# Patient Record
Sex: FEMALE | Race: WHITE | NOT HISPANIC OR LATINO | Employment: PART TIME | URBAN - NONMETROPOLITAN AREA
[De-identification: names, ages, dates, MRNs, and addresses within clinical notes are randomized per-mention and may not be internally consistent; named-entity substitution may affect disease eponyms.]

---

## 2019-02-25 ENCOUNTER — HOSPITAL ENCOUNTER (EMERGENCY)
Facility: HOSPITAL | Age: 39
Discharge: HOME | End: 2019-02-25
Attending: EMERGENCY MEDICINE | Admitting: EMERGENCY MEDICINE
Payer: COMMERCIAL

## 2019-02-25 ENCOUNTER — APPOINTMENT (EMERGENCY)
Dept: RADIOLOGY | Facility: HOSPITAL | Age: 39
End: 2019-02-25
Payer: COMMERCIAL

## 2019-02-25 VITALS
BODY MASS INDEX: 29.23 KG/M2 | SYSTOLIC BLOOD PRESSURE: 126 MMHG | HEIGHT: 63 IN | RESPIRATION RATE: 18 BRPM | WEIGHT: 165 LBS | HEART RATE: 96 BPM | TEMPERATURE: 98.1 F | DIASTOLIC BLOOD PRESSURE: 78 MMHG | OXYGEN SATURATION: 98 %

## 2019-02-25 DIAGNOSIS — J02.9 PHARYNGITIS: Primary | ICD-10-CM

## 2019-02-25 PROCEDURE — 99283 EMERGENCY DEPT VISIT LOW MDM: CPT

## 2019-02-25 RX ORDER — AMOXICILLIN 500 MG/1
500 CAPSULE ORAL EVERY 8 HOURS SCHEDULED
Qty: 21 CAPSULE | Refills: 0 | Status: SHIPPED | OUTPATIENT
Start: 2019-02-25 | End: 2019-03-04

## 2019-02-25 NOTE — ED PROVIDER NOTES
History  Chief Complaint   Patient presents with    Sore Throat     Sore throat, congestion, cough since yesterday     Patient presents to the emergency department today offering a 24 hour history of sore throat  Vague history of a nonproductive cough at best   No ear pain  No nasal congestion  No fevers chills sweats  No sick contents  Denies novel body aches  Denies headache  None       Past Medical History:   Diagnosis Date    Bipolar depression [facility])        Past Surgical History:   Procedure Laterality Date     SECTION         History reviewed  No pertinent family history  I have reviewed and agree with the history as documented  Social History     Tobacco Use    Smoking status: Current Every Day Smoker     Types: Cigarettes    Smokeless tobacco: Never Used   Substance Use Topics    Alcohol use: Never     Frequency: Never    Drug use: Not Currently        Review of Systems   Constitutional: Negative  HENT: Positive for sore throat  Negative for congestion, dental problem, drooling, ear discharge, ear pain, facial swelling, hearing loss, mouth sores, nosebleeds, postnasal drip, rhinorrhea, sinus pressure, sinus pain, sneezing, tinnitus, trouble swallowing and voice change  Eyes: Negative  Respiratory: Positive for cough  Negative for choking, chest tightness, shortness of breath, wheezing and stridor  Cardiovascular: Negative  Gastrointestinal: Negative  Endocrine: Negative  Genitourinary: Negative  Musculoskeletal: Negative  Skin: Negative  Allergic/Immunologic: Negative  Neurological: Negative  Hematological: Negative  Psychiatric/Behavioral: Negative  All other systems reviewed and are negative  Physical Exam  Physical Exam   Constitutional: She is oriented to person, place, and time  She appears well-developed and well-nourished  Non-toxic appearance  She does not appear ill  No distress  HENT:   Head: Normocephalic  Mouth/Throat: No oral lesions  No uvula swelling  Posterior oropharyngeal erythema present  No oropharyngeal exudate, posterior oropharyngeal edema or tonsillar abscesses  No tonsillar exudate  Eyes: Pupils are equal, round, and reactive to light  Neck: Normal range of motion  Cardiovascular: Normal rate  Pulmonary/Chest: Effort normal    Abdominal: Soft  Neurological: She is alert and oriented to person, place, and time  Skin: Skin is warm  Capillary refill takes less than 2 seconds  Psychiatric: She has a normal mood and affect  Vitals reviewed  Vital Signs  ED Triage Vitals [02/25/19 1023]   Temperature Pulse Respirations Blood Pressure SpO2   98 1 °F (36 7 °C) 96 18 126/78 98 %      Temp Source Heart Rate Source Patient Position - Orthostatic VS BP Location FiO2 (%)   Temporal Monitor Sitting Right arm --      Pain Score       No Pain           Vitals:    02/25/19 1023   BP: 126/78   Pulse: 96   Patient Position - Orthostatic VS: Sitting       Visual Acuity      ED Medications  Medications - No data to display    Diagnostic Studies  Results Reviewed     None                 XR chest 2 views    (Results Pending)              Procedures  Procedures       Phone Contacts  ED Phone Contact    ED Course  ED Course as of Feb 25 1157   Mon Feb 25, 2019   1152 Blood Pressure: 126/78   1152 Temperature: 98 1 °F (36 7 °C)   1152 Pulse: 96   1152 Respirations: 18   1152 SpO2: 98 %                               MDM    Disposition  Final diagnoses:   Pharyngitis     Time reflects when diagnosis was documented in both MDM as applicable and the Disposition within this note     Time User Action Codes Description Comment    2/25/2019 11:56 AM Cheryle Legions D Add [J02 9] Pharyngitis       ED Disposition     ED Disposition Condition Date/Time Comment    Discharge Good Mon Feb 25, 2019 11:56 AM Laila Jeffery discharge to home/self care              Follow-up Information    None         Patient's Medications Discharge Prescriptions    AMOXICILLIN (AMOXIL) 500 MG CAPSULE    Take 1 capsule (500 mg total) by mouth every 8 (eight) hours for 7 days       Start Date: 2/25/2019 End Date: 3/4/2019       Order Dose: 500 mg       Quantity: 21 capsule    Refills: 0     No discharge procedures on file      ED Provider  Electronically Signed by           Bryan Fung PA-C  02/25/19 1159

## 2019-04-23 ENCOUNTER — HOSPITAL ENCOUNTER (EMERGENCY)
Facility: HOSPITAL | Age: 39
Discharge: HOME | End: 2019-04-23
Attending: EMERGENCY MEDICINE
Payer: COMMERCIAL

## 2019-04-23 VITALS
OXYGEN SATURATION: 95 % | RESPIRATION RATE: 14 BRPM | BODY MASS INDEX: 31.21 KG/M2 | TEMPERATURE: 97.6 F | WEIGHT: 176.2 LBS | SYSTOLIC BLOOD PRESSURE: 139 MMHG | DIASTOLIC BLOOD PRESSURE: 88 MMHG | HEART RATE: 88 BPM

## 2019-04-23 DIAGNOSIS — J01.10 ACUTE FRONTAL SINUSITIS, RECURRENCE NOT SPECIFIED: Primary | ICD-10-CM

## 2019-04-23 PROCEDURE — 99283 EMERGENCY DEPT VISIT LOW MDM: CPT | Performed by: EMERGENCY MEDICINE

## 2019-04-23 PROCEDURE — 99283 EMERGENCY DEPT VISIT LOW MDM: CPT

## 2019-04-23 RX ORDER — DOXYCYCLINE HYCLATE 100 MG/1
100 CAPSULE ORAL ONCE
Status: COMPLETED | OUTPATIENT
Start: 2019-04-23 | End: 2019-04-23

## 2019-04-23 RX ORDER — DOXYCYCLINE HYCLATE 100 MG/1
100 CAPSULE ORAL 2 TIMES DAILY
Qty: 14 CAPSULE | Refills: 0 | Status: SHIPPED | OUTPATIENT
Start: 2019-04-23 | End: 2019-04-30

## 2019-04-23 RX ORDER — FLUTICASONE PROPIONATE 50 MCG
1 SPRAY, SUSPENSION (ML) NASAL DAILY
Qty: 16 G | Refills: 0 | Status: SHIPPED | OUTPATIENT
Start: 2019-04-23 | End: 2021-10-07

## 2019-04-23 RX ADMIN — DOXYCYCLINE HYCLATE 100 MG: 100 CAPSULE ORAL at 09:58

## 2020-12-26 ENCOUNTER — HOSPITAL ENCOUNTER (EMERGENCY)
Facility: HOSPITAL | Age: 40
Discharge: HOME | End: 2020-12-26
Attending: EMERGENCY MEDICINE | Admitting: EMERGENCY MEDICINE
Payer: COMMERCIAL

## 2020-12-26 ENCOUNTER — APPOINTMENT (EMERGENCY)
Dept: ULTRASOUND IMAGING | Facility: HOSPITAL | Age: 40
End: 2020-12-26
Payer: COMMERCIAL

## 2020-12-26 VITALS
OXYGEN SATURATION: 99 % | RESPIRATION RATE: 18 BRPM | WEIGHT: 198 LBS | TEMPERATURE: 97.5 F | HEART RATE: 80 BPM | BODY MASS INDEX: 35.07 KG/M2 | DIASTOLIC BLOOD PRESSURE: 69 MMHG | SYSTOLIC BLOOD PRESSURE: 119 MMHG

## 2020-12-26 DIAGNOSIS — D64.9 ANEMIA: ICD-10-CM

## 2020-12-26 DIAGNOSIS — N93.8 DYSFUNCTIONAL UTERINE BLEEDING: Primary | ICD-10-CM

## 2020-12-26 LAB
ANION GAP SERPL CALCULATED.3IONS-SCNC: 11 MMOL/L (ref 4–13)
BACTERIA UR QL AUTO: ABNORMAL /HPF
BASOPHILS # BLD AUTO: 0.09 THOUSANDS/ΜL (ref 0–0.1)
BASOPHILS NFR BLD AUTO: 1 % (ref 0–1)
BILIRUB UR QL STRIP: NEGATIVE
BUN SERPL-MCNC: 8 MG/DL (ref 5–25)
CALCIUM SERPL-MCNC: 8.4 MG/DL (ref 8.3–10.1)
CHLORIDE SERPL-SCNC: 105 MMOL/L (ref 100–108)
CLARITY UR: ABNORMAL
CO2 SERPL-SCNC: 23 MMOL/L (ref 21–32)
COLOR UR: ABNORMAL
CREAT SERPL-MCNC: 0.79 MG/DL (ref 0.6–1.3)
EOSINOPHIL # BLD AUTO: 0.23 THOUSAND/ΜL (ref 0–0.61)
EOSINOPHIL NFR BLD AUTO: 3 % (ref 0–6)
ERYTHROCYTE [DISTWIDTH] IN BLOOD BY AUTOMATED COUNT: 17.1 % (ref 11.6–15.1)
EXT PREG TEST URINE: NEGATIVE
EXT. CONTROL ED NAV: NORMAL
GFR SERPL CREATININE-BSD FRML MDRD: 94 ML/MIN/1.73SQ M
GLUCOSE SERPL-MCNC: 154 MG/DL (ref 65–140)
GLUCOSE UR STRIP-MCNC: NEGATIVE MG/DL
HCT VFR BLD AUTO: 29.8 % (ref 34.8–46.1)
HGB BLD-MCNC: 8.8 G/DL (ref 11.5–15.4)
HGB UR QL STRIP.AUTO: ABNORMAL
IMM GRANULOCYTES # BLD AUTO: 0.06 THOUSAND/UL (ref 0–0.2)
IMM GRANULOCYTES NFR BLD AUTO: 1 % (ref 0–2)
INR PPP: 1.09 (ref 0.84–1.19)
KETONES UR STRIP-MCNC: NEGATIVE MG/DL
LEUKOCYTE ESTERASE UR QL STRIP: NEGATIVE
LYMPHOCYTES # BLD AUTO: 1.65 THOUSANDS/ΜL (ref 0.6–4.47)
LYMPHOCYTES NFR BLD AUTO: 20 % (ref 14–44)
MCH RBC QN AUTO: 24.4 PG (ref 26.8–34.3)
MCHC RBC AUTO-ENTMCNC: 29.5 G/DL (ref 31.4–37.4)
MCV RBC AUTO: 83 FL (ref 82–98)
MONOCYTES # BLD AUTO: 0.5 THOUSAND/ΜL (ref 0.17–1.22)
MONOCYTES NFR BLD AUTO: 6 % (ref 4–12)
NEUTROPHILS # BLD AUTO: 5.74 THOUSANDS/ΜL (ref 1.85–7.62)
NEUTS SEG NFR BLD AUTO: 69 % (ref 43–75)
NITRITE UR QL STRIP: NEGATIVE
NON-SQ EPI CELLS URNS QL MICRO: ABNORMAL /HPF
NRBC BLD AUTO-RTO: 0 /100 WBCS
PH UR STRIP.AUTO: 6.5 [PH]
PLATELET # BLD AUTO: 251 THOUSANDS/UL (ref 149–390)
PMV BLD AUTO: 9.6 FL (ref 8.9–12.7)
POTASSIUM SERPL-SCNC: 4.1 MMOL/L (ref 3.5–5.3)
PROT UR STRIP-MCNC: ABNORMAL MG/DL
PROTHROMBIN TIME: 13.9 SECONDS (ref 11.6–14.5)
RBC # BLD AUTO: 3.6 MILLION/UL (ref 3.81–5.12)
RBC #/AREA URNS AUTO: ABNORMAL /HPF
SODIUM SERPL-SCNC: 139 MMOL/L (ref 136–145)
SP GR UR STRIP.AUTO: 1.03 (ref 1–1.03)
TSH SERPL DL<=0.05 MIU/L-ACNC: 2.17 UIU/ML (ref 0.36–3.74)
UROBILINOGEN UR QL STRIP.AUTO: 0.2 E.U./DL
WBC # BLD AUTO: 8.27 THOUSAND/UL (ref 4.31–10.16)
WBC #/AREA URNS AUTO: ABNORMAL /HPF

## 2020-12-26 PROCEDURE — 81001 URINALYSIS AUTO W/SCOPE: CPT | Performed by: EMERGENCY MEDICINE

## 2020-12-26 PROCEDURE — 80048 BASIC METABOLIC PNL TOTAL CA: CPT | Performed by: EMERGENCY MEDICINE

## 2020-12-26 PROCEDURE — 99284 EMERGENCY DEPT VISIT MOD MDM: CPT

## 2020-12-26 PROCEDURE — 85025 COMPLETE CBC W/AUTO DIFF WBC: CPT | Performed by: EMERGENCY MEDICINE

## 2020-12-26 PROCEDURE — 84443 ASSAY THYROID STIM HORMONE: CPT | Performed by: EMERGENCY MEDICINE

## 2020-12-26 PROCEDURE — 76856 US EXAM PELVIC COMPLETE: CPT

## 2020-12-26 PROCEDURE — 96365 THER/PROPH/DIAG IV INF INIT: CPT

## 2020-12-26 PROCEDURE — 81025 URINE PREGNANCY TEST: CPT | Performed by: EMERGENCY MEDICINE

## 2020-12-26 PROCEDURE — 36415 COLL VENOUS BLD VENIPUNCTURE: CPT | Performed by: EMERGENCY MEDICINE

## 2020-12-26 PROCEDURE — 99285 EMERGENCY DEPT VISIT HI MDM: CPT | Performed by: EMERGENCY MEDICINE

## 2020-12-26 PROCEDURE — 76830 TRANSVAGINAL US NON-OB: CPT

## 2020-12-26 PROCEDURE — 85610 PROTHROMBIN TIME: CPT | Performed by: EMERGENCY MEDICINE

## 2020-12-26 PROCEDURE — 96375 TX/PRO/DX INJ NEW DRUG ADDON: CPT

## 2020-12-26 RX ORDER — ACETAMINOPHEN 325 MG/1
975 TABLET ORAL ONCE
Status: COMPLETED | OUTPATIENT
Start: 2020-12-26 | End: 2020-12-26

## 2020-12-26 RX ORDER — FERROUS SULFATE 325(65) MG
325 TABLET ORAL DAILY
Qty: 30 TABLET | Refills: 1 | Status: SHIPPED | OUTPATIENT
Start: 2020-12-26 | End: 2021-10-07

## 2020-12-26 RX ORDER — TRANEXAMIC ACID 650 1/1
650 TABLET ORAL 3 TIMES DAILY
Qty: 15 TABLET | Refills: 0 | Status: SHIPPED | OUTPATIENT
Start: 2020-12-26 | End: 2020-12-31

## 2020-12-26 RX ORDER — FERROUS SULFATE 325(65) MG
325 TABLET ORAL ONCE
Status: COMPLETED | OUTPATIENT
Start: 2020-12-26 | End: 2020-12-26

## 2020-12-26 RX ORDER — FENTANYL CITRATE 50 UG/ML
50 INJECTION, SOLUTION INTRAMUSCULAR; INTRAVENOUS ONCE
Status: COMPLETED | OUTPATIENT
Start: 2020-12-26 | End: 2020-12-26

## 2020-12-26 RX ADMIN — TRANEXAMIC ACID 1000 MG: 1 INJECTION, SOLUTION INTRAVENOUS at 12:55

## 2020-12-26 RX ADMIN — FENTANYL CITRATE 50 MCG: 0.05 INJECTION, SOLUTION INTRAMUSCULAR; INTRAVENOUS at 09:53

## 2020-12-26 RX ADMIN — FERROUS SULFATE TAB 325 MG (65 MG ELEMENTAL FE) 325 MG: 325 (65 FE) TAB at 10:12

## 2020-12-26 RX ADMIN — ACETAMINOPHEN 975 MG: 325 TABLET, FILM COATED ORAL at 09:51

## 2020-12-30 ENCOUNTER — TELEPHONE (OUTPATIENT)
Dept: OBGYN CLINIC | Facility: MEDICAL CENTER | Age: 40
End: 2020-12-30

## 2021-04-19 ENCOUNTER — IMMUNIZATIONS (OUTPATIENT)
Dept: FAMILY MEDICINE CLINIC | Facility: HOSPITAL | Age: 41
End: 2021-04-19

## 2021-04-19 DIAGNOSIS — Z23 ENCOUNTER FOR IMMUNIZATION: Primary | ICD-10-CM

## 2021-04-19 PROCEDURE — 91301 SARS-COV-2 / COVID-19 MRNA VACCINE (MODERNA) 100 MCG: CPT

## 2021-04-19 PROCEDURE — 0011A SARS-COV-2 / COVID-19 MRNA VACCINE (MODERNA) 100 MCG: CPT

## 2021-05-18 ENCOUNTER — IMMUNIZATIONS (OUTPATIENT)
Dept: FAMILY MEDICINE CLINIC | Facility: HOSPITAL | Age: 41
End: 2021-05-18

## 2021-05-18 DIAGNOSIS — Z23 ENCOUNTER FOR IMMUNIZATION: Primary | ICD-10-CM

## 2021-05-18 PROCEDURE — 91301 SARS-COV-2 / COVID-19 MRNA VACCINE (MODERNA) 100 MCG: CPT

## 2021-05-18 PROCEDURE — 0012A SARS-COV-2 / COVID-19 MRNA VACCINE (MODERNA) 100 MCG: CPT

## 2021-09-30 ENCOUNTER — OFFICE VISIT (OUTPATIENT)
Dept: URGENT CARE | Facility: CLINIC | Age: 41
End: 2021-09-30
Payer: COMMERCIAL

## 2021-09-30 VITALS
BODY MASS INDEX: 34.2 KG/M2 | TEMPERATURE: 97.8 F | RESPIRATION RATE: 18 BRPM | HEART RATE: 91 BPM | HEIGHT: 63 IN | OXYGEN SATURATION: 99 % | WEIGHT: 193 LBS

## 2021-09-30 DIAGNOSIS — B34.9 VIRAL ILLNESS: Primary | ICD-10-CM

## 2021-09-30 PROCEDURE — U0005 INFEC AGEN DETEC AMPLI PROBE: HCPCS | Performed by: PHYSICIAN ASSISTANT

## 2021-09-30 PROCEDURE — 99213 OFFICE O/P EST LOW 20 MIN: CPT | Performed by: PHYSICIAN ASSISTANT

## 2021-09-30 PROCEDURE — U0003 INFECTIOUS AGENT DETECTION BY NUCLEIC ACID (DNA OR RNA); SEVERE ACUTE RESPIRATORY SYNDROME CORONAVIRUS 2 (SARS-COV-2) (CORONAVIRUS DISEASE [COVID-19]), AMPLIFIED PROBE TECHNIQUE, MAKING USE OF HIGH THROUGHPUT TECHNOLOGIES AS DESCRIBED BY CMS-2020-01-R: HCPCS | Performed by: PHYSICIAN ASSISTANT

## 2021-09-30 NOTE — PATIENT INSTRUCTIONS
You can take vitamin D3 2000 IU daily, vitamin-C 1 g every 12 hours, and a daily multivitamin  Please check your sugars more frequently  Call your primary care provider and schedule a follow-up tele visit within the next 3 days  Follow CDC guidelines for self quarantine as discussed  [address]    Your healthcare provider and/or public health staff have evaluated you and have determined that you do not need to remain in the hospital at this time  At this time you can be isolated at home where you will be monitored by staff from your local or state health department  You should carefully follow the prevention and isolation steps below until a healthcare provider or local or state health department says that you can return to your normal activities  Stay home except to get medical care    People who are mildly ill with COVID-19 are able to isolate at home during their illness  You should restrict activities outside your home, except for getting medical care  Do not go to work, school, or public areas  Avoid using public transportation, ride-sharing, or taxis  Separate yourself from other people and animals in your home    People: As much as possible, you should stay in a specific room and away from other people in your home  Also, you should use a separate bathroom, if available  Animals: You should restrict contact with pets and other animals while you are sick with COVID-19, just like you would around other people  Although there have not been reports of pets or other animals becoming sick with COVID-19, it is still recommended that people sick with COVID-19 limit contact with animals until more information is known about the virus  When possible, have another member of your household care for your animals while you are sick  If you are sick with COVID-19, avoid contact with your pet, including petting, snuggling, being kissed or licked, and sharing food   If you must care for your pet or be around animals while you are sick, wash your hands before and after you interact with pets and wear a facemask  See COVID-19 and Animals for more information  Call ahead before visiting your doctor    If you have a medical appointment, call the healthcare provider and tell them that you have or may have COVID-19  This will help the healthcare providers office take steps to keep other people from getting infected or exposed  Wear a facemask    You should wear a facemask when you are around other people (e g , sharing a room or vehicle) or pets and before you enter a healthcare providers office  If you are not able to wear a facemask (for example, because it causes trouble breathing), then people who live with you should not stay in the same room with you, or they should wear a facemask if they enter your room  Cover your coughs and sneezes    Cover your mouth and nose with a tissue when you cough or sneeze  Throw used tissues in a lined trash can  Immediately wash your hands with soap and water for at least 20 seconds or, if soap and water are not available, clean your hands with an alcohol-based hand  that contains at least 60% alcohol  Clean your hands often    Wash your hands often with soap and water for at least 20 seconds, especially after blowing your nose, coughing, or sneezing; going to the bathroom; and before eating or preparing food  If soap and water are not readily available, use an alcohol-based hand  with at least 60% alcohol, covering all surfaces of your hands and rubbing them together until they feel dry  Soap and water are the best option if hands are visibly dirty  Avoid touching your eyes, nose, and mouth with unwashed hands  Avoid sharing personal household items    You should not share dishes, drinking glasses, cups, eating utensils, towels, or bedding with other people or pets in your home   After using these items, they should be washed thoroughly with soap and water  Clean all high-touch surfaces everyday    High touch surfaces include counters, tabletops, doorknobs, bathroom fixtures, toilets, phones, keyboards, tablets, and bedside tables  Also, clean any surfaces that may have blood, stool, or body fluids on them  Use a household cleaning spray or wipe, according to the label instructions  Labels contain instructions for safe and effective use of the cleaning product including precautions you should take when applying the product, such as wearing gloves and making sure you have good ventilation during use of the product  Monitor your symptoms    Seek prompt medical attention if your illness is worsening (e g , difficulty breathing)  Before seeking care, call your healthcare provider and tell them that you have, or are being evaluated for, COVID-19  Put on a facemask before you enter the facility  These steps will help the healthcare providers office to keep other people in the office or waiting room from getting infected or exposed  Ask your healthcare provider to call the local or [facility] health department  Persons who are placed under active monitoring or facilitated self-monitoring should follow instructions provided by their local health department or occupational health professionals, as appropriate  If you have a medical emergency and need to call 911, notify the dispatch personnel that you have, or are being evaluated for COVID-19  If possible, put on a facemask before emergency medical services arrive      Discontinuing home isolation    Patients with confirmed COVID-19 should remain under home isolation precautions until the following conditions are met:   - They have had no fever for at least 24 hours (that is one full day of no fever without the use medicine that reduces fevers)  AND  - other symptoms have improved (for example, when their cough or shortness of breath have improved)  AND  - If had mild or moderate illness, at least 10 days have passed since their symptoms first appeared or if severe illness (needed oxygen) or immunosuppressed, at least 20 days have passed since symptoms first appeared  Patients with confirmed COVID-19 should also notify close contacts (including their workplace) and ask that they self-quarantine  Currently, close contact is defined as being within 6 feet for 15 minutes or more from the period 24 hours starting 48 hours before symptom onset to the time at which the patient went into isolation  Close contacts of patients diagnosed with COVID-19 should be instructed by the patient to self-quarantine for 14 days from the last time of their last contact with the patient       Source: [employer]

## 2021-09-30 NOTE — PROGRESS NOTES
[facility]          NAME: Barry Ramírez is a 39 y o  female  : 1980    MRN: [id number]  DATE: 2021  TIME: 3:03 PM    Assessment and Plan   Viral illness [B34 9]  1  Viral illness  Novel Coronavirus (Covid-19),PCR [facility] - Office Collection       Patient Instructions   You can take vitamin D3 2000 IU daily, vitamin-C 1 g every 12 hours, and a daily multivitamin  Please check your sugars more frequently  Call your primary care provider and schedule a follow-up tele visit within the next 3 days  Follow CDC guidelines for self quarantine as discussed  [address]    Your healthcare provider and/or public health staff have evaluated you and have determined that you do not need to remain in the hospital at this time  At this time you can be isolated at home where you will be monitored by staff from your local or state health department  You should carefully follow the prevention and isolation steps below until a healthcare provider or local or state health department says that you can return to your normal activities  Stay home except to get medical care    People who are mildly ill with COVID-19 are able to isolate at home during their illness  You should restrict activities outside your home, except for getting medical care  Do not go to work, school, or public areas  Avoid using public transportation, ride-sharing, or taxis  Separate yourself from other people and animals in your home    People: As much as possible, you should stay in a specific room and away from other people in your home  Also, you should use a separate bathroom, if available  Animals: You should restrict contact with pets and other animals while you are sick with COVID-19, just like you would around other people   Although there have not been reports of pets or other animals becoming sick with COVID-19, it is still recommended that people sick with COVID-19 limit contact with animals until more information is known about the virus  When possible, have another member of your household care for your animals while you are sick  If you are sick with COVID-19, avoid contact with your pet, including petting, snuggling, being kissed or licked, and sharing food  If you must care for your pet or be around animals while you are sick, wash your hands before and after you interact with pets and wear a facemask  See COVID-19 and Animals for more information  Call ahead before visiting your doctor    If you have a medical appointment, call the healthcare provider and tell them that you have or may have COVID-19  This will help the healthcare providers office take steps to keep other people from getting infected or exposed  Wear a facemask    You should wear a facemask when you are around other people (e g , sharing a room or vehicle) or pets and before you enter a healthcare providers office  If you are not able to wear a facemask (for example, because it causes trouble breathing), then people who live with you should not stay in the same room with you, or they should wear a facemask if they enter your room  Cover your coughs and sneezes    Cover your mouth and nose with a tissue when you cough or sneeze  Throw used tissues in a lined trash can  Immediately wash your hands with soap and water for at least 20 seconds or, if soap and water are not available, clean your hands with an alcohol-based hand  that contains at least 60% alcohol  Clean your hands often    Wash your hands often with soap and water for at least 20 seconds, especially after blowing your nose, coughing, or sneezing; going to the bathroom; and before eating or preparing food  If soap and water are not readily available, use an alcohol-based hand  with at least 60% alcohol, covering all surfaces of your hands and rubbing them together until they feel dry    Soap and water are the best option if hands are visibly dirty  Avoid touching your eyes, nose, and mouth with unwashed hands  Avoid sharing personal household items    You should not share dishes, drinking glasses, cups, eating utensils, towels, or bedding with other people or pets in your home  After using these items, they should be washed thoroughly with soap and water  Clean all high-touch surfaces everyday    High touch surfaces include counters, tabletops, doorknobs, bathroom fixtures, toilets, phones, keyboards, tablets, and bedside tables  Also, clean any surfaces that may have blood, stool, or body fluids on them  Use a household cleaning spray or wipe, according to the label instructions  Labels contain instructions for safe and effective use of the cleaning product including precautions you should take when applying the product, such as wearing gloves and making sure you have good ventilation during use of the product  Monitor your symptoms    Seek prompt medical attention if your illness is worsening (e g , difficulty breathing)  Before seeking care, call your healthcare provider and tell them that you have, or are being evaluated for, COVID-19  Put on a facemask before you enter the facility  These steps will help the healthcare providers office to keep other people in the office or waiting room from getting infected or exposed  Ask your healthcare provider to call the local or state health department  Persons who are placed under active monitoring or facilitated self-monitoring should follow instructions provided by their local health department or occupational health professionals, as appropriate  If you have a medical emergency and need to call 911, notify the dispatch personnel that you have, or are being evaluated for COVID-19  If possible, put on a facemask before emergency medical services arrive      Discontinuing home isolation    Patients with confirmed COVID-19 should remain under home isolation precautions until the following conditions are met:   - They have had no fever for at least 24 hours (that is one full day of no fever without the use medicine that reduces fevers)  AND  - other symptoms have improved (for example, when their cough or shortness of breath have improved)  AND  - If had mild or moderate illness, at least 10 days have passed since their symptoms first appeared or if severe illness (needed oxygen) or immunosuppressed, at least 20 days have passed since symptoms first appeared  Patients with confirmed COVID-19 should also notify close contacts (including their workplace) and ask that they self-quarantine  Currently, close contact is defined as being within 6 feet for 15 minutes or more from the period 24 hours starting 48 hours before symptom onset to the time at which the patient went into isolation  Close contacts of patients diagnosed with COVID-19 should be instructed by the patient to self-quarantine for 14 days from the last time of their last contact with the patient  Source: [employer]   To present to the ER if symptoms worsen  Chief Complaint     Chief Complaint   Patient presents with    Cold Like Symptoms     headache, sinus congestion, sore throat for 2 days         History of Present Illness   Azam Davila presents to the clinic c/o    + covid exposure 9/24  Fully vaccinated against covid  Sinusitis  This is a new problem  The current episode started yesterday  The problem is unchanged  There has been no fever  The pain is moderate  Associated symptoms include chills, congestion, coughing, headaches, sinus pressure and a sore throat  Pertinent negatives include no diaphoresis, ear pain or shortness of breath  Past treatments include nothing  The treatment provided no relief  Review of Systems   Review of Systems   Constitutional: Positive for chills  Negative for diaphoresis, fatigue and fever     HENT: Positive for congestion, sinus pressure and sore throat  Negative for ear discharge, ear pain and facial swelling  Eyes: Negative for photophobia, pain, discharge, redness, itching and visual disturbance  Respiratory: Positive for cough  Negative for apnea, chest tightness, shortness of breath and wheezing  Cardiovascular: Negative for chest pain and palpitations  Gastrointestinal: Negative for abdominal pain  Skin: Negative for color change, rash and wound  Neurological: Positive for headaches  Negative for dizziness  Hematological: Negative for adenopathy           Current Medications     Long-Term Medications   Medication Sig Dispense Refill    ferrous sulfate 325 (65 Fe) mg tablet Take 1 tablet (325 mg total) by mouth daily (Patient not taking: Reported on 2021) 30 tablet 1    fluticasone (FLONASE) 50 mcg/act nasal spray 1 spray into each nostril daily (Patient not taking: Reported on 2021) 16 g 0       Current Allergies     Allergies as of 2021 - Reviewed 2021   Allergen Reaction Noted    Clonazepam Other (See Comments) 2019    Latex  2019    Methylphenidate Other (See Comments) 2020            The following portions of the patient's history were reviewed and updated as appropriate: allergies, current medications, past family history, past medical history, past social history, past surgical history and problem list   Past Medical History:   Diagnosis Date    [facility]      Past Surgical History:   Procedure Laterality Date     SECTION       Social History     Socioeconomic History    Marital status: /Civil Union     Spouse name: Not on file    Number of children: Not on file    Years of education: Not on file    Highest education level: Not on file   Occupational History    Not on file   Tobacco Use    Smoking status: Current Every Day Smoker     Packs/day:      Types: Cigarettes    Smokeless tobacco: Never Used   Vaping Use    Vaping Use: Never used   Substance and Sexual Activity    Alcohol use: Never    Drug use: Not Currently    Sexual activity: Not on file   Other Topics Concern    Not on file   Social History Narrative    Not on file     Social Determinants of Health     Financial Resource Strain:     Difficulty of Paying Living Expenses:    Food Insecurity:     Worried About Running Out of Food in the Last Year:     [address] in the Last Year:    Transportation Needs:     Lack of Transportation (Medical):  Lack of Transportation (Non-Medical):    Physical Activity:     Days of Exercise per Week:     Minutes of Exercise per Session:    Stress:     Feeling of Stress :    Social Connections:     Frequency of Communication with Friends and Family:     Frequency of Social Gatherings with Friends and Family:     Attends Denominational Services:     Active Member of Clubs or Organizations:     Attends Club or Organization Meetings:     Marital Status:    Intimate Partner Violence:     Fear of Current or Ex-Partner:     Emotionally Abused:     Physically Abused:     Sexually Abused:        Objective   Pulse 91   Temp 97 8 °F (36 6 °C) (Temporal)   Resp 18   Ht 5' 3" (1 6 m)   Wt 87 5 kg (193 lb)   SpO2 99%   BMI 34 19 kg/m²      Physical Exam     Physical Exam  Vitals and nursing note reviewed  Constitutional:       General: She is not in acute distress  Appearance: She is well-developed  She is not diaphoretic  HENT:      Head: Normocephalic and atraumatic  Right Ear: Tympanic membrane and external ear normal       Left Ear: Tympanic membrane and external ear normal       Nose: Nose normal       Mouth/Throat:      Mouth: Mucous membranes are moist       Pharynx: Oropharynx is clear  No oropharyngeal exudate or posterior oropharyngeal erythema  Eyes:      General: No scleral icterus  Right eye: No discharge  Left eye: No discharge        Conjunctiva/sclera: Conjunctivae normal  Cardiovascular:      Rate and Rhythm: Normal rate and regular rhythm  Heart sounds: Normal heart sounds  No murmur heard  No friction rub  No gallop  Pulmonary:      Effort: Pulmonary effort is normal  No respiratory distress  Breath sounds: Normal breath sounds  No decreased breath sounds, wheezing, rhonchi or rales  Skin:     General: Skin is warm and dry  Coloration: Skin is not pale  Findings: No erythema or rash  Neurological:      Mental Status: She is alert and oriented to person, place, and time  Psychiatric:         Behavior: Behavior normal          Thought Content:  Thought content normal          Judgment: Judgment normal          Trevor Amezcua PA-C

## 2021-09-30 NOTE — LETTER
September 30, 2021     Patient: Sloane Santiago   YOB: 1980   Date of Visit: 9/30/2021       To Whom it May Concern:    Sloane Santiago is under my professional care  She was seen in my office on 9/30/2021  Patient cannot return to work until cleared by medical provider  If you have any questions or concerns, please don't hesitate to call           Sincerely,          Margie uBtts PA-C        CC: No Recipients

## 2021-10-01 ENCOUNTER — TELEPHONE (OUTPATIENT)
Dept: INTERNAL MEDICINE CLINIC | Facility: CLINIC | Age: 41
End: 2021-10-01

## 2021-10-01 LAB — SARS-COV-2 RNA RESP QL NAA+PROBE: NEGATIVE

## 2021-10-07 ENCOUNTER — OFFICE VISIT (OUTPATIENT)
Dept: INTERNAL MEDICINE CLINIC | Facility: CLINIC | Age: 41
End: 2021-10-07
Payer: COMMERCIAL

## 2021-10-07 VITALS
HEART RATE: 82 BPM | HEIGHT: 63 IN | DIASTOLIC BLOOD PRESSURE: 70 MMHG | BODY MASS INDEX: 33.54 KG/M2 | WEIGHT: 189.3 LBS | SYSTOLIC BLOOD PRESSURE: 124 MMHG | OXYGEN SATURATION: 98 % | TEMPERATURE: 97.6 F

## 2021-10-07 DIAGNOSIS — Z13.6 ENCOUNTER FOR SCREENING FOR CARDIOVASCULAR DISORDERS: ICD-10-CM

## 2021-10-07 DIAGNOSIS — M54.2 NECK PAIN: ICD-10-CM

## 2021-10-07 DIAGNOSIS — D50.0 IRON DEFICIENCY ANEMIA DUE TO CHRONIC BLOOD LOSS: ICD-10-CM

## 2021-10-07 DIAGNOSIS — K76.0 FATTY LIVER: ICD-10-CM

## 2021-10-07 DIAGNOSIS — F31.81 BIPOLAR 2 DISORDER (HCC): Primary | ICD-10-CM

## 2021-10-07 DIAGNOSIS — M62.830 BACK SPASM: ICD-10-CM

## 2021-10-07 DIAGNOSIS — Z62.810 PERSONAL HISTORY OF SEXUAL ABUSE IN CHILDHOOD: ICD-10-CM

## 2021-10-07 DIAGNOSIS — Z23 NEED FOR INFLUENZA VACCINATION: ICD-10-CM

## 2021-10-07 DIAGNOSIS — Z91.410 HISTORY OF SEXUAL ABUSE IN ADULTHOOD: ICD-10-CM

## 2021-10-07 DIAGNOSIS — Z12.31 VISIT FOR SCREENING MAMMOGRAM: ICD-10-CM

## 2021-10-07 DIAGNOSIS — F43.10 PTSD (POST-TRAUMATIC STRESS DISORDER): ICD-10-CM

## 2021-10-07 PROBLEM — N85.00 ENDOMETRIAL HYPERPLASIA WITHOUT ATYPIA: Status: ACTIVE | Noted: 2021-02-25

## 2021-10-07 PROBLEM — N92.1 MENOMETRORRHAGIA: Status: ACTIVE | Noted: 2021-03-08

## 2021-10-07 PROBLEM — R10.2 CHRONIC PELVIC PAIN IN FEMALE: Status: ACTIVE | Noted: 2021-03-08

## 2021-10-07 PROBLEM — D27.1 CYSTADENOMA OF LEFT OVARY: Status: ACTIVE | Noted: 2021-04-15

## 2021-10-07 PROBLEM — G89.29 CHRONIC PELVIC PAIN IN FEMALE: Status: RESOLVED | Noted: 2021-03-08 | Resolved: 2021-10-07

## 2021-10-07 PROBLEM — R10.2 CHRONIC PELVIC PAIN IN FEMALE: Status: RESOLVED | Noted: 2021-03-08 | Resolved: 2021-10-07

## 2021-10-07 PROBLEM — N92.1 MENOMETRORRHAGIA: Status: RESOLVED | Noted: 2021-03-08 | Resolved: 2021-10-07

## 2021-10-07 PROBLEM — N85.00 ENDOMETRIAL HYPERPLASIA WITHOUT ATYPIA: Status: RESOLVED | Noted: 2021-02-25 | Resolved: 2021-10-07

## 2021-10-07 PROBLEM — G89.29 CHRONIC PELVIC PAIN IN FEMALE: Status: ACTIVE | Noted: 2021-03-08

## 2021-10-07 PROCEDURE — 99214 OFFICE O/P EST MOD 30 MIN: CPT | Performed by: FAMILY MEDICINE

## 2021-10-07 PROCEDURE — 90471 IMMUNIZATION ADMIN: CPT | Performed by: FAMILY MEDICINE

## 2021-10-07 PROCEDURE — 3725F SCREEN DEPRESSION PERFORMED: CPT | Performed by: FAMILY MEDICINE

## 2021-10-07 PROCEDURE — 90686 IIV4 VACC NO PRSV 0.5 ML IM: CPT | Performed by: FAMILY MEDICINE

## 2021-10-07 RX ORDER — TOPIRAMATE 25 MG/1
25 TABLET ORAL 2 TIMES DAILY
Qty: 60 TABLET | Refills: 4 | Status: SHIPPED | OUTPATIENT
Start: 2021-10-07

## 2021-10-07 RX ORDER — METHOCARBAMOL 500 MG/1
500 TABLET, FILM COATED ORAL 4 TIMES DAILY PRN
Qty: 100 TABLET | Refills: 0 | Status: SHIPPED | OUTPATIENT
Start: 2021-10-07 | End: 2022-03-21

## 2021-10-09 ENCOUNTER — OFFICE VISIT (OUTPATIENT)
Dept: URGENT CARE | Facility: CLINIC | Age: 41
End: 2021-10-09
Payer: COMMERCIAL

## 2021-10-09 ENCOUNTER — HOSPITAL ENCOUNTER (EMERGENCY)
Facility: HOSPITAL | Age: 41
Discharge: HOME | End: 2021-10-09
Attending: INTERNAL MEDICINE
Payer: COMMERCIAL

## 2021-10-09 ENCOUNTER — NURSE TRIAGE (OUTPATIENT)
Dept: OTHER | Facility: OTHER | Age: 41
End: 2021-10-09

## 2021-10-09 VITALS
HEART RATE: 95 BPM | RESPIRATION RATE: 17 BRPM | OXYGEN SATURATION: 98 % | TEMPERATURE: 98.4 F | SYSTOLIC BLOOD PRESSURE: 130 MMHG | DIASTOLIC BLOOD PRESSURE: 71 MMHG

## 2021-10-09 VITALS
HEART RATE: 87 BPM | SYSTOLIC BLOOD PRESSURE: 145 MMHG | OXYGEN SATURATION: 96 % | DIASTOLIC BLOOD PRESSURE: 67 MMHG | RESPIRATION RATE: 18 BRPM | TEMPERATURE: 98.1 F

## 2021-10-09 DIAGNOSIS — F31.9 BIPOLAR DISORDER (HCC): ICD-10-CM

## 2021-10-09 DIAGNOSIS — F41.9 ANXIETY: ICD-10-CM

## 2021-10-09 DIAGNOSIS — F32.A DEPRESSION: ICD-10-CM

## 2021-10-09 DIAGNOSIS — R45.851 SUICIDAL IDEATION: Primary | ICD-10-CM

## 2021-10-09 DIAGNOSIS — Z65.8 PSYCHOSOCIAL STRESSORS: ICD-10-CM

## 2021-10-09 LAB
AMPHETAMINES SERPL QL SCN: NEGATIVE
BARBITURATES UR QL: NEGATIVE
BENZODIAZ UR QL: NEGATIVE
COCAINE UR QL: NEGATIVE
ETHANOL EXG-MCNC: NORMAL MG/DL
EXT PREG TEST URINE: NEGATIVE
EXT. CONTROL ED NAV: NORMAL
METHADONE UR QL: NEGATIVE
OPIATES UR QL SCN: NEGATIVE
OXYCODONE+OXYMORPHONE UR QL SCN: NEGATIVE
PCP UR QL: NEGATIVE
SARS-COV-2 RNA RESP QL NAA+PROBE: NEGATIVE
THC UR QL: NEGATIVE

## 2021-10-09 PROCEDURE — 81025 URINE PREGNANCY TEST: CPT | Performed by: INTERNAL MEDICINE

## 2021-10-09 PROCEDURE — U0003 INFECTIOUS AGENT DETECTION BY NUCLEIC ACID (DNA OR RNA); SEVERE ACUTE RESPIRATORY SYNDROME CORONAVIRUS 2 (SARS-COV-2) (CORONAVIRUS DISEASE [COVID-19]), AMPLIFIED PROBE TECHNIQUE, MAKING USE OF HIGH THROUGHPUT TECHNOLOGIES AS DESCRIBED BY CMS-2020-01-R: HCPCS | Performed by: INTERNAL MEDICINE

## 2021-10-09 PROCEDURE — 99215 OFFICE O/P EST HI 40 MIN: CPT | Performed by: NURSE PRACTITIONER

## 2021-10-09 PROCEDURE — 80307 DRUG TEST PRSMV CHEM ANLYZR: CPT | Performed by: INTERNAL MEDICINE

## 2021-10-09 PROCEDURE — U0005 INFEC AGEN DETEC AMPLI PROBE: HCPCS | Performed by: INTERNAL MEDICINE

## 2021-10-09 PROCEDURE — 82075 ASSAY OF BREATH ETHANOL: CPT | Performed by: INTERNAL MEDICINE

## 2021-10-09 PROCEDURE — 99284 EMERGENCY DEPT VISIT MOD MDM: CPT

## 2021-10-09 PROCEDURE — 99284 EMERGENCY DEPT VISIT MOD MDM: CPT | Performed by: INTERNAL MEDICINE

## 2021-10-09 RX ORDER — HYDROXYZINE HYDROCHLORIDE 25 MG/1
25 TABLET, FILM COATED ORAL EVERY 6 HOURS PRN
Qty: 15 TABLET | Refills: 0 | Status: SHIPPED | OUTPATIENT
Start: 2021-10-09

## 2021-10-09 RX ORDER — SERTRALINE HYDROCHLORIDE 25 MG/1
25 TABLET, FILM COATED ORAL DAILY
Qty: 30 TABLET | Refills: 1 | Status: SHIPPED | OUTPATIENT
Start: 2021-10-09

## 2021-10-11 ENCOUNTER — HOSPITAL ENCOUNTER (OUTPATIENT)
Dept: RADIOLOGY | Facility: HOSPITAL | Age: 41
Discharge: HOME | End: 2021-10-11
Payer: COMMERCIAL

## 2021-10-11 ENCOUNTER — OFFICE VISIT (OUTPATIENT)
Dept: INTERNAL MEDICINE CLINIC | Facility: CLINIC | Age: 41
End: 2021-10-11
Payer: COMMERCIAL

## 2021-10-11 ENCOUNTER — APPOINTMENT (OUTPATIENT)
Dept: LAB | Facility: HOSPITAL | Age: 41
End: 2021-10-11
Payer: COMMERCIAL

## 2021-10-11 VITALS
HEART RATE: 69 BPM | BODY MASS INDEX: 33.13 KG/M2 | TEMPERATURE: 97.4 F | HEIGHT: 63 IN | OXYGEN SATURATION: 98 % | DIASTOLIC BLOOD PRESSURE: 72 MMHG | WEIGHT: 187 LBS | SYSTOLIC BLOOD PRESSURE: 124 MMHG

## 2021-10-11 DIAGNOSIS — Z13.6 ENCOUNTER FOR SCREENING FOR CARDIOVASCULAR DISORDERS: ICD-10-CM

## 2021-10-11 DIAGNOSIS — D50.0 IRON DEFICIENCY ANEMIA DUE TO CHRONIC BLOOD LOSS: ICD-10-CM

## 2021-10-11 DIAGNOSIS — K76.0 FATTY LIVER: ICD-10-CM

## 2021-10-11 DIAGNOSIS — F43.10 PTSD (POST-TRAUMATIC STRESS DISORDER): ICD-10-CM

## 2021-10-11 DIAGNOSIS — M54.2 NECK PAIN: ICD-10-CM

## 2021-10-11 DIAGNOSIS — F31.81 BIPOLAR 2 DISORDER (HCC): ICD-10-CM

## 2021-10-11 DIAGNOSIS — F31.81 BIPOLAR 2 DISORDER (HCC): Primary | ICD-10-CM

## 2021-10-11 LAB
ALBUMIN SERPL BCP-MCNC: 3.9 G/DL (ref 3.5–5)
ALP SERPL-CCNC: 125 U/L (ref 46–116)
ALT SERPL W P-5'-P-CCNC: 40 U/L (ref 12–78)
ANION GAP SERPL CALCULATED.3IONS-SCNC: 12 MMOL/L (ref 4–13)
AST SERPL W P-5'-P-CCNC: 24 U/L (ref 5–45)
BASOPHILS # BLD AUTO: 0.1 THOUSANDS/ΜL (ref 0–0.1)
BASOPHILS NFR BLD AUTO: 1 % (ref 0–1)
BILIRUB SERPL-MCNC: 0.39 MG/DL (ref 0.2–1)
BUN SERPL-MCNC: 9 MG/DL (ref 5–25)
CALCIUM SERPL-MCNC: 8.6 MG/DL (ref 8.3–10.1)
CHLORIDE SERPL-SCNC: 104 MMOL/L (ref 100–108)
CHOLEST SERPL-MCNC: 262 MG/DL (ref 50–200)
CO2 SERPL-SCNC: 21 MMOL/L (ref 21–32)
CREAT SERPL-MCNC: 0.72 MG/DL (ref 0.6–1.3)
EOSINOPHIL # BLD AUTO: 0.32 THOUSAND/ΜL (ref 0–0.61)
EOSINOPHIL NFR BLD AUTO: 3 % (ref 0–6)
ERYTHROCYTE [DISTWIDTH] IN BLOOD BY AUTOMATED COUNT: 18.8 % (ref 11.6–15.1)
FERRITIN SERPL-MCNC: 8 NG/ML (ref 8–388)
GFR SERPL CREATININE-BSD FRML MDRD: 104 ML/MIN/1.73SQ M
GLUCOSE P FAST SERPL-MCNC: 83 MG/DL (ref 65–99)
HCT VFR BLD AUTO: 38.2 % (ref 34.8–46.1)
HDLC SERPL-MCNC: 34 MG/DL
HGB BLD-MCNC: 11.9 G/DL (ref 11.5–15.4)
IMM GRANULOCYTES # BLD AUTO: 0.06 THOUSAND/UL (ref 0–0.2)
IMM GRANULOCYTES NFR BLD AUTO: 1 % (ref 0–2)
IRON SATN MFR SERPL: 13 % (ref 15–50)
IRON SERPL-MCNC: 66 UG/DL (ref 50–170)
LDLC SERPL CALC-MCNC: 182 MG/DL (ref 0–100)
LYMPHOCYTES # BLD AUTO: 2.59 THOUSANDS/ΜL (ref 0.6–4.47)
LYMPHOCYTES NFR BLD AUTO: 28 % (ref 14–44)
MCH RBC QN AUTO: 27.6 PG (ref 26.8–34.3)
MCHC RBC AUTO-ENTMCNC: 31.2 G/DL (ref 31.4–37.4)
MCV RBC AUTO: 89 FL (ref 82–98)
MONOCYTES # BLD AUTO: 0.61 THOUSAND/ΜL (ref 0.17–1.22)
MONOCYTES NFR BLD AUTO: 7 % (ref 4–12)
NEUTROPHILS # BLD AUTO: 5.74 THOUSANDS/ΜL (ref 1.85–7.62)
NEUTS SEG NFR BLD AUTO: 60 % (ref 43–75)
NONHDLC SERPL-MCNC: 228 MG/DL
NRBC BLD AUTO-RTO: 0 /100 WBCS
PLATELET # BLD AUTO: 357 THOUSANDS/UL (ref 149–390)
PMV BLD AUTO: 9.6 FL (ref 8.9–12.7)
POTASSIUM SERPL-SCNC: 4.2 MMOL/L (ref 3.5–5.3)
PROT SERPL-MCNC: 7.3 G/DL (ref 6.4–8.2)
RBC # BLD AUTO: 4.31 MILLION/UL (ref 3.81–5.12)
SODIUM SERPL-SCNC: 137 MMOL/L (ref 136–145)
TIBC SERPL-MCNC: 501 UG/DL (ref 250–450)
TRIGL SERPL-MCNC: 230 MG/DL
TSH SERPL DL<=0.05 MIU/L-ACNC: 1.24 UIU/ML (ref 0.36–3.74)
WBC # BLD AUTO: 9.42 THOUSAND/UL (ref 4.31–10.16)

## 2021-10-11 PROCEDURE — 85025 COMPLETE CBC W/AUTO DIFF WBC: CPT

## 2021-10-11 PROCEDURE — 99213 OFFICE O/P EST LOW 20 MIN: CPT | Performed by: NURSE PRACTITIONER

## 2021-10-11 PROCEDURE — 72050 X-RAY EXAM NECK SPINE 4/5VWS: CPT

## 2021-10-11 PROCEDURE — 36415 COLL VENOUS BLD VENIPUNCTURE: CPT

## 2021-10-11 PROCEDURE — 83550 IRON BINDING TEST: CPT

## 2021-10-11 PROCEDURE — 84443 ASSAY THYROID STIM HORMONE: CPT

## 2021-10-11 PROCEDURE — 82728 ASSAY OF FERRITIN: CPT

## 2021-10-11 PROCEDURE — 3008F BODY MASS INDEX DOCD: CPT | Performed by: NURSE PRACTITIONER

## 2021-10-11 PROCEDURE — 83540 ASSAY OF IRON: CPT

## 2021-10-11 PROCEDURE — 80053 COMPREHEN METABOLIC PANEL: CPT

## 2021-10-11 PROCEDURE — 80061 LIPID PANEL: CPT

## 2021-10-18 ENCOUNTER — HOSPITAL ENCOUNTER (OUTPATIENT)
Dept: MAMMOGRAPHY | Facility: HOSPITAL | Age: 41
Discharge: HOME | End: 2021-10-18
Payer: COMMERCIAL

## 2021-10-18 VITALS — WEIGHT: 187 LBS | BODY MASS INDEX: 33.13 KG/M2 | HEIGHT: 63 IN

## 2021-10-18 DIAGNOSIS — Z12.31 VISIT FOR SCREENING MAMMOGRAM: ICD-10-CM

## 2021-10-18 PROCEDURE — 77067 SCR MAMMO BI INCL CAD: CPT

## 2021-10-18 PROCEDURE — 77063 BREAST TOMOSYNTHESIS BI: CPT

## 2021-10-21 ENCOUNTER — TELEPHONE (OUTPATIENT)
Dept: FAMILY MEDICINE CLINIC | Facility: CLINIC | Age: 41
End: 2021-10-21

## 2021-11-08 ENCOUNTER — EVALUATION (OUTPATIENT)
Dept: PHYSICAL THERAPY | Facility: CLINIC | Age: 41
End: 2021-11-08
Payer: COMMERCIAL

## 2021-11-08 DIAGNOSIS — M54.2 NECK PAIN: Primary | ICD-10-CM

## 2021-11-08 DIAGNOSIS — M62.830 BACK SPASM: ICD-10-CM

## 2021-11-08 PROCEDURE — 97535 SELF CARE MNGMENT TRAINING: CPT | Performed by: PHYSICAL THERAPIST

## 2021-11-08 PROCEDURE — 97112 NEUROMUSCULAR REEDUCATION: CPT | Performed by: PHYSICAL THERAPIST

## 2021-11-08 PROCEDURE — 97162 PT EVAL MOD COMPLEX 30 MIN: CPT | Performed by: PHYSICAL THERAPIST

## 2021-11-08 PROCEDURE — 97140 MANUAL THERAPY 1/> REGIONS: CPT | Performed by: PHYSICAL THERAPIST

## 2021-11-15 ENCOUNTER — OFFICE VISIT (OUTPATIENT)
Dept: PHYSICAL THERAPY | Facility: CLINIC | Age: 41
End: 2021-11-15
Payer: COMMERCIAL

## 2021-11-15 DIAGNOSIS — M62.830 BACK SPASM: Primary | ICD-10-CM

## 2021-11-15 PROCEDURE — 97535 SELF CARE MNGMENT TRAINING: CPT | Performed by: PHYSICAL THERAPIST

## 2021-11-15 PROCEDURE — 97110 THERAPEUTIC EXERCISES: CPT | Performed by: PHYSICAL THERAPIST

## 2021-11-15 PROCEDURE — 97140 MANUAL THERAPY 1/> REGIONS: CPT | Performed by: PHYSICAL THERAPIST

## 2021-11-22 ENCOUNTER — OFFICE VISIT (OUTPATIENT)
Dept: PHYSICAL THERAPY | Facility: CLINIC | Age: 41
End: 2021-11-22
Payer: COMMERCIAL

## 2021-11-22 DIAGNOSIS — M62.830 BACK SPASM: ICD-10-CM

## 2021-11-22 DIAGNOSIS — M54.2 NECK PAIN: Primary | ICD-10-CM

## 2021-11-22 PROCEDURE — 97535 SELF CARE MNGMENT TRAINING: CPT | Performed by: PHYSICAL THERAPIST

## 2021-11-22 PROCEDURE — 97140 MANUAL THERAPY 1/> REGIONS: CPT | Performed by: PHYSICAL THERAPIST

## 2021-11-22 PROCEDURE — 97110 THERAPEUTIC EXERCISES: CPT | Performed by: PHYSICAL THERAPIST

## 2021-11-29 ENCOUNTER — OFFICE VISIT (OUTPATIENT)
Dept: PHYSICAL THERAPY | Facility: CLINIC | Age: 41
End: 2021-11-29
Payer: COMMERCIAL

## 2021-11-29 DIAGNOSIS — M54.2 NECK PAIN: Primary | ICD-10-CM

## 2021-11-29 DIAGNOSIS — M62.830 BACK SPASM: ICD-10-CM

## 2021-11-29 PROCEDURE — 97110 THERAPEUTIC EXERCISES: CPT | Performed by: PHYSICAL THERAPIST

## 2021-11-29 PROCEDURE — 97140 MANUAL THERAPY 1/> REGIONS: CPT | Performed by: PHYSICAL THERAPIST

## 2021-12-06 ENCOUNTER — EVALUATION (OUTPATIENT)
Dept: PHYSICAL THERAPY | Facility: CLINIC | Age: 41
End: 2021-12-06
Payer: COMMERCIAL

## 2021-12-06 DIAGNOSIS — M54.2 NECK PAIN: Primary | ICD-10-CM

## 2021-12-06 DIAGNOSIS — M62.830 BACK SPASM: ICD-10-CM

## 2021-12-06 PROCEDURE — 97140 MANUAL THERAPY 1/> REGIONS: CPT | Performed by: PHYSICAL THERAPIST

## 2021-12-06 PROCEDURE — 97110 THERAPEUTIC EXERCISES: CPT | Performed by: PHYSICAL THERAPIST

## 2021-12-13 ENCOUNTER — OFFICE VISIT (OUTPATIENT)
Dept: PHYSICAL THERAPY | Facility: CLINIC | Age: 41
End: 2021-12-13
Payer: COMMERCIAL

## 2021-12-13 DIAGNOSIS — M54.2 NECK PAIN: Primary | ICD-10-CM

## 2021-12-13 DIAGNOSIS — M62.830 BACK SPASM: ICD-10-CM

## 2021-12-13 PROCEDURE — 97140 MANUAL THERAPY 1/> REGIONS: CPT | Performed by: PHYSICAL THERAPIST

## 2021-12-13 PROCEDURE — 97110 THERAPEUTIC EXERCISES: CPT | Performed by: PHYSICAL THERAPIST

## 2021-12-20 ENCOUNTER — OFFICE VISIT (OUTPATIENT)
Dept: PHYSICAL THERAPY | Facility: CLINIC | Age: 41
End: 2021-12-20
Payer: COMMERCIAL

## 2021-12-20 DIAGNOSIS — M54.2 NECK PAIN: Primary | ICD-10-CM

## 2021-12-20 DIAGNOSIS — M62.830 BACK SPASM: ICD-10-CM

## 2021-12-20 PROCEDURE — 97110 THERAPEUTIC EXERCISES: CPT | Performed by: PHYSICAL THERAPIST

## 2021-12-20 PROCEDURE — 97140 MANUAL THERAPY 1/> REGIONS: CPT | Performed by: PHYSICAL THERAPIST

## 2021-12-27 ENCOUNTER — TELEPHONE (OUTPATIENT)
Dept: INTERNAL MEDICINE CLINIC | Facility: CLINIC | Age: 41
End: 2021-12-27

## 2021-12-27 ENCOUNTER — APPOINTMENT (OUTPATIENT)
Dept: PHYSICAL THERAPY | Facility: CLINIC | Age: 41
End: 2021-12-27
Payer: COMMERCIAL

## 2021-12-27 DIAGNOSIS — Z20.822 EXPOSURE TO COVID-19 VIRUS: Primary | ICD-10-CM

## 2021-12-27 PROCEDURE — U0005 INFEC AGEN DETEC AMPLI PROBE: HCPCS | Performed by: NURSE PRACTITIONER

## 2021-12-27 PROCEDURE — U0003 INFECTIOUS AGENT DETECTION BY NUCLEIC ACID (DNA OR RNA); SEVERE ACUTE RESPIRATORY SYNDROME CORONAVIRUS 2 (SARS-COV-2) (CORONAVIRUS DISEASE [COVID-19]), AMPLIFIED PROBE TECHNIQUE, MAKING USE OF HIGH THROUGHPUT TECHNOLOGIES AS DESCRIBED BY CMS-2020-01-R: HCPCS | Performed by: NURSE PRACTITIONER

## 2022-01-03 ENCOUNTER — TELEPHONE (OUTPATIENT)
Dept: INTERNAL MEDICINE CLINIC | Facility: CLINIC | Age: 42
End: 2022-01-03

## 2022-01-03 NOTE — LETTER
January 3, 2022    Patient: Chela Moran  YOB: 1980  Date of Last Encounter: 12/27/2021      To whom it may concern:     Chela Moran has tested positive for COVID-19 (Coronavirus)  She may return to work on 1/4/2022  Any questions or concerns, please feel free to call        Sincerely,              MARTHA Gtz

## 2022-01-03 NOTE — TELEPHONE ENCOUNTER
Pt called asking for a RTW note  Stated no symptoms  Started with symptoms on 12/24/21    I did the note and faxed to [phone] Unable to assess

## 2022-01-05 NOTE — PROGRESS NOTES
PT Re-Evaluation     Today's date: 1/10/2022  Patient name: Remedios Maradiaga  : 1980  MRN: [id number]  Referring provider: Valentino Peri, MD  Dx:   Encounter Diagnosis     ICD-10-CM    1  Neck pain  M54 2    2  Back spasm  M62 830                   Assessment  Assessment details:   CURRENT FUNCTIONAL STATUS    Ability to turn neck: Fair/Good  Ability to look up: Fair/Good   Standing/ADL tolerance 60 minutes  Ability to bend forward: Fair/Good     SHORT TERM GOALS (2 WEEKS)    Increase cervical spine AROM 10 % in all restricted planes  Increase lumbar spine AROM 10 % in all restricted planes  Improve sitting posture  Decrease pain to 2-4/10  Ability to turn neck: Good  Ability to look up: Good  Standing/ADL tolerance 2 hours  Ability to bend forward: Good     LONG TERM GOALS (DISCHARGE)    Cervical Spine AROM: WFL in all planes  -partially met  Lumbar Spine AROM: WFL in all planes  -partially met  Sitting posture: Good-partially met  Decrease pain to 1-4/10-partially met  Ability to turn neck: Good-partially met  Ability to look up: Good-partially met  Standing/ADL tolerance 2 hours  -partially met    Ability to bend forward: Fair/Good-partially met  Understanding of Dx/Px/POC: good   Prognosis: good    Goals  See assessment details above  Plan  Plan details: The patient is resuming PT after having COVID recently  She continues to present with pain, decreased ROM, poor posture, and decreased tolerance to activity  The patient would benefit from continued skilled PT services to address these issues and to maximize function  The patient will also continue performing their HEP        Patient would benefit from: skilled physical therapy  Planned modality interventions: cryotherapy and thermotherapy: hydrocollator packs  Planned therapy interventions: manual therapy, neuromuscular re-education, therapeutic exercise, therapeutic activities, self care and home exercise program  Frequency: 1x week  Duration in weeks: 4        Subjective Evaluation    History of Present Illness  Mechanism of injury: Subjective: The patient is resuming treatment after being on hold from having COVID  She did note improvement from treatment, but states that her pain and stiffness have worsened since not attending PT  She would like to continue attending once a week  Pain  Current pain ratin  At best pain rating: 3  At worst pain ratin  Location: Neck 4-5/10, Low back 3-4/10  Progression: improved    Hand dominance: right    Patient Goals  Patient goals for therapy: decreased pain and increased motion  Patient goal: Improved tolerance for standing at work  Objective     Postural Observations    Additional Postural Observation Details  Moderate forward head sitting posture  General Comments:      Cervical/Thoracic Comments  CURRENT OBJECTIVE MEASUREMENTS    Cervical Spine AROM: F=90 %, EXT=90 %, R ROT=75 %, L ROT=75 %  Upper Extremity Strength: R=4 5/5, L=5/5  Upper Extremity Reflexes: Right elbow absent, remainder of UE reflexes +2  Upper Extremity Sensation: Intact to light touch bilaterally  Lumbar Spine AROM: F=75 %, EXT=100 %, R SB=90 %, L SB=90 %  Lower Extremity Strength: R=4 5/5, L=5/5  Lower Extremity Reflexes: Right knee reflex absent, left knee and bilateral ankle +2  Lower Extremity Sensation: Intact to light touch bilaterally    Sitting Posture: Fair                       Precautions: None        Manuals 12/20 1/10 11/22 11/29  12/6  12/13   STM Neck supine RK RK RK RK  RK  RK   Upper Cervical Traction RK RK RK RK  RK  RK   Left UT stretching supine RK RK-bilat RK RK  RK  RK   C-T P/A mob prone RK  RK       RK   L-S STM prone RK  RK       RK                   Neuro Re-Ed               Quadruped  Arm Raises               Quadruped Leg Raises               Quadruped Arm and Leg Raises               Sitting Posture Correction with Roll                               Ther Ex             Head Retraction Sitting 10x  10x 10x 10x  HEP  10x   C EXT AROM 10x 10x 10x 10x  HEP  10x   LTR B     5x         DKTC     5x         Bilateral piriformis stretching    3x 10" 10x10'  10x10"     Flexion in standing 1/10 1/10 2/10 1/10  1/10  1/10   Lumbar flexion in sitting 10x TID HEP             BACK EXT:  F 4, P 3, C 0  P11 3/10  P11 3/10 P11 3/10 P11 3/10  P11 3/10  P11 3/10   PYR AB:  S 7, P 1  P7 3/10  P7 3/10 P7 2/10 P7 2/10  P7 3/10  P7 3/10   Rotary Torso:  S 1, P 3, C 5  P1 3/10  P1 3/10 P1 2/10 P1 2/10  P1 3/10  P1 3/10   Hoist Row: S5  P2 3/10  P2 3/10 P2 2/10 P2 2/10  P2 3/10  P2 3/10   LAT Pulldown  P2 2/10  P2 3/10      P2 2/10 P2 2/10   SA Pulldown P4 3/10 P4 3/10 P4 2/10 P4 2/10  P4 3/10  P4 3/10   Oblique Press  P2 3/10  P2 3/10 P2 2/10 P2 2/10  P2 3/10  P2 3/10                   Ther Activity                               Modalities

## 2022-01-10 ENCOUNTER — EVALUATION (OUTPATIENT)
Dept: PHYSICAL THERAPY | Facility: CLINIC | Age: 42
End: 2022-01-10
Payer: COMMERCIAL

## 2022-01-10 DIAGNOSIS — M54.2 NECK PAIN: Primary | ICD-10-CM

## 2022-01-10 DIAGNOSIS — M62.830 BACK SPASM: ICD-10-CM

## 2022-01-10 PROCEDURE — 97110 THERAPEUTIC EXERCISES: CPT | Performed by: PHYSICAL THERAPIST

## 2022-01-10 PROCEDURE — 97140 MANUAL THERAPY 1/> REGIONS: CPT | Performed by: PHYSICAL THERAPIST

## 2022-01-17 ENCOUNTER — APPOINTMENT (OUTPATIENT)
Dept: PHYSICAL THERAPY | Facility: CLINIC | Age: 42
End: 2022-01-17
Payer: COMMERCIAL

## 2022-01-24 ENCOUNTER — OFFICE VISIT (OUTPATIENT)
Dept: PHYSICAL THERAPY | Facility: CLINIC | Age: 42
End: 2022-01-24
Payer: COMMERCIAL

## 2022-01-24 DIAGNOSIS — M54.2 NECK PAIN: Primary | ICD-10-CM

## 2022-01-24 DIAGNOSIS — M62.830 BACK SPASM: ICD-10-CM

## 2022-01-24 PROCEDURE — 97140 MANUAL THERAPY 1/> REGIONS: CPT | Performed by: PHYSICAL THERAPIST

## 2022-01-24 PROCEDURE — 97110 THERAPEUTIC EXERCISES: CPT | Performed by: PHYSICAL THERAPIST

## 2022-01-24 NOTE — PROGRESS NOTES
Daily Note     Today's date: 2022  Patient name: Helio Lafleur  : 1980  MRN: [id number]  Referring provider: Dimas Rucker MD  Dx:   Encounter Diagnosis     ICD-10-CM    1  Neck pain  M54 2    2  Back spasm  M62 830                   Subjective: Patient states that she was bent over cleaning at work and had increased neck and back pain until the next day  Motrin helped some  She feels that her neck has improved more than her low back in therapy, but she is doing her neck stretches more than her back stretches  Objective: Cervical extension and lumbar flexion moderately limited by local spinal pain  Moderate cervical and lumbar paraspinal spasm  Assessment: Tolerated treatment well, having decreased pain and spasm afterward  Patient would benefit from continued PT      Plan: Progress treatment as tolerated            Precautions: None        Manuals 12/20 1/10 1/24 11/29  12/6  12/13   STM Neck supine RK RK RK RK  RK  RK   Upper Cervical Traction RK RK RK RK  RK  RK   Left UT stretching supine RK RK-bilat RK RK  RK  RK   C-T P/A mob prone RK  RK  RK     RK   L-S STM prone RK  RK  RK     RK                   Neuro Re-Ed               Quadruped  Arm Raises               Quadruped Leg Raises               Quadruped Arm and Leg Raises               Sitting Posture Correction with Roll                               Ther Ex               Head Retraction Sitting 10x  10x  10x  HEP  10x   C EXT AROM 10x 10x  10x  HEP  10x   LTR B              DKTC              Bilateral piriformis stretching     10x10'  10x10"     Flexion in standing 1/10 1/10  1/10  1/10  1/10   Lumbar flexion in sitting 10x TID HEP             BACK EXT:  F 4, P 3, C 0  P11 3/10  P11 3/10 P11 3/10 P11 3/10  P11 3/10  P11 3/10   PYR AB:  S 7, P 1  P7 3/10  P7 3/10 P7 2/10 P7 2/10  P7 3/10  P7 3/10   Rotary Torso:  S 1, P 3, C 5  P1 3/10  P1 3/10 P1 2/10 P1 2/10  P1 3/10  P1 3/10   Hoist Row: S5  P2 3/10  P2 3/10 P2 2/10 P2 2/10  P2 3/10  P2 3/10   LAT Pulldown  P2 2/10  P2 3/10  P2 3/10    P2 2/10 P2 2/10   SA Pulldown P4 3/10 P4 3/10 P4 2/10 P4 2/10  P4 3/10  P4 3/10   Oblique Press  P2 3/10  P2 3/10 P2 2/10 P2 2/10  P2 3/10  P2 3/10                   Ther Activity                               Modalities

## 2022-01-31 ENCOUNTER — OFFICE VISIT (OUTPATIENT)
Dept: PHYSICAL THERAPY | Facility: CLINIC | Age: 42
End: 2022-01-31
Payer: COMMERCIAL

## 2022-01-31 DIAGNOSIS — M54.2 NECK PAIN: Primary | ICD-10-CM

## 2022-01-31 DIAGNOSIS — M62.830 BACK SPASM: ICD-10-CM

## 2022-01-31 PROCEDURE — 97140 MANUAL THERAPY 1/> REGIONS: CPT | Performed by: PHYSICAL THERAPIST

## 2022-01-31 PROCEDURE — 97110 THERAPEUTIC EXERCISES: CPT | Performed by: PHYSICAL THERAPIST

## 2022-01-31 NOTE — PROGRESS NOTES
PT Discharge    Today's date: 2022  Patient name: Mark Carmen  : 1980  MRN: [id number]  Referring provider: Neva Huddleston MD  Dx:   Encounter Diagnosis     ICD-10-CM    1  Neck pain  M54 2    2  Back spasm  M62 830                   Assessment  Assessment details:   CURRENT FUNCTIONAL STATUS    Ability to turn neck: Fair/Good  Ability to look up: Fair/Good   Standing/ADL tolerance 60 minutes  Ability to bend forward: Fair      LONG TERM GOALS (DISCHARGE)    Cervical Spine AROM: WFL in all planes  -partially met  Lumbar Spine AROM: WFL in all planes  -partially met  Sitting posture: Good-partially met  Decrease pain to 1-4/10-partially met  Ability to turn neck: Good-partially met  Ability to look up: Good-partially met  Standing/ADL tolerance 2 hours  -partially met    Ability to bend forward: Fair/Good-partially met  Understanding of Dx/Px/POC: good   Prognosis: good    Goals  See assessment details above  Plan  Plan details: The patient has shown some improvement in PT demonstrating decreased pain, increased range of motion, improved posture, and increased tolerance to activity  As per her request she has been discharged to a home exercise program           Patient would benefit from: skilled physical therapy  Planned modality interventions: cryotherapy and thermotherapy: hydrocollator packs  Planned therapy interventions: manual therapy, neuromuscular re-education, therapeutic exercise, therapeutic activities, self care and home exercise program        Subjective Evaluation    History of Present Illness  Mechanism of injury: Subjective: The patient states that there has been more improvement in her neck than in her back from therapy  She notes occasional tingling in both hands and feet, and weakness in both ankles  Recently she was bent over cleaning, and had a significant increase in low back pain   She feels that she has achieved maximum benefit from treatment at this time and requests discharge to a [facility]  Pain  Current pain ratin  At best pain rating: 3  At worst pain ratin  Location: Neck 4-10, Low back 3-4/10  Progression: improved    Hand dominance: right    Patient Goals  Patient goals for therapy: decreased pain and increased motion  Patient goal: Improved tolerance for standing at work  Objective     Postural Observations    Additional Postural Observation Details  Moderate forward head sitting posture  General Comments:      Cervical/Thoracic Comments  CURRENT OBJECTIVE MEASUREMENTS    Cervical Spine AROM: F=90 %, EXT=75 %, R ROT=90 %, L ROT=75 %  Upper Extremity Strength: R=4 5/5, L=5/5  Upper Extremity Sensation: Intact to light touch bilaterally  Lumbar Spine AROM: F=75 %, EXT=100 %, R SB=90 %, L SB=90 %  Lower Extremity Strength: R=4 5/5, L=5/5  Lower Extremity Sensation: Intact to light touch bilaterally    Sitting Posture: Fair                       Precautions: None        Manuals 12/20 1/10 1/24 1/31  12/6  12/13   STM Neck supine RK RK RK RK  RK  RK   Upper Cervical Traction RK RK RK RK  RK  RK   Left UT stretching supine RK RK-bilat RK RK  RK  RK   C-T P/A mob prone RK  RK  RK  RK   RK   L-S STM prone RK  RK  RK  RK   RK                   Neuro Re-Ed               Quadruped  Arm Raises               Quadruped Leg Raises               Quadruped Arm and Leg Raises               Sitting Posture Correction with Roll                               Ther Ex               Head Retraction Sitting 10x  10x   10x BID HEP  HEP  10x   C EXT AROM 10x 10x   10x BID HEP  HEP  10x   LTR B               DKTC               Bilateral piriformis stretching       10x10' BID HEP  10x10"     Lumbar Flexion in standing 1/10 1/10   1/10 BID HEP  1/10  1/10   Lumbar flexion in sitting 10x TID HEP             BACK EXT:  F 4, P 3, C 0  P11 3/10  P11 3/10 P11 3/10 P11 3/10  P11 3/10  P11 3/10   PYR AB:  S 7, P 1  P7 3/10  P7 3/10 P7 2/10 P7 2/10  P7 3/10  P7 3/10   Rotary Torso:  S 1, P 3, C 5  P1 3/10  P1 3/10 P1 2/10 P1 2/10  P1 3/10  P1 3/10   Hoist Row: S5  P2 3/10  P2 3/10 P2 2/10 P2 2/10  P2 3/10  P2 3/10   LAT Pulldown  P2 2/10  P2 3/10  P2 3/10  P2 3/10  P2 2/10 P2 2/10   SA Pulldown P4 3/10 P4 3/10 P4 2/10 P4 2/10  P4 3/10  P4 3/10   Oblique Press  P2 3/10  P2 3/10 P2 2/10 P2 2/10  P2 3/10  P2 3/10                   Ther Activity                               Modalities

## 2022-01-31 NOTE — PROGRESS NOTES
Daily Note     Today's date: 2022  Patient name: Mar Goss  : 1980  MRN: [id number]  Referring provider: Rdaha Maurice MD  Dx: No diagnosis found  Subjective: ***      Objective: See treatment diary below      Assessment: Tolerated treatment {Tolerated treatment :3822173451}   Patient {assessment:8058010131}      Plan: {PLAN:2840910349}        Precautions: None        Manuals 12/20 1/10 1/24 1/31  12/6  12/13   STM Neck supine RK RK RK RK  RK  RK   Upper Cervical Traction RK RK RK RK  RK  RK   Left UT stretching supine RK RK-bilat RK RK  RK  RK   C-T P/A mob prone RK  RK  RK  RK   RK   L-S STM prone RK  RK  RK  RK   RK                   Neuro Re-Ed               Quadruped  Arm Raises               Quadruped Leg Raises               Quadruped Arm and Leg Raises               Sitting Posture Correction with Roll                               Ther Ex               Head Retraction Sitting 10x  10x    HEP  10x   C EXT AROM 10x 10x    HEP  10x   LTR B             DKTC             Bilateral piriformis stretching       10x10"     Flexion in standing 1/10 1/10  1/10  1/10  1/10   Lumbar flexion in sitting 10x TID HEP             BACK EXT:  F 4, P 3, C 0  P11 3/10  P11 3/10 P11 3/10 P11 3/10  P11 3/10  P11 3/10   PYR AB:  S 7, P 1  P7 3/10  P7 3/10 P7 2/10 P7 3/10  P7 3/10  P7 3/10   Rotary Torso:  S 1, P 3, C 5  P1 3/10  P1 3/10 P1 2/10 P1 3/10  P1 3/10  P1 3/10   Hoist Row: S5  P2 3/10  P2 3/10 P2 2/10 P2 3/10  P2 3/10  P2 3/10   LAT Pulldown  P2 2/10  P2 3/10  P2 3/10  P2 3/10  P2 2/10 P2 2/10   SA Pulldown P4 3/10 P4 3/10 P4 2/10 P4 3/10  P4 3/10  P4 3/10   Oblique Press  P2 3/10  P2 3/10 P2 2/10 P2 2/10  P2 3/10  P2 3/10                   Ther Activity                               Modalities

## 2022-03-15 ENCOUNTER — TELEPHONE (OUTPATIENT)
Dept: INTERNAL MEDICINE CLINIC | Facility: CLINIC | Age: 42
End: 2022-03-15

## 2022-03-15 NOTE — TELEPHONE ENCOUNTER
Patient called stating that she thinks that she has either diverticulitis or a kidney stone  Since patient woke up this morning she has pain in left side between waist and groin  She does not have a fever  She doesn't know what to do, if she needs an appointment or just get testing done

## 2022-03-16 ENCOUNTER — OFFICE VISIT (OUTPATIENT)
Dept: URGENT CARE | Facility: MEDICAL CENTER | Age: 42
End: 2022-03-16
Payer: COMMERCIAL

## 2022-03-16 VITALS
WEIGHT: 177 LBS | BODY MASS INDEX: 31.35 KG/M2 | RESPIRATION RATE: 18 BRPM | OXYGEN SATURATION: 97 % | HEART RATE: 74 BPM | DIASTOLIC BLOOD PRESSURE: 74 MMHG | TEMPERATURE: 97.4 F | SYSTOLIC BLOOD PRESSURE: 108 MMHG

## 2022-03-16 DIAGNOSIS — M54.50 ACUTE LEFT-SIDED LOW BACK PAIN WITHOUT SCIATICA: Primary | ICD-10-CM

## 2022-03-16 LAB
SL AMB  POCT GLUCOSE, UA: NEGATIVE
SL AMB LEUKOCYTE ESTERASE,UA: NEGATIVE
SL AMB POCT BILIRUBIN,UA: 0.2
SL AMB POCT BLOOD,UA: NEGATIVE
SL AMB POCT CLARITY,UA: NORMAL
SL AMB POCT COLOR,UA: NORMAL
SL AMB POCT KETONES,UA: NEGATIVE
SL AMB POCT NITRITE,UA: NEGATIVE
SL AMB POCT PH,UA: 5
SL AMB POCT SPECIFIC GRAVITY,UA: 1.03
SL AMB POCT URINE PROTEIN: NEGATIVE
SL AMB POCT UROBILINOGEN: 0.2

## 2022-03-16 PROCEDURE — 99212 OFFICE O/P EST SF 10 MIN: CPT | Performed by: PHYSICIAN ASSISTANT

## 2022-03-16 RX ORDER — METHOCARBAMOL 750 MG/1
750 TABLET, FILM COATED ORAL EVERY 6 HOURS PRN
Qty: 20 TABLET | Refills: 0 | Status: SHIPPED | OUTPATIENT
Start: 2022-03-16 | End: 2022-03-21

## 2022-03-16 NOTE — PATIENT INSTRUCTIONS
Back Pain   AMBULATORY CARE:   Back pain  is common  You may have back pain and muscle spasms  You may feel sore or stiff on one or both sides of your back  The pain may spread to your lower body  Conditions that affect the spine, joints, or muscles can cause back pain  These may include arthritis, spinal stenosis (narrowing of the spinal column), muscle tension, or breakdown of the spinal discs  Call your local emergency number (911 in the [address] [facility]) if:   · You have severe back pain with chest pain  · You cannot control your urine or bowel movements  · Your pain becomes so severe that you cannot walk  Seek care immediately if:   · You have pain, numbness, or weakness in one or both legs  · You have severe back pain, nausea, and vomiting  · You have severe back pain that spreads to your side or genital area  Call your doctor if:   · You have back pain that does not get better with rest and pain medicine  · You have a fever  · You have pain that worsens when you are on your back or when you rest     · You have pain that worsens when you cough or sneeze  · You lose weight without trying  · You have questions or concerns about your condition or care  Medicines: You may need any of the following:  · NSAIDs , such as ibuprofen, help decrease swelling, pain, and fever  This medicine is available with or without a doctor's order  NSAIDs can cause stomach bleeding or kidney problems in certain people  If you take blood thinner medicine, always ask your healthcare provider if NSAIDs are safe for you  Always read the medicine label and follow directions  · Acetaminophen  decreases pain and fever  It is available without a doctor's order  Ask how much to take and how often to take it  Follow directions  Read the labels of all other medicines you are using to see if they also contain acetaminophen, or ask your doctor or pharmacist  Acetaminophen can cause liver damage if not taken correctly   Do not use more than 4 grams (4,000 milligrams) total of acetaminophen in one day  · Muscle relaxers  help decrease muscle spasms and back pain  · Take your medicine as directed  Contact your healthcare provider if you think your medicine is not helping or if you have side effects  Tell him or her if you are allergic to any medicine  Keep a list of the medicines, vitamins, and herbs you take  Include the amounts, and when and why you take them  Bring the list or the pill bottles to follow-up visits  Carry your medicine list with you in case of an emergency  Manage your back pain:   · Apply ice  on your back for 15 to 20 minutes every hour or as directed  Use an ice pack, or put crushed ice in a plastic bag  Cover it with a towel before you apply it to your skin  Ice helps prevent tissue damage and decreases pain  · Apply heat  on your back for 20 to 30 minutes every 2 hours for as many days as directed  Heat helps decrease pain and muscle spasms  · Stay active  as much as you can without causing more pain  Bed rest could make your back pain worse  Avoid heavy lifting until your pain is gone  · Go to physical therapy as directed  A physical therapist can teach you exercises to help improve movement and strength, and to decrease pain  Follow up with your doctor in 2 weeks, or as directed: You might need to see a specialist  Write down your questions so you remember to ask them during your visits  © Copyright [employer] 2022 Information is for End User's use only and may not be sold, redistributed or otherwise used for commercial purposes  All illustrations and images included in CareNotes® are the copyrighted property of A D A M , Inc  or [facility] [employer]   The above information is an  only  It is not intended as medical advice for individual conditions or treatments   Talk to your doctor, nurse or pharmacist before following any medical regimen to see if it is safe and effective for you

## 2022-03-16 NOTE — LETTER
March 16, 2022     Patient: Price Kocher   YOB: 1980   Date of Visit: 3/16/2022       To Whom It May Concern: It is my medical opinion that Price Kocher be excused form work 03/16/2022  If you have any questions or concerns, please don't hesitate to call           Sincerely,        Armando Cruz PA-C    CC: No Recipients

## 2022-03-16 NOTE — PROGRESS NOTES
[facility]        NAME: Helio Lafleur is a 39 y o  female  : 1980    MRN: [id number]  DATE: 2022  TIME: 10:45 AM    Assessment and Plan   Acute left-sided low back pain without sciatica [M54 50]  1  Acute left-sided low back pain without sciatica  POCT urine dip auto non-scope    methocarbamol (Robaxin-750) 750 mg tablet         Patient Instructions       Follow up with PCP in 3-5 days  Proceed to  ER if symptoms worsen  Chief Complaint     Chief Complaint   Patient presents with    Back Pain     lower back pain that radiates toleft flank and down to groin  History of Present Illness       Patient presents with a 2 day history of lower abdominal pain and back pain  She has a history of previous of diverticulitis,  UTIs, as well as, kidney stones  No fever or chills  No known injuries and no radiation of pain into lower extremities  No bowel or bladder incontinence, paresthesias lower extremities or lower extremity muscle weakness  Review of Systems   Review of Systems   Constitutional: Negative for chills and fever  Gastrointestinal: Positive for nausea  Negative for diarrhea and vomiting  Genitourinary: Negative for difficulty urinating, dysuria, frequency, hematuria and urgency  Musculoskeletal: Positive for back pain  Skin: Negative for rash           Current Medications       Current Outpatient Medications:     atorvastatin (LIPITOR) 10 mg tablet, Take 1 tablet (10 mg total) by mouth daily, Disp: 90 tablet, Rfl: 3    hydrOXYzine HCL (ATARAX) 25 mg tablet, Take 1 tablet (25 mg total) by mouth every 6 (six) hours as needed for anxiety for up to 15 doses, Disp: 15 tablet, Rfl: 0    methocarbamol (ROBAXIN) 500 mg tablet, Take 1 tablet (500 mg total) by mouth 4 (four) times a day as needed for muscle spasms (Patient not taking: Reported on 3/16/2022 ), Disp: 100 tablet, Rfl: 0    methocarbamol (Robaxin-750) 750 mg tablet, Take 1 tablet (750 mg total) by mouth every 6 (six) hours as needed for muscle spasms, Disp: 20 tablet, Rfl: 0    sertraline (Zoloft) 25 mg tablet, Take 1 tablet (25 mg total) by mouth daily, Disp: 30 tablet, Rfl: 1    topiramate (Topamax) 25 mg tablet, Take 1 tablet (25 mg total) by mouth 2 (two) times a day, Disp: 60 tablet, Rfl: 4    Current Allergies     Allergies as of 2022 - Reviewed 2022   Allergen Reaction Noted    Clonazepam Other (See Comments) 2019    Latex  2019    Methylphenidate Other (See Comments) 2020            The following portions of the patient's history were reviewed and updated as appropriate: allergies, current medications, past family history, past medical history, past social history, past surgical history and problem list      Past Medical History:   Diagnosis Date    Anxiety     Bipolar depression ([facility] [address] )     Chronic pelvic pain in female 3/8/2021    Endometrial hyperplasia without atypia 2021    Hypertension     Menometrorrhagia 3/8/2021       Past Surgical History:   Procedure Laterality Date     SECTION      HYSTERECTOMY  2021       Family History   Problem Relation Age of Onset    Heart disease Father     No Known Problems Sister     No Known Problems Daughter     No Known Problems Maternal Grandmother     No Known Problems Maternal Grandfather     No Known Problems Paternal Grandmother     No Known Problems Paternal Grandfather     No Known Problems Son     No Known Problems Son     No Known Problems Son     No Known Problems Maternal Aunt     No Known Problems Maternal Aunt     No Known Problems Paternal Aunt     No Known Problems Paternal Aunt          Medications have been verified  Objective   /74   Pulse 74   Temp (!) 97 4 °F (36 3 °C) (Temporal)   Resp 18   Wt 80 3 kg (177 lb)   LMP 2020   SpO2 97%   BMI 31 35 kg/m²   Patient's last menstrual period was 2020         Physical Exam     Physical Exam

## 2022-03-16 NOTE — TELEPHONE ENCOUNTER
Patient went to [facility] and they gave her muscle relaxers which she hasn't started taking  I told her that if that medication does not help then let us know

## 2022-03-17 ENCOUNTER — HOSPITAL ENCOUNTER (EMERGENCY)
Facility: HOSPITAL | Age: 42
Discharge: HOME | End: 2022-03-17
Attending: EMERGENCY MEDICINE | Admitting: EMERGENCY MEDICINE
Payer: COMMERCIAL

## 2022-03-17 ENCOUNTER — APPOINTMENT (EMERGENCY)
Dept: CT IMAGING | Facility: HOSPITAL | Age: 42
End: 2022-03-17
Payer: COMMERCIAL

## 2022-03-17 VITALS
HEART RATE: 78 BPM | TEMPERATURE: 97.8 F | SYSTOLIC BLOOD PRESSURE: 111 MMHG | DIASTOLIC BLOOD PRESSURE: 62 MMHG | RESPIRATION RATE: 16 BRPM | OXYGEN SATURATION: 95 %

## 2022-03-17 DIAGNOSIS — M54.50 ACUTE LOW BACK PAIN: Primary | ICD-10-CM

## 2022-03-17 LAB
ALBUMIN SERPL BCP-MCNC: 4.4 G/DL (ref 3.5–5)
ALP SERPL-CCNC: 138 U/L (ref 46–116)
ALT SERPL W P-5'-P-CCNC: 40 U/L (ref 12–78)
ANION GAP SERPL CALCULATED.3IONS-SCNC: 7 MMOL/L (ref 4–13)
AST SERPL W P-5'-P-CCNC: 23 U/L (ref 5–45)
BASOPHILS # BLD AUTO: 0.08 THOUSANDS/ΜL (ref 0–0.1)
BASOPHILS NFR BLD AUTO: 1 % (ref 0–1)
BILIRUB SERPL-MCNC: 0.47 MG/DL (ref 0.2–1)
BUN SERPL-MCNC: 9 MG/DL (ref 5–25)
CALCIUM SERPL-MCNC: 9 MG/DL (ref 8.3–10.1)
CHLORIDE SERPL-SCNC: 101 MMOL/L (ref 100–108)
CO2 SERPL-SCNC: 28 MMOL/L (ref 21–32)
CREAT SERPL-MCNC: 0.82 MG/DL (ref 0.6–1.3)
EOSINOPHIL # BLD AUTO: 0.23 THOUSAND/ΜL (ref 0–0.61)
EOSINOPHIL NFR BLD AUTO: 2 % (ref 0–6)
ERYTHROCYTE [DISTWIDTH] IN BLOOD BY AUTOMATED COUNT: 14.3 % (ref 11.6–15.1)
GFR SERPL CREATININE-BSD FRML MDRD: 89 ML/MIN/1.73SQ M
GLUCOSE SERPL-MCNC: 72 MG/DL (ref 65–140)
HCT VFR BLD AUTO: 44 % (ref 34.8–46.1)
HGB BLD-MCNC: 14.4 G/DL (ref 11.5–15.4)
IMM GRANULOCYTES # BLD AUTO: 0.04 THOUSAND/UL (ref 0–0.2)
IMM GRANULOCYTES NFR BLD AUTO: 0 % (ref 0–2)
LIPASE SERPL-CCNC: 69 U/L (ref 73–393)
LYMPHOCYTES # BLD AUTO: 3.55 THOUSANDS/ΜL (ref 0.6–4.47)
LYMPHOCYTES NFR BLD AUTO: 32 % (ref 14–44)
MCH RBC QN AUTO: 31.6 PG (ref 26.8–34.3)
MCHC RBC AUTO-ENTMCNC: 32.7 G/DL (ref 31.4–37.4)
MCV RBC AUTO: 97 FL (ref 82–98)
MONOCYTES # BLD AUTO: 0.85 THOUSAND/ΜL (ref 0.17–1.22)
MONOCYTES NFR BLD AUTO: 8 % (ref 4–12)
NEUTROPHILS # BLD AUTO: 6.46 THOUSANDS/ΜL (ref 1.85–7.62)
NEUTS SEG NFR BLD AUTO: 57 % (ref 43–75)
NRBC BLD AUTO-RTO: 0 /100 WBCS
PLATELET # BLD AUTO: 331 THOUSANDS/UL (ref 149–390)
PMV BLD AUTO: 10.2 FL (ref 8.9–12.7)
POTASSIUM SERPL-SCNC: 4 MMOL/L (ref 3.5–5.3)
PROT SERPL-MCNC: 8.1 G/DL (ref 6.4–8.2)
RBC # BLD AUTO: 4.55 MILLION/UL (ref 3.81–5.12)
SODIUM SERPL-SCNC: 136 MMOL/L (ref 136–145)
WBC # BLD AUTO: 11.21 THOUSAND/UL (ref 4.31–10.16)

## 2022-03-17 PROCEDURE — 96361 HYDRATE IV INFUSION ADD-ON: CPT

## 2022-03-17 PROCEDURE — 36415 COLL VENOUS BLD VENIPUNCTURE: CPT | Performed by: PHYSICIAN ASSISTANT

## 2022-03-17 PROCEDURE — 80053 COMPREHEN METABOLIC PANEL: CPT | Performed by: PHYSICIAN ASSISTANT

## 2022-03-17 PROCEDURE — 83690 ASSAY OF LIPASE: CPT | Performed by: PHYSICIAN ASSISTANT

## 2022-03-17 PROCEDURE — 85025 COMPLETE CBC W/AUTO DIFF WBC: CPT | Performed by: PHYSICIAN ASSISTANT

## 2022-03-17 PROCEDURE — 99284 EMERGENCY DEPT VISIT MOD MDM: CPT | Performed by: PHYSICIAN ASSISTANT

## 2022-03-17 PROCEDURE — 74177 CT ABD & PELVIS W/CONTRAST: CPT

## 2022-03-17 PROCEDURE — 96374 THER/PROPH/DIAG INJ IV PUSH: CPT

## 2022-03-17 PROCEDURE — 99284 EMERGENCY DEPT VISIT MOD MDM: CPT

## 2022-03-17 RX ORDER — NAPROXEN 500 MG/1
500 TABLET ORAL 2 TIMES DAILY WITH MEALS
Qty: 30 TABLET | Refills: 0 | Status: SHIPPED | OUTPATIENT
Start: 2022-03-17 | End: 2022-08-01

## 2022-03-17 RX ORDER — KETOROLAC TROMETHAMINE 30 MG/ML
15 INJECTION, SOLUTION INTRAMUSCULAR; INTRAVENOUS ONCE
Status: COMPLETED | OUTPATIENT
Start: 2022-03-17 | End: 2022-03-17

## 2022-03-17 RX ADMIN — SODIUM CHLORIDE 1000 ML: 0.9 INJECTION, SOLUTION INTRAVENOUS at 17:43

## 2022-03-17 RX ADMIN — IOHEXOL 100 ML: 350 INJECTION, SOLUTION INTRAVENOUS at 19:02

## 2022-03-17 RX ADMIN — KETOROLAC TROMETHAMINE 15 MG: 30 INJECTION, SOLUTION INTRAMUSCULAR; INTRAVENOUS at 17:43

## 2022-03-17 NOTE — Clinical Note
Sapna Turk was seen and treated in our emergency department on 3/17/2022  No restrictions            Diagnosis: low back strain    Clovis Maradiaga    She may return on this date: 03/20/2022         If you have any questions or concerns, please don't hesitate to call        Ewelina Kelly PA-C    ______________________________           _______________          _______________  Hospital Representative                              Date                                Time

## 2022-03-17 NOTE — ED PROVIDER NOTES
History  Chief Complaint   Patient presents with    Back Pain     Started w/ L groin pain now radiating into b/l lower back  Seen at [facility] yesterday, r/o UTI  Started on muscle relaxers w/ no relief  Patient presents to the emergency department today for evaluation of back pain  She actually started about 2-3 days ago with left lower quadrant abdominal pain the radiating into the low back  She states for the most part the abdominal pain has subsided  She states her main concern is bilateral low back pain  No history of trauma  She denies urinary urgency frequency or burning  Denies constipation or diarrhea  She has an urgent care yesterday had a negative urinalysis was discharged home on muscle relaxers but she states she is scared to them therefore did not take them  She does have a history of diverticulitis  Denies vaginal bleeding or discharge  She has had hysterectomy in the past           Prior to Admission Medications   Prescriptions Last Dose Informant Patient Reported?  Taking?   atorvastatin (LIPITOR) 10 mg tablet   No No   Sig: Take 1 tablet (10 mg total) by mouth daily   hydrOXYzine HCL (ATARAX) 25 mg tablet  Self No No   Sig: Take 1 tablet (25 mg total) by mouth every 6 (six) hours as needed for anxiety for up to 15 doses   methocarbamol (ROBAXIN) 500 mg tablet  Self No No   Sig: Take 1 tablet (500 mg total) by mouth 4 (four) times a day as needed for muscle spasms   Patient not taking: Reported on 3/16/2022    methocarbamol (Robaxin-750) 750 mg tablet   No No   Sig: Take 1 tablet (750 mg total) by mouth every 6 (six) hours as needed for muscle spasms   sertraline (Zoloft) 25 mg tablet  Self No No   Sig: Take 1 tablet (25 mg total) by mouth daily   topiramate (Topamax) 25 mg tablet  Self No No   Sig: Take 1 tablet (25 mg total) by mouth 2 (two) times a day      Facility-Administered Medications: None       Past Medical History:   Diagnosis Date    Anxiety     [facility]     Chronic pelvic pain in female 3/8/2021    Endometrial hyperplasia without atypia 2021    Hypertension     Menometrorrhagia 3/8/2021       Past Surgical History:   Procedure Laterality Date     SECTION      HYSTERECTOMY  2021       Family History   Problem Relation Age of Onset    Heart disease Father     No Known Problems Sister     No Known Problems Daughter     No Known Problems Maternal Grandmother     No Known Problems Maternal Grandfather     No Known Problems Paternal Grandmother     No Known Problems Paternal Grandfather     No Known Problems Son     No Known Problems Son     No Known Problems Son     No Known Problems Maternal Aunt     No Known Problems Maternal Aunt     No Known Problems Paternal Aunt     No Known Problems Paternal Aunt      I have reviewed and agree with the history as documented  E-Cigarette/Vaping    E-Cigarette Use Current Every Day User     Cartridges/Day 0 5 ppd      E-Cigarette/Vaping Substances     Social History     Tobacco Use    Smoking status: Current Every Day Smoker     Packs/day: 0 50     Types: Cigarettes    Smokeless tobacco: Never Used   Vaping Use    Vaping Use: Every day   Substance Use Topics    Alcohol use: Never    Drug use: Never       Review of Systems   Constitutional: Negative for chills and fever  HENT: Negative for ear pain and sore throat  Eyes: Negative for pain and visual disturbance  Respiratory: Negative for cough and shortness of breath  Cardiovascular: Negative for chest pain and palpitations  Gastrointestinal: Positive for abdominal pain and nausea  Negative for vomiting  Genitourinary: Negative for dysuria and hematuria  Musculoskeletal: Positive for back pain  Negative for arthralgias  Skin: Negative for color change and rash  Neurological: Negative for seizures and syncope  All other systems reviewed and are negative        Physical Exam  Physical Exam  Vitals and nursing note reviewed  Constitutional:       General: She is not in acute distress  Appearance: She is well-developed  She is not ill-appearing, toxic-appearing or diaphoretic  HENT:      Head: Normocephalic and atraumatic  Right Ear: External ear normal  No swelling  Tympanic membrane is not bulging  Left Ear: External ear normal  No swelling  Tympanic membrane is not bulging  Nose: Nose normal       Mouth/Throat:      Pharynx: No oropharyngeal exudate  Eyes:      General: Lids are normal       Conjunctiva/sclera: Conjunctivae normal       Pupils: Pupils are equal, round, and reactive to light  Neck:      Thyroid: No thyromegaly  Vascular: No JVD  Trachea: No tracheal deviation  Cardiovascular:      Rate and Rhythm: Normal rate and regular rhythm  Pulses: Normal pulses  Heart sounds: Normal heart sounds  No murmur heard  No friction rub  No gallop  Pulmonary:      Effort: Pulmonary effort is normal  No respiratory distress  Breath sounds: Normal breath sounds  No stridor  No wheezing or rales  Chest:      Chest wall: No tenderness  Abdominal:      General: Bowel sounds are normal  There is no distension  Palpations: Abdomen is soft  There is no mass  Tenderness: There is abdominal tenderness  There is no guarding or rebound  Negative signs include Snow's sign  Hernia: No hernia is present  Comments: LLQ and RLQ tenderness   Musculoskeletal:         General: No tenderness  Normal range of motion  Cervical back: Normal range of motion and neck supple  No edema  Normal range of motion  Lymphadenopathy:      Cervical: No cervical adenopathy  Skin:     General: Skin is warm and dry  Capillary Refill: Capillary refill takes less than 2 seconds  Coloration: Skin is not pale  Findings: No erythema or rash  Neurological:      General: No focal deficit present        Mental Status: She is alert and oriented to person, place, and time       GCS: GCS eye subscore is 4  GCS verbal subscore is 5  GCS motor subscore is 6  Cranial Nerves: No cranial nerve deficit  Sensory: No sensory deficit  Deep Tendon Reflexes: Reflexes are normal and symmetric  Psychiatric:         Mood and Affect: Mood normal          Speech: Speech normal          Behavior: Behavior normal          Thought Content:  Thought content normal          Judgment: Judgment normal          Vital Signs  ED Triage Vitals [03/17/22 1638]   Temperature Pulse Respirations Blood Pressure SpO2   97 8 °F (36 6 °C) 89 15 127/83 96 %      Temp Source Heart Rate Source Patient Position - Orthostatic VS BP Location FiO2 (%)   Temporal Monitor Sitting Right arm --      Pain Score       7           Vitals:    03/17/22 1638 03/17/22 1830   BP: 127/83 111/62   Pulse: 89 78   Patient Position - Orthostatic VS: Sitting Lying         Visual Acuity      ED Medications  Medications   sodium chloride 0 9 % bolus 1,000 mL (0 mL Intravenous Stopped 3/17/22 1844)   ketorolac (TORADOL) injection 15 mg (15 mg Intravenous Given 3/17/22 1743)   iohexol (OMNIPAQUE) 350 MG/ML injection (SINGLE-DOSE) 100 mL (100 mL Intravenous Given 3/17/22 1902)       Diagnostic Studies  Results Reviewed     Procedure Component Value Units Date/Time    Comprehensive metabolic panel [[id number]]  (Abnormal) Collected: 03/17/22 1743    Lab Status: Final result Specimen: Blood from Arm, Left Updated: 03/17/22 1805     Sodium 136 mmol/L      Potassium 4 0 mmol/L      Chloride 101 mmol/L      CO2 28 mmol/L      ANION GAP 7 mmol/L      BUN 9 mg/dL      Creatinine 0 82 mg/dL      Glucose 72 mg/dL      Calcium 9 0 mg/dL      AST 23 U/L      ALT 40 U/L      Alkaline Phosphatase 138 U/L      Total Protein 8 1 g/dL      Albumin 4 4 g/dL      Total Bilirubin 0 47 mg/dL      eGFR 89 ml/min/1 73sq m     Narrative:      [place] guidelines for Chronic Kidney Disease (CKD):     Stage 1 with normal or high GFR (GFR > 90 mL/min/1 73 square meters)    Stage 2 Mild CKD (GFR = 60-89 mL/min/1 73 square meters)    Stage 3A Moderate CKD (GFR = 45-59 mL/min/1 73 square meters)    Stage 3B Moderate CKD (GFR = 30-44 mL/min/1 73 square meters)    Stage 4 Severe CKD (GFR = 15-29 mL/min/1 73 square meters)    Stage 5 End Stage CKD (GFR <15 mL/min/1 73 square meters)  Note: GFR calculation is accurate only with a steady state creatinine    Lipase [[id number]]  (Abnormal) Collected: 03/17/22 1743    Lab Status: Final result Specimen: Blood from Arm, Left Updated: 03/17/22 1805     Lipase 69 u/L     CBC and differential [[id number]]  (Abnormal) Collected: 03/17/22 1743    Lab Status: Final result Specimen: Blood from Arm, Left Updated: 03/17/22 1751     WBC 11 21 Thousand/uL      RBC 4 55 Million/uL      Hemoglobin 14 4 g/dL      Hematocrit 44 0 %      MCV 97 fL      MCH 31 6 pg      MCHC 32 7 g/dL      RDW 14 3 %      MPV 10 2 fL      Platelets 385 Thousands/uL      nRBC 0 /100 WBCs      Neutrophils Relative 57 %      Immat GRANS % 0 %      Lymphocytes Relative 32 %      Monocytes Relative 8 %      Eosinophils Relative 2 %      Basophils Relative 1 %      Neutrophils Absolute 6 46 Thousands/µL      Immature Grans Absolute 0 04 Thousand/uL      Lymphocytes Absolute 3 55 Thousands/µL      Monocytes Absolute 0 85 Thousand/µL      Eosinophils Absolute 0 23 Thousand/µL      Basophils Absolute 0 08 Thousands/µL     UA (URINE) with reflex to Scope [[id number]]     Lab Status: No result Specimen: Urine                  CT abdomen pelvis with contrast   Final Result by Jina Matias MD (03/17 2008)         1  Hepatomegaly  2   Hysterectomy  Suspected 3 1 cm residual right ovary  Clinical correlation is needed  3   Colonic diverticulosis without diverticulitis              Workstation performed: [id number]                    Procedures  Procedures         ED Course  ED Course as of 03/17/22 2048   Thu Mar 17, 2022   1328 Blood Pressure: 127/83   1652 Temperature: 97 8 °F (36 6 °C)   1652 Pulse: 89   1652 Respirations: 15   1652 SpO2: 96 %   1811 WBC(!): 11 21   1811 Hemoglobin: 14 4   1811 Platelet Count: 025   1811 Sodium: 136   1811 CO2: 28   1811 eGFR: 89   1811 Lipase(!): 69   1840 Awaiting urine and CT scan   1854 Pt at [address] interpretation and urine   2040 IMPRESSION:        1  Hepatomegaly  2   Hysterectomy  Suspected 3 1 cm residual right ovary  Clinical correlation is needed  3   Colonic diverticulosis without diverticulitis                                                 MDM    Disposition  Final diagnoses:   Acute low back pain     Time reflects when diagnosis was documented in both MDM as applicable and the Disposition within this note     Time User Action Codes Description Comment    3/17/2022  8:42 PM Albaro Ryan [M54 50] Acute low back pain       ED Disposition     ED Disposition Condition Date/Time Comment    Discharge Stable u Mar 17, 2022  8:42 PM Sapna Turk discharge to home/self care  Follow-up Information     Follow up With Specialties Details Why Contact Info    Reina Hermosillo MD [facility] Medicine Schedule an appointment as soon as possible for a visit  As needed [address] [place] [address]  [phone]            Patient's Medications   Discharge Prescriptions    NAPROXEN (NAPROSYN) 500 MG TABLET    Take 1 tablet (500 mg total) by mouth 2 (two) times a day with meals       Start Date: 3/17/2022 End Date: --       Order Dose: 500 mg       Quantity: 30 tablet    Refills: 0       No discharge procedures on file      PDMP Review     None          ED Provider  Electronically Signed by           Ewelina Kelly PA-C  03/17/22 2048

## 2022-03-18 ENCOUNTER — TELEPHONE (OUTPATIENT)
Dept: INTERNAL MEDICINE CLINIC | Facility: CLINIC | Age: 42
End: 2022-03-18

## 2022-03-18 NOTE — TELEPHONE ENCOUNTER
Pt called stated she needed to schedule an ED f/u appt  Stated she had LBP  She did not get the scripts yet from the ED  Stated still has some pain but waiting for pharmacy to open to get other med    Informed her to take meds and call Monday if f/u appt is needed

## 2022-03-21 ENCOUNTER — OFFICE VISIT (OUTPATIENT)
Dept: INTERNAL MEDICINE CLINIC | Facility: CLINIC | Age: 42
End: 2022-03-21
Payer: COMMERCIAL

## 2022-03-21 ENCOUNTER — HOSPITAL ENCOUNTER (OUTPATIENT)
Dept: RADIOLOGY | Facility: HOSPITAL | Age: 42
Discharge: HOME | End: 2022-03-21
Payer: COMMERCIAL

## 2022-03-21 VITALS — OXYGEN SATURATION: 97 % | SYSTOLIC BLOOD PRESSURE: 114 MMHG | HEART RATE: 87 BPM | DIASTOLIC BLOOD PRESSURE: 70 MMHG

## 2022-03-21 DIAGNOSIS — M54.50 CHRONIC BILATERAL LOW BACK PAIN WITHOUT SCIATICA: Primary | ICD-10-CM

## 2022-03-21 DIAGNOSIS — G89.29 CHRONIC BILATERAL LOW BACK PAIN WITHOUT SCIATICA: Primary | ICD-10-CM

## 2022-03-21 DIAGNOSIS — M54.50 CHRONIC BILATERAL LOW BACK PAIN WITHOUT SCIATICA: ICD-10-CM

## 2022-03-21 DIAGNOSIS — G89.29 CHRONIC BILATERAL LOW BACK PAIN WITHOUT SCIATICA: ICD-10-CM

## 2022-03-21 PROCEDURE — 72110 X-RAY EXAM L-2 SPINE 4/>VWS: CPT

## 2022-03-21 PROCEDURE — 99213 OFFICE O/P EST LOW 20 MIN: CPT | Performed by: FAMILY MEDICINE

## 2022-03-21 RX ORDER — CYCLOBENZAPRINE HCL 5 MG
5 TABLET ORAL 3 TIMES DAILY PRN
Qty: 30 TABLET | Refills: 0 | Status: SHIPPED | OUTPATIENT
Start: 2022-03-21 | End: 2022-08-01
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
## 2022-03-21 NOTE — LETTER
March 21, 2022     Patient: Rachelle Gerardo   YOB: 1980   Date of Visit: 3/21/2022       To Whom it May Concern:    Rachelle Gerardo is under my professional care  She was seen in my office on 3/21/2022  She may return to work on 03/24/22  If you have any questions or concerns, please don't hesitate to call           Sincerely,          Nicole Jolley MD        CC: Rachelle Gerardo

## 2022-04-18 ENCOUNTER — TELEPHONE (OUTPATIENT)
Dept: PAIN MEDICINE | Facility: CLINIC | Age: 42
End: 2022-04-18

## 2022-04-18 ENCOUNTER — TELEPHONE (OUTPATIENT)
Dept: OBGYN CLINIC | Facility: HOSPITAL | Age: 42
End: 2022-04-18

## 2022-05-31 ENCOUNTER — CONSULT (OUTPATIENT)
Dept: PAIN MEDICINE | Facility: CLINIC | Age: 42
End: 2022-05-31
Payer: COMMERCIAL

## 2022-05-31 VITALS
TEMPERATURE: 98.7 F | BODY MASS INDEX: 33.17 KG/M2 | DIASTOLIC BLOOD PRESSURE: 72 MMHG | HEIGHT: 63 IN | HEART RATE: 94 BPM | SYSTOLIC BLOOD PRESSURE: 108 MMHG | WEIGHT: 187.2 LBS

## 2022-05-31 DIAGNOSIS — M54.12 CERVICAL RADICULOPATHY: ICD-10-CM

## 2022-05-31 DIAGNOSIS — G89.29 CHRONIC BILATERAL LOW BACK PAIN WITHOUT SCIATICA: ICD-10-CM

## 2022-05-31 DIAGNOSIS — M54.50 CHRONIC BILATERAL LOW BACK PAIN WITHOUT SCIATICA: ICD-10-CM

## 2022-05-31 DIAGNOSIS — M54.2 NECK PAIN: Primary | ICD-10-CM

## 2022-05-31 PROCEDURE — 99204 OFFICE O/P NEW MOD 45 MIN: CPT | Performed by: ANESTHESIOLOGY

## 2022-05-31 NOTE — PATIENT INSTRUCTIONS
Core Strengthening Exercises   WHAT YOU NEED TO KNOW:   What do I need to know about core strengthening exercises? Your core includes the muscles of your lower back, hip, pelvis, and abdomen  Core strengthening exercises help heal and strengthen these muscles  This helps prevent another injury, and keeps your pelvis, spine, and hips in the correct position  What do I need to know about exercise safety? Talk to your healthcare provider before you start an exercise program  A physical therapist can teach you how to do core strengthening exercises safely  Do the exercises on a mat or firm surface  A firm surface will support your spine and prevent low back pain  Do not do these exercises on a bed  Move slowly and smoothly  Avoid fast or jerky motions  Stop if you feel pain  Core exercises should not be painful  Stop if you feel pain  Breathe normally during core exercises  Do not hold your breath  This may cause an increase in blood pressure and prevent muscle strengthening  Your healthcare provider will tell you when to inhale and exhale during the exercise  Begin all of your exercises with abdominal bracing  Abdominal bracing helps warm up your core muscles  You can also practice abdominal bracing throughout the day  Lie on your back with your knees bent and feet flat on the floor  Place your arms in a relaxed position beside your body  Tighten your abdominal muscles  Pull your belly button in and up toward your spine  Hold for 5 seconds  Relax your muscles  Repeat 10 times  How do I perform core strengthening exercises? Your healthcare provider will tell you how often to do these exercises  The provider will also tell you how many repetitions of each exercise you should do  Hold each exercise for 5 seconds or as directed  As you get stronger, increase your hold to 10 to 15 seconds  You can do some of these exercises on a stability ball, or with a weight   Ask your healthcare provider how to use a stability ball or weight for these exercises:  Bridging:  Lie on your back with your knees bent and feet flat on the floor  Rest your arms at your side  Tighten your buttocks, and then lift your hips 1 inch off the floor  Hold for 5 seconds  When you can do this exercise without pain for 10 seconds, increase the distance you lift your hips  A good goal is to be able to lift your hips so that your shoulders, hips, and knees are in a straight line  Dead bug:  Lie on your back with your knees bent and feet flat on the floor  Place your arms in a relaxed position beside your body  Begin with abdominal bracing  Next, raise one leg, keeping your knee bent  Hold for 5 seconds  Repeat with the other leg  When you can do this exercise without pain for 10 to 15 seconds, you may raise one straight leg and hold  Repeat with the other leg  Quadruped:  Place your hands and knees on the floor  Keep your wrists directly below your shoulders and your knees directly below your hips  Pull your belly button in toward your spine  Do not flatten or arch your back  Tighten your abdominal muscles below your belly button  Hold for 5 seconds  When you can do this exercise without pain for 10 to 15 seconds, you may extend one arm and hold  Repeat on the other side  Side bridge exercises:      Standing side bridge:  Stand next to a wall and extend one arm toward the wall  Place your palm flat on the wall with your fingers pointing upward  Begin with abdominal bracing  Next, without moving your feet, slowly bend your arm to 90 degrees  Hold for 5 seconds  Repeat on the other side  When you can do this exercise without pain for 10 to 15 seconds, you may do the bent leg side bridge on the floor  Bent leg side bridge:  Lie on one side with your legs, hips, and shoulders in a straight line  Prop yourself up onto your forearm so your elbow is directly below your shoulder   Bend your knees back to 90 degrees  Begin with abdominal bracing  Next, lift your hips and balance yourself on your forearm and knees  Hold for 5 seconds  Repeat on the other side  When you can do this exercise without pain for 10 to 15 seconds, you may do the straight leg side bridge on the floor  Straight leg side bridge:  Lie on one side with your legs, hips, and shoulders in a straight line  Prop yourself up onto your forearm so your elbow is directly below your shoulder  Begin with abdominal bracing  Lift your hips off the floor and balance yourself on your forearm and the outside of your flexed foot  Do not let your ankle bend sideways  Hold for 5 seconds  Repeat on the other side  When you can do this exercise without pain for 10 to 15 seconds, ask your healthcare provider for more advanced exercises  Superman:  Lie on your stomach  Extend your arms forward on the floor  Tighten your abdominal muscles and lift your right hand and left leg off the floor  Hold this position  Slowly return to the starting position  Tighten your abdominal muscles and lift your left hand and right leg off the floor  Hold this position  Slowly return to the starting position  Clam:  Lie on your side with your knees bent  Put your bottom arm under your head to keep your neck in line  Put your top hand on your hip to keep your pelvis from moving  Put your heels together, and keep them together during this exercise  Slowly raise your top knee toward the ceiling  Then lower your leg so your knees are together  Repeat this exercise 10 times  Then switch sides and do the exercise 10 times with the other leg  Curl up:  Lie on your back with your knees bent and feet flat on the floor  Place your hands, palms down, underneath your lower back  Next, with your elbows on the floor, lift your shoulders and chest 2 to 3 inches off the floor  Keep your head in line with your shoulders  Hold this position   Slowly return to the starting position  Straight leg raises:  Lie on your back with one leg straight  Bend the other knee and place your foot flat on the floor  Tighten your abdominal muscles  Keep your leg straight and slowly lift it straight up 6 to 12 inches off the floor  Hold this position  Lower your leg slowly  Do as many repetitions as directed on this side  Repeat with the other leg  Plank:  Lie on your stomach  Bend your elbows and place your forearms flat on the floor  Lift your chest, stomach, and knees off the floor  Make sure your elbows are below your shoulders  Your body should be in a straight line  Do not let your hips or lower back sink to the ground  Squeeze your abdominal muscles together and hold for 15 seconds  To make this exercise harder, hold for 30 seconds or lift 1 leg at a time  Bicycles:  Lie on your back  Bend both knees and bring them toward your chest  Your calves should be parallel to the floor  Place the palms of your hands on the back of your head  Straighten your right leg and keep it lifted 2 inches off the floor  Raise your head and shoulders off the floor and twist towards your left  Keep your head and shoulders lifted  Bend your right knee while you straighten your left leg  Keep your left leg 2 inches off the floor  Twist your head and chest towards the left leg  Continue to straighten 1 leg at a time and twist        When should I contact my healthcare provider? You have sharp or worsening pain during exercise or at rest     You have questions or concerns about your condition, care, or exercise program     CARE AGREEMENT:   You have the right to help plan your care  Learn about your health condition and how it may be treated  Discuss treatment options with your healthcare providers to decide what care you want to receive  You always have the right to refuse treatment  The above information is an  only   It is not intended as medical advice for individual conditions or treatments  Talk to your doctor, nurse or pharmacist before following any medical regimen to see if it is safe and effective for you  © Copyright [facility] 2022 Information is for End User's use only and may not be sold, redistributed or otherwise used for commercial purposes   All illustrations and images included in CareNotes® are the copyrighted property of A D A M , Inc  or [address] [place] [address]

## 2022-05-31 NOTE — PROGRESS NOTES
Assessment:  1  Neck pain    2  Chronic bilateral low back pain without sciatica    3  Cervical radiculopathy        Plan:  Patient is a 70-year-old female complaints of neck pain, bilateral shoulder pain, low back pain, bilateral leg pain with chronic pain syndrome secondary to lumbar degenerative disc disease, cervical degenerative disc disease, lumbar radiculopathy presents office for initial consultation  Patient is not having a full set of diagnostic images at this time  This office visit we reviewed the pathology behind core muscles in ways in which we can engage those muscles the lifestyle changes  We also  discussed core strengthening exercises which involved incorporating exercise such as Pilates and calisthenics  Reviewed the fact that patient does not do anything strengthen core but continues to do low bearing activities damage will continue to have into the disc and mild of the lumbar spine joint then continue to worsen patient's prognosis  1  Physical therapy referral provided to patient for her to learn how to do lumbar core strengthening exercises   2  We will order an MRI of the cervical lumbar spine to better assess the discogenic pathology behind patient's cervical lumbar radicular symptoms  Patient has complete physical therapy as of January 2022 and continued to home exercise regimen ever since without any significant improvement  3  Follow-up in 1 month      History of Present Illness: The patient is a 39 y o  female who presents for consultation in regards to Neck Pain, Shoulder Pain, Back Pain, and Knee Pain  Symptoms have been present for several months  Symptoms began without any precipitating injury or trauma  Pain is reported to be 9 on the numeric rating scale  Symptoms are felt nearly constantly and worst in the no typical pattern  Symptoms are characterized as sharp, numbing, tingling and pressure-like  Symptoms are associated with no weakness    Aggravating factors include standing, bending, leaning forward, leaning bckward, sitting and walking  Relieving factors include nothing  No change in symptoms with kneeling, turning the head, relaxation, coughing/sneezing and bowel movements  Treatments that have been helpful include home exercise  Medications to relieve symptoms include Flexeril  Review of Systems:    Review of Systems   Constitutional: Negative for fever and unexpected weight change  HENT: Negative for trouble swallowing  Eyes: Negative for visual disturbance  Respiratory: Negative for shortness of breath and wheezing  Cardiovascular: Positive for leg swelling  Negative for chest pain and palpitations  Gastrointestinal: Negative for constipation, diarrhea, nausea and vomiting  Endocrine: Negative for cold intolerance, heat intolerance and polydipsia  Genitourinary: Negative for difficulty urinating and frequency  Musculoskeletal: Positive for arthralgias, joint swelling and myalgias  Negative for gait problem  Skin: Negative for rash  Neurological: Negative for dizziness, seizures, syncope, weakness and headaches  Hematological: Does not bruise/bleed easily  Psychiatric/Behavioral: Negative for dysphoric mood  The patient is nervous/anxious  Depression     All other systems reviewed and are negative          Past Medical History:   Diagnosis Date    Anxiety     Bipolar depression ([facility] [address] )     Chronic pelvic pain in female 3/8/2021    Endometrial hyperplasia without atypia 2021    Hypertension     Menometrorrhagia 3/8/2021       Past Surgical History:   Procedure Laterality Date     SECTION      HYSTERECTOMY  2021       Family History   Problem Relation Age of Onset    Heart disease Father     No Known Problems Sister     No Known Problems Daughter     No Known Problems Maternal Grandmother     No Known Problems Maternal Grandfather     No Known Problems Paternal Grandmother     No Known Problems Paternal Grandfather     No Known Problems Son     No Known Problems Son     No Known Problems Son     No Known Problems Maternal Aunt     No Known Problems Maternal Aunt     No Known Problems Paternal Aunt     No Known Problems Paternal Aunt        Social History     Occupational History    Not on file   Tobacco Use    Smoking status: Current Every Day Smoker     Packs/day: 0 50     Types: Cigarettes    Smokeless tobacco: Never Used   Vaping Use    Vaping Use: Every day   Substance and Sexual Activity    Alcohol use: Never    Drug use: Never    Sexual activity: Not Currently         Current Outpatient Medications:     atorvastatin (LIPITOR) 10 mg tablet, Take 1 tablet (10 mg total) by mouth daily, Disp: 90 tablet, Rfl: 3    cyclobenzaprine (FLEXERIL) 5 mg tablet, Take 1 tablet (5 mg total) by mouth 3 (three) times a day as needed for muscle spasms, Disp: 30 tablet, Rfl: 0    hydrOXYzine HCL (ATARAX) 25 mg tablet, Take 1 tablet (25 mg total) by mouth every 6 (six) hours as needed for anxiety for up to 15 doses, Disp: 15 tablet, Rfl: 0    naproxen (Naprosyn) 500 mg tablet, Take 1 tablet (500 mg total) by mouth 2 (two) times a day with meals, Disp: 30 tablet, Rfl: 0    sertraline (Zoloft) 25 mg tablet, Take 1 tablet (25 mg total) by mouth daily (Patient not taking: Reported on 3/21/2022 ), Disp: 30 tablet, Rfl: 1    topiramate (Topamax) 25 mg tablet, Take 1 tablet (25 mg total) by mouth 2 (two) times a day, Disp: 60 tablet, Rfl: 4    Allergies   Allergen Reactions    Clonazepam Other (See Comments)    Latex     Methylphenidate Other (See Comments)     Hyperactivity"       Physical Exam:    /72 (BP Location: Left arm, Patient Position: Sitting, Cuff Size: Large)   Pulse 94   Temp 98 7 °F (37 1 °C)   Ht 5' 3" (1 6 m)   Wt 84 9 kg (187 lb 3 2 oz)   LMP 12/25/2020   BMI 33 16 kg/m²     Constitutional: normal, well developed, well nourished, alert, in no distress and non-toxic and no overt pain behavior  and obese  Eyes: anicteric  HEENT: grossly intact  Neck: supple, symmetric, trachea midline and no masses   Pulmonary:even and unlabored  Cardiovascular:No edema or pitting edema present  Skin:Normal without rashes or lesions and well hydrated  Psychiatric:Mood and affect appropriate  Neurologic:Cranial Nerves II-XII grossly intact  Musculoskeletal:antalgic     Cervical Spine examination demonstrates  Decreased ROM secondary to pain with lateral rotation to the left/right and bending to the left/right, in addition to neck flexion  5/5 upper extremity strength in all muscle groups bilaterally  Negative Spurling's maneuver to the b/l Ue, sensitivity to light touch intact b/l Ue  Lumbar/Sacral Spine examination demonstrates  Decreased range of motion lumbar spine with pain upon: flexion, lateral rotation to the left/right, and bending to the left/right  Bilateral lumbar paraspinals tender to palpation  Muscle spasms noted in the lumbar area bilaterally  4/5 lower extremity strength in all muscle groups bilaterally  Positive seated straight leg raise for bilateral lower extremities  Sensitivity to light touch intact bilateral lower extremities  2+ reflexes in the patella and Achilles  No ankle clonus     Imaging  LUMBAR SPINE     INDICATION:   M54 50: Low back pain, unspecified  G89 29: Other chronic pain        COMPARISON:  None     VIEWS:  XR SPINE LUMBAR MINIMUM 4 VIEWS NON INJURY  Images: 5     FINDINGS:     There are 5 non rib bearing lumbar vertebral bodies       There is no evidence of acute fracture or destructive osseous lesion      Alignment is unremarkable       Mild intervertebral disc disc space narrowing at L5-S1      The pedicles appear intact   Facet arthropathy at L5-S1      Soft tissues are unremarkable      IMPRESSION:     Mild degenerative changes of lumbar spine, most significant at L5-S1

## 2022-06-07 ENCOUNTER — TELEPHONE (OUTPATIENT)
Dept: PAIN MEDICINE | Facility: CLINIC | Age: 42
End: 2022-06-07

## 2022-06-07 ENCOUNTER — HOSPITAL ENCOUNTER (OUTPATIENT)
Dept: MRI IMAGING | Facility: HOSPITAL | Age: 42
Discharge: HOME | End: 2022-06-07
Attending: ANESTHESIOLOGY

## 2022-06-07 DIAGNOSIS — M54.50 CHRONIC BILATERAL LOW BACK PAIN WITHOUT SCIATICA: ICD-10-CM

## 2022-06-07 DIAGNOSIS — M54.12 CERVICAL RADICULOPATHY: ICD-10-CM

## 2022-06-07 DIAGNOSIS — M54.2 NECK PAIN: ICD-10-CM

## 2022-06-07 DIAGNOSIS — G89.29 CHRONIC BILATERAL LOW BACK PAIN WITHOUT SCIATICA: ICD-10-CM

## 2022-06-07 NOTE — TELEPHONE ENCOUNTER
Pt called in to get a Open MRI referral   Pt had a MRI appt today and it was canceled  Pt is claustrophobic          Please be advised thank you    Pt can be contacted @ [phone]

## 2022-06-08 DIAGNOSIS — M54.2 NECK PAIN: Primary | ICD-10-CM

## 2022-06-08 DIAGNOSIS — G89.29 CHRONIC BILATERAL LOW BACK PAIN WITH SCIATICA, SCIATICA LATERALITY UNSPECIFIED: ICD-10-CM

## 2022-06-08 DIAGNOSIS — M54.16 LUMBAR RADICULOPATHY: ICD-10-CM

## 2022-06-08 DIAGNOSIS — M54.40 CHRONIC BILATERAL LOW BACK PAIN WITH SCIATICA, SCIATICA LATERALITY UNSPECIFIED: ICD-10-CM

## 2022-06-08 DIAGNOSIS — M62.830 BACK SPASM: ICD-10-CM

## 2022-06-08 DIAGNOSIS — M54.12 CERVICAL RADICULOPATHY: ICD-10-CM

## 2022-06-08 NOTE — TELEPHONE ENCOUNTER
S/w pt and advised of same  Per pt she will decide where she is going to have her open MRI and was advised to let us know so that the order can be faxed  Pt verbalized understanding

## 2022-06-15 NOTE — TELEPHONE ENCOUNTER
Pt would like an auth done for [facility] on [address] [facility]     # for location [phone]    Pt states she cant schedule until Maggi Umaña is completed

## 2022-06-20 ENCOUNTER — EVALUATION (OUTPATIENT)
Dept: PHYSICAL THERAPY | Facility: CLINIC | Age: 42
End: 2022-06-20
Payer: COMMERCIAL

## 2022-06-20 DIAGNOSIS — M54.2 NECK PAIN: ICD-10-CM

## 2022-06-20 DIAGNOSIS — M54.12 CERVICAL RADICULOPATHY: ICD-10-CM

## 2022-06-20 DIAGNOSIS — M54.50 CHRONIC BILATERAL LOW BACK PAIN WITHOUT SCIATICA: ICD-10-CM

## 2022-06-20 DIAGNOSIS — G89.29 CHRONIC BILATERAL LOW BACK PAIN WITHOUT SCIATICA: ICD-10-CM

## 2022-06-20 PROCEDURE — 97535 SELF CARE MNGMENT TRAINING: CPT | Performed by: PHYSICAL THERAPIST

## 2022-06-20 PROCEDURE — 97110 THERAPEUTIC EXERCISES: CPT | Performed by: PHYSICAL THERAPIST

## 2022-06-20 PROCEDURE — 97161 PT EVAL LOW COMPLEX 20 MIN: CPT | Performed by: PHYSICAL THERAPIST

## 2022-06-20 NOTE — PROGRESS NOTES
PT Evaluation     Today's date: 2022  Patient name: Denver Blanco   : 1980  MRN: [id number]  Referring provider: Geeta aWlters MD  Dx:   Encounter Diagnosis     ICD-10-CM    1  Chronic bilateral low back pain without sciatica  M54 50 Ambulatory referral to Physical Therapy    G89 29    2  Neck pain  M54 2 Ambulatory referral to Physical Therapy   3  Cervical radiculopathy  M54 12 Ambulatory referral to Physical Therapy                  Assessment  Assessment details: Pt presents to PT with long term low back pain  Pain most of the time but worse with walking and standing long periods of time  Findings of hypomobile TL spine, hinging at mid lumbar with ext, L lumbar paraspinal tone and slight hip IR restriction  Weakness of trunk noted    Impairments: abnormal or restricted ROM, impaired balance, impaired physical strength and pain with function  Functional limitations: walking long periods of time, sitting at times, laying at times,   Symptom irritability: moderateUnderstanding of Dx/Px/POC: good   Prognosis: fair    Goals  ST-3 weeks  Independent with phase I lumbar stability program  Perform proper prone superman      LT-8 weeks  Hamstring mobility 65 deg  LE strength 5/5 throughout   Review proper lifting and lumbar mechanics for painfree lifting with ADLs and household activity  Reduce pain by 50% with all activity and ambulation  Complete SLS for 10 sec b/l with proper hip and trunk control  Trunk sit test: 10 sec    Plan  Plan details: TE, NMR, TA, MT, self education, and modalities as needed in order to progress through skilled PT focused on  ROM, strength, balance, coordination   Patient would benefit from: skilled physical therapy  Planned modality interventions: cryotherapy and thermotherapy: hydrocollator packs  Planned therapy interventions: manual therapy, neuromuscular re-education, self care, therapeutic activities, therapeutic exercise and home exercise program  Frequency: 2x week  Duration in weeks: 4  Treatment plan discussed with: patient        Subjective Evaluation    History of Present Illness  Mechanism of injury: 39year old female presenting to PT with long term low back pain  Was doing PT end of /early   Was sent to pain management where they plan on doing MRI () and recommended starting PT again  Low back hurts constantly  Standing and walking seem to be the worst  Is compliant with previous HEP but does not feel it is helping at this point  Also does have neck pain  Works at [employer]     Pain  Current pain ratin  At worst pain ratin    Patient Goals  Patient goals for therapy: decreased pain, increased motion, increased strength, independence with ADLs/IADLs and return to sport/leisure activities  Patient goal: walk with less pain        Objective     General Comments:      Lumbar Comments  Observation/Gait  Gait is normal  Slight increase in lordosis      Multi-seg movement patterns  Flex: mid tibia, able to round lumbar, stiffness with lumbar  Ext: scap past heels, hinge mid lumbar immediately  SB: good mobility, no hinging      Lumbar  Good mobility PA  Hypomobile TL     Slight increase in tone L lumbar paraspinals      Hip screen  Prone: left hip IR joseph 10 deg compared to R    Leg length symmetrical    Hamstring mobility:   50 deg b/l    Hip flexor mobility: slight restriction on R (2" from table)    Strength/myotome  4-4+/5 throughout    DTR's  1+ L4 R, 2+ L  1+ b/l S1    Trunk flex sit test: unable to hold  Superman: unable to complete              Precautions: long term low back and neck pain    Manuals 6-20       Manual traction        Joint work TL mobs NV       flexibility        ST        Neuro Re-Ed        [employer]iv        birddog                                        Ther Ex        NuStep/TM        Hamstring stretch 4x30" ea       Prone alt ue/le lift x6 ea       bridge x20       Supine hand to knee x12 5" ea       clamshells        Hip ext        Leg press        Ham curls        Knee ext        Ankle strength - band        Standing HR        Lumbar ext        Lumbar flex                SKTC        LTR        Prone ext                Ther Activity                        Gait Training                        Modalities                          Access Code: [email or web address]  URL: [email or web address]  Date: 06/20/2022  Prepared by: Lai Kinsey    Exercises  · Supine Bridge - 1 x daily - 7 x weekly - 3 sets - 10 reps  · Full Superman on Table - 1 x daily - 7 x weekly - 3 sets - 10 reps  · Supine Alternating Knee Taps with Hands - 1 x daily - 7 x weekly - 3 sets - 10 reps  · Supine Hamstring Stretch with Strap - 1 x daily - 7 x weekly - 3 sets - 10 reps

## 2022-06-27 ENCOUNTER — APPOINTMENT (OUTPATIENT)
Dept: PHYSICAL THERAPY | Facility: CLINIC | Age: 42
End: 2022-06-27
Payer: COMMERCIAL

## 2022-06-29 ENCOUNTER — OFFICE VISIT (OUTPATIENT)
Dept: PHYSICAL THERAPY | Facility: CLINIC | Age: 42
End: 2022-06-29
Payer: COMMERCIAL

## 2022-06-29 DIAGNOSIS — G89.29 CHRONIC BILATERAL LOW BACK PAIN WITHOUT SCIATICA: Primary | ICD-10-CM

## 2022-06-29 DIAGNOSIS — M54.50 CHRONIC BILATERAL LOW BACK PAIN WITHOUT SCIATICA: Primary | ICD-10-CM

## 2022-06-29 DIAGNOSIS — M54.12 CERVICAL RADICULOPATHY: ICD-10-CM

## 2022-06-29 DIAGNOSIS — M54.2 NECK PAIN: ICD-10-CM

## 2022-06-29 PROCEDURE — 97110 THERAPEUTIC EXERCISES: CPT

## 2022-06-29 PROCEDURE — 97112 NEUROMUSCULAR REEDUCATION: CPT

## 2022-06-29 NOTE — PROGRESS NOTES
Daily Note     Today's date: 2022  Patient name: Annelise Harry  : 1980  MRN: [id number]  Referring provider: Naheed Singh MD  Dx:   Encounter Diagnosis     ICD-10-CM    1  Chronic bilateral low back pain without sciatica  M54 50     G89 29    2  Neck pain  M54 2    3  Cervical radiculopathy  M54 12                   Subjective: ***      Objective: See treatment diary below      Assessment: Tolerated treatment well  Patient demonstrated fatigue post treatment      Plan: Continue per plan of care  Precautions: long term low back and neck pain    Manuals 6-20       Manual traction        Joint work TL mobs NV       flexibility        ST        Neuro Re-Ed        [employer]iv        birddog                                        Ther Ex        NuStep/TM        Hamstring stretch 4x30" ea       Prone alt ue/le lift x6 ea       bridge x20       Supine hand to knee x12 5" ea       clamshells        Hip ext        Leg press        Ham curls        Knee ext        Ankle strength - band        Standing HR        Lumbar ext        Lumbar flex                SKTC        LTR        Prone ext                Ther Activity                        Gait Training                        Modalities                          Access Code: [email or web address]  URL: [email or web address]  Date: 2022  Prepared by: Jaja Cox    Exercises  · Supine Bridge - 1 x daily - 7 x weekly - 3 sets - 10 reps  · Full Superman on Table - 1 x daily - 7 x weekly - 3 sets - 10 reps  · Supine Alternating Knee Taps with Hands - 1 x daily - 7 x weekly - 3 sets - 10 reps  · Supine Hamstring Stretch with Strap - 1 x daily - 7 x weekly - 3 sets - 10 reps

## 2022-06-29 NOTE — PROGRESS NOTES
Daily Note     Today's date: 2022   Patient name: Michoacano Lopez  : 1980  MRN: [id number]  Referring provider: Shantel Santos MD  Dx:   Encounter Diagnosis     ICD-10-CM    1  Chronic bilateral low back pain without sciatica  M54 50     G89 29    2  Neck pain  M54 2    3  Cervical radiculopathy  M54 12        Start Time: 1300  Stop Time: 1400  Total time in clinic (min): 60 minutes    Subjective: Patient reports that she does not know how she is feeling  Patient reports low back pain as 5/10  Objective: See treatment diary below      Assessment: Tolerated treatment well  Nina participated in skilled PT session focused on strengthening, stretching, ROM, and core stabilization  Patient demonstrates decreased core stability with exercises, cues to correct and engage  Patient presents with increased tightness in B/L HS  Patient tolerated new exercises added well with no increase in pain  Patient would continue to benefit from skilled PT interventions to address strengthening, stretching, ROM, and core stabilization    Patient demonstrated fatigue post treatment      Plan: Continue per plan of care        Precautions: long term low back and neck pain    Manuals 6-20       Manual traction        Joint work TL mobs NV       flexibility        ST        Neuro Re-Ed        TA  5"2x10      TA+  X 5 ea                                      Ther Ex        NuStep/TM  NS L2 x 8 min      Hamstring stretch 4x30" ea Supine w/strap 30" x 4 ea      Prone alt ue/le lift x6 ea x6 ea      bridge x20 X 20      Supine hand to knee x12 5" ea Held pain      clamshells        Hip ext        Leg press  P2 2 x10      Ham curls        Knee ext        Ankle strength - band        Standing HR        Lumbar ext        Lumbar flex                SKTC        LTR        Prone ext                Ther Activity                        Gait Training                        Modalities Access Code: [email or web address]  URL: [email or web address]  Date: 06/20/2022  Prepared by: Ramana Noyola    Exercises  · Supine Bridge - 1 x daily - 7 x weekly - 3 sets - 10 reps  · Full Superman on Table - 1 x daily - 7 x weekly - 3 sets - 10 reps  · Supine Alternating Knee Taps with Hands - 1 x daily - 7 x weekly - 3 sets - 10 reps  · Supine Hamstring Stretch with Strap - 1 x daily - 7 x weekly - 3 sets - 10 reps

## 2022-07-05 ENCOUNTER — APPOINTMENT (OUTPATIENT)
Dept: PHYSICAL THERAPY | Facility: CLINIC | Age: 42
End: 2022-07-05
Payer: COMMERCIAL

## 2022-07-06 ENCOUNTER — OFFICE VISIT (OUTPATIENT)
Dept: PHYSICAL THERAPY | Facility: CLINIC | Age: 42
End: 2022-07-06
Payer: COMMERCIAL

## 2022-07-06 DIAGNOSIS — G89.29 CHRONIC BILATERAL LOW BACK PAIN WITHOUT SCIATICA: Primary | ICD-10-CM

## 2022-07-06 DIAGNOSIS — M54.12 CERVICAL RADICULOPATHY: ICD-10-CM

## 2022-07-06 DIAGNOSIS — M54.2 NECK PAIN: ICD-10-CM

## 2022-07-06 DIAGNOSIS — M54.50 CHRONIC BILATERAL LOW BACK PAIN WITHOUT SCIATICA: Primary | ICD-10-CM

## 2022-07-06 PROCEDURE — 97110 THERAPEUTIC EXERCISES: CPT

## 2022-07-06 PROCEDURE — 97140 MANUAL THERAPY 1/> REGIONS: CPT

## 2022-07-06 NOTE — PROGRESS NOTES
Daily Note     Today's date: 2022  Patient name: Elise Lees  : 1980  MRN: [id number]  Referring provider: Ailin Moctezuma MD  Dx:   Encounter Diagnosis     ICD-10-CM    1  Chronic bilateral low back pain without sciatica  M54 50     G89 29    2  Neck pain  M54 2    3  Cervical radiculopathy  M54 12        Start Time: 1300  Stop Time: 1400  Total time in clinic (min): 60 minutes    Subjective: Patient reports no new complaints  Patient upset about something that happened at work in the morning  Objective: See treatment diary below      Assessment: Tolerated treatment well  Nina participated in skilled PT session focused on strengthening, core stabilization, stretching, and ROM  Patient able to tolerate new exercises added to session  Patient demonstrates improved strength with exercises with increased weight on some exercises  Patient continues with low back pain and L knee pain  Patient would continue to benefit from skilled PT interventions to address strengthening, core stabilization, stretching, and ROM  Patient demonstrated fatigue post treatment      Plan: Continue per plan of care        Precautions: long term low back and neck pain    Manuals 6-20      Manual traction        Joint work TL mobs NV       flexibility        ST   Left Low back paraspinal/L Piriformis STM     Neuro Re-Ed        TA  5"2x10 5" 2x10     TA+march   3" 2x10     birddog  X 5 ea                                      Ther Ex        NuStep/TM  NS L2 x 8 min NS L2 x 10 min     Hamstring stretch 4x30" ea Supine w/strap 30" x 4 ea Supine w/strap 30" x 4 ea     Prone alt ue/le lift x6 ea x6 ea      bridge x20 X 20      Supine hand to knee x12 5" ea Held pain      clamshells        Hip ext        Leg press  P2 2 x10 P2 2x10     Ham curls   P5 2x10     Knee ext   P2 2x10     Ankle strength - band        Standing HR   3x10     Lumbar ext        Lumbar flex                SKTC   10 x 5"     LTR   5 x 10" Prone ext                Ther Activity                        Gait Training                        Modalities                          Access Code: [email or web address]  URL: [email or web address]  Date: 06/20/2022  Prepared by: Darian Lorenzana    Exercises  · Supine Bridge - 1 x daily - 7 x weekly - 3 sets - 10 reps  · Full Superman on Table - 1 x daily - 7 x weekly - 3 sets - 10 reps  · Supine Alternating Knee Taps with Hands - 1 x daily - 7 x weekly - 3 sets - 10 reps  · Supine Hamstring Stretch with Strap - 1 x daily - 7 x weekly - 3 sets - 10 reps

## 2022-07-11 ENCOUNTER — OFFICE VISIT (OUTPATIENT)
Dept: PHYSICAL THERAPY | Facility: CLINIC | Age: 42
End: 2022-07-11
Payer: COMMERCIAL

## 2022-07-11 DIAGNOSIS — M54.2 NECK PAIN: ICD-10-CM

## 2022-07-11 DIAGNOSIS — M54.12 CERVICAL RADICULOPATHY: ICD-10-CM

## 2022-07-11 DIAGNOSIS — G89.29 CHRONIC BILATERAL LOW BACK PAIN WITHOUT SCIATICA: Primary | ICD-10-CM

## 2022-07-11 DIAGNOSIS — M54.50 CHRONIC BILATERAL LOW BACK PAIN WITHOUT SCIATICA: Primary | ICD-10-CM

## 2022-07-11 PROCEDURE — 97110 THERAPEUTIC EXERCISES: CPT

## 2022-07-11 PROCEDURE — 97140 MANUAL THERAPY 1/> REGIONS: CPT

## 2022-07-11 NOTE — PROGRESS NOTES
Daily Note     Today's date: 2022  Patient name: Lilian Mckeon  : 1980  MRN: [id number]  Referring provider: Braulio Phipps MD  Dx:   Encounter Diagnosis     ICD-10-CM    1  Chronic bilateral low back pain without sciatica  M54 50     G89 29    2  Neck pain  M54 2    3  Cervical radiculopathy  M54 12        Start Time: 1400  Stop Time: 1500  Total time in clinic (min): 60 minutes    Subjective: Patient very quiet upon entering gym  Patient states her L knee was hurting all weekend  Patient reports 0/10 pain in L knee at present  Objective: See treatment diary below       Assessment: Tolerated treatment well  Nina participated in skilled PT session focused on strengthening, stretching, and ROM  Patient continues with B/L paraspinal thoracic tightness limiting trunk mobility  Patient would continue to benefit from skilled PT interventions to address strengthening, stretching, and ROM  Patient demonstrated fatigue post treatment       Plan: Continue per plan of care        Precautions: long term low back and neck pain    Manuals 6- 7    Manual traction        Joint work TL mobs NV       flexibility        ST   Left Low back paraspinal/L Piriformis STM Left/Right low back paraspinal STM    Neuro Re-Ed        TA  5"2x10 5" 2x10 5" 2x10    TA+march   3" 2x10 3" 2x10    birddog  X 5 ea                                      Ther Ex        NuStep/TM  NS L2 x 8 min NS L2 x 10 min L2 x 10 min    Hamstring stretch 4x30" ea Supine w/strap 30" x 4 ea Supine w/strap 30" x 4 ea Supine w/strap 30"x4 ea    Prone alt ue/le lift x6 ea x6 ea  X 8 ea    bridge x20 X 20  2x10    Supine hand to knee x12 5" ea Held pain      clamshells    YTB 5" 2x10    Hip ext        Leg press  P2 2 x10 P2 2x10     Ham curls   P5 2x10     Knee ext   P2 2x10     Ankle strength - band        Standing HR   3x10     Lumbar ext        Lumbar flex                SKTC   10 x 5" 10"x10 ea    LTR   5 x 10" 10" x 5 ea Prone ext                Ther Activity                        Gait Training                        Modalities                          Access Code: [email or web address]  URL: [email or web address]  Date: 06/20/2022  Prepared by: Carla Italia    Exercises  · Supine Bridge - 1 x daily - 7 x weekly - 3 sets - 10 reps  · Full Superman on Table - 1 x daily - 7 x weekly - 3 sets - 10 reps  · Supine Alternating Knee Taps with Hands - 1 x daily - 7 x weekly - 3 sets - 10 reps  · Supine Hamstring Stretch with Strap - 1 x daily - 7 x weekly - 3 sets - 10 reps

## 2022-07-18 ENCOUNTER — APPOINTMENT (OUTPATIENT)
Dept: PHYSICAL THERAPY | Facility: CLINIC | Age: 42
End: 2022-07-18
Payer: COMMERCIAL

## 2022-07-18 NOTE — PROGRESS NOTES
PT Discharge    Today's date: 2022  Patient name: Henna Pruett   : 1980  MRN: [id number]  Referring provider: Mis Francis MD  Dx:   Encounter Diagnosis     ICD-10-CM    1  Chronic bilateral low back pain without sciatica  M54 50     G89 29    2  Neck pain  M54 2    3  Cervical radiculopathy  M54 12        Assessment  Assessment details: Pt self discharged from therapy today over the phone  I did discuss with her that we can re-evaluate her and make sure we adjust treatment plan accordingly to ensure no irritation at the knee  She was not interested in this and said she no longer wanted to continue with therapy  Impairments: abnormal or restricted ROM, impaired balance, impaired physical strength and pain with function  Functional limitations: walking long periods of time, sitting at times, laying at times,   Symptom irritability: moderateUnderstanding of Dx/Px/POC: good   Prognosis: fair    Goals  DISCONTINUED - self discharged   ST-3 weeks  Independent with phase I lumbar stability program  Perform proper prone superman      LT-8 weeks  Hamstring mobility 65 deg  LE strength 5/5 throughout   Review proper lifting and lumbar mechanics for painfree lifting with ADLs and household activity  Reduce pain by 50% with all activity and ambulation  Complete SLS for 10 sec b/l with proper hip and trunk control  Trunk sit test: 10 sec    Plan  Plan details: Self discharged  Planned therapy interventions: home exercise program  Treatment plan discussed with: patient        Subjective Evaluation    History of Present Illness  Mechanism of injury: Pt called to cancel appt for  noting her knee has been hurting with therapy  She does not want to come anymore and self discharged    Pain  Current pain ratin  At worst pain ratin    Patient Goals  Patient goals for therapy: decreased pain, increased motion, increased strength, independence with ADLs/IADLs and return to sport/leisure activities  Patient goal: walk with less pain        Objective  ·

## 2022-08-01 ENCOUNTER — OFFICE VISIT (OUTPATIENT)
Dept: PAIN MEDICINE | Facility: CLINIC | Age: 42
End: 2022-08-01
Payer: COMMERCIAL

## 2022-08-01 VITALS
WEIGHT: 189 LBS | DIASTOLIC BLOOD PRESSURE: 81 MMHG | BODY MASS INDEX: 33.49 KG/M2 | HEIGHT: 63 IN | SYSTOLIC BLOOD PRESSURE: 130 MMHG | HEART RATE: 91 BPM

## 2022-08-01 DIAGNOSIS — M47.812 CERVICAL SPONDYLOSIS: ICD-10-CM

## 2022-08-01 DIAGNOSIS — G89.4 CHRONIC PAIN SYNDROME: Primary | ICD-10-CM

## 2022-08-01 DIAGNOSIS — M47.816 LUMBAR SPONDYLOSIS: ICD-10-CM

## 2022-08-01 DIAGNOSIS — G89.29 CHRONIC BILATERAL LOW BACK PAIN WITHOUT SCIATICA: ICD-10-CM

## 2022-08-01 DIAGNOSIS — M54.50 CHRONIC BILATERAL LOW BACK PAIN WITHOUT SCIATICA: ICD-10-CM

## 2022-08-01 DIAGNOSIS — M54.2 NECK PAIN: ICD-10-CM

## 2022-08-01 DIAGNOSIS — M79.18 MYOFASCIAL PAIN SYNDROME: ICD-10-CM

## 2022-08-01 PROCEDURE — 99214 OFFICE O/P EST MOD 30 MIN: CPT | Performed by: NURSE PRACTITIONER

## 2022-08-01 RX ORDER — TIZANIDINE 2 MG/1
2 TABLET ORAL EVERY 8 HOURS PRN
Qty: 90 TABLET | Refills: 1 | Status: SHIPPED | OUTPATIENT
Start: 2022-08-01 | End: 2022-10-03

## 2022-08-01 NOTE — PATIENT INSTRUCTIONS
Tizanidine (By mouth)   Tizanidine ([id number]-[id number]-d-nayely)  Treats muscle spasticity  Brand Name(s): Zanaflex, Zanaflex Capsule   There may be other brand names for this medicine  When This Medicine Should Not Be Used: This medicine is not right for everyone  Do not use if you had an allergic reaction to tizanidine  How to Use This Medicine:   Capsule, Tablet  Take your medicine as directed  Your dose may need to be changed several times to find what works best for you  You may take this medicine with or without food, but always take it the same way every time  Tizanidine works differently depending on whether you take it on an empty stomach or a full stomach  Talk to your doctor if you have any questions about this  Missed dose: Take a dose as soon as you remember  If it is almost time for your next dose, wait until then and take a regular dose  Do not take extra medicine to make up for a missed dose  Store the medicine in a closed container at room temperature, away from heat, moisture, and direct light  Drugs and Foods to Avoid:   Ask your doctor or pharmacist before using any other medicine, including over-the-counter medicines, vitamins, and herbal products  Do not use this medicine together with ciprofloxacin or fluvoxamine  Some foods and medicines can affect how tizanidine works  Tell your doctor if you are using any of the following:  Acyclovir, baclofen, cimetidine, famotidine, ticlopidine, verapamil, zileuton  Birth control pills, blood pressure medicine, medicine for heart rhythm problems (such as amiodarone, mexiletine, propafenone), or medicine to treat an infection (such as levofloxacin, moxifloxacin)  Do not drink alcohol while you are using this medicine  Tell your doctor if you use anything else that makes you sleepy  Some examples are allergy medicine, narcotic pain medicine, and alcohol    Warnings While Using This Medicine:   Tell your doctor if you are pregnant or breastfeeding, or if you have kidney disease or liver disease  This medicine may cause the following problems:  Low blood pressure  Liver damage  This medicine may make you dizzy or drowsy  Do not drive or do anything else that could be dangerous until you know how this medicine affects you  Stand or sit up slowly if you are dizzy  Do not stop using this medicine suddenly  Your doctor will need to slowly decrease your dose before you stop it completely  Your doctor will do lab tests at regular visits to check on the effects of this medicine  Keep all appointments  Keep all medicine out of the reach of children  Never share your medicine with anyone  Possible Side Effects While Using This Medicine:   Call your doctor right away if you notice any of these side effects: Allergic reaction: Itching or hives, swelling in your face or hands, swelling or tingling in your mouth or throat, chest tightness, trouble breathing  Dark urine or pale stools, nausea, vomiting, loss of appetite, stomach pain, yellow skin or eyes  Lightheadedness, dizziness, or fainting  Seeing or hearing things that are not really there  Slow heartbeat  If you notice these less serious side effects, talk with your doctor:   Dry mouth  Drowsiness or sleepiness  Weakness  If you notice other side effects that you think are caused by this medicine, tell your doctor  Call your doctor for medical advice about side effects  You may report side effects to FDA at 7-316-FDA-8632    © Copyright [employer] 2022 Information is for End User's use only and may not be sold, redistributed or otherwise used for commercial purposes  The above information is an  only  It is not intended as medical advice for individual conditions or treatments  Talk to your doctor, nurse or pharmacist before following any medical regimen to see if it is safe and effective for you

## 2022-08-01 NOTE — PROGRESS NOTES
Assessment:  1  Chronic pain syndrome    2  Chronic bilateral low back pain without sciatica    3  Lumbar spondylosis    4  Neck pain    5  Cervical spondylosis    6  Myofascial pain syndrome        Plan:  While the patient was in the office today, I did have a thorough conversation regarding their chronic pain syndrome, medication management, and treatment plan options  Patient is being seen for follow-up visit  She was initially seen here for consultation on 05/31/2022  She was referred to physical therapy for her cervical area  Patient states that she attended several sessions of physical therapy  She is doing cervical stretching/strengthening exercises at home  She previously participated in physical therapy for her lumbar spine and tells me that she continues to do lumbar exercises at home  MRI of her lumbar spine was done on 06/27/2022  MRI reveals mild facet arthritis at L3-4 and L4-5  There is minimal loss of height and disc desiccation at L4-5  MRI of the cervical spine reveals multilevel degenerative disc disease and foraminal stenosis predominantly at C4-5 and C5-6 levels  MRI results were reviewed with patient during today's visit  We briefly discussed possibility of interventional therapy which would include injections such as trigger point injections and or medial branch blocks  Patient states that she is not at all interested in injections because she is scared of needles  She would like to consider seeing a chiropractor at this time and is planning to self refer herself  Since the patient is having myofascial pain and/or spasms, I advised the patient that starting on a muscle relaxer could help decrease some of the myofascial pain and/or spasms  I advised patient that they should not drive or operate heavy machinery while on this medication as it could cause lethargy  I advised the patient to call our office if they experience any side effects    The patient verbalized understanding  Trial tizanidine 2 mg every 8 hours as needed for spasms  Prescription was sent to her pharmacy  The patient will follow-up in 8 weeks for medication prescription refill and reevaluation  The patient was advised to contact the office should their symptoms worsen in the interim  The patient was agreeable and verbalized an understanding  History of Present Illness: The patient is a 39 y o  female who presents for a follow up office visit in regards to Back Pain  The patients current symptoms include complaints of neck pain that radiates to both shoulders and low back pain, nonradiating  Current pain level is an 8/10  Quality pain is described as tight and stiff  Current pain medications includes:  Over-the-counter Tylenol as needed  She previously tried Flexeril she states that it was not very effective, but made her very drowsy  I have personally reviewed and/or updated the patient's past medical history, past surgical history, family history, social history, current medications, allergies, and vital signs today  Review of Systems  Review of Systems   Constitutional: Negative for fatigue and unexpected weight change  HENT: Negative for dental problem, ear pain, hearing loss and sneezing  Eyes: Negative for visual disturbance  Respiratory: Negative for cough and chest tightness  Cardiovascular: Negative for leg swelling  Gastrointestinal: Positive for nausea  Negative for anal bleeding  Endocrine: Negative for heat intolerance  Genitourinary: Negative for flank pain and genital sores  Musculoskeletal: Positive for gait problem  Decreased range of motion  Joint stiffness   Skin: Negative for wound  Allergic/Immunologic: Negative for immunocompromised state  Neurological: Negative for speech difficulty and light-headedness  Hematological: Negative for adenopathy  Psychiatric/Behavioral: Negative for confusion   The patient is not hyperactive  All other systems reviewed and are negative          Past Medical History:   Diagnosis Date    Anxiety     Bipolar depression ([facility] [address] )     Chronic pelvic pain in female 3/8/2021    Endometrial hyperplasia without atypia 2021    Hypertension     Menometrorrhagia 3/8/2021       Past Surgical History:   Procedure Laterality Date     SECTION      HYSTERECTOMY  2021       Family History   Problem Relation Age of Onset    Heart disease Father     No Known Problems Sister     No Known Problems Daughter     No Known Problems Maternal Grandmother     No Known Problems Maternal Grandfather     No Known Problems Paternal Grandmother     No Known Problems Paternal Grandfather     No Known Problems Son     No Known Problems Son     No Known Problems Son     No Known Problems Maternal Aunt     No Known Problems Maternal Aunt     No Known Problems Paternal Aunt     No Known Problems Paternal Aunt        Social History     Occupational History    Not on file   Tobacco Use    Smoking status: Current Every Day Smoker     Packs/day: 0 50     Types: Cigarettes    Smokeless tobacco: Never Used   Vaping Use    Vaping Use: Every day   Substance and Sexual Activity    Alcohol use: Never    Drug use: Never    Sexual activity: Not Currently         Current Outpatient Medications:     atorvastatin (LIPITOR) 10 mg tablet, Take 1 tablet (10 mg total) by mouth daily, Disp: 90 tablet, Rfl: 3    hydrOXYzine HCL (ATARAX) 25 mg tablet, Take 1 tablet (25 mg total) by mouth every 6 (six) hours as needed for anxiety for up to 15 doses, Disp: 15 tablet, Rfl: 0    tiZANidine (ZANAFLEX) 2 mg tablet, Take 1 tablet (2 mg total) by mouth every 8 (eight) hours as needed for muscle spasms, Disp: 90 tablet, Rfl: 1    topiramate (Topamax) 25 mg tablet, Take 1 tablet (25 mg total) by mouth 2 (two) times a day, Disp: 60 tablet, Rfl: 4    sertraline (Zoloft) 25 mg tablet, Take 1 tablet (25 mg total) by mouth daily (Patient not taking: No sig reported), Disp: 30 tablet, Rfl: 1    Allergies   Allergen Reactions    Clonazepam Other (See Comments)    Latex     Methylphenidate Other (See Comments)     Hyperactivity"       Physical Exam:    /81   Pulse 91   Ht 5' 3" (1 6 m)   Wt 85 7 kg (189 lb)   LMP 12/25/2020   BMI 33 48 kg/m²     Constitutional:normal, well developed, well nourished, alert, in no distress and non-toxic and no overt pain behavior  Eyes:anicteric  HEENT:grossly intact  Neck:supple, symmetric, trachea midline and no masses   Pulmonary:even and unlabored  Cardiovascular:No edema or pitting edema present  Skin:Normal without rashes or lesions and well hydrated  Psychiatric:Mood and affect appropriate  Neurologic:Cranial Nerves II-XII grossly intact  Musculoskeletal:Range of motion of the cervical and lumbar spine are slightly limited in all planes  there is trigger point tenderness over bilateral cervical paraspinal muscles and bilateral trapezius muscles  There is tenderness to palpation across the lower lumbar area  She is able to heel and toe walk without difficulty  Motor strength is intact in both lower extremities at +5 out of +5     Imaging  No orders to display       No orders of the defined types were placed in this encounter

## 2022-10-03 ENCOUNTER — OFFICE VISIT (OUTPATIENT)
Dept: PAIN MEDICINE | Facility: CLINIC | Age: 42
End: 2022-10-03
Payer: COMMERCIAL

## 2022-10-03 VITALS
BODY MASS INDEX: 34.38 KG/M2 | WEIGHT: 194 LBS | DIASTOLIC BLOOD PRESSURE: 76 MMHG | HEIGHT: 63 IN | HEART RATE: 87 BPM | SYSTOLIC BLOOD PRESSURE: 113 MMHG

## 2022-10-03 DIAGNOSIS — G89.4 CHRONIC PAIN SYNDROME: Primary | ICD-10-CM

## 2022-10-03 DIAGNOSIS — M54.50 CHRONIC BILATERAL LOW BACK PAIN WITHOUT SCIATICA: ICD-10-CM

## 2022-10-03 DIAGNOSIS — M54.2 NECK PAIN: ICD-10-CM

## 2022-10-03 DIAGNOSIS — G89.29 CHRONIC BILATERAL LOW BACK PAIN WITHOUT SCIATICA: ICD-10-CM

## 2022-10-03 DIAGNOSIS — M47.816 LUMBAR SPONDYLOSIS: ICD-10-CM

## 2022-10-03 DIAGNOSIS — M79.18 MYOFASCIAL PAIN SYNDROME: ICD-10-CM

## 2022-10-03 DIAGNOSIS — M47.812 CERVICAL SPONDYLOSIS: ICD-10-CM

## 2022-10-03 PROCEDURE — 99214 OFFICE O/P EST MOD 30 MIN: CPT | Performed by: NURSE PRACTITIONER

## 2022-10-03 RX ORDER — MELOXICAM 15 MG/1
15 TABLET ORAL DAILY
Qty: 30 TABLET | Refills: 1 | Status: SHIPPED | OUTPATIENT
Start: 2022-10-03

## 2022-10-03 NOTE — PROGRESS NOTES
Assessment:  1  Chronic pain syndrome    2  Chronic bilateral low back pain without sciatica    3  Lumbar spondylosis    4  Neck pain    5  Cervical spondylosis    6  Myofascial pain syndrome        Plan:  While the patient was in the office today, I did have a thorough conversation regarding their chronic pain syndrome, medication management, and treatment plan options  Patient is being seen for follow-up visit  She was last seen here on 08/01/2022 which time tizanidine was started  Patient tells me that she only took it a couple times at bedtime  She is not really sure that helped  She never took it during the day because it seemed to make her drowsy  At this time, we will discontinue tizanidine due to lack of efficacy  Trial Mobic 15 milligrams daily with food  Prescription was sent to her pharmacy  Patient is not interested in interventional therapy  Patient previously participated in physical therapy for both her cervical spine lumbar spine  She states that she continues to do exercises at home every day  The patient will follow-up in 6 weeks for medication prescription refill and reevaluation  The patient was advised to contact the office should their symptoms worsen in the interim  The patient was agreeable and verbalized an understanding  History of Present Illness: The patient is a 43 y o  female who presents for a follow up office visit in regards to Shoulder Pain and Back Pain  The patients current symptoms include complaints of low back pain, neck pain  Current pain level is an 8/10  Quality pain is described as sharp, numb, pins and needles  Current pain medications includes:  She has prescription for tizanidine 2 milligrams, but states that she has not really taken it  She took a couple times at bedtime, but isn't sure if it even helps  It seemed to make her drowsy during the day        I have personally reviewed and/or updated the patient's past medical history, past surgical history, family history, social history, current medications, allergies, and vital signs today  Review of Systems  Review of Systems   Respiratory: Negative for shortness of breath  Cardiovascular: Negative for chest pain  Gastrointestinal: Negative for constipation, diarrhea, nausea and vomiting  Musculoskeletal: Positive for gait problem  Negative for arthralgias, joint swelling (knees) and myalgias  Decreased range of motion  Joint stiffness   Neurological: Negative for dizziness, seizures and weakness  All other systems reviewed and are negative          Past Medical History:   Diagnosis Date    Anxiety     Bipolar depression ([facility] [address] )     Chronic pelvic pain in female 3/8/2021    Endometrial hyperplasia without atypia 2021    Hypertension     Menometrorrhagia 3/8/2021       Past Surgical History:   Procedure Laterality Date     SECTION      HYSTERECTOMY  2021       Family History   Problem Relation Age of Onset    Heart disease Father     No Known Problems Sister     No Known Problems Daughter     No Known Problems Maternal Grandmother     No Known Problems Maternal Grandfather     No Known Problems Paternal Grandmother     No Known Problems Paternal Grandfather     No Known Problems Son     No Known Problems Son     No Known Problems Son     No Known Problems Maternal Aunt     No Known Problems Maternal Aunt     No Known Problems Paternal Aunt     No Known Problems Paternal Aunt        Social History     Occupational History    Not on file   Tobacco Use    Smoking status: Current Every Day Smoker     Packs/day: 0 50     Types: Cigarettes    Smokeless tobacco: Never Used   Vaping Use    Vaping Use: Every day   Substance and Sexual Activity    Alcohol use: Never    Drug use: Never    Sexual activity: Not Currently         Current Outpatient Medications:     atorvastatin (LIPITOR) 10 mg tablet, Take 1 tablet (10 mg total) by mouth daily, Disp: 90 tablet, Rfl: 3    hydrOXYzine HCL (ATARAX) 25 mg tablet, Take 1 tablet (25 mg total) by mouth every 6 (six) hours as needed for anxiety for up to 15 doses, Disp: 15 tablet, Rfl: 0    meloxicam (MOBIC) 15 mg tablet, Take 1 tablet (15 mg total) by mouth daily With food, Disp: 30 tablet, Rfl: 1    topiramate (Topamax) 25 mg tablet, Take 1 tablet (25 mg total) by mouth 2 (two) times a day, Disp: 60 tablet, Rfl: 4    sertraline (Zoloft) 25 mg tablet, Take 1 tablet (25 mg total) by mouth daily (Patient not taking: No sig reported), Disp: 30 tablet, Rfl: 1    Allergies   Allergen Reactions    Clonazepam Other (See Comments)    Latex     Methylphenidate Other (See Comments)     Hyperactivity"       Physical Exam:    /76 (BP Location: Left arm, Patient Position: Sitting, Cuff Size: Large)   Pulse 87   Ht 5' 3" (1 6 m)   Wt 88 kg (194 lb)   LMP 12/25/2020   BMI 34 37 kg/m²     Constitutional:normal, well developed, well nourished, alert, in no distress and non-toxic and no overt pain behavior  Eyes:anicteric  HEENT:grossly intact  Neck:supple, symmetric, trachea midline and no masses   Pulmonary:even and unlabored  Cardiovascular:No edema or pitting edema present  Skin:Normal without rashes or lesions and well hydrated  Psychiatric:Mood and affect appropriate  Neurologic:Cranial Nerves II-XII grossly intact  Musculoskeletal:normal    Imaging  No orders to display       No orders of the defined types were placed in this encounter

## 2022-10-06 ENCOUNTER — APPOINTMENT (OUTPATIENT)
Dept: LAB | Facility: MEDICAL CENTER | Age: 42
End: 2022-10-06
Payer: COMMERCIAL

## 2022-10-06 DIAGNOSIS — F41.8 CASTRATION COMPLEX: ICD-10-CM

## 2022-10-06 DIAGNOSIS — F43.10 POSTTRAUMATIC STRESS DISORDER: ICD-10-CM

## 2022-10-06 DIAGNOSIS — F31.76 DEPRESSED BIPOLAR I DISORDER IN REMISSION (HCC): ICD-10-CM

## 2022-10-06 LAB
25(OH)D3 SERPL-MCNC: 14.5 NG/ML (ref 30–100)
ANION GAP SERPL CALCULATED.3IONS-SCNC: 6 MMOL/L (ref 4–13)
BASOPHILS # BLD AUTO: 0.08 THOUSANDS/ΜL (ref 0–0.1)
BASOPHILS NFR BLD AUTO: 1 % (ref 0–1)
BUN SERPL-MCNC: 9 MG/DL (ref 5–25)
CALCIUM SERPL-MCNC: 9.4 MG/DL (ref 8.3–10.1)
CHLORIDE SERPL-SCNC: 111 MMOL/L (ref 96–108)
CO2 SERPL-SCNC: 22 MMOL/L (ref 21–32)
CREAT SERPL-MCNC: 0.78 MG/DL (ref 0.6–1.3)
EOSINOPHIL # BLD AUTO: 0.26 THOUSAND/ΜL (ref 0–0.61)
EOSINOPHIL NFR BLD AUTO: 3 % (ref 0–6)
ERYTHROCYTE [DISTWIDTH] IN BLOOD BY AUTOMATED COUNT: 13.2 % (ref 11.6–15.1)
GFR SERPL CREATININE-BSD FRML MDRD: 94 ML/MIN/1.73SQ M
GLUCOSE P FAST SERPL-MCNC: 119 MG/DL (ref 65–99)
HCT VFR BLD AUTO: 44.4 % (ref 34.8–46.1)
HGB BLD-MCNC: 14.6 G/DL (ref 11.5–15.4)
IMM GRANULOCYTES # BLD AUTO: 0.04 THOUSAND/UL (ref 0–0.2)
IMM GRANULOCYTES NFR BLD AUTO: 1 % (ref 0–2)
LYMPHOCYTES # BLD AUTO: 2.6 THOUSANDS/ΜL (ref 0.6–4.47)
LYMPHOCYTES NFR BLD AUTO: 34 % (ref 14–44)
MCH RBC QN AUTO: 33.3 PG (ref 26.8–34.3)
MCHC RBC AUTO-ENTMCNC: 32.9 G/DL (ref 31.4–37.4)
MCV RBC AUTO: 101 FL (ref 82–98)
MONOCYTES # BLD AUTO: 0.54 THOUSAND/ΜL (ref 0.17–1.22)
MONOCYTES NFR BLD AUTO: 7 % (ref 4–12)
NEUTROPHILS # BLD AUTO: 4.04 THOUSANDS/ΜL (ref 1.85–7.62)
NEUTS SEG NFR BLD AUTO: 54 % (ref 43–75)
NRBC BLD AUTO-RTO: 0 /100 WBCS
PLATELET # BLD AUTO: 302 THOUSANDS/UL (ref 149–390)
PMV BLD AUTO: 10.5 FL (ref 8.9–12.7)
POTASSIUM SERPL-SCNC: 4.3 MMOL/L (ref 3.5–5.3)
RBC # BLD AUTO: 4.38 MILLION/UL (ref 3.81–5.12)
SODIUM SERPL-SCNC: 139 MMOL/L (ref 135–147)
T4 FREE SERPL-MCNC: 0.9 NG/DL (ref 0.76–1.46)
TSH SERPL DL<=0.05 MIU/L-ACNC: 2.25 UIU/ML (ref 0.45–4.5)
WBC # BLD AUTO: 7.56 THOUSAND/UL (ref 4.31–10.16)

## 2022-10-06 PROCEDURE — 84443 ASSAY THYROID STIM HORMONE: CPT

## 2022-10-06 PROCEDURE — 82306 VITAMIN D 25 HYDROXY: CPT

## 2022-10-06 PROCEDURE — 80048 BASIC METABOLIC PNL TOTAL CA: CPT

## 2022-10-06 PROCEDURE — 85025 COMPLETE CBC W/AUTO DIFF WBC: CPT

## 2022-10-06 PROCEDURE — 84439 ASSAY OF FREE THYROXINE: CPT

## 2022-10-06 PROCEDURE — 36415 COLL VENOUS BLD VENIPUNCTURE: CPT

## 2022-10-19 ENCOUNTER — HOSPITAL ENCOUNTER (OUTPATIENT)
Dept: MAMMOGRAPHY | Facility: HOSPITAL | Age: 42
Discharge: HOME | End: 2022-10-19
Payer: COMMERCIAL

## 2022-10-19 VITALS — HEIGHT: 63 IN | WEIGHT: 194 LBS | BODY MASS INDEX: 34.38 KG/M2

## 2022-10-19 DIAGNOSIS — Z12.31 ENCOUNTER FOR SCREENING MAMMOGRAM FOR MALIGNANT NEOPLASM OF BREAST: ICD-10-CM

## 2022-10-19 PROCEDURE — 77063 BREAST TOMOSYNTHESIS BI: CPT

## 2022-10-19 PROCEDURE — 77067 SCR MAMMO BI INCL CAD: CPT

## 2022-11-10 ENCOUNTER — OFFICE VISIT (OUTPATIENT)
Dept: INTERNAL MEDICINE CLINIC | Facility: CLINIC | Age: 42
End: 2022-11-10

## 2022-11-10 VITALS
HEART RATE: 87 BPM | SYSTOLIC BLOOD PRESSURE: 124 MMHG | DIASTOLIC BLOOD PRESSURE: 80 MMHG | TEMPERATURE: 97.9 F | BODY MASS INDEX: 33.49 KG/M2 | OXYGEN SATURATION: 96 % | HEIGHT: 63 IN | WEIGHT: 189 LBS

## 2022-11-10 DIAGNOSIS — Z00.00 ANNUAL PHYSICAL EXAM: Primary | ICD-10-CM

## 2022-11-10 DIAGNOSIS — B07.9 WART ON THUMB: ICD-10-CM

## 2022-11-10 DIAGNOSIS — Z23 NEED FOR INFLUENZA VACCINATION: ICD-10-CM

## 2022-11-10 DIAGNOSIS — F31.81 BIPOLAR 2 DISORDER (HCC): ICD-10-CM

## 2022-11-10 DIAGNOSIS — M54.50 CHRONIC BILATERAL LOW BACK PAIN WITHOUT SCIATICA: ICD-10-CM

## 2022-11-10 DIAGNOSIS — E78.2 MIXED HYPERLIPIDEMIA: ICD-10-CM

## 2022-11-10 DIAGNOSIS — E55.9 VITAMIN D DEFICIENCY: ICD-10-CM

## 2022-11-10 DIAGNOSIS — G89.29 CHRONIC BILATERAL LOW BACK PAIN WITHOUT SCIATICA: ICD-10-CM

## 2022-11-10 RX ORDER — ERGOCALCIFEROL 1.25 MG/1
50000 CAPSULE ORAL WEEKLY
Qty: 12 CAPSULE | Refills: 1 | Status: SHIPPED | OUTPATIENT
Start: 2022-11-10

## 2022-11-11 ENCOUNTER — TELEPHONE (OUTPATIENT)
Dept: INTERNAL MEDICINE CLINIC | Facility: CLINIC | Age: 42
End: 2022-11-11

## 2022-11-11 NOTE — PATIENT INSTRUCTIONS

## 2022-11-11 NOTE — PROGRESS NOTES
ADULT ANNUAL [facility]  [facility]    NAME: Jessica Johnson  AGE: 43 y o  SEX: female  : 1980     DATE: 2022     Assessment and Plan:     Problem List Items Addressed This Visit        Musculoskeletal and Integument    Wart on thumb    Relevant Orders    Ambulatory Referral to Dermatology    CBC and differential       Other    Bipolar 2 disorder (HCC)    Relevant Medications    sertraline (ZOLOFT) 50 mg tablet    Other Relevant Orders    CBC and differential    Chronic bilateral low back pain without sciatica    Relevant Orders    CBC and differential    Mixed hyperlipidemia    Relevant Orders    CBC and differential    Comprehensive metabolic panel    Lipid panel    TSH, 3rd generation      Other Visit Diagnoses     Annual physical exam    -  Primary    Vitamin D deficiency        Relevant Medications    ergocalciferol (VITAMIN D2) 50,000 units    Other Relevant Orders    Vitamin D 25 hydroxy    Need for influenza vaccination        Relevant Orders    influenza vaccine, quadrivalent, 0 5 mL, preservative-free, for adult and pediatric patients 6 mos+ (AFLURIA, FLUARIX, FLULAVAL, FLUZONE) (Completed)        Orders recommendations as noted above  Previous laboratory testing reviewed with her  Blood sugar mildly elevated  Watch diet more closely  Carbohydrates reviewed  Increase activity level  Cholesterol much improved  Continue with the atorvastatin as previously  Watch for any worsening joint pains  Visits with pain management reviewed  Continue with the meloxicam   Vitamin-D level significantly low  High-dose vitamin-D prescribed weekly  Continue follow-up with Psychiatry regularly  Mood has been relatively stable  Watch for any worsening  Recommend mammograms yearly  Flu shot given today  Will have her follow up in about 6 months or sooner if needed        Immunizations and preventive care screenings were discussed with patient today  Appropriate education was printed on patient's after visit summary  Counseling:  Alcohol/drug use: discussed moderation in alcohol intake, the recommendations for healthy alcohol use, and avoidance of illicit drug use  Dental Health: discussed importance of regular tooth brushing, flossing, and dental visits  Injury prevention: discussed safety/seat belts, safety helmets, smoke detectors, carbon dioxide detectors, and smoking near bedding or upholstery  Sexual health: discussed sexually transmitted diseases, partner selection, use of condoms, avoidance of unintended pregnancy, and contraceptive alternatives  Exercise: the importance of regular exercise/physical activity was discussed  Recommend exercise 3-5 times per week for at least 30 minutes  BMI Counseling: Body mass index is 33 48 kg/m²  The BMI is above normal  Nutrition recommendations include decreasing portion sizes, encouraging healthy choices of fruits and vegetables, decreasing fast food intake, consuming healthier snacks, limiting drinks that contain sugar, moderation in carbohydrate intake and reducing intake of cholesterol  Exercise recommendations include exercising 3-5 times per week  Rationale for BMI follow-up plan is due to patient being overweight or obese  No follow-ups on file  Chief Complaint:     Chief Complaint   Patient presents with   • Follow-up     "Discuss labs, thinks she is allergic to gold, a thing on her left thumb"      History of Present Illness:     Adult Annual Physical   Patient here for a comprehensive physical exam  The patient reports problems - See below  She presents for annual wellness visit  Has generally been doing well  Has been working at [employer]  Has noted a firm skin lesion left thumb  This seems to peel off a times but always returns  Would like this taking care of definitively    Continues with the back and joint symptoms but these have improved with the meloxicam  Had followed up with pain management  Continues to follow-up with Psychiatry  Tolerating her medications without difficulty  Has not been taking over-the-counter vitamin B12 or D recently  Appetite has been generally stable  Denies any nausea, vomiting, or diarrhea  Diet and Physical Activity  Diet/Nutrition: well balanced diet  Exercise: no formal exercise  Depression Screening  PHQ-2/9 Depression Screening         General Health  Sleep: gets 7-8 hours of sleep on average  Hearing: normal - bilateral   Vision: goes for regular eye exams  Dental: regular dental visits  /GYN Health  Patient is: premenopausal  Last menstrual period:    Contraceptive method:        Review of Systems:     Review of Systems   Constitutional: Positive for activity change  Negative for appetite change, chills and fever  HENT: Negative for congestion and rhinorrhea  Eyes: Negative for visual disturbance  Respiratory: Negative for chest tightness and shortness of breath  Cardiovascular: Negative for chest pain and palpitations  Gastrointestinal: Negative for abdominal pain, blood in stool, diarrhea, nausea and vomiting  Endocrine: Negative for polydipsia, polyphagia and polyuria  Genitourinary: Negative for dysuria, frequency and urgency  Musculoskeletal: Negative for gait problem  Skin: Negative for color change  As per HPI   Neurological: Negative for dizziness and headaches  Hematological: Does not bruise/bleed easily  Psychiatric/Behavioral: Negative for confusion and sleep disturbance  The patient is not nervous/anxious         Past Medical History:     Past Medical History:   Diagnosis Date   • Anxiety    • Bipolar depression ([facility] [address] )    • Chronic pelvic pain in female 3/8/2021   • Endometrial hyperplasia without atypia 2021   • Hypertension    • Menometrorrhagia 3/8/2021      Past Surgical History:     Past Surgical History:   Procedure Laterality Date   •  SECTION • HYSTERECTOMY  04/09/2021      Social History:     Social History     Socioeconomic History   • Marital status: /Civil Union     Spouse name: None   • Number of children: None   • Years of education: None   • Highest education level: None   Occupational History   • None   Tobacco Use   • Smoking status: Current Every Day Smoker     Packs/day: 0 50     Types: Cigarettes   • Smokeless tobacco: Never Used   Vaping Use   • Vaping Use: Every day   Substance and Sexual Activity   • Alcohol use: Never   • Drug use: Never   • Sexual activity: Not Currently   Other Topics Concern   • None   Social History Narrative   • None     Social Determinants of Health     Financial Resource Strain: Not on file   Food Insecurity: Not on file   Transportation Needs: Not on file   Physical Activity: Not on file   Stress: Not on file   Social Connections: Not on file   Intimate Partner Violence: Not on file   Housing Stability: Not on file      Family History:     Family History   Problem Relation Age of Onset   • Heart disease Father    • No Known Problems Sister    • No Known Problems Daughter    • No Known Problems Maternal Grandmother    • No Known Problems Maternal Grandfather    • No Known Problems Paternal Grandmother    • No Known Problems Paternal Grandfather    • No Known Problems Son    • No Known Problems Son    • No Known Problems Son    • No Known Problems Maternal Aunt    • No Known Problems Maternal Aunt    • No Known Problems Paternal Aunt    • No Known Problems Paternal Aunt       Current Medications:     Current Outpatient Medications   Medication Sig Dispense Refill   • atorvastatin (LIPITOR) 10 mg tablet Take 1 tablet (10 mg total) by mouth daily 90 tablet 3   • ergocalciferol (VITAMIN D2) 50,000 units Take 1 capsule (50,000 Units total) by mouth once a week 12 capsule 1   • meloxicam (MOBIC) 15 mg tablet Take 1 tablet (15 mg total) by mouth daily With food 30 tablet 1   • sertraline (ZOLOFT) 50 mg tablet Take 50 mg by mouth daily     • topiramate (Topamax) 25 mg tablet Take 1 tablet (25 mg total) by mouth 2 (two) times a day 60 tablet 4     No current facility-administered medications for this visit  Allergies: Allergies   Allergen Reactions   • Clonazepam Other (See Comments)   • Latex    • Methylphenidate Other (See Comments)     Hyperactivity"      Physical Exam:     /80 (BP Location: Left arm, Patient Position: Sitting, Cuff Size: Large)   Pulse 87   Temp 97 9 °F (36 6 °C)   Ht 5' 3" (1 6 m)   Wt 85 7 kg (189 lb)   LMP 12/25/2020   SpO2 96%   BMI 33 48 kg/m²     Physical Exam  Vitals and nursing note reviewed  Constitutional:       General: She is not in acute distress  Appearance: She is well-developed  HENT:      Head: Normocephalic and atraumatic  Eyes:      Conjunctiva/sclera: Conjunctivae normal    Cardiovascular:      Rate and Rhythm: Normal rate and regular rhythm  Heart sounds: No murmur heard  Pulmonary:      Effort: Pulmonary effort is normal  No respiratory distress  Breath sounds: Normal breath sounds  Abdominal:      Palpations: Abdomen is soft  Tenderness: There is no abdominal tenderness  Musculoskeletal:      Cervical back: Neck supple  Skin:     General: Skin is warm and dry  Comments: Wart like skin lesion left thumb   Neurological:      Mental Status: She is alert     Psychiatric:         Mood and Affect: Mood and affect normal          Speech: Speech normal          Cognition and Memory: Cognition and memory normal           Maxwell Bustos MD  [address] [place] [address],[address]

## 2022-11-13 DIAGNOSIS — J45.20 MILD INTERMITTENT REACTIVE AIRWAY DISEASE WITHOUT COMPLICATION: Primary | ICD-10-CM

## 2022-11-13 RX ORDER — ALBUTEROL SULFATE 90 UG/1
2 AEROSOL, METERED RESPIRATORY (INHALATION) EVERY 6 HOURS PRN
Qty: 18 G | Refills: 1 | Status: SHIPPED | OUTPATIENT
Start: 2022-11-13 | End: 2022-12-13

## 2022-11-14 ENCOUNTER — OFFICE VISIT (OUTPATIENT)
Dept: PAIN MEDICINE | Facility: CLINIC | Age: 42
End: 2022-11-14

## 2022-11-14 VITALS
DIASTOLIC BLOOD PRESSURE: 81 MMHG | BODY MASS INDEX: 32.71 KG/M2 | HEIGHT: 63 IN | HEART RATE: 80 BPM | SYSTOLIC BLOOD PRESSURE: 124 MMHG | WEIGHT: 184.6 LBS

## 2022-11-14 DIAGNOSIS — M79.18 MYOFASCIAL PAIN SYNDROME: ICD-10-CM

## 2022-11-14 DIAGNOSIS — G89.4 CHRONIC PAIN SYNDROME: ICD-10-CM

## 2022-11-14 DIAGNOSIS — G89.29 CHRONIC BILATERAL LOW BACK PAIN WITHOUT SCIATICA: ICD-10-CM

## 2022-11-14 DIAGNOSIS — M47.816 LUMBAR SPONDYLOSIS: ICD-10-CM

## 2022-11-14 DIAGNOSIS — M47.812 CERVICAL SPONDYLOSIS: ICD-10-CM

## 2022-11-14 DIAGNOSIS — M54.2 NECK PAIN: ICD-10-CM

## 2022-11-14 DIAGNOSIS — M54.50 CHRONIC BILATERAL LOW BACK PAIN WITHOUT SCIATICA: ICD-10-CM

## 2022-11-14 RX ORDER — MELOXICAM 15 MG/1
15 TABLET ORAL DAILY
Qty: 30 TABLET | Refills: 2 | Status: SHIPPED | OUTPATIENT
Start: 2022-11-14

## 2022-11-14 NOTE — PROGRESS NOTES
Assessment:  1  Chronic pain syndrome    2  Chronic bilateral low back pain without sciatica    3  Lumbar spondylosis    4  Neck pain    5  Cervical spondylosis    6  Myofascial pain syndrome        Plan:  While the patient was in the office today, I did have a thorough conversation regarding their chronic pain syndrome, medication management, and treatment plan options  Patient is being seen for follow-up visit  She was last seen here on 10/03/2022 at which time she was started on Mobic 15 mg daily  She tells me that there has been some additional improvement in her low back pain since starting this medication  She denies side effects from this medication  Renewed Mobic 15 mg daily  Prescription was sent to her pharmacy with 2 refills  Patient is not interested in interventional therapy  Continue home exercise program for lumbar core strengthening/stretching  We discussed the importance of a lifelong commitment to daily exercises geared toward lumbar core stretching and strengthening  The patient was agreeable and verbalized an understanding  The patient will follow-up in 3 months for medication prescription refill and reevaluation  The patient was advised to contact the office should their symptoms worsen in the interim  The patient was agreeable and verbalized an understanding  History of Present Illness: The patient is a 43 y o  female who presents for a follow up office visit in regards to Back Pain  The patient’s current symptoms include complaints of low back pain, nonradiating  Current pain level is a 5/10  Quality pain is described as pressure-like  Current pain medications includes:  Mobic 15 mg daily   The patient reports that this regimen is providing 25-30 % pain relief  The patient is reporting no side effects from this pain medication regimen      I have personally reviewed and/or updated the patient's past medical history, past surgical history, family history, social history, current medications, allergies, and vital signs today  Review of Systems  Review of Systems   Respiratory: Negative for shortness of breath  Cardiovascular: Negative for chest pain  Gastrointestinal: Negative for constipation, diarrhea, nausea and vomiting  Musculoskeletal: Positive for joint swelling  Negative for arthralgias, gait problem and myalgias  Joint stiffness   Neurological: Negative for dizziness, seizures and weakness  All other systems reviewed and are negative          Past Medical History:   Diagnosis Date   • Anxiety    • Bipolar depression ([facility] [address] )    • Chronic pelvic pain in female 3/8/2021   • Endometrial hyperplasia without atypia 2021   • Hypertension    • Menometrorrhagia 3/8/2021       Past Surgical History:   Procedure Laterality Date   •  SECTION     • HYSTERECTOMY  2021       Family History   Problem Relation Age of Onset   • Heart disease Father    • No Known Problems Sister    • No Known Problems Daughter    • No Known Problems Maternal Grandmother    • No Known Problems Maternal Grandfather    • No Known Problems Paternal Grandmother    • No Known Problems Paternal Grandfather    • No Known Problems Son    • No Known Problems Son    • No Known Problems Son    • No Known Problems Maternal Aunt    • No Known Problems Maternal Aunt    • No Known Problems Paternal Aunt    • No Known Problems Paternal Aunt        Social History     Occupational History   • Not on file   Tobacco Use   • Smoking status: Current Every Day Smoker     Packs/day: 0 50     Types: Cigarettes   • Smokeless tobacco: Never Used   Vaping Use   • Vaping Use: Every day   Substance and Sexual Activity   • Alcohol use: Never   • Drug use: Never   • Sexual activity: Not Currently         Current Outpatient Medications:   •  albuterol (PROVENTIL HFA,VENTOLIN HFA) 90 mcg/act inhaler, Inhale 2 puffs every 6 (six) hours as needed for wheezing, Disp: 18 g, Rfl: 1  • atorvastatin (LIPITOR) 10 mg tablet, Take 1 tablet (10 mg total) by mouth daily, Disp: 90 tablet, Rfl: 3  •  ergocalciferol (VITAMIN D2) 50,000 units, Take 1 capsule (50,000 Units total) by mouth once a week, Disp: 12 capsule, Rfl: 1  •  meloxicam (MOBIC) 15 mg tablet, Take 1 tablet (15 mg total) by mouth daily With food, Disp: 30 tablet, Rfl: 2  •  sertraline (ZOLOFT) 50 mg tablet, Take 50 mg by mouth daily, Disp: , Rfl:   •  topiramate (Topamax) 25 mg tablet, Take 1 tablet (25 mg total) by mouth 2 (two) times a day, Disp: 60 tablet, Rfl: 4    Allergies   Allergen Reactions   • Clonazepam Other (See Comments)   • Latex    • Methylphenidate Other (See Comments)     Hyperactivity"       Physical Exam:    /81 (BP Location: Left arm, Patient Position: Sitting, Cuff Size: Large)   Pulse 80   Ht 5' 3" (1 6 m)   Wt 83 7 kg (184 lb 9 6 oz)   LMP 12/25/2020   BMI 32 70 kg/m²     Constitutional:normal, well developed, well nourished, alert, in no distress and non-toxic and no overt pain behavior  Eyes:anicteric  HEENT:grossly intact  Neck:supple, symmetric, trachea midline and no masses   Pulmonary:even and unlabored  Cardiovascular:No edema or pitting edema present  Skin:Normal without rashes or lesions and well hydrated  Psychiatric:Mood and affect appropriate  Neurologic:Cranial Nerves II-XII grossly intact  Musculoskeletal:normal    Imaging  No orders to display       No orders of the defined types were placed in this encounter

## 2022-11-18 DIAGNOSIS — R40.0 DAYTIME SOMNOLENCE: Primary | ICD-10-CM

## 2022-11-21 ENCOUNTER — OFFICE VISIT (OUTPATIENT)
Dept: URGENT CARE | Facility: MEDICAL CENTER | Age: 42
End: 2022-11-21

## 2022-11-21 VITALS
HEART RATE: 85 BPM | RESPIRATION RATE: 18 BRPM | WEIGHT: 186 LBS | HEIGHT: 63 IN | TEMPERATURE: 98.6 F | BODY MASS INDEX: 32.96 KG/M2 | OXYGEN SATURATION: 98 % | SYSTOLIC BLOOD PRESSURE: 120 MMHG | DIASTOLIC BLOOD PRESSURE: 80 MMHG

## 2022-11-21 DIAGNOSIS — J06.9 VIRAL URI: Primary | ICD-10-CM

## 2022-11-21 RX ORDER — FLUTICASONE PROPIONATE 50 MCG
2 SPRAY, SUSPENSION (ML) NASAL DAILY
Qty: 11.1 ML | Refills: 0 | Status: SHIPPED | OUTPATIENT
Start: 2022-11-21

## 2022-11-21 NOTE — PROGRESS NOTES
[facility]        NAME: Alexandra Guzman is a 43 y o  female  : 1980    MRN: [id number]  DATE: 2022  TIME: 9:11 AM    Assessment and Plan   Viral URI [J06 9]  1  Viral URI  fluticasone (FLONASE) 50 mcg/act nasal spray            Patient Instructions     Use nasal spray as instructed  Tylenol as needed for pain/fever  Drink plenty of fluids and rest    May try over the counter cold/flu meds like robitussin, mucinex, etc   Follow up with PCP in 3-5 days  Proceed to  ER if symptoms worsen  Chief Complaint     Chief Complaint   Patient presents with   • Cold Like Symptoms     Cough, sneezing, runny nose, fatigue , when laying nicole she hears bubbles in the throat area all s/s started Thursday          History of Present Illness       43 yoF here for cough, runny nose, and fatigue that began 4 days ago  Sx have been constant  Pt states coworker was sick recently - pt had negative covid test at home last week  She has been taking dayquil with some relief  Pt has occasional chills at home  She denies any fever, trouble breathing, chest pain, ear pain  Review of Systems   Review of Systems   Constitutional: Positive for chills and fatigue  Negative for fever  HENT: Positive for congestion, postnasal drip and rhinorrhea  Negative for ear pain, sinus pressure, sinus pain and sore throat  Respiratory: Positive for cough  Negative for shortness of breath and wheezing  Cardiovascular: Negative for chest pain and palpitations  Gastrointestinal: Negative for abdominal pain, diarrhea, nausea and vomiting  Musculoskeletal: Negative for arthralgias and myalgias  Skin: Negative  Neurological: Negative            Current Medications       Current Outpatient Medications:   •  albuterol (PROVENTIL HFA,VENTOLIN HFA) 90 mcg/act inhaler, Inhale 2 puffs every 6 (six) hours as needed for wheezing, Disp: 18 g, Rfl: 1  •  atorvastatin (LIPITOR) 10 mg tablet, Take 1 tablet (10 mg total) by mouth daily, Disp: 90 tablet, Rfl: 3  •  ergocalciferol (VITAMIN D2) 50,000 units, Take 1 capsule (50,000 Units total) by mouth once a week, Disp: 12 capsule, Rfl: 1  •  fluticasone (FLONASE) 50 mcg/act nasal spray, 2 sprays into each nostril daily, Disp: 11 1 mL, Rfl: 0  •  meloxicam (MOBIC) 15 mg tablet, Take 1 tablet (15 mg total) by mouth daily With food, Disp: 30 tablet, Rfl: 2  •  sertraline (ZOLOFT) 50 mg tablet, Take 50 mg by mouth daily, Disp: , Rfl:   •  topiramate (Topamax) 25 mg tablet, Take 1 tablet (25 mg total) by mouth 2 (two) times a day, Disp: 60 tablet, Rfl: 4    Current Allergies     Allergies as of 2022 - Reviewed 2022   Allergen Reaction Noted   • Clonazepam Other (See Comments) 2019   • Latex  2019   • Methylphenidate Other (See Comments) 2020            The following portions of the patient's history were reviewed and updated as appropriate: allergies, current medications, past family history, past medical history, past social history, past surgical history and problem list      Past Medical History:   Diagnosis Date   • Anxiety    • Bipolar depression ([facility] [address] )    • Chronic pelvic pain in female 3/8/2021   • Endometrial hyperplasia without atypia 2021   • Hypertension    • Menometrorrhagia 3/8/2021       Past Surgical History:   Procedure Laterality Date   •  SECTION     • HYSTERECTOMY  2021       Family History   Problem Relation Age of Onset   • Heart disease Father    • No Known Problems Sister    • No Known Problems Daughter    • No Known Problems Maternal Grandmother    • No Known Problems Maternal Grandfather    • No Known Problems Paternal Grandmother    • No Known Problems Paternal Grandfather    • No Known Problems Son    • No Known Problems Son    • No Known Problems Son    • No Known Problems Maternal Aunt    • No Known Problems Maternal Aunt    • No Known Problems Paternal Aunt    • No Known Problems Paternal Aunt          Medications have been verified  Objective   /80   Pulse 85   Temp 98 6 °F (37 °C)   Resp 18   Ht 5' 3" (1 6 m)   Wt 84 4 kg (186 lb)   LMP 12/25/2020   SpO2 98%   BMI 32 95 kg/m²        Physical Exam     Physical Exam  Constitutional:       General: She is not in acute distress  Appearance: Normal appearance  HENT:      Right Ear: Tympanic membrane, ear canal and external ear normal  Tympanic membrane is not erythematous or bulging  Left Ear: A middle ear effusion is present  Tympanic membrane is not erythematous  Nose: Mucosal edema, congestion and rhinorrhea present  Rhinorrhea is clear  Mouth/Throat:      Lips: Pink  Mouth: Mucous membranes are moist       Pharynx: Posterior oropharyngeal erythema (mild) present  No oropharyngeal exudate or uvula swelling  Tonsils: No tonsillar exudate or tonsillar abscesses  Eyes:      Extraocular Movements: Extraocular movements intact  Pupils: Pupils are equal, round, and reactive to light  Cardiovascular:      Rate and Rhythm: Normal rate and regular rhythm  Pulses: Normal pulses  Heart sounds: Normal heart sounds  No murmur heard  No friction rub  No gallop  Pulmonary:      Effort: Pulmonary effort is normal  No respiratory distress  Breath sounds: Normal breath sounds  No stridor  No wheezing, rhonchi or rales  Abdominal:      General: Abdomen is flat  Tenderness: There is no abdominal tenderness  There is no guarding or rebound  Skin:     Capillary Refill: Capillary refill takes less than 2 seconds  Neurological:      General: No focal deficit present  Mental Status: She is alert

## 2022-11-21 NOTE — PATIENT INSTRUCTIONS
Use nasal spray as instructed  Tylenol as needed for pain/fever  Drink plenty of fluids and rest    May try over the counter cold/flu meds like robitussin, mucinex, etc   Follow up with PCP in 3-5 days  Upper Respiratory Infection   WHAT YOU NEED TO KNOW:   An upper respiratory infection is also called a cold  It can affect your nose, throat, ears, and sinuses  Cold symptoms are usually worst for the first 3 to 5 days  Most people get better in 7 to 14 days  You may continue to cough for 2 to 3 weeks  Colds are caused by viruses and do not get better with antibiotics  DISCHARGE INSTRUCTIONS:   Call your local emergency number (911 in the [address] [place] [address],[address]) if:   You have chest pain or trouble breathing  Return to the emergency department if:   You have a fever over 102ºF (39ºC)  Call your doctor if:   You have a low fever  Your sore throat gets worse or you see white or yellow spots in your throat  Your symptoms get worse after 3 to 5 days or are not better in 14 days  You have a rash anywhere on your skin  You have large, tender lumps in your neck  You have thick, green, or yellow drainage from your nose  You cough up thick yellow, green, or bloody mucus  You have a bad earache  You have questions or concerns about your condition or care  Medicines: You may need any of the following:  Decongestants  help reduce nasal congestion and help you breathe more easily  If you take decongestant pills, they may make you feel restless or cause problems with your sleep  Do not use decongestant sprays for more than a few days  Cough suppressants  help reduce coughing  Ask your healthcare provider which type of cough medicine is best for you  NSAIDs , such as ibuprofen, help decrease swelling, pain, and fever  NSAIDs can cause stomach bleeding or kidney problems in certain people  If you take blood thinner medicine, always ask your healthcare provider if NSAIDs are safe for you   Always read the medicine label and follow directions  Acetaminophen  decreases pain and fever  It is available without a doctor's order  Ask how much to take and how often to take it  Follow directions  Read the labels of all other medicines you are using to see if they also contain acetaminophen, or ask your doctor or pharmacist  Acetaminophen can cause liver damage if not taken correctly  Do not use more than 4 grams (4,000 milligrams) total of acetaminophen in one day  Take your medicine as directed  Contact your healthcare provider if you think your medicine is not helping or if you have side effects  Tell him or her if you are allergic to any medicine  Keep a list of the medicines, vitamins, and herbs you take  Include the amounts, and when and why you take them  Bring the list or the pill bottles to follow-up visits  Carry your medicine list with you in case of an emergency  Self-care:   Rest as much as possible  Slowly start to do more each day  Drink more liquids as directed  Liquids will help thin and loosen mucus so you can cough it up  Liquids will also help prevent dehydration  Liquids that help prevent dehydration include water, fruit juice, and broth  Do not drink liquids that contain caffeine  Caffeine can increase your risk for dehydration  Ask your healthcare provider how much liquid to drink each day  Soothe a sore throat  Gargle with warm salt water  Make salt water by dissolving ¼ teaspoon salt in 1 cup warm water  You may also suck on hard candy or throat lozenges  You may use a sore throat spray  Use a humidifier or vaporizer  Use a cool mist humidifier or a vaporizer to increase air moisture in your home  This may make it easier for you to breathe and help decrease your cough  Use saline nasal drops as directed  These help relieve congestion  Apply petroleum-based jelly around the outside of your nostrils  This can decrease irritation from blowing your nose  Do not smoke  Nicotine and other chemicals in cigarettes and cigars can make your symptoms worse  They can also cause infections such as bronchitis or pneumonia  Ask your healthcare provider for information if you currently smoke and need help to quit  E-cigarettes or smokeless tobacco still contain nicotine  Talk to your healthcare provider before you use these products  Prevent a cold: Wash your hands often  Use soap and water every time you wash your hands  Rub your soapy hands together, lacing your fingers  Use the fingers of one hand to scrub under the nails of the other hand  Wash for at least 20 seconds  Rinse with warm, running water for several seconds  Then dry your hands  Use germ-killing gel if soap and water are not available  Do not touch your eyes or mouth without washing your hands first          Cover a sneeze or cough  Use a tissue that covers your mouth and nose  Put the used tissue in the trash right away  Use the bend of your arm if a tissue is not available  Wash your hands well with soap and water or use a hand   Do not stand close to anyone who is sneezing or coughing  Try to stay away from others while you are sick  This is especially important during the first 2 to 3 days when the virus is more easily spread  Wait until a fever, cough, or other symptoms are gone before you return to work or other regular activities  Do not share items while you are sick  This includes food, drinks, eating utensils, and dishes  Follow up with your doctor as directed:  Write down your questions so you remember to ask them during your visits  © Copyright [employer] 2022 Information is for End User's use only and may not be sold, redistributed or otherwise used for commercial purposes  All illustrations and images included in CareNotes® are the copyrighted property of [employer]  or [employer]  The above information is an  only   It is not intended as medical advice for individual conditions or treatments  Talk to your doctor, nurse or pharmacist before following any medical regimen to see if it is safe and effective for you

## 2022-11-28 ENCOUNTER — TELEPHONE (OUTPATIENT)
Dept: PAIN MEDICINE | Facility: CLINIC | Age: 42
End: 2022-11-28

## 2022-11-28 NOTE — TELEPHONE ENCOUNTER
Caller: Paula Ohm    Doctor: Jj Christopher     Reason for call: patient called for script for meloxicam (MOBIC) 15 mg tablet     Informed patient that script was send on 11/14- pt is calling pharmacy to check

## 2023-01-26 ENCOUNTER — OFFICE VISIT (OUTPATIENT)
Dept: INTERNAL MEDICINE CLINIC | Facility: CLINIC | Age: 43
End: 2023-01-26

## 2023-01-26 ENCOUNTER — HOSPITAL ENCOUNTER (OUTPATIENT)
Dept: RADIOLOGY | Facility: HOSPITAL | Age: 43
Discharge: HOME | End: 2023-01-26

## 2023-01-26 VITALS
HEART RATE: 65 BPM | OXYGEN SATURATION: 97 % | DIASTOLIC BLOOD PRESSURE: 84 MMHG | SYSTOLIC BLOOD PRESSURE: 138 MMHG | TEMPERATURE: 97.5 F

## 2023-01-26 DIAGNOSIS — M79.601 RIGHT ARM PAIN: ICD-10-CM

## 2023-01-26 DIAGNOSIS — F31.81 BIPOLAR 2 DISORDER (HCC): ICD-10-CM

## 2023-01-26 DIAGNOSIS — M75.21 BICEPS TENDONITIS ON RIGHT: Primary | ICD-10-CM

## 2023-01-26 DIAGNOSIS — M75.21 BICEPS TENDONITIS ON RIGHT: ICD-10-CM

## 2023-01-26 RX ORDER — PREDNISONE 10 MG/1
40 TABLET ORAL DAILY
Qty: 20 TABLET | Refills: 0 | Status: SHIPPED | OUTPATIENT
Start: 2023-01-26 | End: 2023-01-31

## 2023-01-26 RX ORDER — TOPIRAMATE 25 MG/1
25 TABLET ORAL 2 TIMES DAILY
Qty: 60 TABLET | Refills: 4 | Status: SHIPPED | OUTPATIENT
Start: 2023-01-26
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
## 2023-02-03 ENCOUNTER — OFFICE VISIT (OUTPATIENT)
Dept: OBGYN CLINIC | Facility: CLINIC | Age: 43
End: 2023-02-03

## 2023-02-03 VITALS
DIASTOLIC BLOOD PRESSURE: 70 MMHG | SYSTOLIC BLOOD PRESSURE: 100 MMHG | OXYGEN SATURATION: 97 % | WEIGHT: 172.8 LBS | HEIGHT: 63 IN | HEART RATE: 97 BPM | BODY MASS INDEX: 30.62 KG/M2

## 2023-02-03 DIAGNOSIS — M75.51 SUBACROMIAL BURSITIS OF RIGHT SHOULDER JOINT: Primary | ICD-10-CM

## 2023-02-03 DIAGNOSIS — S53.004S: ICD-10-CM

## 2023-02-03 RX ORDER — BUPIVACAINE HYDROCHLORIDE 2.5 MG/ML
4 INJECTION, SOLUTION INFILTRATION; PERINEURAL
Status: COMPLETED | OUTPATIENT
Start: 2023-02-03 | End: 2023-02-03

## 2023-02-03 RX ORDER — TRIAMCINOLONE ACETONIDE 40 MG/ML
40 INJECTION, SUSPENSION INTRA-ARTICULAR; INTRAMUSCULAR
Status: COMPLETED | OUTPATIENT
Start: 2023-02-03 | End: 2023-02-03

## 2023-02-03 RX ADMIN — TRIAMCINOLONE ACETONIDE 40 MG: 40 INJECTION, SUSPENSION INTRA-ARTICULAR; INTRAMUSCULAR at 11:58

## 2023-02-03 RX ADMIN — BUPIVACAINE HYDROCHLORIDE 4 ML: 2.5 INJECTION, SOLUTION INFILTRATION; PERINEURAL at 11:58

## 2023-02-03 NOTE — LETTER
February 5, 2023     Matt Huerta, [address]    Patient: Tawny Conte   YOB: 1980   Date of Visit: 2/3/2023       Dear Dr Estefania Becerril:    Thank you for referring Tawny Conte to me for evaluation  Below are my notes for this consultation  If you have questions, please do not hesitate to call me  I look forward to following your patient along with you  Sincerely,        Wellington Vera DO        CC: No Recipients  Wellington Vera DO  2/3/2023 11:58 AM  Signed  Large joint arthrocentesis: R subacromial bursa  Universal Protocol:  Consent: Verbal consent obtained  Risks and benefits: risks, benefits and alternatives were discussed  Consent given by: patient    Supporting Documentation  Indications: pain   Procedure Details  Location: shoulder - R subacromial bursa  Needle size: 22 G  Ultrasound guidance: no  Approach: posterior  Medications administered: 4 mL bupivacaine 0 25 %; 40 mg triamcinolone acetonide 40 mg/mL    Patient tolerance: patient tolerated the procedure well with no immediate complications    Risks and benefits of CSI were discussed with patient extensively  Risks were highlighted which included but were not limited to infection, pain, local site swelling, and chance that injection may not be effective  Patient was also counseled regarding glucose elevation days after receiving CSI and to be mindful of diet and check sugars daily  Patient agreeable to proceed with CSI after counseling  Wellington Vera DO  2/3/2023 11:58 AM  Signed  Accompanied by advocate/friend     Subjective:  Chief Complaint   Patient presents with   • Right Upper Arm - Pain     R bicep  Has been about 1 month  Tawny Conte is a 43 y o  female complains of right shoulder pain  Onset of the symptoms was several weeks ago  Mechanism of injury: none  Aggravating factors: lifting overhead and across body   Treatment to date: rest  Symptoms have gradually worsened  The following portions of the patient's history were reviewed and updated as appropriate: allergies, current medications, past family history, past medical history, past social history, past surgical history and problem list     Occupation:      Review of Systems   Constitutional: Negative for fever  HENT: Negative for dental problem and headaches  Eyes: Negative for vision loss  Respiratory: Negative for cough and shortness of breath  Cardiovascular: Negative for leg swelling and palpitations  Gastrointestinal: Negative for constipation and diarrhea  Genitourinary: Negative for bladder incontinence and difficulty urinating  Musculoskeletal: Negative for back pain and difficulty walking  Skin: Negative for rash and ulcer  Neurological: Negative for dizziness and headaches  Hem/Lymph/Immuno: Negative for blood clots  Does not bruise/bleed easily  Psychiatric/Behavioral: Negative for confusion  Objective:  /70 (BP Location: Left arm, Patient Position: Sitting, Cuff Size: Large)   Pulse 97   Ht 5' 3" (1 6 m)   Wt 78 4 kg (172 lb 12 8 oz)   LMP 2020   SpO2 97%   BMI 30 61 kg/m²     Skin: no rashes, lesions, skin discolorations, lacerations  Vasculature: normal radial and ulnar pulse,  normal skin color, normal capillary refill in extremity, no upper extremity edema  Neurologic: Neurologic exam is normal throughout upper extremities, Awake, alert, and oriented x3, no apparent distress  Musculoskeletal:   right SHOULDER EXAM    Intact skin    No erythema, no induration, no signs of infection  No gross deformity    AROM FF: 120 degrees, PROM 180 with hiking  AROM ER with arm at its side: 90 degrees  AROM IR: 80 degrees    Grind test: negative    Supraspinatus strength testin+/5  Infraspinatus strength testin/5    Belly press: negative      Empty can: positive  Biceps TTP: positive  Arc test: positive  Armstrong: positive  Tenderness to palpation of AC joint: negative  Pain with cross-body adduction: negative        Imaging:    XR shoulder 2+ vw right    Result Date: 1/28/2023  Narrative: RIGHT SHOULDER INDICATION:   M75 21: Bicipital tendinitis, right shoulder [address]79 601: Pain in right arm  COMPARISON:  None VIEWS:  XR SHOULDER 2+ VW RIGHT FINDINGS: There is no acute fracture or dislocation  No significant degenerative changes  No lytic or blastic osseous lesion  Soft tissues are unremarkable  Impression: No acute osseous abnormality  Workstation performed: [id number]     XR elbow 3+ vw right    Result Date: 1/28/2023  Narrative: RIGHT ELBOW INDICATION:   M75 21: Bicipital tendinitis, right shoulder M79 601: Pain in right arm  COMPARISON:  None VIEWS:  XR ELBOW 3+ VW RIGHT FINDINGS: There is no acute fracture  Radial head dislocation    Chronic fracture deformity distal humeral shaft  There is no joint effusion  No significant degenerative changes  No lytic or blastic osseous lesion  Soft tissues are unremarkable  Impression: Chronic fracture deformity distal humeral shaft  Radial head appears dislocated and possibly chronic  No acute osseous abnormality  Workstation performed: [id number]        Assessment/Plan:  1  Radial head dislocation, right, sequela    - Ambulatory Referral to Orthopedic Surgery    2  Subacromial bursitis of right shoulder joint    - Ambulatory Referral to Physical Therapy; Future    Clinical impression is that patient is having subacromial bursitis given reproduction of pain symptoms with impingement testing  Discussed treatment options including physical therapy, oral anti-inflammatory and consideration for corticosteroid injection for subacromial bursa in office today  After discussion patient is agreeable to trial corticosteroid injection and begin with physical therapy  We will plan to follow-up again in about 2 months for reevaluation    In regards to chronic radial head dislocation patient remains asymptomatic in terms of pain however has limitations in terms of pronation supination  We did discuss she can see orthopedic surgery however given chronicity of this injury and relative low symptomology I advised that we continue with observation

## 2023-02-03 NOTE — PROGRESS NOTES
Large joint arthrocentesis: R subacromial bursa  Universal Protocol:  Consent: Verbal consent obtained  Risks and benefits: risks, benefits and alternatives were discussed  Consent given by: patient    Supporting Documentation  Indications: pain   Procedure Details  Location: shoulder - R subacromial bursa  Needle size: 22 G  Ultrasound guidance: no  Approach: posterior  Medications administered: 4 mL bupivacaine 0 25 %; 40 mg triamcinolone acetonide 40 mg/mL    Patient tolerance: patient tolerated the procedure well with no immediate complications    Risks and benefits of CSI were discussed with patient extensively  Risks were highlighted which included but were not limited to infection, pain, local site swelling, and chance that injection may not be effective  Patient was also counseled regarding glucose elevation days after receiving CSI and to be mindful of diet and check sugars daily  Patient agreeable to proceed with CSI after counseling

## 2023-02-03 NOTE — LETTER
February 3, 2023     Patient: Nikki Miles  YOB: 1980  Date of Visit: 2/3/2023      To Whom it May Concern:    Nikki Miles is under my professional care  Petra Up was seen in my office on 2/3/2023  Please excuse patient from work for right shoulder pain for 02/03/23 date  Can return as of 2/5/2023  If you have any questions or concerns, please don't hesitate to call           Sincerely,          Norma Mina DO        CC: No Recipients

## 2023-02-03 NOTE — PROGRESS NOTES
Accompanied by advocate/friend     Subjective:  Chief Complaint   Patient presents with   • Right Upper Arm - Pain     R bicep  Has been about 1 month  Isaac Rivers is a 43 y o  female complains of right shoulder pain  Onset of the symptoms was several weeks ago  Mechanism of injury: none  Aggravating factors: lifting overhead and across body  Treatment to date: rest  Symptoms have gradually worsened  The following portions of the patient's history were reviewed and updated as appropriate: allergies, current medications, past family history, past medical history, past social history, past surgical history and problem list     Occupation:      Review of Systems   Constitutional: Negative for fever  HENT: Negative for dental problem and headaches  Eyes: Negative for vision loss  Respiratory: Negative for cough and shortness of breath  Cardiovascular: Negative for leg swelling and palpitations  Gastrointestinal: Negative for constipation and diarrhea  Genitourinary: Negative for bladder incontinence and difficulty urinating  Musculoskeletal: Negative for back pain and difficulty walking  Skin: Negative for rash and ulcer  Neurological: Negative for dizziness and headaches  Hem/Lymph/Immuno: Negative for blood clots  Does not bruise/bleed easily  Psychiatric/Behavioral: Negative for confusion  Objective:  /70 (BP Location: Left arm, Patient Position: Sitting, Cuff Size: Large)   Pulse 97   Ht 5' 3" (1 6 m)   Wt 78 4 kg (172 lb 12 8 oz)   LMP 12/25/2020   SpO2 97%   BMI 30 61 kg/m²     Skin: no rashes, lesions, skin discolorations, lacerations  Vasculature: normal radial and ulnar pulse,  normal skin color, normal capillary refill in extremity, no upper extremity edema  Neurologic: Neurologic exam is normal throughout upper extremities, Awake, alert, and oriented x3, no apparent distress  Musculoskeletal:   right SHOULDER EXAM    Intact skin    No erythema, no induration, no signs of infection  No gross deformity    AROM FF: 120 degrees, PROM 180 with hiking  AROM ER with arm at its side: 90 degrees  AROM IR: 80 degrees    Grind test: negative    Supraspinatus strength testin+/5  Infraspinatus strength testin/5    Belly press: negative      Empty can: positive  Biceps TTP: positive  Arc test: positive  Armstrong: positive  Tenderness to palpation of AC joint: negative  Pain with cross-body adduction: negative        Imaging:    XR shoulder 2+ vw right    Result Date: 2023  Narrative: RIGHT SHOULDER INDICATION:   M75 21: Bicipital tendinitis, right shoulder M79 601: Pain in right arm  COMPARISON:  None VIEWS:  XR SHOULDER 2+ VW RIGHT FINDINGS: There is no acute fracture or dislocation  No significant degenerative changes  No lytic or blastic osseous lesion  Soft tissues are unremarkable  Impression: No acute osseous abnormality  Workstation performed: [id number]     XR elbow 3+ vw right    Result Date: 2023  Narrative: RIGHT ELBOW INDICATION:   M75 21: Bicipital tendinitis, right shoulder M79 601: Pain in right arm  COMPARISON:  None VIEWS:  XR ELBOW 3+ VW RIGHT FINDINGS: There is no acute fracture  Radial head dislocation    Chronic fracture deformity distal humeral shaft  There is no joint effusion  No significant degenerative changes  No lytic or blastic osseous lesion  Soft tissues are unremarkable  Impression: Chronic fracture deformity distal humeral shaft  Radial head appears dislocated and possibly chronic  No acute osseous abnormality  Workstation performed: [id number]        Assessment/Plan:  1  Radial head dislocation, right, sequela    - Ambulatory Referral to Orthopedic Surgery    2  Subacromial bursitis of right shoulder joint    - Ambulatory Referral to Physical Therapy; Future    Clinical impression is that patient is having subacromial bursitis given reproduction of pain symptoms with impingement testing    Discussed treatment options including physical therapy, oral anti-inflammatory and consideration for corticosteroid injection for subacromial bursa in office today  After discussion patient is agreeable to trial corticosteroid injection and begin with physical therapy  We will plan to follow-up again in about 2 months for reevaluation  In regards to chronic radial head dislocation patient remains asymptomatic in terms of pain however has limitations in terms of pronation supination  We did discuss she can see orthopedic surgery however given chronicity of this injury and relative low symptomology I advised that we continue with observation

## 2023-02-14 ENCOUNTER — EVALUATION (OUTPATIENT)
Dept: PHYSICAL THERAPY | Facility: CLINIC | Age: 43
End: 2023-02-14

## 2023-02-14 ENCOUNTER — OFFICE VISIT (OUTPATIENT)
Dept: PAIN MEDICINE | Facility: CLINIC | Age: 43
End: 2023-02-14

## 2023-02-14 VITALS
BODY MASS INDEX: 30.48 KG/M2 | DIASTOLIC BLOOD PRESSURE: 81 MMHG | RESPIRATION RATE: 16 BRPM | SYSTOLIC BLOOD PRESSURE: 124 MMHG | HEART RATE: 63 BPM | HEIGHT: 63 IN | WEIGHT: 172 LBS

## 2023-02-14 DIAGNOSIS — M54.50 CHRONIC BILATERAL LOW BACK PAIN WITHOUT SCIATICA: ICD-10-CM

## 2023-02-14 DIAGNOSIS — M54.2 NECK PAIN: ICD-10-CM

## 2023-02-14 DIAGNOSIS — M75.51 SUBACROMIAL BURSITIS OF RIGHT SHOULDER JOINT: ICD-10-CM

## 2023-02-14 DIAGNOSIS — G89.4 CHRONIC PAIN SYNDROME: Primary | ICD-10-CM

## 2023-02-14 DIAGNOSIS — M47.816 LUMBAR SPONDYLOSIS: ICD-10-CM

## 2023-02-14 DIAGNOSIS — M79.18 MYOFASCIAL PAIN SYNDROME: ICD-10-CM

## 2023-02-14 DIAGNOSIS — G89.29 CHRONIC BILATERAL LOW BACK PAIN WITHOUT SCIATICA: ICD-10-CM

## 2023-02-14 DIAGNOSIS — M47.812 CERVICAL SPONDYLOSIS: ICD-10-CM

## 2023-02-14 RX ORDER — MELOXICAM 15 MG/1
15 TABLET ORAL DAILY
Qty: 30 TABLET | Refills: 5 | Status: SHIPPED | OUTPATIENT
Start: 2023-02-14

## 2023-02-14 NOTE — PROGRESS NOTES
PT Evaluation     Today's date: 2023  Patient name: Rick Pastrana  : 1980  MRN: [id number]  Referring provider: Blake Hernandez DO  Dx:   Encounter Diagnosis     ICD-10-CM    1  Subacromial bursitis of right shoulder joint  M75 51 Ambulatory Referral to Physical Therapy          Start Time: 902  Stop Time: 956  Total time in clinic (min): 54 minutes    Assessment  Assessment details: Patient is a 44 yo female with medical diagnosis of subacromial bursitis of right shoulder  Following a thorough physical therapy evaluation, the patient demonstrates impaired forearm supination and shoulder external rotation ROM, end range pain c/ right shoulder AROM, decreased thoracic and cervical mobility and weakness of posterior chain  (+) cavitation c/ grade II t/s Pas, which decreased tenderness to T/S Pas in region  Educated patient about importance of strength in posterior chain and postural endurance  Will likely always be limited in shoulder external rotation secondary to injury at age 12, but will work on increasing ROM and strength of musculature slightly to better improve shoulder mechanics during elevation  Provided patient c/ handout c/ HEP  Patient demonstrated each exercise c/ good form and verbalized understanding of expectations c/ HEP  Patient will benefit from skilled physical therapy treatment to address the aforementioned impairments and functional deficits, accomplish patient goals and return patient to OF  Impairments: abnormal or restricted ROM, abnormal movement, activity intolerance, impaired physical strength, lacks appropriate home exercise program, pain with function, scapular dyskinesis and poor posture     Goals  STG (3 weeks):  Scap Retrac in Prone Endurance >10" c/ good technique  Pain <= 4/10 at worst     LTG (8 weeks):   Full AROM with minimal pain (compared to L UE, excluding ER)  Minimal pain with ADLs and at work  4+/5 strength R RTC all planes by DC  Independent with R shoulder mobility and strength as appropriate by DC    Plan  Plan details: TE, NMR, TA, MT, self-care, and modalities as needed in order to progress through skilled PT focused on ROM, strength, posture, motor control  Patient would benefit from: skilled physical therapy  Planned modality interventions: cryotherapy and thermotherapy: hydrocollator packs  Planned therapy interventions: manual therapy, neuromuscular re-education, patient education, self care, therapeutic activities, therapeutic exercise and home exercise program  Frequency: 2x week  Duration in weeks: 8  Treatment plan discussed with: patient        Subjective Evaluation    History of Present Illness  Mechanism of injury: IE: Patient is a 44 yo female presenting with right shoulder pain starting 2 months ago and gradually worsening  PMHx (+) for right distal humerus fx at age 12, has had limited external rotation since that age  Also (+) for history of neck pain  Pain shoots down to elbow  Main aggravating factors are tasks at work, slicing tomatoes even bothered it  Patient is an employee at [employer]  Pain c/ driving and turning steering wheel c/ right arm and lifting into abduction and extension  Had injection on 2/3/23 c/ ortho doc, which helped but patient reports pain is   Patients primary goals for PT are to decrease pain  Pain  Current pain ratin  At best pain ratin  At worst pain ratin  Relieving factors: change in position and relaxation  Aggravating factors: overhead activity and lifting    Treatments  Previous treatment: injection treatment  Patient Goals  Patient goals for therapy: decreased pain, increased motion, increased strength and return to sport/leisure activities          Objective     Observation:     -Posture: Forward head and rounded shoulders      -Thoracic Mobility: Decreased T/S Ext    TTP: R infraspinatus and R subscap m   Belly & tendon    Cervical ROM (degrees):  ROT (R/L): 80 / 70    Shoulder ROM (degrees):  FLX (R/L): 160  / 165  ABD (R/L):  155 p! In elbow @ end range/ 155  ER (R/L): 55 / 90   IR (R/L): T7 / T7    Shoulder Strength (MMT Grades):  FLX (R/L): 4- / 4+  ABD (R/L): 4- / 4+  ER (R/L): 4- / 4+  IR (R/L): 4- / 4+    Shoulder Special Tests (R):  HADD Test: (-)  Hawkin's Alexys: (+)       Precautions: None      Date 2/14       Manuals        PROM Shoulder KS       STM to R SubScap KS + Infraspinatus      T/S PAs KS II                       Neuro Re-Ed        Ball Stability        Bilateral ER        Standing TB Row/Ext L3 2x10 Row        Prone Scap Stab        [place]urns: Row, Ext, HABD                Ther Ex        Stick: ER 5"x20       Stick: ABD & EXT        SA Punches nv       Posterior Capsule Stretch        Sleeper Stretch        S/Lying ER/ABD nv       Standing TB ER/IR nv       TB Bicep Curls L3 2x10       Pulleys nv               Ther Activity                        Modalities        CP                Self Care: Provided patient c/ handout c/ HEP  Patient demonstrated each exercise c/ good form and verbalized understanding of expectations c/ HEP  Access Code: [email or web address]  URL: [email or web address]  Date: 02/14/2023  Prepared by: Merlin Eng    Exercises  • Scapular Retraction with Resistance - 1 x daily - 7 x weekly - 2 sets - 10 reps  • Standing Bicep Curls with Resistance - 1 x daily - 7 x weekly - 2 sets - 10 reps  • Seated Shoulder External Rotation AAROM with Dowel - 1 x daily - 7 x weekly

## 2023-02-14 NOTE — PROGRESS NOTES
Assessment:  1  Chronic pain syndrome    2  Chronic bilateral low back pain without sciatica    3  Lumbar spondylosis    4  Neck pain    5  Cervical spondylosis    6  Myofascial pain syndrome        Plan:  While the patient was in the office today, I did have a thorough conversation regarding their chronic pain syndrome, medication management, and treatment plan options  Patient is being in for a follow-up visit  Overall, she reports that her low back pain is stable  She uses Mobic 15 mg daily with food  Denies side effects from this medication  Would consider interventional therapy in the future if her pain worsens  Continue with home exercise program for lumbar core strengthening/stretching  Renewed Mobic 15 mg daily with food  I included 5 refills the prescription  The patient will follow-up in 6 months for medication prescription refill and reevaluation  The patient was advised to contact the office should their symptoms worsen in the interim  The patient was agreeable and verbalized an understanding  History of Present Illness: The patient is a 43 y o  female who presents for a follow up office visit in regards to Back Pain  The patient’s current symptoms include plaints of low back pain  Current pain level is a 4/10  Current pain level is described as pressure-like  Current pain medications includes: Mobic 15 mg daily  The patient reports that this regimen is providing 40% pain relief  The patient is reporting no side effects from this pain medication regimen  I have personally reviewed and/or updated the patient's past medical history, past surgical history, family history, social history, current medications, allergies, and vital signs today  Review of Systems  Review of Systems   Musculoskeletal: Positive for back pain  Decreased ROM  Joint stiffness  Joint swelling   All other systems reviewed and are negative            Past Medical History:   Diagnosis Date • Anxiety    • Bipolar depression ([facility] [address] )    • Chronic pelvic pain in female 3/8/2021   • Endometrial hyperplasia without atypia 2021   • Hypertension    • Menometrorrhagia 3/8/2021       Past Surgical History:   Procedure Laterality Date   •  SECTION     • HYSTERECTOMY  2021       Family History   Problem Relation Age of Onset   • Heart disease Father    • No Known Problems Sister    • No Known Problems Daughter    • No Known Problems Maternal Grandmother    • No Known Problems Maternal Grandfather    • No Known Problems Paternal Grandmother    • No Known Problems Paternal Grandfather    • No Known Problems Son    • No Known Problems Son    • No Known Problems Son    • No Known Problems Maternal Aunt    • No Known Problems Maternal Aunt    • No Known Problems Paternal Aunt    • No Known Problems Paternal Aunt        Social History     Occupational History   • Not on file   Tobacco Use   • Smoking status: Every Day     Packs/day: 0 50     Types: Cigarettes   • Smokeless tobacco: Never   Vaping Use   • Vaping Use: Every day   Substance and Sexual Activity   • Alcohol use: Never   • Drug use: Never   • Sexual activity: Not Currently         Current Outpatient Medications:   •  atorvastatin (LIPITOR) 10 mg tablet, Take 1 tablet (10 mg total) by mouth daily, Disp: 90 tablet, Rfl: 3  •  ergocalciferol (VITAMIN D2) 50,000 units, Take 1 capsule (50,000 Units total) by mouth once a week, Disp: 12 capsule, Rfl: 1  •  fluticasone (FLONASE) 50 mcg/act nasal spray, 2 sprays into each nostril daily, Disp: 11 1 mL, Rfl: 0  •  meloxicam (MOBIC) 15 mg tablet, Take 1 tablet (15 mg total) by mouth daily With food, Disp: 30 tablet, Rfl: 5  •  sertraline (ZOLOFT) 50 mg tablet, Take 50 mg by mouth daily, Disp: , Rfl:   •  topiramate (Topamax) 25 mg tablet, Take 1 tablet (25 mg total) by mouth 2 (two) times a day, Disp: 60 tablet, Rfl: 4    Allergies   Allergen Reactions   • Clonazepam Other (See Comments)   • Latex • Methylphenidate Other (See Comments)     Hyperactivity"       Physical Exam:    /81 (BP Location: Left arm, Patient Position: Sitting)   Pulse 63   Resp 16   Ht 5' 3" (1 6 m)   Wt 78 kg (172 lb)   LMP 12/25/2020   BMI 30 47 kg/m²     Constitutional:normal, well developed, well nourished, alert, in no distress and non-toxic and no overt pain behavior  Eyes:anicteric  HEENT:grossly intact  Neck:supple, symmetric, trachea midline and no masses   Pulmonary:even and unlabored  Cardiovascular:No edema or pitting edema present  Skin:Normal without rashes or lesions and well hydrated  Psychiatric:Mood and affect appropriate  Neurologic:Cranial Nerves II-XII grossly intact  Musculoskeletal:normal    Imaging    Narrative & Impression   LUMBAR SPINE     INDICATION:   M54 50: Low back pain, unspecified  G89 29: Other chronic pain        COMPARISON:  None     VIEWS:  XR SPINE LUMBAR MINIMUM 4 VIEWS NON INJURY  Images: 5     FINDINGS:     There are 5 non rib bearing lumbar vertebral bodies       There is no evidence of acute fracture or destructive osseous lesion      Alignment is unremarkable       Mild intervertebral disc disc space narrowing at L5-S1      The pedicles appear intact  Facet arthropathy at L5-S1      Soft tissues are unremarkable      IMPRESSION:     Mild degenerative changes of lumbar spine, most significant at L5-S1               No orders to display       No orders of the defined types were placed in this encounter

## 2023-02-17 ENCOUNTER — OFFICE VISIT (OUTPATIENT)
Dept: PHYSICAL THERAPY | Facility: CLINIC | Age: 43
End: 2023-02-17

## 2023-02-17 DIAGNOSIS — M75.51 SUBACROMIAL BURSITIS OF RIGHT SHOULDER JOINT: Primary | ICD-10-CM

## 2023-02-17 NOTE — PROGRESS NOTES
Daily Note     Today's date: 2023  Patient name: Ky Ariza  : 1980  MRN: [id number]  Referring provider: Huong Villalobos DO  Dx:   Encounter Diagnosis     ICD-10-CM    1  Subacromial bursitis of right shoulder joint  M75 51                      Subjective: Patient reports that her R shoulder as 3/10 at start of therapy  Objective: See treatment diary below  PRE's added per flow sheet without complaints  Assessment: Tolerated treatment well today  Pt responded well today  Progressions made today without complaints  Plan: Progress treatment as tolerated  Precautions: None      Date  2     Manuals        PROM Shoulder KS TS      STM to R SubScap KS + Infraspinatus      T/S PAs KS II NP                      Neuro Re-Ed        Ball Stability        Bilateral ER        Standing TB Row/Ext L3 2x10 Row Row L3 2x10      Prone Scap Stab        [place]urns: Row, Ext, E HABD        TB EXT  LV 3 2/10      Ther Ex        Stick: ER 5"x20 5"x20      Stick: ABD & EXT        SA Punches nv 2/10      Posterior Capsule Stretch        Sleeper Stretch        S/Lying ER/ABD nv 2/10 0#      Standing TB ER/IR nv L1 2x10      TB Bicep Curls L3 2x10 L3 2x10      Pulleys nv 4' scap              Ther Activity                        Modalities        CP                Self Care: Provided patient c/ handout c/ HEP  Patient demonstrated each exercise c/ good form and verbalized understanding of expectations c/ HEP  Access Code: [email or web address]  URL: [email or web address]  Date: 2023  Prepared by: Elsie Montana    Exercises  • Scapular Retraction with Resistance - 1 x daily - 7 x weekly - 2 sets - 10 reps  • Standing Bicep Curls with Resistance - 1 x daily - 7 x weekly - 2 sets - 10 reps  • Seated Shoulder External Rotation AAROM with Dowel - 1 x daily - 7 x weekly

## 2023-02-19 ENCOUNTER — OFFICE VISIT (OUTPATIENT)
Dept: URGENT CARE | Facility: MEDICAL CENTER | Age: 43
End: 2023-02-19

## 2023-02-19 VITALS
OXYGEN SATURATION: 99 % | HEART RATE: 70 BPM | RESPIRATION RATE: 18 BRPM | TEMPERATURE: 98 F | DIASTOLIC BLOOD PRESSURE: 70 MMHG | SYSTOLIC BLOOD PRESSURE: 122 MMHG

## 2023-02-19 DIAGNOSIS — R21 RASH: Primary | ICD-10-CM

## 2023-02-19 RX ORDER — MOMETASONE FUROATE 1 MG/G
CREAM TOPICAL DAILY
Qty: 45 G | Refills: 0 | Status: SHIPPED | OUTPATIENT
Start: 2023-02-19

## 2023-02-19 NOTE — PROGRESS NOTES
[facility] [facility]        NAME: Price Kocher is a 43 y o  female  : 1980    MRN: [id number]  DATE: 2023  TIME: 9:05 AM    Assessment and Plan   Rash [R21]  1  Rash  mometasone (ELOCON) 0 1 % cream          Patient Instructions     Apply Elocon cream as prescribed (Do not use for longer than 4 weeks)  Wash hands following administration  Avoid scratching area  Topical benadryl cream during the day  Allegra and Pepcid over the counter  Oral benadryl for itching as needed at night  Keep area clean and dry  Watch for signs of infection  Follow up with PCP in 3-5 days  Proceed to  ER if symptoms worsen  Chief Complaint     Chief Complaint   Patient presents with   • Rash     Right shoulder/armpit/neck, itchy, started friday         History of Present Illness       Denies URI sx  Patient states PT used a new gel on Friday before rash appeared  Denies difficulty breathing/swallowing  No hx/o anaphylaxis  Rash  This is a new problem  Episode onset: Friday  Location: R shoulder/neck/axillary  The rash is characterized by redness and itchiness  Associated with: new gel at PT  Pertinent negatives include no fever or shortness of breath  Treatments tried: benadryl cream        Review of Systems   Review of Systems   Constitutional: Negative for chills and fever  HENT: Negative for trouble swallowing  Respiratory: Negative for shortness of breath, wheezing and stridor  Cardiovascular: Negative for chest pain  Gastrointestinal: Negative for abdominal pain  Musculoskeletal: Negative for arthralgias  Skin: Positive for rash           Current Medications       Current Outpatient Medications:   •  atorvastatin (LIPITOR) 10 mg tablet, Take 1 tablet (10 mg total) by mouth daily, Disp: 90 tablet, Rfl: 3  •  ergocalciferol (VITAMIN D2) 50,000 units, Take 1 capsule (50,000 Units total) by mouth once a week, Disp: 12 capsule, Rfl: 1  •  fluticasone (FLONASE) 50 mcg/act nasal spray, 2 sprays into each nostril daily, Disp: 11 1 mL, Rfl: 0  •  meloxicam (MOBIC) 15 mg tablet, Take 1 tablet (15 mg total) by mouth daily With food, Disp: 30 tablet, Rfl: 5  •  mometasone (ELOCON) 0 1 % cream, Apply topically daily, Disp: 45 g, Rfl: 0  •  sertraline (ZOLOFT) 50 mg tablet, Take 50 mg by mouth daily, Disp: , Rfl:   •  topiramate (Topamax) 25 mg tablet, Take 1 tablet (25 mg total) by mouth 2 (two) times a day, Disp: 60 tablet, Rfl: 4    Current Allergies     Allergies as of 2023 - Reviewed 2023   Allergen Reaction Noted   • Clonazepam Other (See Comments) 2019   • Latex  2019   • Methylphenidate Other (See Comments) 2020            The following portions of the patient's history were reviewed and updated as appropriate: allergies, current medications, past family history, past medical history, past social history, past surgical history and problem list      Past Medical History:   Diagnosis Date   • Anxiety    • Bipolar depression ([facility] [address] )    • Chronic pelvic pain in female 3/8/2021   • Endometrial hyperplasia without atypia 2021   • Hypertension    • Menometrorrhagia 3/8/2021       Past Surgical History:   Procedure Laterality Date   •  SECTION     • HYSTERECTOMY  2021       Family History   Problem Relation Age of Onset   • Heart disease Father    • No Known Problems Sister    • No Known Problems Daughter    • No Known Problems Maternal Grandmother    • No Known Problems Maternal Grandfather    • No Known Problems Paternal Grandmother    • No Known Problems Paternal Grandfather    • No Known Problems Son    • No Known Problems Son    • No Known Problems Son    • No Known Problems Maternal Aunt    • No Known Problems Maternal Aunt    • No Known Problems Paternal Aunt    • No Known Problems Paternal Aunt          Medications have been verified          Objective   /70   Pulse 70   Temp 98 °F (36 7 °C)   Resp 18   LMP 2020   SpO2 99%   Patient's last menstrual period was 12/25/2020  Physical Exam     Physical Exam  Constitutional:       General: She is not in acute distress  Appearance: She is well-developed  Cardiovascular:      Rate and Rhythm: Normal rate and regular rhythm  Heart sounds: Normal heart sounds  No murmur heard  No friction rub  No gallop  Pulmonary:      Effort: Pulmonary effort is normal  No respiratory distress  Breath sounds: Normal breath sounds  No wheezing or rales  Chest:      Chest wall: No tenderness  Lymphadenopathy:      Cervical: No cervical adenopathy  Skin:     General: Skin is warm  Findings: Rash present  Neurological:      Mental Status: She is alert  Psychiatric:         Behavior: Behavior normal          Thought Content:  Thought content normal          Judgment: Judgment normal

## 2023-02-19 NOTE — PATIENT INSTRUCTIONS
Apply Elocon cream as prescribed (Do not use for longer than 4 weeks)  Wash hands following administration  Avoid scratching area  Topical benadryl cream during the day  Allegra and Pepcid over the counter  Oral benadryl for itching as needed at night  Keep area clean and dry  Watch for signs of infection  Follow up with PCP in 3-5 days  Proceed to  ER if symptoms worsen

## 2023-02-21 ENCOUNTER — APPOINTMENT (OUTPATIENT)
Dept: PHYSICAL THERAPY | Facility: CLINIC | Age: 43
End: 2023-02-21

## 2023-02-21 ENCOUNTER — OFFICE VISIT (OUTPATIENT)
Dept: INTERNAL MEDICINE CLINIC | Facility: CLINIC | Age: 43
End: 2023-02-21

## 2023-02-21 VITALS — TEMPERATURE: 98.1 F

## 2023-02-21 DIAGNOSIS — B02.9 HERPES ZOSTER WITHOUT COMPLICATION: Primary | ICD-10-CM

## 2023-02-21 RX ORDER — VALACYCLOVIR HYDROCHLORIDE 1 G/1
1000 TABLET, FILM COATED ORAL 3 TIMES DAILY
Qty: 21 TABLET | Refills: 0 | Status: SHIPPED | OUTPATIENT
Start: 2023-02-21 | End: 2023-03-03
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
## 2023-02-25 ENCOUNTER — APPOINTMENT (EMERGENCY)
Dept: RADIOLOGY | Facility: HOSPITAL | Age: 43
End: 2023-02-25

## 2023-02-25 ENCOUNTER — HOSPITAL ENCOUNTER (EMERGENCY)
Facility: HOSPITAL | Age: 43
Discharge: HOME | End: 2023-02-25
Attending: EMERGENCY MEDICINE

## 2023-02-25 VITALS
HEART RATE: 90 BPM | RESPIRATION RATE: 18 BRPM | TEMPERATURE: 98 F | SYSTOLIC BLOOD PRESSURE: 122 MMHG | OXYGEN SATURATION: 98 % | DIASTOLIC BLOOD PRESSURE: 56 MMHG

## 2023-02-25 DIAGNOSIS — M25.562 ACUTE PAIN OF LEFT KNEE: Primary | ICD-10-CM

## 2023-02-25 RX ORDER — OXYCODONE HYDROCHLORIDE 5 MG/1
5 TABLET ORAL ONCE
Status: COMPLETED | OUTPATIENT
Start: 2023-02-25 | End: 2023-02-25

## 2023-02-25 RX ORDER — KETOROLAC TROMETHAMINE 30 MG/ML
30 INJECTION, SOLUTION INTRAMUSCULAR; INTRAVENOUS ONCE
Status: COMPLETED | OUTPATIENT
Start: 2023-02-25 | End: 2023-02-25

## 2023-02-25 RX ORDER — ACETAMINOPHEN 325 MG/1
650 TABLET ORAL ONCE
Status: COMPLETED | OUTPATIENT
Start: 2023-02-25 | End: 2023-02-25

## 2023-02-25 RX ORDER — HYDROCODONE BITARTRATE AND ACETAMINOPHEN 5; 325 MG/1; MG/1
1 TABLET ORAL EVERY 6 HOURS PRN
Qty: 6 TABLET | Refills: 0 | Status: SHIPPED | OUTPATIENT
Start: 2023-02-25

## 2023-02-25 RX ADMIN — KETOROLAC TROMETHAMINE 30 MG: 30 INJECTION, SOLUTION INTRAMUSCULAR at 13:41

## 2023-02-25 RX ADMIN — ACETAMINOPHEN 650 MG: 325 TABLET ORAL at 13:40

## 2023-02-25 RX ADMIN — OXYCODONE HYDROCHLORIDE 5 MG: 5 TABLET ORAL at 13:40

## 2023-02-25 NOTE — DISCHARGE INSTRUCTIONS
Rest, ice, compression, elevation  Continue use of knee brace and crutches  Continue your anti-inflammatory as prescribed with food  Caution sedation with pain medication  No driving while taking  Follow up with orthopedics this week for recheck or return to ER as needed

## 2023-02-25 NOTE — Clinical Note
Collin Forde was seen and treated in our emergency department on 2/25/2023  No work until cleared by Family Doctor/Orthopedics        Diagnosis: left knee pain    Nina    She may return on this date: If you have any questions or concerns, please don't hesitate to call        Malik Garay PA-C    ______________________________           _______________          _______________  Hospital Representative                              Date                                Time

## 2023-02-25 NOTE — ED PROVIDER NOTES
History  Chief Complaint   Patient presents with   • Knee Pain     Pt c/o left knee pain since Thursday, states she was at work and when she got home it started hurting, denies any injury  43year old female with PMH HTN, bipolar, anxiety presents ambulatory from home for evaluation of left knee pain  Pt reports pain started on Thursday after work  She denies specific injury or trauma  She notes h/o arthritis in her knees but states this pain feels more severe than arthritis  Denies any recent falls  She reports increased swelling of the knee  Also reports pain throughout the knee  Does not radiate  Denies fever, chills  Denies chest pain, SOB, N/V/D, abdominal pain  Denies numbness, tingling, weakness  Recently diagnosed with shingles on her shoulder/arm on the right and taking valtrex for that  She reports feeling okay in this regards  Pain aggravated by movement and walking  She states at work she is on the concrete all day  No reported alleviating factors  Has tried ice and ACE bandage without relief  History provided by:  Patient   used: No    Knee Pain  Location:  Knee  Injury: no    Knee location:  L knee  Pain details:     Quality:  Aching and burning    Radiates to:  Does not radiate    Duration:  3 days    Timing:  Constant    Progression:  Unchanged  Chronicity:  New  Dislocation: no    Prior injury to area:  No  Relieved by:  Nothing  Worsened by: Activity and bearing weight  Ineffective treatments:  Ice and rest  Associated symptoms: decreased ROM and swelling    Associated symptoms: no back pain, no fatigue, no fever, no neck pain, no numbness and no tingling    Risk factors: obesity and recent illness    Risk factors: no concern for non-accidental trauma, no frequent fractures and no known bone disorder        Prior to Admission Medications   Prescriptions Last Dose Informant Patient Reported?  Taking?   atorvastatin (LIPITOR) 10 mg tablet   No No   Sig: Take 1 tablet (10 mg total) by mouth daily   ergocalciferol (VITAMIN D2) 50,000 units   No No   Sig: Take 1 capsule (50,000 Units total) by mouth once a week   fluticasone (FLONASE) 50 mcg/act nasal spray   No No   Si sprays into each nostril daily   meloxicam (MOBIC) 15 mg tablet   No No   Sig: Take 1 tablet (15 mg total) by mouth daily With food   mometasone (ELOCON) 0 1 % cream   No No   Sig: Apply topically daily   sertraline (ZOLOFT) 50 mg tablet   Yes No   Sig: Take 50 mg by mouth daily   topiramate (Topamax) 25 mg tablet   No No   Sig: Take 1 tablet (25 mg total) by mouth 2 (two) times a day   valACYclovir (VALTREX) 1,000 mg tablet   No No   Sig: Take 1 tablet (1,000 mg total) by mouth 3 (three) times a day for 7 days      Facility-Administered Medications: None       Past Medical History:   Diagnosis Date   • Anxiety    • Bipolar depression (HCC)    • Chronic pelvic pain in female 3/8/2021   • Endometrial hyperplasia without atypia 2021   • Hypertension    • Menometrorrhagia 3/8/2021       Past Surgical History:   Procedure Laterality Date   •  SECTION     • HYSTERECTOMY  2021       Family History   Problem Relation Age of Onset   • Heart disease Father    • No Known Problems Sister    • No Known Problems Daughter    • No Known Problems Maternal Grandmother    • No Known Problems Maternal Grandfather    • No Known Problems Paternal Grandmother    • No Known Problems Paternal Grandfather    • No Known Problems Son    • No Known Problems Son    • No Known Problems Son    • No Known Problems Maternal Aunt    • No Known Problems Maternal Aunt    • No Known Problems Paternal Aunt    • No Known Problems Paternal Aunt      I have reviewed and agree with the history as documented      E-Cigarette/Vaping   • E-Cigarette Use Former User    • Cartridges/Day 0 5 ppd      E-Cigarette/Vaping Substances     Social History     Tobacco Use   • Smoking status: Every Day     Packs/day: 0 50     Types: Cigarettes   • Smokeless tobacco: Never   Vaping Use   • Vaping Use: Former   Substance Use Topics   • Alcohol use: Never   • Drug use: Never       Review of Systems   Constitutional: Negative  Negative for chills, fatigue and fever  HENT: Negative  Negative for congestion, rhinorrhea and sore throat  Eyes: Negative  Respiratory: Negative  Negative for cough, shortness of breath and wheezing  Cardiovascular: Negative  Negative for chest pain, palpitations and leg swelling  Gastrointestinal: Negative  Negative for abdominal pain, constipation, diarrhea, nausea and vomiting  Genitourinary: Negative  Negative for dysuria and frequency  Musculoskeletal: Positive for arthralgias and joint swelling  Negative for back pain and neck pain  Skin: Positive for rash  Neurological: Negative  Negative for dizziness, light-headedness, numbness and headaches  Psychiatric/Behavioral: Negative  All other systems reviewed and are negative  Physical Exam  Physical Exam  Vitals and nursing note reviewed  Constitutional:       General: She is awake  She is not in acute distress  Appearance: She is well-developed  She is not toxic-appearing  HENT:      Head: Normocephalic and atraumatic  Right Ear: Hearing and external ear normal       Left Ear: Hearing and external ear normal       Mouth/Throat:      Mouth: Mucous membranes are moist       Pharynx: Oropharynx is clear  Eyes:      General: Lids are normal  No scleral icterus  Conjunctiva/sclera: Conjunctivae normal    Neck:      Trachea: Trachea and phonation normal  No tracheal deviation  Cardiovascular:      Rate and Rhythm: Normal rate and regular rhythm  Pulses: Normal pulses  Radial pulses are 2+ on the right side and 2+ on the left side  Dorsalis pedis pulses are 2+ on the right side and 2+ on the left side  Posterior tibial pulses are 2+ on the right side and 2+ on the left side        Heart sounds: Normal heart sounds, S1 normal and S2 normal  No murmur heard  Pulmonary:      Effort: Pulmonary effort is normal  No tachypnea or respiratory distress  Breath sounds: Normal breath sounds  No wheezing or rhonchi  Musculoskeletal:      Cervical back: Normal range of motion and neck supple  Left knee: Effusion present  No deformity, erythema, ecchymosis, lacerations or crepitus  Normal range of motion  Tenderness present over the medial joint line and lateral joint line  Normal alignment and normal patellar mobility  Normal pulse  Right lower leg: No edema  Left lower leg: No edema  Left ankle: Normal       Left foot: Normal    Skin:     General: Skin is warm and dry  Capillary Refill: Capillary refill takes less than 2 seconds  Findings: Rash (shingles rash posterior right shoulder) present  No abrasion, bruising or erythema  Neurological:      General: No focal deficit present  Mental Status: She is alert and oriented to person, place, and time  GCS: GCS eye subscore is 4  GCS verbal subscore is 5  GCS motor subscore is 6  Cranial Nerves: No cranial nerve deficit  Sensory: No sensory deficit  Motor: Motor function is intact  No abnormal muscle tone     Psychiatric:         Mood and Affect: Mood normal          Speech: Speech normal          Behavior: Behavior normal          Vital Signs  ED Triage Vitals   Temperature Pulse Respirations Blood Pressure SpO2   02/25/23 1248 02/25/23 1248 02/25/23 1248 02/25/23 1329 02/25/23 1248   98 °F (36 7 °C) 88 18 122/56 98 %      Temp Source Heart Rate Source Patient Position - Orthostatic VS BP Location FiO2 (%)   02/25/23 1248 02/25/23 1248 02/25/23 1248 02/25/23 1248 --   Temporal Monitor Lying Left arm       Pain Score       02/25/23 1248       10 - Worst Possible Pain           Vitals:    02/25/23 1248 02/25/23 1329   BP:  122/56   Pulse: 88 90   Patient Position - Orthostatic VS: Lying          Visual Acuity      ED Medications  Medications   ketorolac (TORADOL) injection 30 mg (30 mg Intramuscular Given 2/25/23 1341)   acetaminophen (TYLENOL) tablet 650 mg (650 mg Oral Given 2/25/23 1340)   oxyCODONE (ROXICODONE) IR tablet 5 mg (5 mg Oral Given 2/25/23 1340)       Diagnostic Studies  Results Reviewed     None                 XR knee 4+ vw left injury    (Results Pending)              Procedures  Procedures         ED Course  ED Course as of 02/25/23 1650   Sat Feb 25, 2023   1409 XR knee 4+ vw left injury  Independently viewed and interpreted by me - no fracture or dislocation; pending official read  1413 Pt updated on results  Prior records reviewed  Pt presented with atraumatic left knee pain  Xray obtained without noted acute osseous findings  Pt provided with knee brace and crutches  Pt not exhibiting any s/sx of infected joint  Based on history and exam, I do not suspect DVT  Reviewed RICE therapy  Advised to continue prescribed NSAID  Rx for norco provided  Reviewed brief course for severe pain  PDMP was queried and no suspicion behaviors noted  Risks/benefits/side effects/alternatives discussed  Pt advised to continue valtrex as prescribed for shingles and complete full duration  Strict return precautions outlined  Advised outpatient follow up with orthopedics or return to ER for change in condition as outlined  Pt verbalized understanding and had no further questions  Medical Decision Making  Acute pain of left knee: acute illness or injury  Amount and/or Complexity of Data Reviewed  External Data Reviewed: labs and notes  Radiology: ordered and independent interpretation performed  Decision-making details documented in ED Course  Risk  OTC drugs  Prescription drug management  Parenteral controlled substances            Disposition  Final diagnoses:   Acute pain of left knee     Time reflects when diagnosis was documented in both MDM as applicable and the Disposition within this note     Time User Action Codes Description Comment    2/25/2023  2:32 PM Brennan Byrne Add [T14 011] Acute pain of left knee       ED Disposition     ED Disposition   Discharge    Condition   Stable    Date/Time   Sat Feb 25, 2023  2:32 PM    Comment   Veronica Keepers discharge to home/self care  Follow-up Information     Follow up With Specialties Details Why Contact Info Additional [address] [facility] Orthopedic Surgery Schedule an appointment as soon as possible for a visit   [address] [facility] [phone]  [address] [facility] [address] [facility], [facility], [place], [address]    [facility] Emergency Department Emergency Medicine  As needed [place] [phone]  [address] [facility] Emergency Department[address], [address]          Discharge Medication List as of 2/25/2023  2:35 PM      START taking these medications    Details   HYDROcodone-acetaminophen (Norco) 5-325 mg per tablet Take 1 tablet by mouth every 6 (six) hours as needed for pain Initial Rx   Max Daily Amount: 4 tablets, Starting Sat 2/25/2023, Normal         CONTINUE these medications which have NOT CHANGED    Details   atorvastatin (LIPITOR) 10 mg tablet Take 1 tablet (10 mg total) by mouth daily, Starting Thu 10/14/2021, Normal      ergocalciferol (VITAMIN D2) 50,000 units Take 1 capsule (50,000 Units total) by mouth once a week, Starting Thu 11/10/2022, Normal      fluticasone (FLONASE) 50 mcg/act nasal spray 2 sprays into each nostril daily, Starting Mon 11/21/2022, Normal      meloxicam (MOBIC) 15 mg tablet Take 1 tablet (15 mg total) by mouth daily With food, Starting Tue 2/14/2023, Normal      mometasone (ELOCON) 0 1 % cream Apply topically daily, Starting Sun 2/19/2023, Normal sertraline (ZOLOFT) 50 mg tablet Take 50 mg by mouth daily, Starting Mon 11/7/2022, Historical Med      topiramate (Topamax) 25 mg tablet Take 1 tablet (25 mg total) by mouth 2 (two) times a day, Starting Thu 1/26/2023, Normal      valACYclovir (VALTREX) 1,000 mg tablet Take 1 tablet (1,000 mg total) by mouth 3 (three) times a day for 7 days, Starting Tue 2/21/2023, Until Tue 2/28/2023, Normal                 PDMP Review       Value Time User    PDMP Reviewed  Yes 2/25/2023  1:32 PM Shena Dove PA-C          ED Provider  Electronically Signed by           Shena Dove PA-C  02/25/23 8349

## 2023-02-28 ENCOUNTER — APPOINTMENT (OUTPATIENT)
Dept: PHYSICAL THERAPY | Facility: CLINIC | Age: 43
End: 2023-02-28

## 2023-03-03 ENCOUNTER — OFFICE VISIT (OUTPATIENT)
Dept: OBGYN CLINIC | Facility: CLINIC | Age: 43
End: 2023-03-03

## 2023-03-03 ENCOUNTER — HOSPITAL ENCOUNTER (EMERGENCY)
Facility: HOSPITAL | Age: 43
Discharge: HOME | End: 2023-03-03
Attending: EMERGENCY MEDICINE

## 2023-03-03 VITALS
SYSTOLIC BLOOD PRESSURE: 122 MMHG | WEIGHT: 178 LBS | RESPIRATION RATE: 16 BRPM | HEART RATE: 89 BPM | DIASTOLIC BLOOD PRESSURE: 83 MMHG | BODY MASS INDEX: 31.54 KG/M2 | HEIGHT: 63 IN

## 2023-03-03 VITALS
TEMPERATURE: 98.4 F | HEART RATE: 101 BPM | OXYGEN SATURATION: 96 % | RESPIRATION RATE: 15 BRPM | SYSTOLIC BLOOD PRESSURE: 111 MMHG | DIASTOLIC BLOOD PRESSURE: 59 MMHG

## 2023-03-03 DIAGNOSIS — M17.12 PRIMARY OSTEOARTHRITIS OF LEFT KNEE: Primary | ICD-10-CM

## 2023-03-03 DIAGNOSIS — L50.9 URTICARIA: Primary | ICD-10-CM

## 2023-03-03 RX ORDER — DIPHENHYDRAMINE HCL 25 MG
25 TABLET ORAL ONCE
Status: COMPLETED | OUTPATIENT
Start: 2023-03-03 | End: 2023-03-03

## 2023-03-03 RX ORDER — PREDNISONE 20 MG/1
20 TABLET ORAL DAILY
Qty: 5 TABLET | Refills: 0 | Status: SHIPPED | OUTPATIENT
Start: 2023-03-03 | End: 2023-03-08

## 2023-03-03 RX ORDER — DIPHENHYDRAMINE HCL 25 MG
25 TABLET ORAL EVERY 6 HOURS
Qty: 20 TABLET | Refills: 0 | Status: SHIPPED | OUTPATIENT
Start: 2023-03-03 | End: 2023-03-08

## 2023-03-03 RX ORDER — PREDNISONE 20 MG/1
40 TABLET ORAL ONCE
Status: COMPLETED | OUTPATIENT
Start: 2023-03-03 | End: 2023-03-03

## 2023-03-03 RX ORDER — METHYLPREDNISOLONE ACETATE 40 MG/ML
1 INJECTION, SUSPENSION INTRA-ARTICULAR; INTRALESIONAL; INTRAMUSCULAR; SOFT TISSUE
Status: COMPLETED | OUTPATIENT
Start: 2023-03-03 | End: 2023-03-03

## 2023-03-03 RX ADMIN — METHYLPREDNISOLONE ACETATE 1 ML: 40 INJECTION, SUSPENSION INTRA-ARTICULAR; INTRALESIONAL; INTRAMUSCULAR; SOFT TISSUE at 15:36

## 2023-03-03 RX ADMIN — PREDNISONE 40 MG: 20 TABLET ORAL at 18:41

## 2023-03-03 RX ADMIN — DIPHENHYDRAMINE HYDROCHLORIDE 25 MG: 25 TABLET ORAL at 18:41

## 2023-03-03 NOTE — LETTER
March 3, 2023     Patient: Nikki Miles  YOB: 1980  Date of Visit: 3/3/2023      To Whom it May Concern:    Nikki Miles is under my professional care  Petra Up was seen in my office on 3/3/2023  Patient was seen for left knee injury  Please excuse patient from work until 3/6/2023  Would recommend that patient take seated breaks throughout the day from 3/6/2023 until 3/11/2023    If you have any questions or concerns, please don't hesitate to call           Sincerely,          Norma Mina DO        CC: Nikki Ginger

## 2023-03-03 NOTE — PROGRESS NOTES
Accompanied by advocate/friend    Subjective:    Chief Complaint   Patient presents with   • Left Knee - Pain       Dmitri Lu is a 43 y o  female complains of left knee pain  Onset of the symptoms was several days ago  Mechanism of injury: none reported  Aggravating factors: walking  and weight bearing  Treatment to date: avoidance of offending activity and brace which is ineffective  Symptoms have progressed to a point and plateaued  The following portions of the patient's history were reviewed and updated as appropriate: allergies, current medications, past family history, past medical history, past social history, past surgical history and problem list     Occupation:      Review of Systems   Constitutional: Negative for fever  HENT: Negative for dental problem and headaches  Eyes: Negative for vision loss  Respiratory: Negative for cough and shortness of breath  Cardiovascular: Negative for leg swelling and palpitations  Gastrointestinal: Negative for constipation and diarrhea  Genitourinary: Negative for bladder incontinence and difficulty urinating  Musculoskeletal: Negative for back pain and difficulty walking  Skin: Negative for rash and ulcer  Neurological: Negative for dizziness and headaches  Hem/Lymph/Immuno: Negative for blood clots  Does not bruise/bleed easily  Psychiatric/Behavioral: Negative for confusion  Objective:  /83 (BP Location: Left arm, Patient Position: Sitting, Cuff Size: Large)   Pulse 89   Resp 16   Ht 5' 3" (1 6 m)   Wt 80 7 kg (178 lb)   LMP 12/25/2020   BMI 31 53 kg/m²   Skin: no rashes, lesions, skin discolorations, lacerations  Vasculature: normal popliteal and pedal pulse, normal skin color, normal capillary refill in extremity, no lower extremity edema  Neurologic: Neurologic exam is normal throughout lower extremities, Awake, alert, and oriented x3, no apparent distress      Musculoskeletal:  Left KNEE EXAM  Gait: limping gait positive   Inspection:No erythema, no induration  There is no gross deformity  Palpation: Swelling: negative  Effusion: mild  Medial joint line TTP: positive  Lateral joint line TTP: negative  ROM: Full flexion and extension  Special tests: Megan's: negative, Apley's compression: negative  Instability to varus/valgus stress: negative  Anterior Drawer: negative Lachman's test: negative  Posterior Drawer: negative  Crepitus: negative        Imaging:  LEFT KNEE     INDICATION:   pain      COMPARISON:  None     VIEWS:  XR KNEE 4+ VW LEFT INJURY   Images: 4     FINDINGS:     There is no acute fracture or dislocation      There is no joint effusion      Mild tricompartmental degenerative changes     No lytic or blastic osseous lesion      Soft tissues are unremarkable      IMPRESSION:  Mild tricompartmental degenerative changes     No acute osseous abnormality            Assessment/Plan:  1  Primary osteoarthritis of left knee  - Ambulatory Referral to Orthopedic Surgery    Large joint arthrocentesis: L knee  Universal Protocol:  Consent: Verbal consent obtained  Risks and benefits: risks, benefits and alternatives were discussed  Consent given by: patient    Supporting Documentation  Indications: pain   Procedure Details  Location: knee - L knee  Needle size: 18 G  Ultrasound guidance: no  Approach: superior  Medications administered: 1 mL methylPREDNISolone acetate 40 mg/mL (4 ml ropivicaine)    Aspirate amount: 7 mL  Aspirate: clear and yellow    Patient tolerance: patient tolerated the procedure well with no immediate complications    Risks and benefits of CSI were discussed with patient extensively  Risks were highlighted which included but were not limited to infection, pain, local site swelling, and chance that injection may not be effective  Patient was also counseled regarding glucose elevation days after receiving CSI and to be mindful of diet and check sugars daily   Patient agreeable to proceed with CSI after counseling

## 2023-03-03 NOTE — ED PROVIDER NOTES
History  Chief Complaint   Patient presents with   • Medical Problem     Patient states she had fluid taken from her left knee today and had a steroid shot around 1400 today  Patient states she developed redness in the face and is developing hives on her arms and legs  Denies any chest pain or SOB     HPI  58-year-old female presenting to the emergency department for evaluation of possible allergic reaction symptoms  Patient reports that she recently was getting over shingles infection took medications for that but has been off these medications for some time now  She reports that also this morning she went to see orthopedics for left knee pain and had an arthrocentesis performed and was given an injection containing steroid and numbing medicine  She presents here with complaints of flushing in her face and redness as well as scattered hives which are very itchy  Patient reports she felt some flushing and redness since the orthopedic exam and injection however the urticaria started the preceding night  Other than the injection which happened after the onset of the urticaria she denies any other new medicines cosmetic products foods  Denies any significant allergy history but does have allergy to latex and several medications listed in her chart  Denies fevers or chills or wheezing or trouble breathing or sensation of throat swelling  No history of similar symptoms in the recent past   She notes some lesions across the right upper extremity as well as scattered areas on her other extremities which have improved somewhat  She has taken nothing for the symptoms  Has previously tolerated steroid medications and steroid injections in the past without allergic symptoms  Prior to Admission Medications   Prescriptions Last Dose Informant Patient Reported? Taking? HYDROcodone-acetaminophen (Norco) 5-325 mg per tablet   No No   Sig: Take 1 tablet by mouth every 6 (six) hours as needed for pain Initial Rx   Max Daily Amount: 4 tablets   atorvastatin (LIPITOR) 10 mg tablet   No No   Sig: Take 1 tablet (10 mg total) by mouth daily   ergocalciferol (VITAMIN D2) 50,000 units   No No   Sig: Take 1 capsule (50,000 Units total) by mouth once a week   fluticasone (FLONASE) 50 mcg/act nasal spray   No No   Si sprays into each nostril daily   meloxicam (MOBIC) 15 mg tablet   No No   Sig: Take 1 tablet (15 mg total) by mouth daily With food   mometasone (ELOCON) 0 1 % cream   No No   Sig: Apply topically daily   sertraline (ZOLOFT) 50 mg tablet   Yes No   Sig: Take 50 mg by mouth daily   topiramate (Topamax) 25 mg tablet   No No   Sig: Take 1 tablet (25 mg total) by mouth 2 (two) times a day   valACYclovir (VALTREX) 1,000 mg tablet   No No   Sig: Take 1 tablet (1,000 mg total) by mouth 3 (three) times a day for 7 days      Facility-Administered Medications Last Administration Doses Remaining   methylPREDNISolone acetate (DEPO-MEDROL) injection 1 mL 3/3/2023  3:36 PM 0          Past Medical History:   Diagnosis Date   • Anxiety    • Bipolar depression (HCC)    • Chronic pelvic pain in female 3/8/2021   • Endometrial hyperplasia without atypia 2021   • Hypertension    • Menometrorrhagia 3/8/2021       Past Surgical History:   Procedure Laterality Date   •  SECTION     • HYSTERECTOMY  2021       Family History   Problem Relation Age of Onset   • Heart disease Father    • No Known Problems Sister    • No Known Problems Daughter    • No Known Problems Maternal Grandmother    • No Known Problems Maternal Grandfather    • No Known Problems Paternal Grandmother    • No Known Problems Paternal Grandfather    • No Known Problems Son    • No Known Problems Son    • No Known Problems Son    • No Known Problems Maternal Aunt    • No Known Problems Maternal Aunt    • No Known Problems Paternal Aunt    • No Known Problems Paternal Aunt      I have reviewed and agree with the history as documented      E-Cigarette/Vaping   • E-Cigarette Use Former User    • Cartridges/Day 0 5 ppd      E-Cigarette/Vaping Substances     Social History     Tobacco Use   • Smoking status: Every Day     Packs/day: 0 50     Types: Cigarettes   • Smokeless tobacco: Never   Vaping Use   • Vaping Use: Former   Substance Use Topics   • Alcohol use: Never   • Drug use: Never       Review of Systems   Constitutional: Negative for chills and fever  HENT: Negative for ear pain and sore throat  Eyes: Negative for pain and visual disturbance  Respiratory: Negative for cough and shortness of breath  Cardiovascular: Negative for chest pain and palpitations  Gastrointestinal: Negative for abdominal pain and vomiting  Genitourinary: Negative for dysuria and hematuria  Musculoskeletal: Negative for arthralgias and back pain  Skin: Positive for rash  Negative for color change  Itching   Neurological: Negative for seizures and syncope  All other systems reviewed and are negative  Physical Exam  Physical Exam  Vitals and nursing note reviewed  Constitutional:       General: She is not in acute distress  Appearance: She is well-developed  HENT:      Head: Normocephalic and atraumatic  Nose: Nose normal       Mouth/Throat:      Mouth: Mucous membranes are moist       Pharynx: Oropharynx is clear  Comments: No oral mucosal lesions  Eyes:      Conjunctiva/sclera: Conjunctivae normal    Cardiovascular:      Rate and Rhythm: Normal rate and regular rhythm  Heart sounds: No murmur heard  Pulmonary:      Effort: Pulmonary effort is normal  No respiratory distress  Breath sounds: Normal breath sounds  Abdominal:      Palpations: Abdomen is soft  Tenderness: There is no abdominal tenderness  Musculoskeletal:         General: No swelling  Cervical back: Neck supple  Comments: Unremarkable examination of bilateral knees  Recent arthrocentesis site on the left knee no signs of cellulitis     Skin:     General: Skin is warm and dry  Capillary Refill: Capillary refill takes less than 2 seconds  Comments: Scattered urticaria most prominent on the extensor surface of the right upper extremity in the region of the triceps and the forearm  Also several scattered lesions on the other extremities  They are raised, pruritic, not on weeping, negative Nikolsky sign no target lesions  Neurological:      General: No focal deficit present  Mental Status: She is alert  Mental status is at baseline  Psychiatric:         Mood and Affect: Mood normal          Vital Signs  ED Triage Vitals [03/03/23 1730]   Temperature Pulse Respirations Blood Pressure SpO2   98 4 °F (36 9 °C) 101 15 111/59 96 %      Temp Source Heart Rate Source Patient Position - Orthostatic VS BP Location FiO2 (%)   Temporal Monitor -- -- --      Pain Score       --           Vitals:    03/03/23 1730   BP: 111/59   Pulse: 101         Visual Acuity      ED Medications  Medications   predniSONE tablet 40 mg (40 mg Oral Given 3/3/23 1841)   diphenhydrAMINE (BENADRYL) tablet 25 mg (25 mg Oral Given 3/3/23 1841)       Diagnostic Studies  Results Reviewed     None                 No orders to display              Procedures  Procedures         ED Course                                             Medical Decision Making  70-year-old female presenting for generalized urticaria no significant systemic symptoms at this time screening vital signs within normal limits no signs of anaphylaxis airway compromise wheezing stridor  No medications taken for this  Did discuss possible causes of allergic reaction as it is mild and is amenable to oral medications will provide Benadryl, prednisone  Medication sent to pharmacy return precautions given patient agreeable to plan  Will discharge  Risk  OTC drugs  Prescription drug management            Disposition  Final diagnoses:   Urticaria     Time reflects when diagnosis was documented in both MDM as applicable and the Disposition within this note     Time User Action Codes Description Comment    3/3/2023  6:27 PM Gaby Chambers Add [L50 9] Urticaria       ED Disposition     ED Disposition   Discharge    Condition   Stable    Date/Time   Fri Mar 3, 2023  6:27 PM    Comment   Gavin Hartman discharge to home/self care  Follow-up Information     Follow up With Specialties Details Why Contact Info    Sierra Allen MD Family Medicine Schedule an appointment as soon as possible for a visit   [address] [facility]  [address] [address]   [phone]            Discharge Medication List as of 3/3/2023  6:37 PM      START taking these medications    Details   diphenhydrAMINE (BENADRYL) 25 mg tablet Take 1 tablet (25 mg total) by mouth every 6 (six) hours for 5 days, Starting Fri 3/3/2023, Until Wed 3/8/2023, Normal      predniSONE 20 mg tablet Take 1 tablet (20 mg total) by mouth daily for 5 days, Starting Fri 3/3/2023, Until Wed 3/8/2023, Normal         CONTINUE these medications which have NOT CHANGED    Details   atorvastatin (LIPITOR) 10 mg tablet Take 1 tablet (10 mg total) by mouth daily, Starting Thu 10/14/2021, Normal      ergocalciferol (VITAMIN D2) 50,000 units Take 1 capsule (50,000 Units total) by mouth once a week, Starting Thu 11/10/2022, Normal      fluticasone (FLONASE) 50 mcg/act nasal spray 2 sprays into each nostril daily, Starting Mon 11/21/2022, Normal      HYDROcodone-acetaminophen (Norco) 5-325 mg per tablet Take 1 tablet by mouth every 6 (six) hours as needed for pain Initial Rx   Max Daily Amount: 4 tablets, Starting Sat 2/25/2023, Normal      meloxicam (MOBIC) 15 mg tablet Take 1 tablet (15 mg total) by mouth daily With food, Starting Tue 2/14/2023, Normal      mometasone (ELOCON) 0 1 % cream Apply topically daily, Starting Sun 2/19/2023, Normal      sertraline (ZOLOFT) 50 mg tablet Take 50 mg by mouth daily, Starting Mon 11/7/2022, Historical Med      topiramate (Topamax) 25 mg tablet Take 1 tablet (25 mg total) by mouth 2 (two) times a day, Starting Thu 1/26/2023, Normal      valACYclovir (VALTREX) 1,000 mg tablet Take 1 tablet (1,000 mg total) by mouth 3 (three) times a day for 7 days, Starting Tue 2/21/2023, Until Fri 3/3/2023, Normal             No discharge procedures on file      PDMP Review       Value Time User    PDMP Reviewed  Yes 2/25/2023  1:32 PM Osmar De La Garza PA-C          ED Provider  Electronically Signed by           Mary De Santiago DO  03/03/23 1966

## 2023-03-06 ENCOUNTER — OFFICE VISIT (OUTPATIENT)
Dept: INTERNAL MEDICINE CLINIC | Facility: CLINIC | Age: 43
End: 2023-03-06

## 2023-03-06 VITALS — TEMPERATURE: 99 F

## 2023-03-06 DIAGNOSIS — L50.9 URTICARIA: Primary | ICD-10-CM

## 2023-03-06 DIAGNOSIS — B02.9 HERPES ZOSTER WITHOUT COMPLICATION: ICD-10-CM
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
## 2023-03-09 ENCOUNTER — OFFICE VISIT (OUTPATIENT)
Dept: PHYSICAL THERAPY | Facility: CLINIC | Age: 43
End: 2023-03-09

## 2023-03-09 DIAGNOSIS — M75.51 SUBACROMIAL BURSITIS OF RIGHT SHOULDER JOINT: Primary | ICD-10-CM

## 2023-03-09 NOTE — PROGRESS NOTES
Daily Note     Today's date: 3/9/2023  Patient name: Rosemary Villa  : 1980  MRN: [id number]  Referring provider: Butch Gibson DO  Dx:   Encounter Diagnosis     ICD-10-CM    1  Subacromial bursitis of right shoulder joint  M75 51                      Subjective: Pt reports she is has been doing well with some mild pain  Objective: See treatment diary below      Assessment: Tolerated treatment well  Patient demonstrated fatigue post treatment  Pt with good overall demonstration of strength and maty to exercises  Pt maty TB strength program well with min c/o pain  Pt with mild c/o pain with wand shoulder flx supine today  Plan: Continue per plan of care  Precautions: None      Date  3/9     Manuals        PROM Shoulder KS TS JV     STM to R SubScap KS + Infraspinatus      T/S PAs KS II NP                      Neuro Re-Ed        Ball Stability        Bilateral ER        Standing TB Row/Ext L3 2x10 Row Row L3 2x10 Row L3 2/10     Prone Scap Stab        [place]: Row, Ext, E HABD        TB EXT  LV 3 2/10 L3 2/10     Ther Ex        Stick: ER 5"x20 5"x20 5"x20     Stick: ABD & EXT   Flx 2/10     SA Punches nv 2/10 2/10     Posterior Capsule Stretch        Sleeper Stretch        S/Lying ER/ABD nv 2/10 0# 2/10 0#     Standing TB ER/IR nv L1 2x10 L1 2/10 ea     TB Bicep Curls L3 2x10 L3 2x10 L3 2/10     Pulleys nv 4' scap 4' scap             Ther Activity                        Modalities        CP                Self Care: Provided patient c/ handout c/ HEP  Patient demonstrated each exercise c/ good form and verbalized understanding of expectations c/ HEP  Access Code: [email or web address]  URL: [email or web address]  Date: 2023  Prepared by: Roshan Michaels    Exercises  • Scapular Retraction with Resistance - 1 x daily - 7 x weekly - 2 sets - 10 reps  • Standing Bicep Curls with Resistance - 1 x daily - 7 x weekly - 2 sets - 10 reps  • Seated Shoulder External Rotation AAROM with Dowel - 1 x daily - 7 x weekly

## 2023-03-13 ENCOUNTER — TELEPHONE (OUTPATIENT)
Dept: INTERNAL MEDICINE CLINIC | Facility: CLINIC | Age: 43
End: 2023-03-13

## 2023-03-13 NOTE — TELEPHONE ENCOUNTER
Patient called to report that her rash is spreading  Upon review of last note did see recommendation for her seeing a specalist     Advised she can [facility] to see who is in network for allergy testing or she can start with dermatology  [facility] phone number provided

## 2023-03-14 ENCOUNTER — APPOINTMENT (OUTPATIENT)
Dept: PHYSICAL THERAPY | Facility: CLINIC | Age: 43
End: 2023-03-14

## 2023-03-29 ENCOUNTER — OFFICE VISIT (OUTPATIENT)
Dept: OBGYN CLINIC | Facility: CLINIC | Age: 43
End: 2023-03-29

## 2023-03-29 VITALS
DIASTOLIC BLOOD PRESSURE: 75 MMHG | HEART RATE: 80 BPM | WEIGHT: 174 LBS | HEIGHT: 63 IN | BODY MASS INDEX: 30.83 KG/M2 | SYSTOLIC BLOOD PRESSURE: 111 MMHG

## 2023-03-29 DIAGNOSIS — M75.51 SUBACROMIAL BURSITIS OF RIGHT SHOULDER JOINT: Primary | ICD-10-CM

## 2023-03-29 NOTE — PROGRESS NOTES
"     Subjective:  Chief Complaint   Patient presents with   • Left Knee - Follow-up       Whitney Johnson is a 43 y o  female presenting for follow-up visit in regards to right shoulder pain  Patient was diagnosed with subacromial bursitis and provided with a subacromial injection with referral to physical therapy  Patient has been unable to attend physical therapy routinely secondary to ongoing body rash which is currently being evaluated by dermatology and allergy  Does state that the shoulder pain has improved slightly since initial evaluation but still bothersome    The following portions of the patient's history were reviewed and updated as appropriate: allergies, current medications, past family history, past medical history, past social history, past surgical history and problem list     Occupation:      Review of Systems   Constitutional: Negative for fever  HENT: Negative for dental problem and headaches  Eyes: Negative for vision loss  Respiratory: Negative for cough and shortness of breath  Cardiovascular: Negative for leg swelling and palpitations  Gastrointestinal: Negative for constipation and diarrhea  Genitourinary: Negative for bladder incontinence and difficulty urinating  Musculoskeletal: Negative for back pain and difficulty walking  Skin: Negative for rash and ulcer  Neurological: Negative for dizziness and headaches  Hem/Lymph/Immuno: Negative for blood clots  Does not bruise/bleed easily  Psychiatric/Behavioral: Negative for confusion  Objective:  /75   Pulse 80   Ht 5' 3\" (1 6 m)   Wt 78 9 kg (174 lb)   LMP 12/25/2020   BMI 30 82 kg/m²     Skin: no rashes, lesions, skin discolorations, lacerations  Vasculature: normal radial and ulnar pulse,  normal skin color, normal capillary refill in extremity, no upper extremity edema  Neurologic: Neurologic exam is normal throughout upper extremities, Awake, alert, and oriented x3, no apparent distress   " Musculoskeletal:   right SHOULDER EXAM    Intact skin  No erythema, no induration, no signs of infection  No gross deformity    AROM FF:  180 with hiking  AROM ER with arm at its side: 90 degrees  AROM IR: 80 degrees    Grind test: negative    Supraspinatus strength testin+/5  Infraspinatus strength testin/5    Belly press: negative      Empty can: Negative  Biceps TTP: positive  Arc test: Not assessed  Armstrong: Negative  Tenderness to palpation of AC joint: negative  Pain with cross-body adduction: negative        Imaging:    XR shoulder 2+ vw right    Result Date: 2023  Narrative: RIGHT SHOULDER INDICATION:   M75 21: Bicipital tendinitis, right shoulder M79 601: Pain in right arm  COMPARISON:  None VIEWS:  XR SHOULDER 2+ VW RIGHT FINDINGS: There is no acute fracture or dislocation  No significant degenerative changes  No lytic or blastic osseous lesion  Soft tissues are unremarkable  Impression: No acute osseous abnormality  Workstation performed: [id number]     XR elbow 3+ vw right    Result Date: 2023  Narrative: RIGHT ELBOW INDICATION:   M75 21: Bicipital tendinitis, right shoulder M79 601: Pain in right arm  COMPARISON:  None VIEWS:  XR ELBOW 3+ VW RIGHT FINDINGS: There is no acute fracture  Radial head dislocation    Chronic fracture deformity distal humeral shaft  There is no joint effusion  No significant degenerative changes  No lytic or blastic osseous lesion  Soft tissues are unremarkable  Impression: Chronic fracture deformity distal humeral shaft  Radial head appears dislocated and possibly chronic  No acute osseous abnormality  Workstation performed: [id number]        Assessment/Plan:  1  Subacromial bursitis of right shoulder joint  I advised patient to continue with physical therapy for an additional 4 to 5 weeks at which time patient will return in office for reevaluation    If symptoms continue to persist in regards to right shoulder pain we will order an MRI of the right shoulder for further evaluation  Patient expressed understanding and will follow-up once PT sessions are completed

## 2023-04-11 PROBLEM — J01.90 ACUTE SINUSITIS: Status: ACTIVE | Noted: 2023-04-11

## 2023-04-12 ENCOUNTER — TELEPHONE (OUTPATIENT)
Dept: INTERNAL MEDICINE CLINIC | Facility: CLINIC | Age: 43
End: 2023-04-12

## 2023-04-12 DIAGNOSIS — J01.90 ACUTE NON-RECURRENT SINUSITIS, UNSPECIFIED LOCATION: Primary | ICD-10-CM

## 2023-04-12 RX ORDER — AZITHROMYCIN 250 MG/1
TABLET, FILM COATED ORAL
Qty: 6 TABLET | Refills: 0 | Status: SHIPPED | OUTPATIENT
Start: 2023-04-12 | End: 2023-04-17

## 2023-04-12 NOTE — TELEPHONE ENCOUNTER
[facility] does not have doxycycline  She is wondering if you can send something else in for her to [address] [facility]

## 2023-06-06 ENCOUNTER — OFFICE VISIT (OUTPATIENT)
Dept: INTERNAL MEDICINE CLINIC | Facility: CLINIC | Age: 43
End: 2023-06-06
Payer: COMMERCIAL

## 2023-06-06 ENCOUNTER — TELEPHONE (OUTPATIENT)
Dept: INTERNAL MEDICINE CLINIC | Facility: CLINIC | Age: 43
End: 2023-06-06

## 2023-06-06 VITALS
SYSTOLIC BLOOD PRESSURE: 124 MMHG | DIASTOLIC BLOOD PRESSURE: 76 MMHG | OXYGEN SATURATION: 97 % | TEMPERATURE: 98.3 F | WEIGHT: 173.3 LBS | BODY MASS INDEX: 30.71 KG/M2 | HEART RATE: 76 BPM | HEIGHT: 63 IN

## 2023-06-06 DIAGNOSIS — F43.10 PTSD (POST-TRAUMATIC STRESS DISORDER): Primary | ICD-10-CM

## 2023-06-06 DIAGNOSIS — T74.21XD SEXUAL ASSAULT OF ADULT, SUBSEQUENT ENCOUNTER: ICD-10-CM

## 2023-06-06 DIAGNOSIS — F31.81 BIPOLAR 2 DISORDER (HCC): ICD-10-CM

## 2023-06-06 PROBLEM — M79.601 RIGHT ARM PAIN: Status: RESOLVED | Noted: 2023-01-26 | Resolved: 2023-06-06

## 2023-06-06 PROBLEM — T74.21XA SEXUAL ASSAULT OF ADULT: Status: ACTIVE | Noted: 2023-06-06

## 2023-06-06 PROCEDURE — 99214 OFFICE O/P EST MOD 30 MIN: CPT | Performed by: NURSE PRACTITIONER
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
## 2023-06-10 PROBLEM — J01.90 ACUTE SINUSITIS: Status: RESOLVED | Noted: 2023-04-11 | Resolved: 2023-06-10

## 2023-07-27 ENCOUNTER — OFFICE VISIT (OUTPATIENT)
Dept: SLEEP CENTER | Facility: CLINIC | Age: 43
End: 2023-07-27
Payer: COMMERCIAL

## 2023-07-27 VITALS
TEMPERATURE: 97.4 F | DIASTOLIC BLOOD PRESSURE: 60 MMHG | HEIGHT: 63 IN | SYSTOLIC BLOOD PRESSURE: 100 MMHG | HEART RATE: 80 BPM | WEIGHT: 166 LBS | BODY MASS INDEX: 29.41 KG/M2 | OXYGEN SATURATION: 97 %

## 2023-07-27 DIAGNOSIS — F41.9 ANXIETY AND DEPRESSION: ICD-10-CM

## 2023-07-27 DIAGNOSIS — G47.09 OTHER INSOMNIA: ICD-10-CM

## 2023-07-27 DIAGNOSIS — G47.33 OSA (OBSTRUCTIVE SLEEP APNEA): Primary | ICD-10-CM

## 2023-07-27 DIAGNOSIS — G89.4 CHRONIC PAIN SYNDROME: ICD-10-CM

## 2023-07-27 DIAGNOSIS — Z72.0 TOBACCO ABUSE: ICD-10-CM

## 2023-07-27 DIAGNOSIS — M79.18 MYOFASCIAL PAIN SYNDROME: ICD-10-CM

## 2023-07-27 DIAGNOSIS — E66.3 OVERWEIGHT (BMI 25.0-29.9): ICD-10-CM

## 2023-07-27 DIAGNOSIS — F43.10 PTSD (POST-TRAUMATIC STRESS DISORDER): ICD-10-CM

## 2023-07-27 DIAGNOSIS — F32.A ANXIETY AND DEPRESSION: ICD-10-CM

## 2023-07-27 DIAGNOSIS — R40.0 DAYTIME SOMNOLENCE: ICD-10-CM

## 2023-07-27 PROCEDURE — 99204 OFFICE O/P NEW MOD 45 MIN: CPT | Performed by: INTERNAL MEDICINE

## 2023-07-27 RX ORDER — HYDROXYZINE HYDROCHLORIDE 10 MG/1
TABLET, FILM COATED ORAL
COMMUNITY
Start: 2023-07-27

## 2023-07-27 NOTE — PROGRESS NOTES
Consultation - Mita Retana  43 y.o. female  Elsy Mccollum  TQQ:[id number]  DOS:7/27/2023    Physician Requesting Consult: Uziel Oliver MD             Reason for Consult : At your kind request I saw Jessica Aldrich for initial sleep evaluation today. Patient is here for a complaint of excessive daytime drowsiness      PFSH, Problem List, Medications & Allergies were reviewed in EMR. Chichi Torres  has a past medical history of Anxiety, Bipolar depression ([address]), Chronic pelvic pain in female (3/8/2021), Endometrial hyperplasia without atypia (2/25/2021), Hypertension, and Menometrorrhagia (3/8/2021). She has a current medication list which includes the following prescription(s): fluticasone, sertraline, topiramate, atorvastatin, diphenhydramine, ergocalciferol, hydrocodone-acetaminophen, hydroxyzine hcl, meloxicam, and mometasone. HPI: She presents with complaint of "I sleep too much ". She also reports snoring and awakens herself with snorting, choking or coughing. Symptoms have been ongoing for several years. Additional Complaints: She is on medication for chronic pain, anxiety, depression and PTSD. Restless Leg Syndrome: reports no suggestive symptoms. Parasomnia: no features reported    Sleep Routine (averaged): Typical Bedtime: 10 PM.  Gets OOB: 6 AM. TIB:8 hrs. Sleep latency: upto 60 minutes. She attributes to racing thoughts, watches TV in bed always on her phone. Sleep Interruptions: 1-2, because of nocturia and able to fall back asleep. Awakens: before alarm most days  Upon awakening: never feels rested. Daytime Function:Nina reports excessive daytime sleepiness [feels like napping & does when has the opportunity. She naps for up to 3 to 4 hours several times a week]. She rated [herself] at Total score: 14 /24 on the [place] Sleepiness Scale. Habits:   reports that she has been smoking cigarettes. She has been smoking an average of .5 packs per day.  She has never used smokeless tobacco.;  reports no history of alcohol use.;[ reports no history of drug use. ];[  E-Cigarette/Vaping   • E-Cigarette Use Former User    • Cartridges/Day 0.5 ppd    ]; Caffeine use:limited; Exercise routine: sometimes. Occupation:     Family History: Father and aunt had obstructive sleep apnea  ROS: Significant for around 30 pounds weight loss in the past 2 years. She reported no nasal symptoms. She reports episodic coughing. He denied shortness of breath or wheezing. She is reporting no cardiac symptoms. Anjali Tomlin EXAM:  /60 (BP Location: Left arm, Cuff Size: Large)   Pulse 80   Temp (!) 97.4 °F (36.3 °C) (Temporal)   Ht 5' 3" (1.6 m)   Wt 75.3 kg (166 lb)   LMP 12/25/2020   SpO2 97%   BMI 29.41 kg/m²    General: Well groomed female, well appearing, in no apparent distress. Neurological: Alert and orientated; cooperative; Cranial nerves intact;    Psychiatric: Speech: Clear and coherent; appears anxious to  Skin: Warm and dry; Color& Hydration good; no facial rashes or lesions   HEENT:  Craniofacial anatomy: narrow facies Sinuses: Non-tender. TMJ: Normal   Eyes: EOM's intact; conjunctiva/corneas clear   Ears: Externally appear normal     Nasal Airway: narrow nares Septum: Intact; Mucosa: Normal; Turbinates: Normal; Rhinorrhea: None  Mouth: Lips: Normal posture; Dentition: normal . Mucosa: Moist; Hard Palate:narrow   Oropharryx: crowded and AP narrowing Tongue: Mallampati:Class IV and MobileSoft Palate:  redundant  Tonsils: absent  Neck:; [Neck Circumference: 15.25 ";] Supple; no abnormal masses; Thyroid: Normal. Trachea: Central.    Lymph: No cervical or submandibular Lymhadenopathy  Heart: S1,S2 normal; RRR; no gallop; no murmur   Lungs: Respiratory Effort: Normal. Air entry good bilaterally. No wheezes. Scattered rhonchi  Abdomen: Obese, soft & non-tender    Extremities: No pedal edema. No clubbing or cyanosis.     Musculoskeletal:  Motor normal; Gait: Normal.       Most recent CBC and BMP have been unremarkable    IMPRESSION: Primary/Secondary Sleep Diagnoses (to Medical or Psych conditions) & Comorbidities   1. SHOLA (obstructive sleep apnea)  Diagnostic Sleep Study    CPAP Study      2. Daytime somnolence  Ambulatory Referral to Sleep Medicine      3. Other insomnia        4. Tobacco abuse        5. Anxiety and depression        6. PTSD (post-traumatic stress disorder)        7. Chronic pain syndrome        8. Myofascial pain syndrome        9. Overweight (BMI 25.0-29. 9)             PLAN:   1. With respect to above conditions, comprehensive counseling provided on pathophysiology; effects on symptoms and comorbidities, diagnostic strategies & limitations; treatment options; risks or no treament; risks & benefits of the various therapeutic options; costs and insurance aspects. 2. I advised weight reduction, avoiding sleeping supine, using alcohol or sedating medications close to bed time and on safe driving practices. 3. Sleep testing is indicated and a diagnostic study will be scheduled. 4.  Patient is willing to try Positive airway pressure therapy and will be scheduled for a titration study if indicated. 5.  Multi component Cognitive behavioral therapy for Insomnia undertaken - Sleep Restriction, Stimulus control, Relaxation techniques and Sleep hygiene were discussed. 6.  I advised smoking cessation and she is contemplating  7. Follow-up to be scheduled after testing /initiation of therapy to review results, further details of treatment options and to adjust therapy. Thank you for allowing me to participate in the care of this patient. Sincerely,      Authenticated electronically on 04/54/03   Board Certified Specialist     Portions of the record may have been created with voice recognition software. Occasional wrong word or "sound a like" substitutions may have occurred due to the inherent limitations of voice recognition software.  There may also be notations and random deletions of words or characters from malfunctioning software. Read the chart carefully and recognize, using context, where substitutions/deletions have occurred.

## 2023-07-27 NOTE — PATIENT INSTRUCTIONS
What you can do to improve your sleep: (Sleep Hygiene) Basic rules for a good night's sleep  Create a regular sleep schedule. This will help you form a sleep routine. Keep a record of your sleep patterns, and any sleeping problems you have. Bring the record to follow-up visits with healthcare providers. Avoid prolonged use of light-emitting screens before bedtime or watching TV in bed. Avoid forcing sleep. Do not take naps. Naps could make it hard for you to fall asleep at bedtime. Deal with your worries before bedtime. Keep your bedroom cool, quiet, and dark. Turn on white noise, such as a fan, to help you relax. Do not use your bed for any activity that will keep you awake. Do not read, exercise, eat, or watch TV in your bedroom. Get up if you do not fall asleep within 20 minutes. Move to another room and do something relaxing until you become sleepy. Limit caffeine, alcohol, nicotine and food to earlier in the day. Only drink caffeine in the morning. Do not drink alcohol within 6 hours of bedtime. Do not eat a heavy meal right before you go to bed. Avoid smoking, especially in the evening. Exercise regularly. Daily exercise will help you sleep better. Do not exercise within 4 hours of bedtime. Stimulus control therapy rules  1. Go to bed only when sleepy. 2. Do not watch television, read, eat, or worry while in bed. Use bed only for sleep and sex. 3. Get out of bed if unable to fall asleep within 20 minutes and go to another room. Return to bed only when sleepy. Repeat this step as many times as necessary throughout the night. 4. Set an alarm clock to wake up at a fixed time each morning, including weekends. 5. Do not take a nap during the day. Data from: [address], 1959 [facility] Nonpharmacologic treatments of insomnia. J Clin Psychiatry 4338; 53:37. Go to AASM website for more information: Sleepeducation. org  Recommended Reading: Book by mj Garcia   No More sleepless nights    What is SHOLA?   Obstructive sleep apnea is a common and serious sleep disorder that causes you to stop breathing during sleep. The airway repeatedly becomes blocked, limiting the amount of air that reaches your lungs. When this happens, you may snore loudly or making choking noises as you try to breathe. Your brain and body becomes oxygen deprived and you may wake up. This may happen a few times a night, or in more severe cases, several hundred times a night. Sleep apnea can make you wake up in the morning feeling tired or unrefreshed even though you have had a full night of sleep. During the day, you may feel fatigued, have difficulty concentrating or you may even unintentionally fall asleep. This is because your body is waking up numerous times throughout the night, even though you might not be conscious of each awakening. The lack of oxygen your body receives can have negative long-term consequences for your health. This includes:  High blood pressure  Heart disease  Irregular heart rhythms  Stroke  Pre-diabetes and diabetes  Depression  Testing  An objective evaluation of your sleep may be needed before your board certified sleep physician can make a diagnosis. Options include:   In-lab overnight sleep study  This type of sleep study requires you to stay overnight at a sleep center, in a bed that may resemble a hotel room. You will sleep with sensors hooked up to various parts of your body. These sensors record your brain waves, heartbeat, breathing and movement. An overnight sleep study also provides your doctor with the most complete information about your sleep. Learn more about an overnight sleep study. Home sleep apnea test  Some patients with high risk factors for obstructive sleep apnea and no other medical disorders may be candidates for a home sleep apnea test. The testing equipment differs in that it is less complicated than what is used in an overnight sleep study.  As such, does not give all the data an in-lab will and if negative, may not mean you do not have the problem. Treatment for sleep apnea includes using a continuous positive airway pressure (CPAP) machine to keep your airway open during sleep. A mask is placed over your nose and mouth, or just your nose. The mask is hooked to the CPAP machine that blows a gentle stream of air into the mask when you breathe. This helps keep your airway open so you can breathe more regularly. Extra oxygen may be given to you through the machine. You may be given a mouth device. It looks like a mouth guard or dental retainer and stops your tongue and mouth tissues from blocking your throat while you sleep. Surgery may be needed to remove extra tissues that block your mouth, throat, or nose. Manage sleep apnea:   Do not smoke. Nicotine and other chemicals in cigarettes and cigars can cause lung damage. Ask your healthcare provider for information if you currently smoke and need help to quit. E-cigarettes or smokeless tobacco still contain nicotine. Talk to your healthcare provider before you use these products. Do not drink alcohol or take sedative medicine before you go to sleep. Alcohol and sedatives can relax the muscles and tissues around your throat. This can block the airflow to your lungs. Maintain a healthy weight. Excess tissue around your throat may restrict your breathing. Ask your healthcare provider for information if you need to lose weight. Sleep on your side or use pillows designed to prevent sleep apnea. This prevents your tongue or other tissues from blocking your throat. You can also raise the head of your bed. Driving Safety. Refrain from driving when drowsy. Follow up with your healthcare provider as directed: Write down your questions so you remember to ask them during your visits. Go to AASM website for more information: Sleepeducation. org  What is SHOLA?    Obstructive sleep apnea is a common and serious sleep disorder that causes you to stop breathing during sleep. The airway repeatedly becomes blocked, limiting the amount of air that reaches your lungs. When this happens, you may snore loudly or making choking noises as you try to breathe. Your brain and body becomes oxygen deprived and you may wake up. This may happen a few times a night, or in more severe cases, several hundred times a night. Sleep apnea can make you wake up in the morning feeling tired or unrefreshed even though you have had a full night of sleep. During the day, you may feel fatigued, have difficulty concentrating or you may even unintentionally fall asleep. This is because your body is waking up numerous times throughout the night, even though you might not be conscious of each awakening. The lack of oxygen your body receives can have negative long-term consequences for your health. This includes:  High blood pressure  Heart disease  Irregular heart rhythms  Stroke  Pre-diabetes and diabetes  Depression  Testing  An objective evaluation of your sleep may be needed before your board certified sleep physician can make a diagnosis. Options include:   In-lab overnight sleep study  This type of sleep study requires you to stay overnight at a sleep center, in a bed that may resemble a hotel room. You will sleep with sensors hooked up to various parts of your body. These sensors record your brain waves, heartbeat, breathing and movement. An overnight sleep study also provides your doctor with the most complete information about your sleep. Learn more about an overnight sleep study. Home sleep apnea test  Some patients with high risk factors for obstructive sleep apnea and no other medical disorders may be candidates for a home sleep apnea test. The testing equipment differs in that it is less complicated than what is used in an overnight sleep study. As such, does not give all the data an in-lab will and if negative, may not mean you do not have the problem.   Treatment for sleep apnea includes using a continuous positive airway pressure (CPAP) machine to keep your airway open during sleep. A mask is placed over your nose and mouth, or just your nose. The mask is hooked to the CPAP machine that blows a gentle stream of air into the mask when you breathe. This helps keep your airway open so you can breathe more regularly. Extra oxygen may be given to you through the machine. You may be given a mouth device. It looks like a mouth guard or dental retainer and stops your tongue and mouth tissues from blocking your throat while you sleep. Surgery may be needed to remove extra tissues that block your mouth, throat, or nose. Manage sleep apnea:   Do not smoke. Nicotine and other chemicals in cigarettes and cigars can cause lung damage. Ask your healthcare provider for information if you currently smoke and need help to quit. E-cigarettes or smokeless tobacco still contain nicotine. Talk to your healthcare provider before you use these products. Do not drink alcohol or take sedative medicine before you go to sleep. Alcohol and sedatives can relax the muscles and tissues around your throat. This can block the airflow to your lungs. Maintain a healthy weight. Excess tissue around your throat may restrict your breathing. Ask your healthcare provider for information if you need to lose weight. Sleep on your side or use pillows designed to prevent sleep apnea. This prevents your tongue or other tissues from blocking your throat. You can also raise the head of your bed. Driving Safety. Refrain from driving when drowsy. Follow up with your healthcare provider as directed: Write down your questions so you remember to ask them during your visits. Go to AASM website for more information: Sleepeducation. org  Nursing Support:  When: Monday through Friday 7A-5PM except holidays  Where: Our direct line is [phone]. If you are having a true emergency please call 911.   In the event that the line is busy or it is after hours please leave a voice message and we will return your call. Please speak clearly, leaving your full name, birth date, best number to reach you and the reason for your call. Medication refills: We will need the name of the medication, the dosage, the ordering provider, whether you get a 30 or 90 day refill, and the pharmacy name and address. Medications will be ordered by the provider only. Nurses cannot call in prescriptions. Please allow 7 days for medication refills. Physician requested updates: If your provider requested that you call with an update after starting medication, please be ready to provide us the medication and dosage, what time you take your medication, the time you attempt to fall asleep, time you fall asleep, when you wake up, and what time you get out of bed. Sleep Study Results: We will contact you with sleep study results and/or next steps after the physician has reviewed your testing.

## 2023-08-02 ENCOUNTER — APPOINTMENT (EMERGENCY)
Dept: CT IMAGING | Facility: HOSPITAL | Age: 43
End: 2023-08-02
Payer: COMMERCIAL

## 2023-08-02 ENCOUNTER — APPOINTMENT (EMERGENCY)
Dept: RADIOLOGY | Facility: HOSPITAL | Age: 43
End: 2023-08-02
Payer: COMMERCIAL

## 2023-08-02 ENCOUNTER — HOSPITAL ENCOUNTER (EMERGENCY)
Facility: HOSPITAL | Age: 43
Discharge: HOME | End: 2023-08-02
Attending: EMERGENCY MEDICINE
Payer: COMMERCIAL

## 2023-08-02 VITALS
SYSTOLIC BLOOD PRESSURE: 127 MMHG | BODY MASS INDEX: 29.41 KG/M2 | HEART RATE: 93 BPM | HEIGHT: 63 IN | DIASTOLIC BLOOD PRESSURE: 80 MMHG | TEMPERATURE: 98.7 F | OXYGEN SATURATION: 96 % | WEIGHT: 166 LBS | RESPIRATION RATE: 20 BRPM

## 2023-08-02 DIAGNOSIS — R07.9 CHEST PAIN, UNSPECIFIED TYPE: Primary | ICD-10-CM

## 2023-08-02 LAB
2HR DELTA HS TROPONIN: 0 NG/L
ANION GAP SERPL CALCULATED.3IONS-SCNC: 9 MMOL/L
BASOPHILS # BLD AUTO: 0.09 THOUSANDS/ÂΜL (ref 0–0.1)
BASOPHILS NFR BLD AUTO: 1 % (ref 0–1)
BUN SERPL-MCNC: 9 MG/DL (ref 5–25)
CALCIUM SERPL-MCNC: 9 MG/DL (ref 8.4–10.2)
CARDIAC TROPONIN I PNL SERPL HS: 3 NG/L
CARDIAC TROPONIN I PNL SERPL HS: 3 NG/L
CHLORIDE SERPL-SCNC: 107 MMOL/L (ref 96–108)
CO2 SERPL-SCNC: 22 MMOL/L (ref 21–32)
CREAT SERPL-MCNC: 0.75 MG/DL (ref 0.6–1.3)
D DIMER PPP FEU-MCNC: 0.7 UG/ML FEU
EOSINOPHIL # BLD AUTO: 0.19 THOUSAND/ÂΜL (ref 0–0.61)
EOSINOPHIL NFR BLD AUTO: 2 % (ref 0–6)
ERYTHROCYTE [DISTWIDTH] IN BLOOD BY AUTOMATED COUNT: 13 % (ref 11.6–15.1)
GFR SERPL CREATININE-BSD FRML MDRD: 98 ML/MIN/1.73SQ M
GLUCOSE SERPL-MCNC: 79 MG/DL (ref 65–140)
HCT VFR BLD AUTO: 42.8 % (ref 34.8–46.1)
HGB BLD-MCNC: 14.4 G/DL (ref 11.5–15.4)
IMM GRANULOCYTES # BLD AUTO: 0.05 THOUSAND/UL (ref 0–0.2)
IMM GRANULOCYTES NFR BLD AUTO: 0 % (ref 0–2)
LYMPHOCYTES # BLD AUTO: 2.81 THOUSANDS/ÂΜL (ref 0.6–4.47)
LYMPHOCYTES NFR BLD AUTO: 25 % (ref 14–44)
MAGNESIUM SERPL-MCNC: 2.1 MG/DL (ref 1.9–2.7)
MCH RBC QN AUTO: 33 PG (ref 26.8–34.3)
MCHC RBC AUTO-ENTMCNC: 33.6 G/DL (ref 31.4–37.4)
MCV RBC AUTO: 98 FL (ref 82–98)
MONOCYTES # BLD AUTO: 0.95 THOUSAND/ÂΜL (ref 0.17–1.22)
MONOCYTES NFR BLD AUTO: 8 % (ref 4–12)
NEUTROPHILS # BLD AUTO: 7.22 THOUSANDS/ÂΜL (ref 1.85–7.62)
NEUTS SEG NFR BLD AUTO: 64 % (ref 43–75)
NRBC BLD AUTO-RTO: 0 /100 WBCS
PLATELET # BLD AUTO: 229 THOUSANDS/UL (ref 149–390)
PMV BLD AUTO: 10.5 FL (ref 8.9–12.7)
POTASSIUM SERPL-SCNC: 3.6 MMOL/L (ref 3.5–5.3)
RBC # BLD AUTO: 4.36 MILLION/UL (ref 3.81–5.12)
SODIUM SERPL-SCNC: 138 MMOL/L (ref 135–147)
WBC # BLD AUTO: 11.31 THOUSAND/UL (ref 4.31–10.16)

## 2023-08-02 PROCEDURE — 36415 COLL VENOUS BLD VENIPUNCTURE: CPT | Performed by: EMERGENCY MEDICINE

## 2023-08-02 PROCEDURE — 93005 ELECTROCARDIOGRAM TRACING: CPT

## 2023-08-02 PROCEDURE — G1004 CDSM NDSC: HCPCS

## 2023-08-02 PROCEDURE — 85025 COMPLETE CBC W/AUTO DIFF WBC: CPT | Performed by: EMERGENCY MEDICINE

## 2023-08-02 PROCEDURE — 71046 X-RAY EXAM CHEST 2 VIEWS: CPT

## 2023-08-02 PROCEDURE — 99285 EMERGENCY DEPT VISIT HI MDM: CPT | Performed by: EMERGENCY MEDICINE

## 2023-08-02 PROCEDURE — 83735 ASSAY OF MAGNESIUM: CPT | Performed by: EMERGENCY MEDICINE

## 2023-08-02 PROCEDURE — 84484 ASSAY OF TROPONIN QUANT: CPT | Performed by: EMERGENCY MEDICINE

## 2023-08-02 PROCEDURE — 80048 BASIC METABOLIC PNL TOTAL CA: CPT | Performed by: EMERGENCY MEDICINE

## 2023-08-02 PROCEDURE — 72125 CT NECK SPINE W/O DYE: CPT

## 2023-08-02 PROCEDURE — 71275 CT ANGIOGRAPHY CHEST: CPT

## 2023-08-02 PROCEDURE — 85379 FIBRIN DEGRADATION QUANT: CPT | Performed by: EMERGENCY MEDICINE

## 2023-08-02 RX ORDER — LORAZEPAM 0.5 MG/1
0.5 TABLET ORAL ONCE
Status: COMPLETED | OUTPATIENT
Start: 2023-08-02 | End: 2023-08-02

## 2023-08-02 RX ORDER — SODIUM CHLORIDE 9 MG/ML
3 INJECTION INTRAVENOUS
Status: DISCONTINUED | OUTPATIENT
Start: 2023-08-02 | End: 2023-08-02 | Stop reason: HOSPADM

## 2023-08-02 RX ORDER — LIDOCAINE 50 MG/G
1 PATCH TOPICAL ONCE
Status: DISCONTINUED | OUTPATIENT
Start: 2023-08-02 | End: 2023-08-02 | Stop reason: HOSPADM

## 2023-08-02 RX ORDER — KETOROLAC TROMETHAMINE 30 MG/ML
10 INJECTION, SOLUTION INTRAMUSCULAR; INTRAVENOUS ONCE
Status: COMPLETED | OUTPATIENT
Start: 2023-08-02 | End: 2023-08-02

## 2023-08-02 RX ADMIN — IOHEXOL 85 ML: 350 INJECTION, SOLUTION INTRAVENOUS at 18:48

## 2023-08-02 RX ADMIN — LORAZEPAM 0.5 MG: 0.5 TABLET ORAL at 16:23

## 2023-08-02 RX ADMIN — KETOROLAC TROMETHAMINE 9.9 MG: 30 INJECTION, SOLUTION INTRAMUSCULAR; INTRAVENOUS at 16:49

## 2023-08-02 RX ADMIN — LIDOCAINE 5% 1 PATCH: 700 PATCH TOPICAL at 16:23

## 2023-08-02 NOTE — ED PROVIDER NOTES
History  Chief Complaint   Patient presents with   • Chest Pain     Patient reports she left work today and started with right sided neck pain that goes down her right arm. Patient also reports mild chest discomfort. This is a 15-year-old right-handed woman with the noted past medical history who presents to the emergency department today stating that she developed retrosternal chest pressure with mild dyspnea around 1400 this afternoon. She had had an argument with a coworker at about 56 today while she was at work ([employer] in [address] [facility]); due to the severity of the argument, she left work early. States that she developed retrosternal chest pressure while she was going home. Pain remained throughout the retrosternal region without radiation accompanied by mild nausea. No nausea/vomiting/diaphoresis/syncope/palpitations. Pain has subsided somewhat since time of onset. She did not take or use anything to treat symptoms. She was otherwise in normal state of health until symptom onset. She does not have underlying cardiac disease of which she is aware. No history of VTE and no travel/surgery/immobilization in past 30 days. No prior cardiac stress testing or catheterization. No LE edema. No fever/chills. No hemoptysis. No diaphoresis. No unexpected weight changes in past month. Also developed sharp/stabbing pain in lateral aspect of R neck extending into the superior aspect of the trapezius on the right side with pain in the right arm and at the same time that she developed retrosternal chest pain. This extends into the right shoulder but does not extend past the right shoulder. There had been no preceding trauma or injury. She states that she has known degenerative disease throughout the cervical spine; has been treated by Dr. Tai Parrish of pain management previously for this. Develops pain like this in the neck about once per day although today is more severe than previous episodes.  Did not take or use anything for sx today. Has pain worse with active range of motion of the neck. No prior fracture or surgery in the cervical spine. Prior right elbow fracture in childhood not surgical repair. DDx includes but is not limited to ACS/PE/dissection/ptx/pna; relatively low concern for dissection given nature of sx and I do not suspect this diagnosis to be likely. Will assess ACS with ecg/troponin initially. Ptx/pna will be assessed by chest imaging. PE to be assessed with d-dimer; transiently tachycardic during exam. Wells score 1.5 d/t tachycardia. CT cervical spine given right upper extremity symptoms. History provided by:  Medical records, patient and EMS personnel  Chest Pain  Associated symptoms: weakness    Associated symptoms: no diaphoresis, no fatigue, no fever, no nausea, no numbness, no palpitations, no shortness of breath and not vomiting        Prior to Admission Medications   Prescriptions Last Dose Informant Patient Reported? Taking? HYDROcodone-acetaminophen (Norco) 5-325 mg per tablet  Self No No   Sig: Take 1 tablet by mouth every 6 (six) hours as needed for pain Initial Rx.  Max Daily Amount: 4 tablets   Patient not taking: Reported on 2023   atorvastatin (LIPITOR) 10 mg tablet  Self No No   Sig: Take 1 tablet (10 mg total) by mouth daily   Patient not taking: Reported on 2023   diphenhydrAMINE (BENADRYL) 25 mg tablet   No No   Sig: Take 1 tablet (25 mg total) by mouth every 6 (six) hours for 5 days   Patient not taking: Reported on 2023   ergocalciferol (VITAMIN D2) 50,000 units  Self No No   Sig: Take 1 capsule (50,000 Units total) by mouth once a week   Patient not taking: Reported on 2023   fluticasone (FLONASE) 50 mcg/act nasal spray  Self No No   Si sprays into each nostril daily   hydrOXYzine HCL (ATARAX) 10 mg tablet  Self Yes No   Patient not taking: Reported on 2023   meloxicam (MOBIC) 15 mg tablet  Self No No   Sig: Take 1 tablet (15 mg total) by mouth daily With food   Patient not taking: Reported on 2023   mometasone (ELOCON) 0.1 % cream  Self No No   Sig: Apply topically daily   Patient not taking: Reported on 2023   sertraline (ZOLOFT) 100 mg tablet  Self Yes No   topiramate (TOPAMAX) 25 mg sprinkle capsule  Self Yes No      Facility-Administered Medications: None       Past Medical History:   Diagnosis Date   • Anxiety    • Bipolar depression ([address])    • Chronic pelvic pain in female 3/8/2021   • Endometrial hyperplasia without atypia 2021   • Hypertension    • Menometrorrhagia 3/8/2021       Past Surgical History:   Procedure Laterality Date   •  SECTION     • HYSTERECTOMY  2021       Family History   Problem Relation Age of Onset   • Heart disease Father    • No Known Problems Sister    • No Known Problems Daughter    • No Known Problems Maternal Grandmother    • No Known Problems Maternal Grandfather    • No Known Problems Paternal Grandmother    • No Known Problems Paternal Grandfather    • No Known Problems Son    • No Known Problems Son    • No Known Problems Son    • No Known Problems Maternal Aunt    • No Known Problems Maternal Aunt    • No Known Problems Paternal Aunt    • No Known Problems Paternal Aunt      I have reviewed and agree with the history as documented. E-Cigarette/Vaping   • E-Cigarette Use Former User    • Cartridges/Day 0.5 ppd      E-Cigarette/Vaping Substances     Social History     Tobacco Use   • Smoking status: Every Day     Packs/day: 0.50     Types: Cigarettes   • Smokeless tobacco: Never   Vaping Use   • Vaping Use: Former   Substance Use Topics   • Alcohol use: Never   • Drug use: Never       Review of Systems   Constitutional: Negative. Negative for chills, diaphoresis, fatigue and fever. Respiratory: Negative. Negative for chest tightness and shortness of breath. Cardiovascular: Positive for chest pain. Negative for palpitations and leg swelling.    Gastrointestinal: Negative for nausea and vomiting. Musculoskeletal: Positive for arthralgias, myalgias and neck stiffness. Negative for neck pain. Skin: Negative. Neurological: Positive for weakness. Negative for numbness. Hematological: Negative. Physical Exam  Physical Exam  Vitals and nursing note reviewed. Constitutional:       General: She is awake. She is in acute distress (anxious; mild painful distress). Appearance: Normal appearance. She is well-developed. HENT:      Head: Normocephalic and atraumatic. Right Ear: Hearing and external ear normal.      Left Ear: Hearing and external ear normal.   Neck:      Trachea: Trachea and phonation normal.        Comments: Negative Spurling's maneuver bilaterally    Hypertonicity and tenderness throughout the right-sided paravertebral cervical musculature extending into the superior aspect of the right trapezius and laterally into the superior lateral aspect of the right shoulder. Posterior midline tender to palpation in the C5-C7 level with no step-off or overlying skin changes. Cardiovascular:      Rate and Rhythm: Normal rate and regular rhythm. Pulses:           Radial pulses are 2+ on the right side and 2+ on the left side. Dorsalis pedis pulses are 2+ on the right side and 2+ on the left side. Posterior tibial pulses are 2+ on the right side and 2+ on the left side. Heart sounds: Normal heart sounds, S1 normal and S2 normal. No murmur heard. No friction rub. No gallop. Pulmonary:      Effort: Pulmonary effort is normal. No respiratory distress. Breath sounds: Normal breath sounds. No stridor. No decreased breath sounds, wheezing, rhonchi or rales. Abdominal:      General: There is no distension. Palpations: There is no mass. Tenderness: There is no abdominal tenderness. There is no guarding or rebound. Musculoskeletal:      Cervical back: No rigidity. Spinous process tenderness and muscular tenderness present. Normal range of motion. Skin:     General: Skin is warm and dry. Neurological:      Mental Status: She is alert and oriented to person, place, and time. GCS: GCS eye subscore is 4. GCS verbal subscore is 5. GCS motor subscore is 6. Cranial Nerves: No cranial nerve deficit. Sensory: Sensation is intact. No sensory deficit. Deep Tendon Reflexes:      Reflex Scores:       Tricep reflexes are 2+ on the right side and 2+ on the left side. Bicep reflexes are 2+ on the right side and 2+ on the left side. Brachioradialis reflexes are 2+ on the right side and 2+ on the left side. Comments: PERRLA; EOMI. Sensation intact to light touch over face in V1-V3 distribution bilaterally. Facial expressions symmetric. Tongue/uvula midline. Shoulder shrug equal bilaterally. Intact C5-T1 sensation in b/l UE. Mild weakness in finger abduction in R hand 5-/5 compared to L.  strength, wrist extension, elbow flexion/extension, shoulder abduction 5/5 bilaterally.          Vital Signs  ED Triage Vitals   Temperature Pulse Respirations Blood Pressure SpO2   08/02/23 1551 08/02/23 1551 08/02/23 1551 08/02/23 1551 08/02/23 1551   98.7 °F (37.1 °C) 93 20 127/80 96 %      Temp Source Heart Rate Source Patient Position - Orthostatic VS BP Location FiO2 (%)   08/02/23 1551 08/02/23 1551 08/02/23 1551 08/02/23 1551 --   Temporal Monitor Lying Left arm       Pain Score       08/02/23 1649       10 - Worst Possible Pain           Vitals:    08/02/23 1551   BP: 127/80   Pulse: 93   Patient Position - Orthostatic VS: Lying         Visual Acuity      ED Medications  Medications   sodium chloride (PF) 0.9 % injection 3 mL (has no administration in time range)   lidocaine (LIDODERM) 5 % patch 1 patch (1 patch Topical Medication Applied 8/2/23 1623)   LORazepam (ATIVAN) tablet 0.5 mg (0.5 mg Oral Given 8/2/23 1623)   ketorolac (TORADOL) injection 9.9 mg (9.9 mg Intravenous Given 8/2/23 1649)       Diagnostic Studies  Results Reviewed     Procedure Component Value Units Date/Time    Basic metabolic panel [[id number]] Collected: 08/02/23 1624    Lab Status: Final result Specimen: Blood from Arm, Right Updated: 08/02/23 1648     Sodium 138 mmol/L      Potassium 3.6 mmol/L      Chloride 107 mmol/L      CO2 22 mmol/L      ANION GAP 9 mmol/L      BUN 9 mg/dL      Creatinine 0.75 mg/dL      Glucose 79 mg/dL      Calcium 9.0 mg/dL      eGFR 98 ml/min/1.73sq m     Narrative:      [facility] guidelines for Chronic Kidney Disease (CKD):   •  Stage 1 with normal or high GFR (GFR > 90 mL/min/1.73 square meters)  •  Stage 2 Mild CKD (GFR = 60-89 mL/min/1.73 square meters)  •  Stage 3A Moderate CKD (GFR = 45-59 mL/min/1.73 square meters)  •  Stage 3B Moderate CKD (GFR = 30-44 mL/min/1.73 square meters)  •  Stage 4 Severe CKD (GFR = 15-29 mL/min/1.73 square meters)  •  Stage 5 End Stage CKD (GFR <15 mL/min/1.73 square meters)  Note: GFR calculation is accurate only with a steady state creatinine    Magnesium [[id number]]  (Normal) Collected: 08/02/23 1624    Lab Status: Final result Specimen: Blood from Arm, Right Updated: 08/02/23 1648     Magnesium 2.1 mg/dL     CBC and differential [[id number]]  (Abnormal) Collected: 08/02/23 1624    Lab Status: Final result Specimen: Blood from Arm, Right Updated: 08/02/23 1631     WBC 11.31 Thousand/uL      RBC 4.36 Million/uL      Hemoglobin 14.4 g/dL      Hematocrit 42.8 %      MCV 98 fL      MCH 33.0 pg      MCHC 33.6 g/dL      RDW 13.0 %      MPV 10.5 fL      Platelets 968 Thousands/uL      nRBC 0 /100 WBCs      Neutrophils Relative 64 %      Immat GRANS % 0 %      Lymphocytes Relative 25 %      Monocytes Relative 8 %      Eosinophils Relative 2 %      Basophils Relative 1 %      Neutrophils Absolute 7.22 Thousands/µL      Immature Grans Absolute 0.05 Thousand/uL      Lymphocytes Absolute 2.81 Thousands/µL      Monocytes Absolute 0.95 Thousand/µL      Eosinophils Absolute 0.19 Thousand/µL      Basophils Absolute 0.09 Thousands/µL     D-dimer, quantitative [[id number]] Collected: 08/02/23 1624    Lab Status: In process Specimen: Blood from Arm, Right Updated: 08/02/23 1629    HS Troponin 0hr (reflex protocol) [[id number]] Collected: 08/02/23 1624    Lab Status: In process Specimen: Blood from Arm, Right Updated: 08/02/23 1628                 X-ray chest 2 views   Final Result by Se Dominguez MD (08/02 1642)      No acute cardiopulmonary disease. Workstation performed: [id number]         CT spine cervical without contrast    (Results Pending)              Procedures  Procedures         ED Course  ED Course as of 08/02/23 1649   Wed Aug 02, 2023   1600 ECG NSR 92 bpm   QRS 80 QTc 425  No acute st/t changes  No ectopy  No Q waves  Normal axis  Interpreted by me   1632 CBC and differential(!)  Mild leukoctyosis  Hg/Hct wnl  Plt wnl   1643 X-ray chest 2 views     FINDINGS:     Cardiomediastinal silhouette appears unremarkable.     The lungs are clear. No pneumothorax or pleural effusion.     Osseous structures appear within normal limits for patient age.     IMPRESSION:     No acute cardiopulmonary disease. [address] [facility] CT completed and awaiting interpretation   1644 Sign over to Dr Rowena Conroy. Remainder of labs  CT interpretation  Re-evaluation for disposition           SBIRT 22yo+    Flowsheet Row Most Recent Value   Initial Alcohol Screen: US AUDIT-C     1. How often do you have a drink containing alcohol? 0 Filed at: 08/02/2023 1553   Audit-C Score 0 Filed at: 08/02/2023 1553   LE: How many times in the past year have you. .. Used an illegal drug or used a prescription medication for non-medical reasons?  Never Filed at: 08/02/2023 1553          Wells' Criteria for PE    Flowsheet Row Most Recent Value   Wells' Criteria for PE    Clinical signs and symptoms of DVT 0 Filed at: 08/02/2023 1631   PE is primary diagnosis or equally likely 0 Filed at: 08/02/2023 1631 HR >100 1.5 Filed at: 08/02/2023 1631   Immobilization at least 3 days or Surgery in the previous 4 weeks 0 Filed at: 08/02/2023 1631   Previous, objectively diagnosed PE or DVT 0 Filed at: 08/02/2023 1631   Hemoptysis 0 Filed at: 08/02/2023 1631   Malignancy with treatment within 6 months or palliative 0 Filed at: 08/02/2023 1631   Wells' Criteria Total 1.5 Filed at: 08/02/2023 1631                MDM    Disposition  Final diagnoses:   None     ED Disposition     None      Follow-up Information    None         Patient's Medications   Discharge Prescriptions    No medications on file       No discharge procedures on file.     PDMP Review       Value Time User    PDMP Reviewed  Yes 2/25/2023  1:32 PM Lolita Del Rosario PA-C          ED Provider  Electronically Signed by           Yanni Parikh DO  08/03/23 122

## 2023-08-03 LAB
ATRIAL RATE: 92 BPM
P AXIS: 57 DEGREES
PR INTERVAL: 172 MS
QRS AXIS: 39 DEGREES
QRSD INTERVAL: 80 MS
QT INTERVAL: 344 MS
QTC INTERVAL: 425 MS
T WAVE AXIS: 43 DEGREES
VENTRICULAR RATE: 92 BPM

## 2023-08-03 PROCEDURE — 93010 ELECTROCARDIOGRAM REPORT: CPT | Performed by: INTERNAL MEDICINE

## 2023-08-03 NOTE — ED CARE HANDOFF
Emergency Department Sign Out Note        Sign out and transfer of care from Dr. Isabel Huang. See Separate Emergency Department note. The patient, Lois Colin, was evaluated by the previous provider for neck and chest pain. ED Course / Workup Pending (followup): Signed out pending labs and imaging studies. Patient low risk for PE, had elevated D-dimer, CTA PE study performed. Lab and imaging results reviewed and discussed with patient, no acute abnormality. 2 sets of troponin negative. Patient with low heart score, low risk of major adverse cardiac event in next month. Patient resting comfortably in ED, feels well to be discharged, discussed with patient follow-up with primary doctor, return precautions given. I have reviewed test results and diagnosis with patient. Follow-up plan reviewed. Precautions for acute return for re-evaluation are reviewed. Opportunity to ask questions was provided. Patient verbalizes understanding.         HEART Risk Score    Flowsheet Row Most Recent Value   Heart Score Risk Calculator    History 1 Filed at: 08/02/2023 2002   ECG 0 Filed at: 08/02/2023 2002   Age 0 Filed at: 08/02/2023 2002   Risk Factors 1 Filed at: 08/02/2023 2002   Troponin 0 Filed at: 08/02/2023 2002   HEART Score 2 Filed at: 08/02/2023 2002                        Wells' Criteria for PE    Flowsheet Row Most Recent Value   Wells' Criteria for PE    Clinical signs and symptoms of DVT 0 Filed at: 08/02/2023 1631   PE is primary diagnosis or equally likely 0 Filed at: 08/02/2023 1631   HR >100 1.5 Filed at: 08/02/2023 1631   Immobilization at least 3 days or Surgery in the previous 4 weeks 0 Filed at: 08/02/2023 1631   Previous, objectively diagnosed PE or DVT 0 Filed at: 08/02/2023 1631   Hemoptysis 0 Filed at: 08/02/2023 1631   Malignancy with treatment within 6 months or palliative 0 Filed at: 08/02/2023 1631   Wells' Criteria Total 1.5 Filed at: 08/02/2023 1631                ED Course as of 08/02/23 2004   Wed Aug 02, 2023   1701 D-Dimer, Quant(!): 0.70  D-dimer elevated, CTA chest PE study ordered to evaluate for PE.   1831 CT cervical spine read by radiology, no acute findings. 1934 hs TnI 2hr: 3  Second troponin negative, delta 0.   1956 CTA ED chest PE study  IMPRESSION:     No pulmonary embolus.     Lungs clear. Procedures  MDM        Disposition  Final diagnoses:   Chest pain, unspecified type     Time reflects when diagnosis was documented in both MDM as applicable and the Disposition within this note     Time User Action Codes Description Comment    8/2/2023  7:58 PM Gray Reese Add [R07.9] Chest pain, unspecified type       ED Disposition     ED Disposition   Discharge    Condition   Stable    Date/Time   Wed Aug 2, 2023  7:58 PM    Comment   Tab Early discharge to home/self care. Follow-up Information     Follow up With Specialties Details Why Contact Info    Jodi Christine MD [facility] Schedule an appointment as soon as possible for a visit   [address] [place] [address]  [phone]          Patient's Medications   Discharge Prescriptions    No medications on file     No discharge procedures on file.        ED Provider  Electronically Signed by     Boyd Solano MD  08/02/23 2004

## 2023-08-04 ENCOUNTER — OFFICE VISIT (OUTPATIENT)
Dept: OBGYN CLINIC | Facility: CLINIC | Age: 43
End: 2023-08-04
Payer: COMMERCIAL

## 2023-08-04 VITALS
BODY MASS INDEX: 29.95 KG/M2 | DIASTOLIC BLOOD PRESSURE: 80 MMHG | OXYGEN SATURATION: 98 % | SYSTOLIC BLOOD PRESSURE: 128 MMHG | HEIGHT: 63 IN | WEIGHT: 169 LBS | HEART RATE: 79 BPM

## 2023-08-04 DIAGNOSIS — M50.30 DEGENERATIVE DISC DISEASE, CERVICAL: ICD-10-CM

## 2023-08-04 DIAGNOSIS — M54.12 RADICULOPATHY, CERVICAL REGION: Primary | ICD-10-CM

## 2023-08-04 PROCEDURE — 99214 OFFICE O/P EST MOD 30 MIN: CPT | Performed by: FAMILY MEDICINE

## 2023-08-04 RX ORDER — METHOCARBAMOL 500 MG/1
500 TABLET, FILM COATED ORAL 4 TIMES DAILY
Qty: 90 TABLET | Refills: 0 | Status: SHIPPED | OUTPATIENT
Start: 2023-08-04

## 2023-08-04 RX ORDER — MELOXICAM 15 MG/1
15 TABLET ORAL DAILY
Qty: 30 TABLET | Refills: 5 | Status: SHIPPED | OUTPATIENT
Start: 2023-08-04

## 2023-08-04 RX ORDER — PREDNISONE 20 MG/1
40 TABLET ORAL DAILY
Qty: 10 TABLET | Refills: 0 | Status: SHIPPED | OUTPATIENT
Start: 2023-08-04

## 2023-08-04 NOTE — LETTER
August 4, 2023     Patient: Collene Merlin  YOB: 1980  Date of Visit: 8/4/2023      To Whom it May Concern:    Collene Merlin is under my professional care. Tim Christopher was seen in my office on 8/4/2023. I am recommending that patient remain out of work until evaluation with spine and pain in 2 weeks. If you have any questions or concerns, please don't hesitate to call.          Sincerely,          Manjula Galeas DO        CC: No Recipients
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
## 2023-08-14 ENCOUNTER — HOSPITAL ENCOUNTER (OUTPATIENT)
Dept: SLEEP CENTER | Facility: CLINIC | Age: 43
Discharge: HOME | End: 2023-08-14
Payer: COMMERCIAL

## 2023-08-14 DIAGNOSIS — G47.33 OSA (OBSTRUCTIVE SLEEP APNEA): ICD-10-CM

## 2023-08-14 PROCEDURE — 95810 POLYSOM 6/> YRS 4/> PARAM: CPT

## 2023-08-15 NOTE — PROGRESS NOTES
Sleep Study Documentation    Pre-Sleep Study       Sleep testing procedure explained to patient:YES    Patient napped prior to study:NO    Caffeine:Dayshift worker after 12PM.  Caffeine use:NO    Alcohol:Dayshift workers after 5PM: Alcohol use:NO    Typical day for patient:NO       Study Documentation    Sleep Study Indications: Snoring, BMI>30, nocturnal choking    Sleep Study: Diagnostic   Snore:Mild  Supplemental O2: no    Minimum SaO2 91%  Baseline SaO2 94%        EKG abnormalities: no     EEG abnormalities: no    Sleep Study Recorded < 2 hours: N/A    Sleep Study Recorded > 2 hours but incomplete study: N/A    Sleep Study Recorded 6 hours but no sleep obtained: NO    Patient classification: unemployed       Post-Sleep Study    Medication used at bedtime or during sleep study:NO    Patient reports time it took to fall asleep:30 to 60 minutes    Patient reports waking up during study:1 to 2 times. Patient reports returning to sleep in 10 to 30 minutes. Patient reports sleeping 4 to 6 hours without dreaming. Patient reports sleep during study:worse than usual    Patient rated sleepiness: Somewhat sleepy or tired    PAP treatment:no.

## 2023-08-21 ENCOUNTER — TELEPHONE (OUTPATIENT)
Dept: SLEEP CENTER | Facility: CLINIC | Age: 43
End: 2023-08-21

## 2023-08-21 ENCOUNTER — OFFICE VISIT (OUTPATIENT)
Dept: PAIN MEDICINE | Facility: CLINIC | Age: 43
End: 2023-08-21
Payer: COMMERCIAL

## 2023-08-21 VITALS
BODY MASS INDEX: 30.19 KG/M2 | DIASTOLIC BLOOD PRESSURE: 77 MMHG | HEART RATE: 106 BPM | SYSTOLIC BLOOD PRESSURE: 112 MMHG | HEIGHT: 63 IN | WEIGHT: 170.4 LBS | RESPIRATION RATE: 16 BRPM

## 2023-08-21 DIAGNOSIS — M54.2 NECK PAIN: ICD-10-CM

## 2023-08-21 DIAGNOSIS — M54.12 CERVICAL RADICULOPATHY: Primary | ICD-10-CM

## 2023-08-21 DIAGNOSIS — G89.4 CHRONIC PAIN SYNDROME: ICD-10-CM

## 2023-08-21 PROCEDURE — 99214 OFFICE O/P EST MOD 30 MIN: CPT | Performed by: ANESTHESIOLOGY

## 2023-08-21 RX ORDER — METHYLPREDNISOLONE 4 MG/1
TABLET ORAL
Qty: 21 TABLET | Refills: 0 | Status: SHIPPED | OUTPATIENT
Start: 2023-08-21

## 2023-08-21 NOTE — PROGRESS NOTES
Assessment:  1. Cervical radiculopathy    2. Neck pain    3. Chronic pain syndrome        Plan:  Patient is a 45-year-old female complains of neck pain, right arm pain with chronic pain some secondary to cervical radiculopathy presents to office for follow-up visit. Patient was seen by orthopedics who was examining her shoulder and feels that majority of her pain is coming from impingement of the nerve in the cervical spine. MRI lumbar spine 6/27/2022 shows C4-C5 broad-based osteophyte disc complex extending more to the left than the right with impingement on the ventral epidural space without papito extension of the cord surface there is asymmetric impingement on the left lateral recess and moderate left foraminal narrowing, C5-C6 osteophyte disc complex change and uncovertebral joint spurring impinging on the left lateral recess and causing moderate narrowing of the left foramen. 1.  We will schedule patient for a right C6-C7 interlaminar epidural steroid injection  2. We will trial a Medrol dose pack for exacerbation of radicular symptoms into the right upper extremity  3. Follow-up 1 month after injection  4. We will also attach a home size exercise regimen for cervical spine strengthening    Complete risks and benefits including bleeding, infection, tissue reaction, nerve injury and allergic reaction were discussed. The approach was demonstrated using models and literature was provided. Verbal and written consent was obtained. [address] [place] [address],[address] Prescription Drug Monitoring Program report was reviewed and was appropriate       History of Present Illness: The patient is a 37 y.o. female who presents for a follow up office visit in regards to Neck Pain and Shoulder Pain (right). The patient’s current symptoms include 10 out of 10 constant sharp, throbbing, cramping, pressure-like, shooting, numbness without any particular time pattern.     Current pain medications includes:  None      I have personally reviewed and/or updated the patient's past medical history, past surgical history, family history, social history, current medications, allergies, and vital signs today. Review of Systems  Review of Systems   Musculoskeletal: Positive for neck pain and neck stiffness. Decreased ROM   All other systems reviewed and are negative.           Past Medical History:   Diagnosis Date   • Anxiety    • Bipolar depression ([address])    • Chronic pelvic pain in female 3/8/2021   • Endometrial hyperplasia without atypia 2021   • Hypertension    • Menometrorrhagia 3/8/2021       Past Surgical History:   Procedure Laterality Date   •  SECTION     • HYSTERECTOMY  2021       Family History   Problem Relation Age of Onset   • Heart disease Father    • No Known Problems Sister    • No Known Problems Daughter    • No Known Problems Maternal Grandmother    • No Known Problems Maternal Grandfather    • No Known Problems Paternal Grandmother    • No Known Problems Paternal Grandfather    • No Known Problems Son    • No Known Problems Son    • No Known Problems Son    • No Known Problems Maternal Aunt    • No Known Problems Maternal Aunt    • No Known Problems Paternal Aunt    • No Known Problems Paternal Aunt        Social History     Occupational History   • Not on file   Tobacco Use   • Smoking status: Every Day     Packs/day: 0.50     Types: Cigarettes   • Smokeless tobacco: Never   Vaping Use   • Vaping Use: Former   Substance and Sexual Activity   • Alcohol use: Never   • Drug use: Never   • Sexual activity: Not Currently         Current Outpatient Medications:   •  fluticasone (FLONASE) 50 mcg/act nasal spray, 2 sprays into each nostril daily, Disp: 11.1 mL, Rfl: 0  •  meloxicam (MOBIC) 15 mg tablet, Take 1 tablet (15 mg total) by mouth daily With food, Disp: 30 tablet, Rfl: 5  •  methocarbamol (ROBAXIN) 500 mg tablet, Take 1 tablet (500 mg total) by mouth 4 (four) times a day, Disp: 90 tablet, Rfl: 0  • sertraline (ZOLOFT) 100 mg tablet, , Disp: , Rfl:   •  topiramate (TOPAMAX) 25 mg sprinkle capsule, , Disp: , Rfl:   •  atorvastatin (LIPITOR) 10 mg tablet, Take 1 tablet (10 mg total) by mouth daily (Patient not taking: Reported on 7/27/2023), Disp: 90 tablet, Rfl: 3  •  diphenhydrAMINE (BENADRYL) 25 mg tablet, Take 1 tablet (25 mg total) by mouth every 6 (six) hours for 5 days (Patient not taking: Reported on 7/27/2023), Disp: 20 tablet, Rfl: 0  •  ergocalciferol (VITAMIN D2) 50,000 units, Take 1 capsule (50,000 Units total) by mouth once a week (Patient not taking: Reported on 7/27/2023), Disp: 12 capsule, Rfl: 1  •  HYDROcodone-acetaminophen (Norco) 5-325 mg per tablet, Take 1 tablet by mouth every 6 (six) hours as needed for pain Initial Rx. Max Daily Amount: 4 tablets (Patient not taking: Reported on 7/27/2023), Disp: 6 tablet, Rfl: 0  •  hydrOXYzine HCL (ATARAX) 10 mg tablet, , Disp: , Rfl:   •  mometasone (ELOCON) 0.1 % cream, Apply topically daily (Patient not taking: Reported on 7/27/2023), Disp: 45 g, Rfl: 0  •  predniSONE 20 mg tablet, Take 2 tablets (40 mg total) by mouth daily (Patient not taking: Reported on 8/21/2023), Disp: 10 tablet, Rfl: 0    Allergies   Allergen Reactions   • Clonazepam Other (See Comments)     Excessive sedation  Vomiting   • Latex    • Methylphenidate Other (See Comments)     Hyperactivity"       Physical Exam:    /77 (BP Location: Left arm, Patient Position: Sitting, Cuff Size: Standard)   Pulse (!) 106   Resp 16   Ht 5' 3" (1.6 m)   Wt 77.3 kg (170 lb 6.4 oz)   LMP 12/25/2020   BMI 30.19 kg/m²     Constitutional:normal, well developed, well nourished, alert, in no distress and non-toxic and no overt pain behavior.   Eyes:anicteric  HEENT:grossly intact  Neck:supple, symmetric, trachea midline and no masses   Pulmonary:even and unlabored  Cardiovascular:No edema or pitting edema present  Skin:Normal without rashes or lesions and well hydrated  Psychiatric:Mood and affect appropriate  Neurologic:Cranial Nerves II-XII grossly intact  Musculoskeletal:antalgic     Cervical Spine examination demonstrates. Decreased ROM secondary to pain with lateral rotation to the left/right and bending to the left/right, in addition to neck flexion. 4/5 right upper extremity strength in all muscle groups bilaterally. Negative Spurling's maneuver to the b/l Ue, sensitivity to light touch intact b/l Ue. Imaging    Study Result    Narrative & Impression   CT CERVICAL SPINE - WITHOUT CONTRAST     INDICATION:   atraumatic neck pain with RUE paresthesias with weakness in finger abduction.     COMPARISON: X-ray dated October 11, 2021     TECHNIQUE:  CT examination of the cervical spine was performed without intravenous contrast.  Contiguous axial images were obtained. Multiplanar 2D reformatted images were created from the source data.     Radiation dose length product (DLP) for this visit:  466.22 mGy-cm . This examination, like all CT scans performed in the [facility], was performed utilizing techniques to minimize radiation dose exposure, including the use of iterative   reconstruction and automated exposure control.     IMAGE QUALITY:  Diagnostic.     FINDINGS:     ALIGNMENT:  There is straightening of normal cervical lordosis. No subluxation or compression deformity. Slight concave left scoliosis. No spondylolisthesis. No jumped or perched facets.     VERTEBRAE:  No fracture.     DEGENERATIVE CHANGES: Mild multilevel cervical degenerative changes are noted without critical central canal stenosis. Mild uncovertebral joint DJD on the left at C4-C5 and at C5-C6. No significant neural foraminal narrowing.     PREVERTEBRAL AND PARASPINAL SOFT TISSUES: Unremarkable     THORACIC INLET:  Normal.        IMPRESSION:  No cervical spine fracture or traumatic malalignment.              No orders to display       No orders of the defined types were placed in this encounter.

## 2023-08-21 NOTE — TELEPHONE ENCOUNTER
Returned call from patient. Advised sleep study does not show SHOLA, shows sleep disordered breathing. Dr. Bebeto Bañuelos would like to follow-up to discuss treatment options. Patient scheduled for follow-up 8/23/2023 with Dr. Bebeto Bañuelos in [facility].

## 2023-08-21 NOTE — PATIENT INSTRUCTIONS
Neck Exercises   WHAT YOU NEED TO KNOW:   What do I need to know about neck exercises? Neck exercises help reduce neck pain, and improve neck movement and strength. Neck exercises also help prevent long-term neck problems. What do I need to know about neck exercise safety? Move slowly, gently, and smoothly. Avoid fast or jerky motions. Stand and sit the way your healthcare provider shows you. Good posture may reduce your neck pain. Check your posture often, even when you are not doing your neck exercises. Follow the exercise program recommended by your healthcare provider. He or she will tell you which exercises are best for your condition. He or she will also tell you how many repetitions to do and how often you should do the exercises. How do I perform neck exercises safely? Exercise position:  You may sit or stand while you do neck exercises. Face forward. Your shoulders should be straight and relaxed, with a good posture. Head tilts, forward and back:  Gently bow your head and try to touch your chin to your chest. Your healthcare provider may tell you to push on the back of your neck to help bow your head. Raise your chin back to the starting position. Tilt your head back as far as possible so you are looking up at the ceiling. Your healthcare provider may tell you to lift your chin to help tilt your head back. Return your head to the starting position. Head tilts, side to side:  Tilt your head, bringing your ear toward your shoulder. Then tilt your head toward the other shoulder. Head turns:  Turn your head to look over your shoulder. Tilt your chin down and try to touch it to your shoulder. Do not raise your shoulder to your chin. Face forward again. Do the same on the other side. Head rolls:  Slowly bring your chin toward your chest. Next, roll your head to the right. Your ear should be positioned over your shoulder. Hold this position for 5 seconds.  Roll your head back toward your chest and to the left into the same position. Hold for 5 seconds. Gently roll your head back and around in a clockwise [place] 3 times. Next, move your head in the reverse direction (counterclockwise) in a [place] 3 times. Do not shrug your shoulders upwards while you do this exercise. When should I call my doctor? Your pain does not get better, or gets worse. You have questions or concerns about your condition, care, or exercise program.    CARE AGREEMENT:   You have the right to help plan your care. Learn about your health condition and how it may be treated. Discuss treatment options with your healthcare providers to decide what care you want to receive. You always have the right to refuse treatment. The above information is an  only. It is not intended as medical advice for individual conditions or treatments. Talk to your doctor, nurse or pharmacist before following any medical regimen to see if it is safe and effective for you. © Copyright [employer] 2022 Information is for End User's use only and may not be sold, redistributed or otherwise used for commercial purposes.

## 2023-08-23 ENCOUNTER — OFFICE VISIT (OUTPATIENT)
Dept: SLEEP CENTER | Facility: CLINIC | Age: 43
End: 2023-08-23
Payer: COMMERCIAL

## 2023-08-23 VITALS
DIASTOLIC BLOOD PRESSURE: 84 MMHG | BODY MASS INDEX: 30.12 KG/M2 | WEIGHT: 170 LBS | OXYGEN SATURATION: 96 % | SYSTOLIC BLOOD PRESSURE: 122 MMHG | HEART RATE: 94 BPM | HEIGHT: 63 IN

## 2023-08-23 DIAGNOSIS — E66.3 OVERWEIGHT (BMI 25.0-29.9): ICD-10-CM

## 2023-08-23 DIAGNOSIS — Z72.0 TOBACCO ABUSE: ICD-10-CM

## 2023-08-23 DIAGNOSIS — F43.10 PTSD (POST-TRAUMATIC STRESS DISORDER): ICD-10-CM

## 2023-08-23 DIAGNOSIS — G47.33 OSA (OBSTRUCTIVE SLEEP APNEA): Primary | ICD-10-CM

## 2023-08-23 DIAGNOSIS — G89.4 CHRONIC PAIN SYNDROME: ICD-10-CM

## 2023-08-23 DIAGNOSIS — R40.0 DAYTIME SOMNOLENCE: ICD-10-CM

## 2023-08-23 DIAGNOSIS — M79.18 MYOFASCIAL PAIN SYNDROME: ICD-10-CM

## 2023-08-23 DIAGNOSIS — F32.A ANXIETY AND DEPRESSION: ICD-10-CM

## 2023-08-23 DIAGNOSIS — F41.9 ANXIETY AND DEPRESSION: ICD-10-CM

## 2023-08-23 DIAGNOSIS — G47.09 OTHER INSOMNIA: ICD-10-CM

## 2023-08-23 PROCEDURE — 99214 OFFICE O/P EST MOD 30 MIN: CPT | Performed by: INTERNAL MEDICINE

## 2023-08-23 NOTE — PROGRESS NOTES
Follow-Up Note - Sleep Center   Gisela Shown  37 y.o. female  Radha Blanco  J:[id number]  DOS:8/23/2023    CC: I saw this patient for follow-up in clinic today for sleep disordered breathing, Coexisting Sleep and Medical Problems. Interval changes: Patient recently had a diagnostic sleep study and is here to review results / treatment options. The study demonstrated very mild sleep disordered breathing: AHI 3.4 /hour. Mild intermittent snoring  was noted. Minimum oxygen saturation 89%. Sleep latency was 26.4 minutes. Sleep efficiency was 85.3%. She was observed in all sleep stages but spent only 0.1% of time asleep in the supine position. PFSH, Problem List, Medications & Allergies were reviewed in EMR. She  has a past medical history of Anxiety, Bipolar depression ([address]), Chronic pelvic pain in female (3/8/2021), Endometrial hyperplasia without atypia (2/25/2021), Hypertension, and Menometrorrhagia (3/8/2021). She has a current medication list which includes the following prescription(s): fluticasone, meloxicam, methylprednisolone, sertraline, topiramate, atorvastatin, diphenhydramine, ergocalciferol, hydrocodone-acetaminophen, hydroxyzine hcl, methocarbamol, and mometasone. HPI: He has ongoing symptoms as outlined in her initial report. Sleep Routine: Lukas Landis reports getting 6 hrs sleep out of 7 to 8 hours in bed; she has difficulty initiating and maintaining sleep . Sleep is interrupted around 2 times a night because of nocturia and she struggles to fall back asleep She arises spontaneously and never feels rested. Nina reports excessive daytime sleepiness, [takes planned naps for several hours.] She rated [herself] at Total score: 17 /24 on the [place] Sleepiness Scale. Habits:[ reports that she has been smoking cigarettes. She has been smoking an average of .5 packs per day.  She has never used smokeless tobacco.], [ reports no history of alcohol use.], [ reports no history of drug use.], Caffeine use: limited, Exercise routine: none. ROS: reviewed significant for weight has been stable. She has nasal congestion and episodic coughing. She reported no cardiac symptoms. She has feelings of anxiety and depression and "no ambition" and spite of treatment with sertraline and Topamax. She has chronic pain for which she is due to have steroid injections. Tin Nicole EXAM: /84 (BP Location: Left arm, Patient Position: Sitting, Cuff Size: Standard)   Pulse 94   Ht 5' 3" (1.6 m)   Wt 77.1 kg (170 lb)   LMP 12/25/2020   SpO2 96%   BMI 30.11 kg/m²     Wt Readings from Last 3 Encounters:   08/23/23 77.1 kg (170 lb)   08/21/23 77.3 kg (170 lb 6.4 oz)   08/04/23 76.7 kg (169 lb)     Patient is well groomed; well appearing. CNS: Alert, orientated, clear & coherent speech. Psych: cooperative and in no distress. Mental state: Appears depressed and has a constricted affect. H&N: EOMI; NC/AT: No facial pressure marks, no rashes. Skin/Extrem: col & hydration normal; no edema  Resp: Respiratory effort is normal  Physical findings otherwise essentially unchanged from previous. IMPRESSION: Diagnoses, Problem List Items & Comorbidities Addressed this Visit   1. SHOLA (obstructive sleep apnea)        2. Other insomnia        3. Anxiety and depression        4. Daytime somnolence        5. PTSD (post-traumatic stress disorder)        6. Tobacco abuse        7. Chronic pain syndrome        8. Myofascial pain syndrome        9. Overweight (BMI 25.0-29. 9)            PLAN:  1. I reviewed results of the Sleep studies with the patient. 2. With respect to above conditions, I counseled on pathophysiology, diagnosis, treatment options, risks and benefits; inter-relationship and effects on symptoms and comorbidities; risks of no treatment; costs and insurance aspects. 3.  Snoring should improve with smoking cessation, addressing nasal congestion and weight reduction.   4.  Nasal symptoms may improve with regular nasal saline rinse up to twice a day, followed by topical nasal steroid (e..g. OTC Flonase, Nasacort) once a day if necessary. 5.  Multi component Cognitive behavioral therapy for Insomnia undertaken - Sleep Restriction, Stimulus control, Relaxation techniques and Sleep hygiene were discussed. 6.  She would benefit from reviewing of her psychiatric medications. In her case, Wellbutrin may help with smoking cessation and weight reduction. Duloxetine may also help with pain control. 7.  If symptoms persist or escalate, a repeat diagnostic study may be considered in future. 8.  I have not scheduled any follow-up in sleep clinic at this time. Thank you for allowing me to participate in the care of this patient. Sincerely,     Authenticated electronically on 71/58/38   Board Certified Specialist     Portions of the record may have been created with voice recognition software. Occasional wrong word or "sound a like" substitutions may have occurred due to the inherent limitations of voice recognition software. There may also be notations and random deletions of words or characters from malfunctioning software. Read the chart carefully and recognize, using context, where substitutions/deletions have occurred.

## 2023-08-23 NOTE — PATIENT INSTRUCTIONS
Nasal symptoms may [improve] with regular nasal saline rinse up to twice a day, followed by topical nasal steroid (e..g. OTC Flonase, Nasacort) once a day if necessary. What you can do to improve your sleep: (Sleep Hygiene) Basic rules for a good night's sleep  Create a regular sleep schedule. This will help you form a sleep routine. Keep a record of your sleep patterns, and any sleeping problems you have. Bring the record to follow-up visits with healthcare providers. Avoid prolonged use of light-emitting screens before bedtime or watching TV in bed. Avoid forcing sleep. Do not take naps. Naps could make it hard for you to fall asleep at bedtime. Deal with your worries before bedtime. Keep your bedroom cool, quiet, and dark. Turn on white noise, such as a fan, to help you relax. Do not use your bed for any activity that will keep you awake. Do not read, exercise, eat, or watch TV in your bedroom. Get up if you do not fall asleep within 20 minutes. Move to another room and do something relaxing until you become sleepy. Limit caffeine, alcohol, nicotine and food to earlier in the day. Only drink caffeine in the morning. Do not drink alcohol within 6 hours of bedtime. Do not eat a heavy meal right before you go to bed. Avoid smoking, especially in the evening. Exercise regularly. Daily exercise will help you sleep better. Do not exercise within 4 hours of bedtime. Stimulus control therapy rules  1. Go to bed only when sleepy. 2. Do not watch television, read, eat, or worry while in bed. Use bed only for sleep and sex. 3. Get out of bed if unable to fall asleep within 20 minutes and go to another room. Return to bed only when sleepy. Repeat this step as many times as necessary throughout the night. 4. Set an alarm clock to wake up at a fixed time each morning, including weekends. 5. Do not take a nap during the day. Data from: [address], [facility] Nonpharmacologic treatments of insomnia.  J Clin Psychiatry 1992; 53:37. Go to AASM website for more information: Sleepeducation. org  Recommended Reading: Book by mj Garcia   No More sleepless nights    Nursing Support:  When: Monday through Friday 7A-5PM except holidays  Where: Our direct line is [phone]. If you are having a true emergency please call 911. In the event that the line is busy or it is after hours please leave a voice message and we will return your call. Please speak clearly, leaving your full name, birth date, best number to reach you and the reason for your call. Medication refills: We will need the name of the medication, the dosage, the ordering provider, whether you get a 30 or 90 day refill, and the pharmacy name and address. Medications will be ordered by the provider only. Nurses cannot call in prescriptions. Please allow 7 days for medication refills. Physician requested updates: If your provider requested that you call with an update after starting medication, please be ready to provide us the medication and dosage, what time you take your medication, the time you attempt to fall asleep, time you fall asleep, when you wake up, and what time you get out of bed. Sleep Study Results: We will contact you with sleep study results and/or next steps after the physician has reviewed your testing.

## 2023-10-12 ENCOUNTER — HOSPITAL ENCOUNTER (EMERGENCY)
Facility: HOSPITAL | Age: 43
Discharge: HOME | End: 2023-10-12
Attending: EMERGENCY MEDICINE | Admitting: EMERGENCY MEDICINE
Payer: COMMERCIAL

## 2023-10-12 VITALS
DIASTOLIC BLOOD PRESSURE: 76 MMHG | SYSTOLIC BLOOD PRESSURE: 125 MMHG | HEART RATE: 78 BPM | RESPIRATION RATE: 18 BRPM | OXYGEN SATURATION: 96 % | TEMPERATURE: 97.5 F

## 2023-10-12 DIAGNOSIS — M54.12 RADICULOPATHY, CERVICAL REGION: ICD-10-CM

## 2023-10-12 DIAGNOSIS — M50.30 DEGENERATIVE DISC DISEASE, CERVICAL: ICD-10-CM

## 2023-10-12 DIAGNOSIS — M54.12 CERVICAL RADICULOPATHY: Primary | ICD-10-CM

## 2023-10-12 PROCEDURE — 99284 EMERGENCY DEPT VISIT MOD MDM: CPT | Performed by: EMERGENCY MEDICINE

## 2023-10-12 PROCEDURE — 99283 EMERGENCY DEPT VISIT LOW MDM: CPT

## 2023-10-12 RX ORDER — OXYCODONE HYDROCHLORIDE 5 MG/1
5 TABLET ORAL EVERY 6 HOURS PRN
Qty: 9 TABLET | Refills: 0 | Status: SHIPPED | OUTPATIENT
Start: 2023-10-12

## 2023-10-12 RX ORDER — MELOXICAM 15 MG/1
15 TABLET ORAL DAILY
Qty: 30 TABLET | Refills: 0 | Status: SHIPPED | OUTPATIENT
Start: 2023-10-12

## 2023-10-12 RX ORDER — METHOCARBAMOL 500 MG/1
500 TABLET, FILM COATED ORAL EVERY 6 HOURS PRN
Qty: 90 TABLET | Refills: 0 | Status: SHIPPED | OUTPATIENT
Start: 2023-10-12

## 2023-10-12 NOTE — ED PROVIDER NOTES
History  Chief Complaint   Patient presents with    Arm Pain     Right arm and neck pain for the past month. Denies any recent injury     49-year-old female presents for evaluation of cervical neck and right arm pain. Patient reports history of such beginning several months ago, she denies initial trauma or injury. She notes gradual worsening of symptoms, she has an upcoming appointment with pain management for injections next week. She reports pain with movement, worsened sleep, she has been unable to work for several months now. Pain is described as sharp in nature beginning in her neck and radiating down, she is most tender on the right upper shoulder area. She denies new trauma or injury, numbness, new symptoms. She denies fevers or recent illnesses. Patient was previously on steroid burst as well as an anti-inflammatory, muscle relaxers, states she has not taken anything recently for her symptoms as she ran out of the prescriptions. Prior to Admission Medications   Prescriptions Last Dose Informant Patient Reported? Taking? HYDROcodone-acetaminophen (Norco) 5-325 mg per tablet  Self No No   Sig: Take 1 tablet by mouth every 6 (six) hours as needed for pain Initial Rx.  Max Daily Amount: 4 tablets   Patient not taking: Reported on 2023   atorvastatin (LIPITOR) 10 mg tablet  Self No No   Sig: Take 1 tablet (10 mg total) by mouth daily   Patient not taking: Reported on 2023   diphenhydrAMINE (BENADRYL) 25 mg tablet   No No   Sig: Take 1 tablet (25 mg total) by mouth every 6 (six) hours for 5 days   Patient not taking: Reported on 2023   ergocalciferol (VITAMIN D2) 50,000 units  Self No No   Sig: Take 1 capsule (50,000 Units total) by mouth once a week   Patient not taking: Reported on 2023   fluticasone (FLONASE) 50 mcg/act nasal spray  Self No No   Si sprays into each nostril daily   hydrOXYzine HCL (ATARAX) 10 mg tablet  Self Yes No   Patient not taking: Reported on 2023   meloxicam (MOBIC) 15 mg tablet   No No   Sig: Take 1 tablet (15 mg total) by mouth daily With food   meloxicam (MOBIC) 15 mg tablet   No Yes   Sig: Take 1 tablet (15 mg total) by mouth daily With food   methocarbamol (ROBAXIN) 500 mg tablet   No No   Sig: Take 1 tablet (500 mg total) by mouth 4 (four) times a day   Patient not taking: Reported on 2023   methocarbamol (ROBAXIN) 500 mg tablet   No Yes   Sig: Take 1 tablet (500 mg total) by mouth every 6 (six) hours as needed for muscle spasms   methylPREDNISolone 4 MG tablet therapy pack   No No   Sig: Use as directed on package   mometasone (ELOCON) 0.1 % cream  Self No No   Sig: Apply topically daily   Patient not taking: Reported on 2023   sertraline (ZOLOFT) 100 mg tablet  Self Yes No   topiramate (TOPAMAX) 25 mg sprinkle capsule  Self Yes No      Facility-Administered Medications: None       Past Medical History:   Diagnosis Date    Anxiety     Bipolar depression (HCC)     Chronic pelvic pain in female 3/8/2021    Endometrial hyperplasia without atypia 2021    Hypertension     Menometrorrhagia 3/8/2021       Past Surgical History:   Procedure Laterality Date     SECTION      HYSTERECTOMY  2021       Family History   Problem Relation Age of Onset    Heart disease Father     No Known Problems Sister     No Known Problems Daughter     No Known Problems Maternal Grandmother     No Known Problems Maternal Grandfather     No Known Problems Paternal Grandmother     No Known Problems Paternal Grandfather     No Known Problems Son     No Known Problems Son     No Known Problems Son     No Known Problems Maternal Aunt     No Known Problems Maternal Aunt     No Known Problems Paternal Aunt     No Known Problems Paternal Aunt      I have reviewed and agree with the history as documented.     E-Cigarette/Vaping    E-Cigarette Use Former User     Cartridges/Day 0.5 ppd      E-Cigarette/Vaping Substances     Social History     Tobacco Use Smoking status: Every Day     Packs/day: 0.50     Types: Cigarettes    Smokeless tobacco: Never   Vaping Use    Vaping Use: Former   Substance Use Topics    Alcohol use: Never    Drug use: Never       Review of Systems   Constitutional:  Negative for appetite change, chills and fever. Respiratory:  Negative for shortness of breath. Cardiovascular:  Negative for chest pain. Musculoskeletal:  Positive for arthralgias, myalgias and neck pain. Skin:  Negative for wound. Neurological:  Negative for weakness and numbness. All other systems reviewed and are negative. Physical Exam  Physical Exam  Vitals reviewed. Constitutional:       General: She is not in acute distress. Appearance: Normal appearance. She is not toxic-appearing. Comments: Patient initially wearing a jacket, able to take right arm out of garment without assistance of left arm   HENT:      Head: Normocephalic and atraumatic. Right Ear: External ear normal.      Left Ear: External ear normal.      Nose: Nose normal.   Eyes:      General:         Right eye: No discharge. Left eye: No discharge. Extraocular Movements: Extraocular movements intact. Cardiovascular:      Rate and Rhythm: Normal rate. Pulmonary:      Effort: Pulmonary effort is normal. No respiratory distress. Musculoskeletal:         General: Tenderness present. Comments: Right paracervical tenderness, tender most over right upper trapezius; strength appears to be 4+/5 on RUE compared to LUE however she does not perform certain movements due pain or afraid of inducing pain, radial pulses 2/4   Skin:     General: Skin is warm. Coloration: Skin is not jaundiced or pale. Neurological:      General: No focal deficit present. Mental Status: She is alert.          Vital Signs  ED Triage Vitals [10/12/23 0930]   Temperature Pulse Respirations Blood Pressure SpO2   97.5 °F (36.4 °C) 78 18 125/76 96 %      Temp Source Heart Rate Source Patient Position - Orthostatic VS BP Location FiO2 (%)   Temporal Monitor Sitting Left arm --      Pain Score       10 - Worst Possible Pain           Vitals:    10/12/23 0930   BP: 125/76   Pulse: 78   Patient Position - Orthostatic VS: Sitting         Visual Acuity      ED Medications  Medications - No data to display    Diagnostic Studies  Results Reviewed       None                   No orders to display              Procedures  Procedures         ED Course                               SBIRT 22yo+      Flowsheet Row Most Recent Value   Initial Alcohol Screen: US AUDIT-C     1. How often do you have a drink containing alcohol? 0 Filed at: 10/12/2023 0932   2. How many drinks containing alcohol do you have on a typical day you are drinking? 0 Filed at: 10/12/2023 0932   3b. FEMALE Any Age, or MALE 65+: How often do you have 4 or more drinks on one occassion? 0 Filed at: 10/12/2023 0932   Audit-C Score 0 Filed at: 10/12/2023 5941   LE: How many times in the past year have you. .. Used an illegal drug or used a prescription medication for non-medical reasons? Never Filed at: 10/12/2023 0932                      Medical Decision Making  80-year-old female presents for evaluation of neck and arm pain. Patient has been experiencing this for the last few months and has followed up with orthopedics as well as pain management, she is scheduled to have a cervical injection performed next week. Patient was taking provided prescriptions however ran out of Mobpatricio Robaxin. Given patient is scheduled to have injection next week, will hold on steroid burst or Medrol Dosepak for this reason. Patient has received imaging in August, uncertain if repeat CT imaging would be useful, patient may benefit most from MRI imaging however there is no indication currently for emergent imaging in the emergency department.   Patient advised on pain management, will provide refills of her previous prescriptions. Risk  Prescription drug management. Disposition  Final diagnoses:   Cervical radiculopathy     Time reflects when diagnosis was documented in both MDM as applicable and the Disposition within this note       Time User Action Codes Description Comment    10/12/2023 10:25 AM Daniel Raymundoy Add [M54.12] Cervical radiculopathy     10/12/2023 10:26 AM Daniel Raymundoy Add [M54.12] Radiculopathy, cervical region     10/12/2023 10:26 AM Daniel Raymundoy Add [M50.30] Degenerative disc disease, cervical           ED Disposition       ED Disposition   Discharge    Condition   Stable    Date/Time   Thu Oct 12, 2023 [address] discharge to home/self care.                    Follow-up Information       Follow up With Specialties Details [address] MELI MD Pain Medicine Call   [address]  [phone]              Discharge Medication List as of 10/12/2023 10:30 AM        START taking these medications    Details   oxyCODONE (ROXICODONE) 5 immediate release tablet Take 1 tablet (5 mg total) by mouth every 6 (six) hours as needed for severe pain for up to 9 doses Max Daily Amount: 20 mg, Starting Thu 10/12/2023, Normal           CONTINUE these medications which have CHANGED    Details   meloxicam (MOBIC) 15 mg tablet Take 1 tablet (15 mg total) by mouth daily With food, Starting Thu 10/12/2023, Normal      methocarbamol (ROBAXIN) 500 mg tablet Take 1 tablet (500 mg total) by mouth every 6 (six) hours as needed for muscle spasms, Starting Thu 10/12/2023, Normal           CONTINUE these medications which have NOT CHANGED    Details   atorvastatin (LIPITOR) 10 mg tablet Take 1 tablet (10 mg total) by mouth daily, Starting Thu 10/14/2021, Normal      diphenhydrAMINE (BENADRYL) 25 mg tablet Take 1 tablet (25 mg total) by mouth every 6 (six) hours for 5 days, Starting Fri 3/3/2023, Until Wed 3/29/2023, Normal      ergocalciferol (VITAMIN D2) 50,000 units Take 1 capsule (50,000 Units total) by mouth once a week, Starting Thu 11/10/2022, Normal      fluticasone (FLONASE) 50 mcg/act nasal spray 2 sprays into each nostril daily, Starting Mon 11/21/2022, Normal      HYDROcodone-acetaminophen (Norco) 5-325 mg per tablet Take 1 tablet by mouth every 6 (six) hours as needed for pain Initial Rx. Max Daily Amount: 4 tablets, Starting Sat 2/25/2023, Normal      hydrOXYzine HCL (ATARAX) 10 mg tablet Starting Thu 7/27/2023, Historical Med      methylPREDNISolone 4 MG tablet therapy pack Use as directed on package, Normal      mometasone (ELOCON) 0.1 % cream Apply topically daily, Starting Sun 2/19/2023, Normal      sertraline (ZOLOFT) 100 mg tablet Starting Wed 4/5/2023, Historical Med      topiramate (TOPAMAX) 25 mg sprinkle capsule Starting Wed 4/5/2023, Historical Med             No discharge procedures on file.     PDMP Review         Value Time User    PDMP Reviewed  Yes 10/12/2023 10:29 AM [id number] Luiz Aguirre DO            ED Provider  Electronically Signed by             [id number] Luiz Aguirre DO  10/12/23 4419

## 2023-10-19 ENCOUNTER — HOSPITAL ENCOUNTER (OUTPATIENT)
Facility: HOSPITAL | Age: 43
Setting detail: OUTPATIENT SURGERY
Discharge: HOME | End: 2023-10-19
Attending: ANESTHESIOLOGY | Admitting: ANESTHESIOLOGY
Payer: COMMERCIAL

## 2023-10-19 ENCOUNTER — APPOINTMENT (OUTPATIENT)
Dept: RADIOLOGY | Facility: HOSPITAL | Age: 43
End: 2023-10-19
Payer: COMMERCIAL

## 2023-10-19 VITALS
TEMPERATURE: 97.9 F | DIASTOLIC BLOOD PRESSURE: 71 MMHG | HEART RATE: 77 BPM | OXYGEN SATURATION: 99 % | SYSTOLIC BLOOD PRESSURE: 139 MMHG | RESPIRATION RATE: 18 BRPM

## 2023-10-19 PROCEDURE — 62321 NJX INTERLAMINAR CRV/THRC: CPT | Performed by: ANESTHESIOLOGY

## 2023-10-19 RX ORDER — DEXAMETHASONE SODIUM PHOSPHATE 10 MG/ML
INJECTION, SOLUTION INTRAMUSCULAR; INTRAVENOUS AS NEEDED
Status: DISCONTINUED | OUTPATIENT
Start: 2023-10-19 | End: 2023-10-19 | Stop reason: HOSPADM

## 2023-10-19 RX ORDER — LIDOCAINE HYDROCHLORIDE 10 MG/ML
INJECTION, SOLUTION EPIDURAL; INFILTRATION; INTRACAUDAL; PERINEURAL AS NEEDED
Status: DISCONTINUED | OUTPATIENT
Start: 2023-10-19 | End: 2023-10-19 | Stop reason: HOSPADM
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
## 2023-10-19 NOTE — OP NOTE
OPERATIVE REPORT  PATIENT NAME: Odette Karimi    :  1980  MRN: [id number]  Pt Location: [facility] OR ROOM 01    SURGERY DATE: 10/19/2023    Surgeon(s) and Role:     * Cali Ramirez MD - Primary    Preop Diagnosis:  Neck pain [M54.2]  Cervical radiculopathy [M54.12]    Post-Op Diagnosis Codes:     * Neck pain [M54.2]     * Cervical radiculopathy [M54.12]    Procedure(s):  Right - Right C7-T1 ILESI    Specimen(s):  * No specimens in log *    Estimated Blood Loss:   Minimal    Drains:  * No LDAs found *    Anesthesia Type:   Local    Operative Indications:  Neck pain [M54.2]  Cervical radiculopathy [M54.12]      Operative Findings:  same    Complications:   None    Procedure and Technique:  Fluoroscopically-guided  C7-T1 interlaminar epidural steroid injection under fluoroscopy      After discussing the risks, benefits, and alternatives to the procedure, the patient expressed understanding and wished to proceed. The patient was brought to the fluoroscopy suite and placed in the prone position. A procedural pause was conducted to verify:  correct patient identity, procedure to be performed and as applicable, correct side and site, correct patient position, and availability of implants, special equipment and special requirements. After identifying the C7-T1 space fluoroscopically, the skin was sterilely prepped and draped in the usual fashion using Chloraprep skin prep. The skin and subcutaneous tissue were anesthetized with 0.5 % lidocaine. Utilizing a loss of resistance technique and intermittent fluoroscopic guidance, a 3.5” 20 gauge Tuohy needle was advanced into the epidural space. Proper needle positioning was confirmed using multiple fluoroscopic views. After negative aspiration, Omnipaque 300 contrast was injected confirming epidural spread without evidence of intravascular or intrathecal spread.   A 4 ml solution consisting of 10 mg of dexamethasone in sterile saline was injected slowly and incrementally into the epidural space. Following the injection the needle was withdrawn slightly and flushed with 0.5 % lidocaine as it was fully extracted. The patient tolerated the procedure well and there were no apparent complications. After appropriate observation, the patient was dismissed from the clinic in good condition under their own power. I was present for the entire procedure.     Patient Disposition:  hemodynamically stable        SIGNATURE: Ramonita Talbot MD  DATE: October 19, 2023  TIME: 9:09 AM

## 2023-10-19 NOTE — DISCHARGE INSTR - AVS FIRST PAGE
Epidural Steroid Injection   WHAT YOU NEED TO KNOW:   An epidural steroid injection (ZOILA) is a procedure to inject steroid medicine into the epidural space. The epidural space is between your spinal cord and vertebrae. Steroids reduce inflammation and fluid buildup in your spine that may be causing pain. You may be given pain medicine along with the steroids. ACTIVITY  Do not drive or operate machinery today. No strenuous activity today - bending, lifting, etc.  You may resume normal activites starting tomorrow - start slowly and as tolerated. You may shower today, but no tub baths or hot tubs. You may have numbness for several hours from the local anesthetic. Please use caution and common sense, especially with weight-bearing activities. CARE OF THE INJECTION SITE  If you have soreness or pain, apply ice to the area today (20 minutes on/20 minutes off). Starting tomorrow, you may use warm, moist heat or ice if needed. You may have an increase or change in your discomfort for 36-48 hours after your treatment. Apply ice and continue with any pain medication you have been prescribed. Notify the [facility] if you have any of the following: redness, drainage, swelling, headache, stiff neck or fever above 100°F.    SPECIAL INSTRUCTIONS  Our office will contact you in approximately 7 days for a progress report. MEDICATIONS  Continue to take all routine medications. Our office may have instructed you to hold some medications. As no general anesthesia was used in today's procedure, you should not experience any side effects related to anesthesia. If you are diabetic, the steroids used in today's injection may temporarily increase your blood sugar levels after the first few days after your injection. Please keep a close eye on your sugars and alert the doctor who manages your diabetes if your sugars are significantly high from your baseline or you are symptomatic.      If you have a problem specifically related to your procedure, please call our office at [phone]. Problems not related to your procedure should be directed to your primary care physician.

## 2023-10-26 ENCOUNTER — TELEPHONE (OUTPATIENT)
Dept: PAIN MEDICINE | Facility: CLINIC | Age: 43
End: 2023-10-26

## 2023-11-06 ENCOUNTER — OFFICE VISIT (OUTPATIENT)
Dept: PODIATRY | Facility: CLINIC | Age: 43
End: 2023-11-06
Payer: COMMERCIAL

## 2023-11-06 ENCOUNTER — APPOINTMENT (OUTPATIENT)
Dept: RADIOLOGY | Facility: MEDICAL CENTER | Age: 43
End: 2023-11-06
Payer: COMMERCIAL

## 2023-11-06 VITALS
BODY MASS INDEX: 30.65 KG/M2 | DIASTOLIC BLOOD PRESSURE: 80 MMHG | SYSTOLIC BLOOD PRESSURE: 117 MMHG | HEIGHT: 63 IN | WEIGHT: 173 LBS | HEART RATE: 81 BPM

## 2023-11-06 DIAGNOSIS — M79.675 PAIN IN TOES OF BOTH FEET: ICD-10-CM

## 2023-11-06 DIAGNOSIS — L60.3 NAIL DYSTROPHY: ICD-10-CM

## 2023-11-06 DIAGNOSIS — M79.671 BILATERAL FOOT PAIN: Primary | ICD-10-CM

## 2023-11-06 DIAGNOSIS — M79.672 BILATERAL FOOT PAIN: ICD-10-CM

## 2023-11-06 DIAGNOSIS — M79.672 BILATERAL FOOT PAIN: Primary | ICD-10-CM

## 2023-11-06 DIAGNOSIS — B35.1 ONYCHOMYCOSIS: ICD-10-CM

## 2023-11-06 DIAGNOSIS — M79.674 PAIN IN TOES OF BOTH FEET: ICD-10-CM

## 2023-11-06 DIAGNOSIS — M79.671 BILATERAL FOOT PAIN: ICD-10-CM

## 2023-11-06 DIAGNOSIS — B35.3 TINEA PEDIS OF BOTH FEET: ICD-10-CM

## 2023-11-06 PROCEDURE — 99243 OFF/OP CNSLTJ NEW/EST LOW 30: CPT | Performed by: PODIATRIST

## 2023-11-06 PROCEDURE — 73630 X-RAY EXAM OF FOOT: CPT

## 2023-11-06 RX ORDER — KETOCONAZOLE 20 MG/G
CREAM TOPICAL DAILY
Qty: 60 G | Refills: 2 | Status: SHIPPED | OUTPATIENT
Start: 2023-11-06

## 2023-11-06 NOTE — LETTER
November 6, 2023     Mahnaz Molina, [address]  [place] [address]    Patient: Ayana Carmona   YOB: 1980   Date of Visit: 11/6/2023     Dear Dr. Senait Tariq      Thank you for referring Ayana Carmona to me for evaluation. Below are the relevant portions of my assessment and plan of care. If you have questions, please do not hesitate to call me. I look forward to following Nina along with you.          Sincerely,        Amari Ramey DPM        CC: No Recipients      Progress Notes:

## 2023-11-06 NOTE — PROGRESS NOTES
Assessment/Plan:    No problem-specific Assessment & Plan notes found for this encounter. Diagnoses and all orders for this visit:    Bilateral foot pain  -     X-ray foot right 3+ views; Future  -     X-ray foot left 3+ views; Future  -     Hepatic function panel; Future    Onychomycosis  -     ciclopirox (PENLAC) 8 % solution; Apply topically daily at bedtime  -     Hepatic function panel; Future    Pain in toes of both feet  -     Hepatic function panel; Future    Nail dystrophy  -     Hepatic function panel; Future    Tinea pedis of both feet  -     ketoconazole (NIZORAL) 2 % cream; Apply topically daily  -     Hepatic function panel; Future     Having significant nail pain bilaterally  - She is very unsatisfied with the discoloration in all of her nails,  - We will begin topical treatment-with Penlac and also ketoconazole did discuss use  - She is to obtain a hepatic panel and return at her leisure to go over this to did not discuss oral treatment  -I told her not to expect this to fully resolve up to around 2 years after initiating treatment    Subjective:      Patient ID: Leon Adair is a 37 y.o. female. Patient presents for evaluation and management of her bilateral feet. Complaining of nail pain due to her thickened elongated nails. No diabetic. Did try topical medications in the past. Tried it over 5 years ago. The following portions of the patient's history were reviewed and updated as appropriate: allergies, current medications, past family history, past medical history, past social history, past surgical history, and problem list.    Review of Systems   Constitutional:  Negative for chills and fever. HENT:  Negative for ear pain and sore throat. Eyes:  Negative for pain and visual disturbance. Respiratory:  Negative for cough and shortness of breath. Cardiovascular:  Negative for chest pain and palpitations. Gastrointestinal:  Negative for abdominal pain and vomiting. Genitourinary:  Negative for dysuria and hematuria. Musculoskeletal:  Negative for arthralgias and back pain. Skin:  Negative for color change and rash. Neurological:  Negative for seizures and syncope. All other systems reviewed and are negative. Objective:      /80 (BP Location: Left arm, Patient Position: Sitting, Cuff Size: Standard)   Pulse 81   Ht 5' 3" (1.6 m)   Wt 78.5 kg (173 lb)   LMP 12/25/2020   BMI 30.65 kg/m²          Physical Exam  Vitals reviewed. Constitutional:       Appearance: Normal appearance. HENT:      Head: Normocephalic and atraumatic. Nose: Nose normal.      Mouth/Throat:      Mouth: Mucous membranes are moist.   Eyes:      Pupils: Pupils are equal, round, and reactive to light. Cardiovascular:      Pulses: Normal pulses. Pulmonary:      Effort: Pulmonary effort is normal.   Musculoskeletal:         General: Tenderness present. Comments: Nails bilateral hallux thickened with notable subungual debris on the left minimal subungual debris on the right, incurvated nail right hallux drift bilaterally. All nails are discolored but minimal subungual debris    Dry cracked scaly moccasin type distribution changes skin   Skin:     Capillary Refill: Capillary refill takes less than 2 seconds. Neurological:      General: No focal deficit present. Mental Status: She is alert and oriented to person, place, and time. Mental status is at baseline.

## 2023-11-06 NOTE — PATIENT INSTRUCTIONS
Nail Fungus   AMBULATORY CARE:   Nail fungus , or onychomycosis, is a fungal infection in your toenail or fingernail. Nail fungus is more common in toenails than fingernails. The cause of the infection may not be known. Common symptoms include the following:   Nails that curl up or down or are misshapen    Discolored (often white, yellow, or brown) nails    Fragile or cracked nails    Thick nails or nails with rough, jagged edges    Nail that is  from the nail bed    Tenderness or pain in the affected nail    Contact your healthcare provider if:  You have questions or concerns about your condition or care. Treatment for nail fungus  may include antifungal medicine and topical treatments. Antifungal medicine is a pill that treats a fungal infection. You may need to take this medicine for up to 12 weeks. Topical treatments include creams and polishes that you apply to the top of your nail. You may need to use topical treatments for up to 1 year before you see positive results. Ask your healthcare provider for more information about antifungal medicine. Manage your symptoms:   Use antifungal sprays or powders. You can buy these at your local drugstore. Keep your nails short  and file down any thick areas. Use separate nail trimmers and files for infected nails and healthy nails. If you go to a salon to get your nails done, bring your own nail files and trimmers. Prevent nail fungus:   Dry your feet with a towel  or hair dryer after you bathe. Do not wear tight-fitting shoes  or shoes that pinch your toes. Avoid shoes made from rubber or plastic. Wear socks that absorb moisture. This includes socks make of wool, nylon, or polypropylene. Do not wear cotton socks. Change your socks if they are damp from sweat or your feet get wet. Put on dry, clean socks every day. Do not go barefoot  in locker rooms or public showers.     Do not use nail polish  or artificial nails such as acrylic or gel nails.     Wear gloves that are waterproof  if you work with water. Follow up with your doctor as directed:  Write down your questions so you remember to ask them during your visits. © Copyright [employer] 2023 Information is for End User's use only and may not be sold, redistributed or otherwise used for commercial purposes. The above information is an  only. It is not intended as medical advice for individual conditions or treatments. Talk to your doctor, nurse or pharmacist before following any medical regimen to see if it is safe and effective for you. Athlete's Foot   WHAT YOU NEED TO KNOW:   Athlete's foot is a foot infection caused by a fungus. DISCHARGE INSTRUCTIONS:   Return to the emergency department if:   You have a fever or chills. You have red streaks going up your leg. Contact your healthcare provider if:   Your infection spreads to other parts of your body. Your infection is not better in 14 days or is not completely gone in 90 days. The skin on your foot or leg is red and hot. You have questions or concerns about your condition or care. Medicines:   Antifungal medicine  may be given as a cream or pill. You may need a doctor's order for this medicine. Take the medicine until it is gone, even if your feet look like they are healed. Take your medicine as directed. Contact your healthcare provider if you think your medicine is not helping or if you have side effects. Tell him or her if you are allergic to any medicine. Keep a list of the medicines, vitamins, and herbs you take. Include the amounts, and when and why you take them. Bring the list or the pill bottles to follow-up visits. Carry your medicine list with you in case of an emergency. Follow up with your doctor as directed:  Write down your questions so you remember to ask them during your visits.    Prevent the spread of athlete's foot:   Prevent the spread of this infection to other parts of your body.  When you shower, dry your groin area and other parts of your body before you dry your feet. Keep your feet clean and dry. Wash your feet each day and dry them well, especially between your toes. After your feet are dry, put powder on your feet and between your toes. Wear clean cotton or wool socks each day. Put your socks on first so you do not spread the infection to other areas of your body. Wear sandals, canvas tennis shoes, or other shoes that allow air to flow to your feet. This helps keep your feet dry. Do not use shoes that are tight, or made of plastic or rubber. Soak your feet in an astringent (drying) solution as directed  if you have blisters. You may need to do this for 20 to 30 minutes, 2 times each day to help dry out the blisters. Wear shoes in public areas. Wear shower shoes or sandals in warm, damp areas. This includes shower stalls, near swimming pools, and locker rooms. Do not share socks or shoes. Do not use public swimming pools. © Copyright [employer] 2022 Information is for End User's use only and may not be sold, redistributed or otherwise used for commercial purposes. All illustrations and images included in CareNotes® are the copyrighted property of [employer] or [address] [place] [address]  The above information is an  only. It is not intended as medical advice for individual conditions or treatments. Talk to your doctor, nurse or pharmacist before following any medical regimen to see if it is safe and effective for you.

## 2023-11-10 ENCOUNTER — VBI (OUTPATIENT)
Dept: ADMINISTRATIVE | Facility: OTHER | Age: 43
End: 2023-11-10

## 2023-11-17 RX ORDER — HYDROXYZINE HYDROCHLORIDE 10 MG/1
TABLET, FILM COATED ORAL
COMMUNITY
Start: 2023-10-24

## 2023-11-20 ENCOUNTER — TELEPHONE (OUTPATIENT)
Age: 43
End: 2023-11-20

## 2023-11-20 ENCOUNTER — OFFICE VISIT (OUTPATIENT)
Dept: PAIN MEDICINE | Facility: CLINIC | Age: 43
End: 2023-11-20
Payer: COMMERCIAL

## 2023-11-20 VITALS
WEIGHT: 175 LBS | RESPIRATION RATE: 16 BRPM | HEIGHT: 63 IN | BODY MASS INDEX: 31.01 KG/M2 | SYSTOLIC BLOOD PRESSURE: 119 MMHG | HEART RATE: 87 BPM | DIASTOLIC BLOOD PRESSURE: 76 MMHG

## 2023-11-20 DIAGNOSIS — M75.101 TEAR OF RIGHT ROTATOR CUFF, UNSPECIFIED TEAR EXTENT, UNSPECIFIED WHETHER TRAUMATIC: ICD-10-CM

## 2023-11-20 DIAGNOSIS — S43.431S LABRAL TEAR OF SHOULDER, RIGHT, SEQUELA: ICD-10-CM

## 2023-11-20 DIAGNOSIS — M47.816 LUMBAR SPONDYLOSIS: ICD-10-CM

## 2023-11-20 DIAGNOSIS — G89.29 CHRONIC BILATERAL LOW BACK PAIN WITHOUT SCIATICA: ICD-10-CM

## 2023-11-20 DIAGNOSIS — G89.4 CHRONIC PAIN SYNDROME: ICD-10-CM

## 2023-11-20 DIAGNOSIS — M79.18 MYOFASCIAL PAIN SYNDROME: ICD-10-CM

## 2023-11-20 DIAGNOSIS — M54.12 CERVICAL RADICULOPATHY: ICD-10-CM

## 2023-11-20 DIAGNOSIS — M47.812 CERVICAL SPONDYLOSIS: Primary | ICD-10-CM

## 2023-11-20 DIAGNOSIS — M54.50 CHRONIC BILATERAL LOW BACK PAIN WITHOUT SCIATICA: ICD-10-CM

## 2023-11-20 PROCEDURE — 99214 OFFICE O/P EST MOD 30 MIN: CPT | Performed by: ANESTHESIOLOGY

## 2023-11-20 NOTE — PROGRESS NOTES
Assessment:  1. Cervical spondylosis    2. Lumbar spondylosis    3. Myofascial pain syndrome    4. Cervical radiculopathy    5. Chronic bilateral low back pain without sciatica    6. Chronic pain syndrome        Plan:  Patient is a 59-year-old female complains of neck pain, right arm pain with chronic pain some secondary to cervical radiculopathy presents to office for follow-up visit. Patient was seen by orthopedics who was examining her shoulder and feels that majority of her pain is coming from impingement of the nerve in the cervical spine. MRI lumbar spine 6/27/2022 shows C4-C5 broad-based osteophyte disc complex extending more to the left than the right with impingement on the ventral epidural space without papito extension of the cord surface there is asymmetric impingement on the left lateral recess and moderate left foraminal narrowing, C5-C6 osteophyte disc complex change and uncovertebral joint spurring impinging on the left lateral recess and causing moderate narrowing of the left foramen. Patient status post C7-T1 interlaminar epidural steroid injection. She reports no significant alleviation of symptoms. Physical exam at this office visit was completed which does show significant weakness in the right shoulder with inability to abduct greater than shoulder height experiencing pain with both passive and active range of motion. 1.  We will order a MRI arthrogram right shoulder for rotator cuff tear versus labral tear  2. Follow-up in 1 month to review imaging        History of Present Illness: The patient is a 37 y.o. female who presents for a follow up office visit in regards to Neck Pain. The patient’s current symptoms include 6 out of 10 occasional burning, shooting, numbness, pins-and-needles worse in the morning, evening, nighttime. Current pain medications includes: Mobic, Robaxin. The patient reports that this regimen is providing 20% pain relief.   The patient is reporting no side effects from this pain medication regimen. I have personally reviewed and/or updated the patient's past medical history, past surgical history, family history, social history, current medications, allergies, and vital signs today. Review of Systems  Review of Systems   Musculoskeletal:  Positive for neck pain. Decreased ROM  Joint stiffness  Swelling hand   All other systems reviewed and are negative.           Past Medical History:   Diagnosis Date    Anxiety     Bipolar depression ([address])     Chronic pelvic pain in female 2021    Endometrial hyperplasia without atypia 2021    Hypertension     Kidney stone     Menometrorrhagia 2021       Past Surgical History:   Procedure Laterality Date     SECTION      EPIDURAL BLOCK INJECTION Right 10/19/2023    Procedure: Right C7-T1 ILESI;  Surgeon: Loulou Willingham MD;  Location: [facility];  Service: Pain Management     HYSTERECTOMY  2021       Family History   Problem Relation Age of Onset    Heart disease Father     No Known Problems Sister     No Known Problems Daughter     No Known Problems Maternal Grandmother     No Known Problems Maternal Grandfather     No Known Problems Paternal Grandmother     No Known Problems Paternal Grandfather     No Known Problems Son     No Known Problems Son     No Known Problems Son     No Known Problems Maternal Aunt     No Known Problems Maternal Aunt     No Known Problems Paternal Aunt     No Known Problems Paternal Aunt        Social History     Occupational History    Not on file   Tobacco Use    Smoking status: Every Day     Packs/day: 0.50     Years: 28.00     Total pack years: 14.00     Types: Cigarettes    Smokeless tobacco: Never   Vaping Use    Vaping Use: Former   Substance and Sexual Activity    Alcohol use: Never    Drug use: Never    Sexual activity: Not Currently         Current Outpatient Medications:     ciclopirox (PENLAC) 8 % solution, Apply topically daily at bedtime, Disp: 6.6 mL, Rfl: 0    fluticasone (FLONASE) 50 mcg/act nasal spray, 2 sprays into each nostril daily, Disp: 11.1 mL, Rfl: 0    hydrOXYzine HCL (ATARAX) 10 mg tablet, , Disp: , Rfl:     ketoconazole (NIZORAL) 2 % cream, Apply topically daily, Disp: 60 g, Rfl: 2    meloxicam (MOBIC) 15 mg tablet, Take 1 tablet (15 mg total) by mouth daily With food, Disp: 30 tablet, Rfl: 0    methocarbamol (ROBAXIN) 500 mg tablet, Take 1 tablet (500 mg total) by mouth every 6 (six) hours as needed for muscle spasms, Disp: 90 tablet, Rfl: 0    methylPREDNISolone 4 MG tablet therapy pack, Use as directed on package, Disp: 21 tablet, Rfl: 0    oxyCODONE (ROXICODONE) 5 immediate release tablet, Take 1 tablet (5 mg total) by mouth every 6 (six) hours as needed for severe pain for up to 9 doses Max Daily Amount: 20 mg, Disp: 9 tablet, Rfl: 0    sertraline (ZOLOFT) 100 mg tablet, Take 100 mg by mouth daily, Disp: , Rfl:     topiramate (TOPAMAX) 25 mg sprinkle capsule, Take 25 mg by mouth 2 (two) times a day, Disp: , Rfl:     Diclofenac Sodium (VOLTAREN) 1 %, Apply 2 g topically 4 (four) times a day (Patient not taking: Reported on 10/19/2023), Disp: 2 g, Rfl: 0    Allergies   Allergen Reactions    Clonazepam Other (See Comments)     Excessive sedation  Vomiting    Latex     Methylphenidate Other (See Comments)     Hyperactivity"       Physical Exam:    /76   Pulse 87   Resp 16   Ht 5' 3" (1.6 m)   Wt 79.4 kg (175 lb)   LMP 12/25/2020   BMI 31.00 kg/m²     Constitutional:normal, well developed, well nourished, alert, in no distress and non-toxic and no overt pain behavior.  and obese  Eyes:anicteric  HEENT:grossly intact  Neck:supple, symmetric, trachea midline and no masses   Pulmonary:even and unlabored  Cardiovascular:No edema or pitting edema present  Skin:Normal without rashes or lesions and well hydrated  Psychiatric:Mood and affect appropriate  Neurologic:Cranial Nerves II-XII grossly intact  Musculoskeletal:normal    Joint Exam. Positive Neer's, modified Armstrong, and empty can test on the right. Imaging      Study Result    Narrative & Impression   CT CERVICAL SPINE - WITHOUT CONTRAST     INDICATION:   atraumatic neck pain with RUE paresthesias with weakness in finger abduction. COMPARISON: X-ray dated October 11, 2021     TECHNIQUE:  CT examination of the cervical spine was performed without intravenous contrast.  Contiguous axial images were obtained. Multiplanar 2D reformatted images were created from the source data. Radiation dose length product (DLP) for this visit:  466.22 mGy-cm . This examination, like all CT scans performed in the [facility], was performed utilizing techniques to minimize radiation dose exposure, including the use of iterative   reconstruction and automated exposure control. IMAGE QUALITY:  Diagnostic. FINDINGS:     ALIGNMENT:  There is straightening of normal cervical lordosis. No subluxation or compression deformity. Slight concave left scoliosis. No spondylolisthesis. No jumped or perched facets. VERTEBRAE:  No fracture. DEGENERATIVE CHANGES: Mild multilevel cervical degenerative changes are noted without critical central canal stenosis. Mild uncovertebral joint DJD on the left at C4-C5 and at C5-C6. No significant neural foraminal narrowing. PREVERTEBRAL AND PARASPINAL SOFT TISSUES: Unremarkable     THORACIC INLET:  Normal.        IMPRESSION:  No cervical spine fracture or traumatic malalignment. No orders to display       No orders of the defined types were placed in this encounter.

## 2023-11-20 NOTE — TELEPHONE ENCOUNTER
That would be advised.  I am not sure what I can use for her anxiety since I only prescribe benzo for MRI anxiety

## 2023-11-20 NOTE — TELEPHONE ENCOUNTER
Caller: Nina    Doctor: Nicky    Reason for call: Pt would like to know if you can Prescribe her something to calm her down for her upcoming MRI on 12/11 being that she is claustrophobic.     Please advise    Call back#: [phone]

## 2023-11-20 NOTE — TELEPHONE ENCOUNTER
Pt requesting something for anxiety prior to MRI. Pts LOV today 11/20 with RM & MRI arthrogram right shoulder was ordered at that time. Pt is scheduled for MRI on 12/11.      Please advise-

## 2023-11-20 NOTE — TELEPHONE ENCOUNTER
Spoke with Pt regarding allergies to benzodiazepines. Pt verified: she is aware of the allergy. Pt states she is going to call her PCP & her Psychiatrist to she what they recommend.      Please advise-

## 2023-11-20 NOTE — PATIENT INSTRUCTIONS
Neck Exercises   WHAT YOU NEED TO KNOW:   What do I need to know about neck exercises? Neck exercises help reduce neck pain, and improve neck movement and strength. Neck exercises also help prevent long-term neck problems. What do I need to know about neck exercise safety? Move slowly, gently, and smoothly. Avoid fast or jerky motions. Stand and sit the way your healthcare provider shows you. Good posture may reduce your neck pain. Check your posture often, even when you are not doing your neck exercises. Follow the exercise program recommended by your healthcare provider. He or she will tell you which exercises are best for your condition. He or she will also tell you how many repetitions to do and how often you should do the exercises. How do I perform neck exercises safely? Exercise position:  You may sit or stand while you do neck exercises. Face forward. Your shoulders should be straight and relaxed, with a good posture. Head tilts, forward and back:  Gently bow your head and try to touch your chin to your chest. Your healthcare provider may tell you to push on the back of your neck to help bow your head. Raise your chin back to the starting position. Tilt your head back as far as possible so you are looking up at the ceiling. Your healthcare provider may tell you to lift your chin to help tilt your head back. Return your head to the starting position. Head tilts, side to side:  Tilt your head, bringing your ear toward your shoulder. Then tilt your head toward the other shoulder. Head turns:  Turn your head to look over your shoulder. Tilt your chin down and try to touch it to your shoulder. Do not raise your shoulder to your chin. Face forward again. Do the same on the other side. Head rolls:  Slowly bring your chin toward your chest. Next, roll your head to the right. Your ear should be positioned over your shoulder. Hold this position for 5 seconds.  Roll your head back toward your chest and to the left into the same position. Hold for 5 seconds. Gently roll your head back and around in a clockwise [place] 3 times. Next, move your head in the reverse direction (counterclockwise) in a [place] 3 times. Do not shrug your shoulders upwards while you do this exercise. When should I call my doctor? Your pain does not get better, or gets worse. You have questions or concerns about your condition, care, or exercise program.    CARE AGREEMENT:   You have the right to help plan your care. Learn about your health condition and how it may be treated. Discuss treatment options with your healthcare providers to decide what care you want to receive. You always have the right to refuse treatment. The above information is an  only. It is not intended as medical advice for individual conditions or treatments. Talk to your doctor, nurse or pharmacist before following any medical regimen to see if it is safe and effective for you. © Copyright Estela Mar 2023 Information is for End User's use only and may not be sold, redistributed or otherwise used for commercial purposes.

## 2023-11-27 ENCOUNTER — TELEPHONE (OUTPATIENT)
Dept: INTERNAL MEDICINE CLINIC | Facility: CLINIC | Age: 43
End: 2023-11-27

## 2023-11-27 ENCOUNTER — TELEPHONE (OUTPATIENT)
Dept: NON INVASIVE DIAGNOSTICS | Facility: HOSPITAL | Age: 43
End: 2023-11-27

## 2023-11-27 NOTE — TELEPHONE ENCOUNTER
Call placed to patient to discuss upcoming MRI arthrogram at [address] [place] [address]. Allergies reviewed and verified patient does not currently take any anticoagulant medications. Pre-procedure instructions discussed with patient. Patient instructed that she may eat normally and take medications as usual before the procedure. Procedure and post procedure expectations and instructions reviewed with the patient. Recommended to patient to have a  post procedure. Patient verbalizes understanding and denies any questions at this time.

## 2023-11-27 NOTE — TELEPHONE ENCOUNTER
Patient is scheduled for an MRI on 12/11 and is requesting medication to take because she is claustrophobic.

## 2023-11-28 DIAGNOSIS — F40.240 CLAUSTROPHOBIA: Primary | ICD-10-CM

## 2023-11-28 RX ORDER — LORAZEPAM 0.5 MG/1
TABLET ORAL
Qty: 2 TABLET | Refills: 0 | Status: SHIPPED | OUTPATIENT
Start: 2023-11-28

## 2023-11-28 NOTE — TELEPHONE ENCOUNTER
Script sent but will need someone to drive her to and from MRI due to potential sedation from the medication.

## 2023-12-04 ENCOUNTER — OFFICE VISIT (OUTPATIENT)
Dept: URGENT CARE | Facility: MEDICAL CENTER | Age: 43
End: 2023-12-04
Payer: COMMERCIAL

## 2023-12-04 VITALS
HEIGHT: 63 IN | DIASTOLIC BLOOD PRESSURE: 80 MMHG | HEART RATE: 86 BPM | OXYGEN SATURATION: 98 % | RESPIRATION RATE: 20 BRPM | WEIGHT: 170 LBS | SYSTOLIC BLOOD PRESSURE: 122 MMHG | TEMPERATURE: 100 F | BODY MASS INDEX: 30.12 KG/M2

## 2023-12-04 DIAGNOSIS — D22.9 BLEEDING SKIN MOLE: Primary | ICD-10-CM

## 2023-12-04 DIAGNOSIS — R23.3 BLEEDING SKIN MOLE: Primary | ICD-10-CM

## 2023-12-04 PROCEDURE — 99213 OFFICE O/P EST LOW 20 MIN: CPT

## 2023-12-04 NOTE — PATIENT INSTRUCTIONS
Tylenol or ibuprofen for pain or fever. Keep area clean and dry. May place triple antibiotic ointment on once daily. Change bandage frequently. Follow-up with dermatology referral as discussed. Follow up with PCP in 3-5 days. Proceed to  ER if symptoms worsen. Mole or Nevus Excision   AMBULATORY CARE:   What you need to know about mole excision:  Mole excision is a procedure done to remove a mole (nevus) from your skin. You may need a mole removed to check it for cancer or to decrease tenderness. You may also have a mole removed for cosmetic reasons. What will happen during mole excision:  You may be given local anesthesia to numb your skin. With local anesthesia, you may still feel pressure or pushing, but you should not feel any pain. Your healthcare provider may also use liquid nitrogen to freeze and numb your skin. He or she will cut and remove your mole. He or she will then close the incision with stitches. The mole may be sent to a lab for tests. What will happen after mole excision:  You may have medicine put on your skin to prevent an infection. You may need to keep a bandage over your wound until it heals. Risks of mole excision:  You may bleed more than expected or get an infection. You may have swelling and changes in the color of your skin where your mole was removed. Fluid may drain from your wound. A scar may form in the area where your mole was removed. Even after surgery, your mole may grow back. Seek care immediately if:   You have worsening redness, pain, or swelling at your wound site. You have pus in your wound. Blood soaks through your bandage. Contact your healthcare provider if:   You have a fever. Your mole grows back. You have questions or concerns about your condition or care. Medicines: You may need any of the following:  Topical antibiotics  help prevent or treat a bacterial infection.      NSAIDs , such as ibuprofen, help decrease swelling, pain, and fever. NSAIDs can cause stomach bleeding or kidney problems in certain people. If you take blood thinner medicine, always ask your healthcare provider if NSAIDs are safe for you. Always read the medicine label and follow directions. Acetaminophen  decreases pain and fever. It is available without a doctor's order. Ask how much to take and how often to take it. Follow directions. Read the labels of all other medicines you are using to see if they also contain acetaminophen, or ask your doctor or pharmacist. Acetaminophen can cause liver damage if not taken correctly. Take your medicine as directed. Contact your healthcare provider if you think your medicine is not helping or if you have side effects. Tell your provider if you are allergic to any medicine. Keep a list of the medicines, vitamins, and herbs you take. Include the amounts, and when and why you take them. Bring the list or the pill bottles to follow-up visits. Carry your medicine list with you in case of an emergency. Wound care:  Ask your healthcare provider how to care for your wound and how long to keep the bandage on. Prevent new moles:   Reduce sun exposure. Wear protective clothing. Apply sunscreen before you go into the sun and at least every 2 hours while you are in the sun. Reapply after swimming or sweating. Limit the amount of time you spend in the sun. The ultraviolet (UV) light from the sun increases your risk for moles. Check your skin every month. Know what your birthmarks and moles look like. Watch for and tell your healthcare provider if you notice changes in color, size, or shape of your birthmarks or moles. Follow up with your healthcare provider as directed: You may need to return to have stitches removed, your wound checked, or more tests done. Write down your questions so you remember to ask them during your visits.   © Copyright [place] 2023 Information is for End User's use only and may not be sold, redistributed or otherwise used for commercial purposes. The above information is an  only. It is not intended as medical advice for individual conditions or treatments. Talk to your doctor, nurse or pharmacist before following any medical regimen to see if it is safe and effective for you.

## 2023-12-04 NOTE — PROGRESS NOTES
[facility]        NAME: Yanira Mcgarry is a 37 y.o. female  : 1980    MRN: [id number]  DATE: 2023  TIME: 1:32 PM    Assessment and Plan   Bleeding skin mole [D22.9, R23.3]  1. Bleeding skin mole  Ambulatory Referral to Dermatology        1 cm x 1 cm raised mole to the left mid back. PT bumped it and it was bleeding. No signs of infection. Mild tenderness to palpation. Area was cleaned with normal saline. New bandage in place. Given referral to dermatology. Patient Instructions     Tylenol or ibuprofen for pain or fever. Keep area clean and dry. May place triple antibiotic ointment on once daily. Change bandage frequently. Follow-up with dermatology referral as discussed. Follow up with PCP in 3-5 days. Proceed to  ER if symptoms worsen. Chief Complaint     Chief Complaint   Patient presents with    Mole On back     Pt has a mole on her back that started bleeding today. History of Present Illness       80-year-old female presents to the clinic due to a mole on the left mid back that started bleeding today. PT believes she bumped it. Put a Band-Aid on at home. Admits to some tenderness in the area for about a week. Denies any follow-up with dermatology. Denies any fever, chills, chest pain, shortness of breath. Review of Systems   Review of Systems   Constitutional: Negative. HENT: Negative. Respiratory: Negative. Cardiovascular: Negative. Musculoskeletal:         Bleeding mole   Neurological: Negative.           Current Medications       Current Outpatient Medications:     ciclopirox (PENLAC) 8 % solution, Apply topically daily at bedtime, Disp: 6.6 mL, Rfl: 0    fluticasone (FLONASE) 50 mcg/act nasal spray, 2 sprays into each nostril daily, Disp: 11.1 mL, Rfl: 0    hydrOXYzine HCL (ATARAX) 10 mg tablet, , Disp: , Rfl:     LORazepam (ATIVAN) 0.5 mg tablet, Take 1-2 about 30 minutes prior to the MRI, Disp: 2 tablet, Rfl: 0    sertraline (ZOLOFT) 100 mg tablet, Take 100 mg by mouth daily, Disp: , Rfl:     topiramate (TOPAMAX) 25 mg sprinkle capsule, Take 25 mg by mouth 2 (two) times a day, Disp: , Rfl:     Diclofenac Sodium (VOLTAREN) 1 %, Apply 2 g topically 4 (four) times a day (Patient not taking: Reported on 10/19/2023), Disp: 2 g, Rfl: 0    ketoconazole (NIZORAL) 2 % cream, Apply topically daily (Patient not taking: Reported on 2023), Disp: 60 g, Rfl: 2    meloxicam (MOBIC) 15 mg tablet, Take 1 tablet (15 mg total) by mouth daily With food (Patient not taking: Reported on 2023), Disp: 30 tablet, Rfl: 0    methocarbamol (ROBAXIN) 500 mg tablet, Take 1 tablet (500 mg total) by mouth every 6 (six) hours as needed for muscle spasms (Patient not taking: Reported on 2023), Disp: 90 tablet, Rfl: 0    methylPREDNISolone 4 MG tablet therapy pack, Use as directed on package (Patient not taking: Reported on 2023), Disp: 21 tablet, Rfl: 0    oxyCODONE (ROXICODONE) 5 immediate release tablet, Take 1 tablet (5 mg total) by mouth every 6 (six) hours as needed for severe pain for up to 9 doses Max Daily Amount: 20 mg (Patient not taking: Reported on 2023), Disp: 9 tablet, Rfl: 0    Current Allergies     Allergies as of 2023 - Reviewed 2023   Allergen Reaction Noted    Clonazepam Other (See Comments) 2019    Latex  2019    Methylphenidate Other (See Comments) 2020            The following portions of the patient's history were reviewed and updated as appropriate: allergies, current medications, past family history, past medical history, past social history, past surgical history and problem list.     Past Medical History:   Diagnosis Date    Anxiety     Bipolar depression ([address])     Chronic pelvic pain in female 2021    Endometrial hyperplasia without atypia 2021    Hypertension     Kidney stone     Menometrorrhagia 2021       Past Surgical History:   Procedure Laterality Date     SECTION      EPIDURAL BLOCK INJECTION Right 10/19/2023    Procedure: Right C7-T1 ILESI;  Surgeon: Rachele Maharaj MD;  Location: [facility];  Service: Pain Management     HYSTERECTOMY  04/09/2021       Family History   Problem Relation Age of Onset    Heart disease Father     No Known Problems Sister     No Known Problems Daughter     No Known Problems Maternal Grandmother     No Known Problems Maternal Grandfather     No Known Problems Paternal Grandmother     No Known Problems Paternal Grandfather     No Known Problems Son     No Known Problems Son     No Known Problems Son     No Known Problems Maternal Aunt     No Known Problems Maternal Aunt     No Known Problems Paternal Aunt     No Known Problems Paternal Aunt          Medications have been verified. Objective   /80   Pulse 86   Temp 100 °F (37.8 °C)   Resp 20   Ht 5' 3" (1.6 m)   Wt 77.1 kg (170 lb)   LMP 12/25/2020   SpO2 98%   BMI 30.11 kg/m²        Physical Exam     Physical Exam  Constitutional:       General: She is not in acute distress. Appearance: Normal appearance. She is not ill-appearing or diaphoretic. HENT:      Head: Normocephalic and atraumatic. Eyes:      Extraocular Movements: Extraocular movements intact. Pupils: Pupils are equal, round, and reactive to light. Cardiovascular:      Rate and Rhythm: Normal rate and regular rhythm. Pulses: Normal pulses. Heart sounds: Normal heart sounds. Pulmonary:      Effort: Pulmonary effort is normal.      Breath sounds: Normal breath sounds. Skin:     General: Skin is warm and dry. Capillary Refill: Capillary refill takes less than 2 seconds. Findings: Wound (Raised 1 cm x 1 cm mole that is flesh-colored with surrounding dried blood. Mild tenderness to palpation. Circular in shape. No surrounding erythema or swelling. No drainage.) present. Neurological:      General: No focal deficit present. Mental Status: She is alert. Mental status is at baseline.    Psychiatric:         Mood and Affect: Mood normal.

## 2023-12-08 ENCOUNTER — OFFICE VISIT (OUTPATIENT)
Dept: OBGYN CLINIC | Facility: CLINIC | Age: 43
End: 2023-12-08
Payer: COMMERCIAL

## 2023-12-08 VITALS
BODY MASS INDEX: 31.36 KG/M2 | DIASTOLIC BLOOD PRESSURE: 94 MMHG | WEIGHT: 177 LBS | RESPIRATION RATE: 16 BRPM | HEIGHT: 63 IN | SYSTOLIC BLOOD PRESSURE: 135 MMHG | HEART RATE: 88 BPM

## 2023-12-08 DIAGNOSIS — M22.2X1 PATELLOFEMORAL DISORDER OF RIGHT KNEE: Primary | ICD-10-CM

## 2023-12-08 PROCEDURE — 99213 OFFICE O/P EST LOW 20 MIN: CPT | Performed by: FAMILY MEDICINE

## 2023-12-08 NOTE — TELEPHONE ENCOUNTER
Caller: Richar Wood    Doctor: Nicky    Reason for call: pt received a message stating the MRI needs authorization.  Please Advise    Call back#: [phone]

## 2023-12-08 NOTE — PROGRESS NOTES
Subjective:    Chief Complaint   Patient presents with    Right Knee - Pain       Socorro Veras is a 37 y.o. female complains of right knee pain. Onset of the symptoms was several months ago. Mechanism of injury:  none reported . Aggravating factors: going up and down stairs. Treatment to date: rest. Symptoms have progressed to a point and plateaued. The following portions were reviewed and updated as needed: allergies, current medications, past medical history, past social history, past surgical history and problem list.    Review of Systems   Constitutional: Negative for fever. HENT: Negative for dental problem and headaches. Eyes: Negative for vision loss. Respiratory: Negative for cough and shortness of breath. Cardiovascular: Negative for leg swelling and palpitations. Gastrointestinal: Negative for constipation and diarrhea. Genitourinary: Negative for bladder incontinence and difficulty urinating. Musculoskeletal: Negative for back pain and difficulty walking. Skin: Negative for rash and ulcer. Neurological: Negative for dizziness and headaches. Hem/Lymph/Immuno: Negative for blood clots. Does not bruise/bleed easily. Psychiatric/Behavioral: Negative for confusion. Objective:  General: no acute distress, non toxic, AAO x3   Skin: no skin changes, no rashes, no wounds or laceration  Vasculature: normal cap refill, no LE edema, normal popliteal and dorsalis pedis pulse  Neurologic:   Musculoskeletal: right KNEE EXAM  Gait: limping gait negative, able to weight bear without difficulty  Inspection: No gross deformity, no redness or warmth   Effusion: negative   Medial joint line TTP: negative  Lateral joint line TTP: negative  ROM: Full flexion and extension  Megan's: negative,  Instability to varus/valgus stress: negative  Anterior Drawer: negative   Lachman's test: negative  Posterior Drawer: negative  Patellar gridn positive        Imaging:       Assessment/Plan:  1. Patellofemoral disorder of right knee  Clinical impression is patient is suffering from patellofemoral syndrome of the right knee. We discussed nature of patellofemoral syndrome and the recommended treatment plan. Home exercise program was provided for knee strengthening.   Advised patient to continue with oral anti-inflammatory as needed for pain

## 2023-12-11 ENCOUNTER — HOSPITAL ENCOUNTER (OUTPATIENT)
Dept: RADIOLOGY | Facility: HOSPITAL | Age: 43
Discharge: HOME | End: 2023-12-11
Attending: ANESTHESIOLOGY

## 2023-12-12 ENCOUNTER — TELEPHONE (OUTPATIENT)
Dept: PAIN MEDICINE | Facility: CLINIC | Age: 43
End: 2023-12-12

## 2023-12-12 DIAGNOSIS — M25.519 CHRONIC SHOULDER PAIN, UNSPECIFIED LATERALITY: Primary | ICD-10-CM

## 2023-12-12 DIAGNOSIS — G89.29 CHRONIC SHOULDER PAIN, UNSPECIFIED LATERALITY: Primary | ICD-10-CM

## 2023-12-12 NOTE — TELEPHONE ENCOUNTER
Nat Menjivar, CHRISTINE Millan MD; [facility]  Pls advise on P2P or PT. Thank you! Previous Messages       ----- Message -----  From: Luciano Paz  Sent: 12/11/2023   9:26 AM EST  To: [facility]  Subject: Peer to peer                                    The prior authorization request for this study has not been approved. You can schedule a peer to peer by calling [facility] [phone] Tracking#  [id number] with the deadline date of no date given. The insurance determined the following:    [ ] Does not meet Medical Necessity  [ ] No prior imaging  [x ] PT or conservative treatment incomplete  [ ] Missing Labs  [ ] Frequency    To approve, we need notes that show four weeks of exercises that you did at home (physician supervised home exercise program). We need to know what exercises your doctor gave you. The notes should tell us how often you did them (how many days per week) and for how long (how much time per day). We also need to know you did not get better. If you choose not to complete a peer to peer then please reply to this message to let us know. Please notify your patient and contact central scheduling by calling [phone] to cancel the appointment and cancel the order in Epic.

## 2023-12-18 ENCOUNTER — OFFICE VISIT (OUTPATIENT)
Dept: PAIN MEDICINE | Facility: CLINIC | Age: 43
End: 2023-12-18
Payer: COMMERCIAL

## 2023-12-18 VITALS
BODY MASS INDEX: 31.79 KG/M2 | RESPIRATION RATE: 16 BRPM | HEIGHT: 63 IN | HEART RATE: 84 BPM | DIASTOLIC BLOOD PRESSURE: 87 MMHG | WEIGHT: 179.4 LBS | SYSTOLIC BLOOD PRESSURE: 124 MMHG

## 2023-12-18 DIAGNOSIS — G89.4 CHRONIC PAIN SYNDROME: ICD-10-CM

## 2023-12-18 DIAGNOSIS — M25.511 CHRONIC RIGHT SHOULDER PAIN: ICD-10-CM

## 2023-12-18 DIAGNOSIS — M75.21 BICEPS TENDONITIS ON RIGHT: Primary | ICD-10-CM

## 2023-12-18 DIAGNOSIS — G89.29 CHRONIC RIGHT SHOULDER PAIN: ICD-10-CM

## 2023-12-18 PROCEDURE — 99213 OFFICE O/P EST LOW 20 MIN: CPT | Performed by: ANESTHESIOLOGY

## 2023-12-18 NOTE — PROGRESS NOTES
Assessment:  1. Biceps tendonitis on right    2. Chronic pain syndrome    3. Chronic right shoulder pain        Plan:  Patient is a 43-year-old female complains of neck pain, right arm pain with chronic pain some secondary to rotator cuff injury presents to office for follow-up visit.  Patient has not started physical therapy yet she starts that this week.  Patient informed that she cannot tolerate inform the office and to communicate with physical therapist to modulate the activities.  Patient completed physical therapy we can then order right shoulder arthrogram.  1.  Start/continue physical therapy  2.  Follow-up in 2 months      History of Present Illness:  The patient is a 43 y.o. female who presents for a follow up office visit in regards to shoulder pain.   The patient’s current symptoms include 8/10 constant burning, sharp, throbbing, pressure-like, shooting, numbness, pins and needles without ny particular time pattern.    Current pain medications includes:  none.     I have personally reviewed and/or updated the patient's past medical history, past surgical history, family history, social history, current medications, allergies, and vital signs today.         Review of Systems  Review of Systems   Musculoskeletal:  Positive for myalgias.        Decreased ROM  Joint stiffness  Swelling (hand)   All other systems reviewed and are negative.          Past Medical History:   Diagnosis Date    Anxiety     Bipolar depression (HCC)     Chronic pelvic pain in female 2021    Endometrial hyperplasia without atypia 2021    Hypertension     Kidney stone     Menometrorrhagia 2021       Past Surgical History:   Procedure Laterality Date     SECTION      EPIDURAL BLOCK INJECTION Right 10/19/2023    Procedure: Right C7-T1 ILESI;  Surgeon: Elsi Saunders MD;  Location: [facility];  Service: Pain Management     HYSTERECTOMY  2021       Family History   Problem Relation Age of Onset     Heart disease Father     No Known Problems Sister     No Known Problems Daughter     No Known Problems Maternal Grandmother     No Known Problems Maternal Grandfather     No Known Problems Paternal Grandmother     No Known Problems Paternal Grandfather     No Known Problems Son     No Known Problems Son     No Known Problems Son     No Known Problems Maternal Aunt     No Known Problems Maternal Aunt     No Known Problems Paternal Aunt     No Known Problems Paternal Aunt        Social History     Occupational History    Not on file   Tobacco Use    Smoking status: Every Day     Current packs/day: 0.50     Average packs/day: 0.5 packs/day for 28.0 years (14.0 ttl pk-yrs)     Types: Cigarettes    Smokeless tobacco: Never   Vaping Use    Vaping status: Former   Substance and Sexual Activity    Alcohol use: Never    Drug use: Never    Sexual activity: Not Currently         Current Outpatient Medications:     ciclopirox (PENLAC) 8 % solution, Apply topically daily at bedtime, Disp: 6.6 mL, Rfl: 0    fluticasone (FLONASE) 50 mcg/act nasal spray, 2 sprays into each nostril daily, Disp: 11.1 mL, Rfl: 0    hydrOXYzine HCL (ATARAX) 10 mg tablet, , Disp: , Rfl:     LORazepam (ATIVAN) 0.5 mg tablet, Take 1-2 about 30 minutes prior to the MRI, Disp: 2 tablet, Rfl: 0    sertraline (ZOLOFT) 100 mg tablet, Take 100 mg by mouth daily, Disp: , Rfl:     topiramate (TOPAMAX) 25 mg sprinkle capsule, Take 25 mg by mouth 2 (two) times a day, Disp: , Rfl:     Diclofenac Sodium (VOLTAREN) 1 %, Apply 2 g topically 4 (four) times a day (Patient not taking: Reported on 10/19/2023), Disp: 2 g, Rfl: 0    ketoconazole (NIZORAL) 2 % cream, Apply topically daily (Patient not taking: Reported on 12/4/2023), Disp: 60 g, Rfl: 2    meloxicam (MOBIC) 15 mg tablet, Take 1 tablet (15 mg total) by mouth daily With food (Patient not taking: Reported on 12/4/2023), Disp: 30 tablet, Rfl: 0    methocarbamol (ROBAXIN) 500 mg tablet, Take 1 tablet (500 mg total)  "by mouth every 6 (six) hours as needed for muscle spasms (Patient not taking: Reported on 12/4/2023), Disp: 90 tablet, Rfl: 0    methylPREDNISolone 4 MG tablet therapy pack, Use as directed on package (Patient not taking: Reported on 12/4/2023), Disp: 21 tablet, Rfl: 0    oxyCODONE (ROXICODONE) 5 immediate release tablet, Take 1 tablet (5 mg total) by mouth every 6 (six) hours as needed for severe pain for up to 9 doses Max Daily Amount: 20 mg (Patient not taking: Reported on 12/4/2023), Disp: 9 tablet, Rfl: 0    Allergies   Allergen Reactions    Clonazepam Other (See Comments)     Excessive sedation  Vomiting    Latex     Methylphenidate Other (See Comments)     Hyperactivity\"       Physical Exam:    /87   Pulse 84   Resp 16   Ht 5' 3\" (1.6 m)   Wt 81.4 kg (179 lb 6.4 oz)   LMP 12/25/2020   BMI 31.78 kg/m²     Constitutional:normal, well developed, well nourished, alert, in no distress and non-toxic and no overt pain behavior. and obese  Eyes:anicteric  HEENT:grossly intact  Neck:supple, symmetric, trachea midline and no masses   Pulmonary:even and unlabored  Cardiovascular:No edema or pitting edema present  Skin:Normal without rashes or lesions and well hydrated  Psychiatric:Mood and affect appropriate  Neurologic:Cranial Nerves II-XII grossly intact  Musculoskeletal:antalgic    Imaging      Study Result    Narrative & Impression   CT CERVICAL SPINE - WITHOUT CONTRAST     INDICATION:   atraumatic neck pain with RUE paresthesias with weakness in finger abduction.     COMPARISON: X-ray dated October 11, 2021     TECHNIQUE:  CT examination of the cervical spine was performed without intravenous contrast.  Contiguous axial images were obtained. Multiplanar 2D reformatted images were created from the source data.     Radiation dose length product (DLP) for this visit:  466.22 mGy-cm .  This examination, like all CT scans performed in the [facility], was performed utilizing techniques to " minimize radiation dose exposure, including the use of iterative   reconstruction and automated exposure control.     IMAGE QUALITY:  Diagnostic.     FINDINGS:     ALIGNMENT:  There is straightening of normal cervical lordosis.  No subluxation or compression deformity. Slight concave left scoliosis. No spondylolisthesis. No jumped or perched facets.     VERTEBRAE:  No fracture.     DEGENERATIVE CHANGES: Mild multilevel cervical degenerative changes are noted without critical central canal stenosis. Mild uncovertebral joint DJD on the left at C4-C5 and at C5-C6. No significant neural foraminal narrowing.     PREVERTEBRAL AND PARASPINAL SOFT TISSUES: Unremarkable     THORACIC INLET:  Normal.        IMPRESSION:  No cervical spine fracture or traumatic malalignment.           No orders to display       No orders of the defined types were placed in this encounter.

## 2023-12-18 NOTE — PATIENT INSTRUCTIONS
Neck Exercises   WHAT YOU NEED TO KNOW:   What do I need to know about neck exercises?  Neck exercises help reduce neck pain, and improve neck movement and strength. Neck exercises also help prevent long-term neck problems.  What do I need to know about neck exercise safety?   Move slowly, gently, and smoothly.  Avoid fast or jerky motions.    Stand and sit the way your healthcare provider shows you.  Good posture may reduce your neck pain. Check your posture often, even when you are not doing your neck exercises.    Follow the exercise program recommended by your healthcare provider.  He or she will tell you which exercises are best for your condition. He or she will also tell you how many repetitions to do and how often you should do the exercises.    How do I perform neck exercises safely?   Exercise position:  You may sit or stand while you do neck exercises. Face forward. Your shoulders should be straight and relaxed, with a good posture.         Head tilts, forward and back:  Gently bow your head and try to touch your chin to your chest. Your healthcare provider may tell you to push on the back of your neck to help bow your head. Raise your chin back to the starting position. Tilt your head back as far as possible so you are looking up at the ceiling. Your healthcare provider may tell you to lift your chin to help tilt your head back. Return your head to the starting position.         Head tilts, side to side:  Tilt your head, bringing your ear toward your shoulder. Then tilt your head toward the other shoulder.         Head turns:  Turn your head to look over your shoulder. Tilt your chin down and try to touch it to your shoulder. Do not raise your shoulder to your chin. Face forward again. Do the same on the other side.         Head rolls:  Slowly bring your chin toward your chest. Next, roll your head to the right. Your ear should be positioned over your shoulder. Hold this position for 5 seconds. Roll your  head back toward your chest and to the left into the same position. Hold for 5 seconds. Gently roll your head back and around in a clockwise Kake 3 times. Next, move your head in the reverse direction (counterclockwise) in a Kake 3 times. Do not shrug your shoulders upwards while you do this exercise.       When should I call my doctor?   Your pain does not get better, or gets worse.    You have questions or concerns about your condition, care, or exercise program.    CARE AGREEMENT:   You have the right to help plan your care. Learn about your health condition and how it may be treated. Discuss treatment options with your healthcare providers to decide what care you want to receive. You always have the right to refuse treatment. The above information is an  only. It is not intended as medical advice for individual conditions or treatments. Talk to your doctor, nurse or pharmacist before following any medical regimen to see if it is safe and effective for you.  © Copyright [employer] 2023 Information is for End User's use only and may not be sold, redistributed or otherwise used for commercial purposes.

## 2023-12-20 ENCOUNTER — EVALUATION (OUTPATIENT)
Dept: PHYSICAL THERAPY | Facility: CLINIC | Age: 43
End: 2023-12-20
Payer: COMMERCIAL

## 2023-12-20 DIAGNOSIS — M25.519 CHRONIC SHOULDER PAIN, UNSPECIFIED LATERALITY: Primary | ICD-10-CM

## 2023-12-20 DIAGNOSIS — G89.29 CHRONIC SHOULDER PAIN, UNSPECIFIED LATERALITY: Primary | ICD-10-CM

## 2023-12-20 PROCEDURE — 97110 THERAPEUTIC EXERCISES: CPT

## 2023-12-20 PROCEDURE — 97162 PT EVAL MOD COMPLEX 30 MIN: CPT

## 2023-12-20 NOTE — PROGRESS NOTES
PT Evaluation     Today's date: 2023  Patient name: Nina Joseph  : 1980  MRN: [id number]  Referring provider: Elsi Saunders MD  Dx:   Encounter Diagnosis     ICD-10-CM    1. Chronic shoulder pain, unspecified laterality  M25.519 Ambulatory referral to Physical Therapy    G89.29           Start Time: 1300  Stop Time: 1400  Total time in clinic (min): 60 minutes    Assessment  Assessment details: The patient is a 43 y.o. female presenting to Physical Therapy today with chronic R sided shoulder, neck, and elbow pain. Upon completion of the initial evaluation the patient is demonstrating with limitations in cervical mobility, R shoulder mobility, R shoulder strength, R elbow mobility, R elbow strength, scapulothoracic control, and pain. Patient is currently having difficulty with functional activities such as lifting, overhead activity, pushing, pulling, and carrying due to symptomatology. She would benefit from a course of skilled Physical Therapy services to address functional limitations to return to prior level of function. Thank you for your referral.   Impairments: abnormal muscle firing, abnormal muscle tone, abnormal or restricted ROM, abnormal movement, activity intolerance, impaired physical strength, lacks appropriate home exercise program, pain with function and poor posture     Symptom irritability: highUnderstanding of Dx/Px/POC: good   Prognosis: fair    Goals  ST.) Patient will initiate HEP Independently   2.) Patient will demonstrate improved R elbow strength evidenced by a 1-2 MMT grade increase  3.) Patient will demonstrate improved R shoulder strength evidenced by a 1-2 MMT grade increase  4.) Patient will demonstrate improved cervical mobility evidenced by </= minimal restriction in all planes of motion   5.) Patient will demonstrate a </= 7/10 pain level at its worse with activity    LT.) Patient will be d/c to an HEP independently  2.) Patient will improve  functional abilities evidenced by FOTO scores  3.) Patient will demonstrate a </= 4/10 pain level at its worse with activity  4.) Patient will demonstrate improved ability to lift, push, pull, and carry   5.) Return to PLOF     Plan  Plan details: Plan of care was reviewed and discussed thoroughly with the patient on their current condition. Patient was instructed on a HEP with written instructions. Patient is in agreement with PT recommendations and will attend Physical Therapy 2x/week for the next 8 weeks to address current deficits.    Patient would benefit from: PT eval and skilled physical therapy  Referral necessary: No  Planned modality interventions: cryotherapy and thermotherapy: hydrocollator packs  Planned therapy interventions: flexibility, functional ROM exercises, graded activity, graded exercise, graded motor, home exercise program, manual therapy, joint mobilization, massage, neuromuscular re-education, patient education, postural training, strengthening, stretching, therapeutic activities, therapeutic exercise and therapeutic training  Frequency: 2x week  Duration in weeks: 8  Plan of Care beginning date: 12/20/2023  Plan of Care expiration date: 2/14/2024  Treatment plan discussed with: patient    Subjective Evaluation    History of Present Illness  Mechanism of injury: The patient reports today with chronic R sided shoulder, elbow, and neck pain. She notes intermittent numbness and tingling into the R arm. She states having a R elbow fracture when she was a child that did not heal correctly and is unable to bend/extend her elbow fully. She reports clicking/popping at times in the R shoulder when she moves the arm. She reports having a series of injections in the neck and R shoulder with no relief of symptoms. The patient states she is currently having difficulty with activities such as sleeping, lifting, pushing, pulling, and carrying due to pain.           Recurrent probem    Quality of life:  fair    Patient Goals  Patient goals for therapy: decreased pain, increased motion, increased strength, independence with ADLs/IADLs and return to sport/leisure activities    Pain  Current pain ratin  At best pain ratin  At worst pain rating: 10  Location: R neck, shoulder, and elbow with radiating numbness and tingling into the hand.  Quality: burning, dull ache, sharp, throbbing and discomfort  Alleviating factors: Nothing.  Aggravating factors: lifting and overhead activity (pushing, pulling, carrying, lifting)  Progression: worsening      Diagnostic Tests  X-ray: abnormal  Treatments  Previous treatment: physical therapy and injection treatment  Current treatment: physical therapy    Objective     Concurrent Complaints  Positive for night pain and disturbed sleep. Negative for dizziness, faints, headaches, nausea/motion sickness and tinnitus    Postural Observations  Seated posture: poor  Standing posture: poor    Additional Postural Observation Details  Patient presents with a forward head, thoracic kyphosis, and rounded shoulder posture.     Palpation     Right   Hypertonic in the biceps, suboccipitals, subscapularis, supraspinatus and upper trapezius.   Tenderness of the biceps, suboccipitals, subscapularis, supraspinatus and upper trapezius.     Tenderness   Cervical Spine   No tenderness in the spinous process, left transverse process and right transverse process.     Neurological Testing     Sensation   Cervical/Thoracic   Left   Intact: light touch    Right   Hyposensation: light touch    Reflexes   Left   Deltoid (C5): normal (2+)  Brachioradialis (C6): normal (2+)    Right   Deltoid (C5): normal (2+)  Brachioradialis (C6): normal (2+)    Active Range of Motion   Cervical/Thoracic Spine       Cervical    Flexion:  with pain Restriction level: moderate  Extension:  with pain Restriction level: maximal  Left lateral flexion:  with pain Restriction level: maximal  Right lateral flexion:  with pain  "Restriction level maximal  Left rotation:  with pain Restriction level: maximal  Right rotation:  with pain Restriction level: maximal    Additional Active Range of Motion Details  PT was unable to appropriately assess R shoulder AROM due to pain.     Passive Range of Motion     Right Shoulder   Flexion: 150 degrees with pain  Abduction: 135 degrees with pain  External rotation 0°: WFL  External rotation 90°: 15 degrees   Internal rotation 0°: WFL  Internal rotation 90°: 15 degrees     Scapular Mobility     Right Shoulder   Scapular mobility: poor  Scapular Mobility with Shoulder to 90° FF   Upward rotation: inadequate  Downward rotation: inadequate    Scapular Mobility beyond 90° FF   Upward rotation: inadequate  Downward rotation: inadequate    Strength/Myotome Testing   Cervical Spine     Left   Normal strength    Right Shoulder     Planes of Motion   Flexion: 1   Extension: 1   Abduction: 1   External rotation at 0°: 1   Internal rotation at 0°: 1     Right Elbow   Flexion: 1  Extension: 1    Tests   Cervical   Negative VBI.     Left   Negative Spurling's Test A.     Right   Negative Spurling's Test A.     Right Shoulder   Positive drop arm, empty can, full can, Hawkin's and Neer's.   Neuro Exam:     Headaches   Patient reports headaches: No.       Flowsheet Rows      Flowsheet Row Most Recent Value   PT/OT G-Codes    Current Score 29   Projected Score 57               Precautions: Cervical Radiculopathy, Hx of R elbow fracture, CPS       Manuals 12/20            R Shoulder PROM              R Elbow PROM              STM                          Neuro Re-Ed             Scapular Retraction              High Pull             Mid Pull                                                                 Ther Ex             UT Stretching  3x20\"             LS Stretching  3x20\"            Table Slides Flexion X10             Table Slides Scaption             Pendulums Circular Table Support             Pendulums Flx/Ext    "           Pendulums Horiz ABD/ADD             Shoulder Shrugs              AAROM Pulleys              HEP Instruction BM            Ther Activity                                       Gait Training                                       Modalities             Ice             MHP

## 2023-12-26 ENCOUNTER — OFFICE VISIT (OUTPATIENT)
Dept: PHYSICAL THERAPY | Facility: CLINIC | Age: 43
End: 2023-12-26
Payer: COMMERCIAL

## 2023-12-26 DIAGNOSIS — M25.519 CHRONIC SHOULDER PAIN, UNSPECIFIED LATERALITY: Primary | ICD-10-CM

## 2023-12-26 DIAGNOSIS — G89.29 CHRONIC SHOULDER PAIN, UNSPECIFIED LATERALITY: Primary | ICD-10-CM

## 2023-12-26 PROCEDURE — 97110 THERAPEUTIC EXERCISES: CPT

## 2023-12-26 PROCEDURE — 97140 MANUAL THERAPY 1/> REGIONS: CPT

## 2023-12-26 NOTE — PROGRESS NOTES
"Daily Note     Today's date: 2023  Patient name: Nina Joseph  : 1980  MRN: [id number]  Referring provider: Elsi Saunders MD  Dx:   Encounter Diagnosis     ICD-10-CM    1. Chronic shoulder pain, unspecified laterality  M25.519     G89.29           Start Time: 0900  Stop Time: 1000  Total time in clinic (min): 60 minutes    Subjective: Patient reports home exercise program has been going well this far. She notes pain in the R shoulder and elbow this morning.      Objective: See treatment diary below      Assessment: Tolerated treatment well. We progressed the current directed therapeutic exercise program with the addition of R shoulder mobility and scapulothoracic strengthening exercises. Patient tolerated progressions well. She would benefit from continued PT to address functional limitations to return to prior level of function.       Plan: Continue per plan of care.      Precautions: Cervical Radiculopathy, Hx of R elbow fracture, CPS       Manuals            R Shoulder PROM   BM           R Elbow PROM   BM           STM                          Neuro Re-Ed             Scapular Retraction   2x10            High Pull             Mid Pull                                                                 Ther Ex             UT Stretching  3x20\"  3x20\" Hold L            LS Stretching  3x20\" 3x20\" Hold L            Table Slides Flexion X10  10\" Holds x10            Table Slides Scaption  10\" Holds x10            Pendulums Circular Table Support  x20Cw/CCW            Pendulums Flx/Ext   2x10            Pendulums Horiz ABD/ADD  2x10            Shoulder Shrugs              AAROM Pulleys              HEP Instruction BM            Ther Activity                                       Gait Training                                       Modalities             Ice             MHP  5' Pre-exercise                 "

## 2023-12-27 ENCOUNTER — OFFICE VISIT (OUTPATIENT)
Dept: PHYSICAL THERAPY | Facility: CLINIC | Age: 43
End: 2023-12-27
Payer: COMMERCIAL

## 2023-12-27 DIAGNOSIS — M25.519 CHRONIC SHOULDER PAIN, UNSPECIFIED LATERALITY: Primary | ICD-10-CM

## 2023-12-27 DIAGNOSIS — G89.29 CHRONIC SHOULDER PAIN, UNSPECIFIED LATERALITY: Primary | ICD-10-CM

## 2023-12-27 PROCEDURE — 97112 NEUROMUSCULAR REEDUCATION: CPT

## 2023-12-27 PROCEDURE — 97110 THERAPEUTIC EXERCISES: CPT

## 2023-12-27 PROCEDURE — 97140 MANUAL THERAPY 1/> REGIONS: CPT

## 2023-12-27 NOTE — PROGRESS NOTES
"Daily Note     Today's date: 2023  Patient name: Nina Joseph  : 1980  MRN: [id number]  Referring provider: Elsi Saunders MD  Dx:   Encounter Diagnosis     ICD-10-CM    1. Chronic shoulder pain, unspecified laterality  M25.519     G89.29                      Subjective: Patient reports that her pain is present in her L shoulder. Nothing helps with the pain. She had an increase in pain after last session that lasted the full day. Unable to sleep on L side. All planes of movement cause an increase in pain.       Objective: See treatment diary below. No progression today.      Assessment: Tolerated treatment fair. Patient would benefit from continued PT to manage L shoulder pain symptoms and improve L shoulder ROM, strength, and stability for functionality. She is unable to tolerate aggressive exercises. Patient has pain in all planes of movement. Limitations in L shoulder ROM in all planes. Her L elbow is lacking full extension. Complaints of elbow and wrist discomfort t/o PROM.       Plan: Continue per plan of care.      Precautions: Cervical Radiculopathy, Hx of R elbow fracture, CPS       Manuals           R Shoulder PROM   BM CM          R Elbow PROM   BM CM          STM                          Neuro Re-Ed             Scapular Retraction   2x10  2x10           High Pull             Mid Pull                                                                 Ther Ex             UT Stretching  3x20\"  3x20\" Hold L  20\"x3 L hold          LS Stretching  3x20\" 3x20\" Hold L  20\"x3 Hold L          Table Slides Flexion X10  10\" Holds x10  10\" Holds x10           Table Slides Scaption  10\" Holds x10  10\" Holds x10          Pendulums Circular Table Support  x20Cw/CCW  x20Cw/CCW           Pendulums Flx/Ext   2x10  2x10          Pendulums Horiz ABD/ADD  2x10  2x10          Shoulder Shrugs              AAROM Pulleys              HEP Instruction BM            Ther Activity                      "                  Gait Training                                       Modalities             Ice             MHP  5' Pre-exercise

## 2024-01-02 ENCOUNTER — HOSPITAL ENCOUNTER (EMERGENCY)
Facility: HOSPITAL | Age: 44
Discharge: HOME | End: 2024-01-02
Attending: EMERGENCY MEDICINE
Payer: COMMERCIAL

## 2024-01-02 ENCOUNTER — OFFICE VISIT (OUTPATIENT)
Dept: URGENT CARE | Facility: MEDICAL CENTER | Age: 44
End: 2024-01-02
Payer: COMMERCIAL

## 2024-01-02 ENCOUNTER — APPOINTMENT (EMERGENCY)
Dept: CT IMAGING | Facility: HOSPITAL | Age: 44
End: 2024-01-02
Payer: COMMERCIAL

## 2024-01-02 VITALS
BODY MASS INDEX: 31.52 KG/M2 | DIASTOLIC BLOOD PRESSURE: 82 MMHG | TEMPERATURE: 96.8 F | WEIGHT: 177.91 LBS | SYSTOLIC BLOOD PRESSURE: 138 MMHG | OXYGEN SATURATION: 96 % | RESPIRATION RATE: 18 BRPM | HEART RATE: 84 BPM

## 2024-01-02 VITALS
HEIGHT: 63 IN | BODY MASS INDEX: 31.54 KG/M2 | HEART RATE: 82 BPM | WEIGHT: 178 LBS | SYSTOLIC BLOOD PRESSURE: 122 MMHG | OXYGEN SATURATION: 99 % | TEMPERATURE: 98.4 F | RESPIRATION RATE: 18 BRPM | DIASTOLIC BLOOD PRESSURE: 78 MMHG

## 2024-01-02 DIAGNOSIS — R22.0 LEFT FACIAL SWELLING: ICD-10-CM

## 2024-01-02 DIAGNOSIS — S09.93XA FACIAL INJURY, INITIAL ENCOUNTER: Primary | ICD-10-CM

## 2024-01-02 DIAGNOSIS — S05.12XA ORBITAL CONTUSION, LEFT, INITIAL ENCOUNTER: Primary | ICD-10-CM

## 2024-01-02 PROCEDURE — 70486 CT MAXILLOFACIAL W/O DYE: CPT

## 2024-01-02 PROCEDURE — 99284 EMERGENCY DEPT VISIT MOD MDM: CPT | Performed by: PHYSICIAN ASSISTANT

## 2024-01-02 PROCEDURE — 99212 OFFICE O/P EST SF 10 MIN: CPT

## 2024-01-02 PROCEDURE — 99284 EMERGENCY DEPT VISIT MOD MDM: CPT

## 2024-01-02 PROCEDURE — G1004 CDSM NDSC: HCPCS

## 2024-01-02 NOTE — PROGRESS NOTES
[facility]        NAME: Nina Joseph is a 43 y.o. female  : 1980    MRN: [id number]  DATE: 2024  TIME: 5:28 PM    Assessment and Plan   Facial injury, initial encounter [S09.93XA]  1. Facial injury, initial encounter  Transfer to other facility      2. Left facial swelling  Transfer to other facility            Patient Instructions       Follow up with PCP in 3-5 days.  Proceed to  ER if symptoms worsen.    Chief Complaint     Chief Complaint   Patient presents with   • Facial Injury     Tripped and hit the counter top , busing noted around the left eye           History of Present Illness       ALEXIS the patient was playing with dogs when she tripped over her two feet and her left eye/face struck the countertop. (Denies any alcohol involved at the time of the fall). She landed on her knees and has bruising on her knees without significant injury. It was a witnessed fall. There was no LOC. There was no bleeding or open skin on the face at the time of the injury. She noticed ecchymosis within a few hours. Today she noted ecchymosis around the eye. No dizziness. Slight headache.  Taking tylenol Q6 hours but has not taken any today.     Headache since time of injury has gotten worse despite taking tylenol and applying ice. Pain and tenderness has spread to the other side of her face.     Given physical exam and patient's reported worsening of symptoms since time of injury.  Recommended she be evaluated in the emergency department for potential improved imaging options if necessary.  I have explained to her that it is at the emergency department's discretion whether they do additional imaging however felt that at very least a second opinion was worth her visit there to assure she has no acute issues.  Patient agreeable to this plan.  She does have a friend who brought her here who will drive her to the ER.        Review of Systems   Review of Systems   Constitutional:  Negative for appetite  change, chills, fatigue and fever.   HENT:  Positive for facial swelling. Negative for ear pain, sore throat and trouble swallowing.    Eyes:  Negative for photophobia, pain, discharge, redness, itching and visual disturbance.   Cardiovascular:  Negative for chest pain and palpitations.   Gastrointestinal:  Negative for abdominal pain, constipation, diarrhea, nausea and vomiting.   Musculoskeletal:  Negative for arthralgias and back pain.   Skin:  Negative for color change and rash.   Neurological:  Positive for headaches. Negative for dizziness, facial asymmetry, speech difficulty and light-headedness.   All other systems reviewed and are negative.        Current Medications       Current Outpatient Medications:   •  ciclopirox (PENLAC) 8 % solution, Apply topically daily at bedtime, Disp: 6.6 mL, Rfl: 0  •  Diclofenac Sodium (VOLTAREN) 1 %, Apply 2 g topically 4 (four) times a day, Disp: 2 g, Rfl: 0  •  fluticasone (FLONASE) 50 mcg/act nasal spray, 2 sprays into each nostril daily, Disp: 11.1 mL, Rfl: 0  •  hydrOXYzine HCL (ATARAX) 10 mg tablet, , Disp: , Rfl:   •  ketoconazole (NIZORAL) 2 % cream, Apply topically daily, Disp: 60 g, Rfl: 2  •  LORazepam (ATIVAN) 0.5 mg tablet, Take 1-2 about 30 minutes prior to the MRI, Disp: 2 tablet, Rfl: 0  •  sertraline (ZOLOFT) 100 mg tablet, Take 100 mg by mouth daily, Disp: , Rfl:   •  topiramate (TOPAMAX) 25 mg sprinkle capsule, Take 25 mg by mouth 2 (two) times a day, Disp: , Rfl:     Current Allergies     Allergies as of 01/02/2024 - Reviewed 01/02/2024   Allergen Reaction Noted   • Clonazepam Other (See Comments) 09/05/2019   • Latex  02/25/2019   • Methylphenidate Other (See Comments) 07/28/2020            The following portions of the patient's history were reviewed and updated as appropriate: allergies, current medications, past family history, past medical history, past social history, past surgical history and problem list.     Past Medical History:   Diagnosis  "Date   • Anxiety    • Bipolar depression (HCC)    • Chronic pelvic pain in female 2021   • Endometrial hyperplasia without atypia 2021   • Hypertension    • Kidney stone    • Menometrorrhagia 2021       Past Surgical History:   Procedure Laterality Date   •  SECTION     • EPIDURAL BLOCK INJECTION Right 10/19/2023    Procedure: Right C7-T1 ILESI;  Surgeon: Elsi Saunders MD;  Location: [facility];  Service: Pain Management    • HYSTERECTOMY  2021       Family History   Problem Relation Age of Onset   • Heart disease Father    • No Known Problems Sister    • No Known Problems Daughter    • No Known Problems Maternal Grandmother    • No Known Problems Maternal Grandfather    • No Known Problems Paternal Grandmother    • No Known Problems Paternal Grandfather    • No Known Problems Son    • No Known Problems Son    • No Known Problems Son    • No Known Problems Maternal Aunt    • No Known Problems Maternal Aunt    • No Known Problems Paternal Aunt    • No Known Problems Paternal Aunt          Medications have been verified.        Objective   /78   Pulse 82   Temp 98.4 °F (36.9 °C)   Resp 18   Ht 5' 3\" (1.6 m)   Wt 80.7 kg (178 lb)   LMP 2020   SpO2 99%   BMI 31.53 kg/m²        Physical Exam     Physical Exam  Vitals and nursing note reviewed.   Constitutional:       General: She is awake. She is not in acute distress.     Appearance: Normal appearance. She is well-developed, well-groomed and normal weight. She is not ill-appearing.   HENT:      Head: Normocephalic. Contusion present. No Saab's sign.        Comments: Pain/tenderness of the nasal bridge. She also has pain in the forehead and up over the top of the head and mid line. Pressure feeling. She denies any change in her vision but does wear glasses which she was wearing at the time of the fall. She does not take any blood thinners. She also has pain in the jaw which subsided since onset. Able to " open/close without pain.   Pain and tenderness noted on diagram in area of blue marking.      Right Ear: Ear canal and external ear normal. A middle ear effusion is present.      Left Ear: Ear canal and external ear normal. A middle ear effusion is present.      Nose: Signs of injury and nasal tenderness present. No rhinorrhea.      Right Nostril: No epistaxis or septal hematoma.      Left Nostril: No epistaxis or septal hematoma.      Mouth/Throat:      Lips: Pink.      Mouth: Mucous membranes are moist.      Pharynx: Oropharynx is clear.      Comments: Pain in forehead when she bites down on tongue depressor   Eyes:      General: Lids are normal.      Extraocular Movements: Extraocular movements intact.      Right eye: Normal extraocular motion and no nystagmus.      Left eye: Abnormal extraocular motion present. No nystagmus.      Conjunctiva/sclera: Conjunctivae normal.      Pupils: Pupils are equal, round, and reactive to light.      Visual Fields: Right eye visual fields normal and left eye visual fields normal.      Comments: Reports pain with moving her eyes. Pain and inability to look to the left fully. When attempting to have her look to the left to follow an object she develops pain and eye falls back to midline. She is also having difficulty with focusing.    Cardiovascular:      Rate and Rhythm: Normal rate and regular rhythm.      Pulses: Normal pulses.      Heart sounds: Normal heart sounds, S1 normal and S2 normal. No murmur heard.  Pulmonary:      Effort: Pulmonary effort is normal.      Breath sounds: Normal breath sounds.   Musculoskeletal:         General: Normal range of motion.      Cervical back: Full passive range of motion without pain, normal range of motion and neck supple. No pain with movement or spinous process tenderness.   Skin:     General: Skin is warm and dry.      Capillary Refill: Capillary refill takes less than 2 seconds.      Findings: Abrasion, bruising and ecchymosis  present.      Comments: Bilateral knee bruising/abrasions.    Neurological:      General: No focal deficit present.      Mental Status: She is alert and oriented to person, place, and time.   Psychiatric:         Mood and Affect: Mood normal.         Behavior: Behavior normal. Behavior is cooperative.

## 2024-01-03 ENCOUNTER — OFFICE VISIT (OUTPATIENT)
Dept: PHYSICAL THERAPY | Facility: CLINIC | Age: 44
End: 2024-01-03
Payer: COMMERCIAL

## 2024-01-03 DIAGNOSIS — M25.519 CHRONIC SHOULDER PAIN, UNSPECIFIED LATERALITY: Primary | ICD-10-CM

## 2024-01-03 DIAGNOSIS — G89.29 CHRONIC SHOULDER PAIN, UNSPECIFIED LATERALITY: Primary | ICD-10-CM

## 2024-01-03 PROCEDURE — 97140 MANUAL THERAPY 1/> REGIONS: CPT

## 2024-01-03 PROCEDURE — 97110 THERAPEUTIC EXERCISES: CPT

## 2024-01-03 NOTE — ED NOTES
Patient approaching this RN asking for update. Niranjan Higgins made aware.     Ary Bear, CHRISTINE  01/02/24 2129

## 2024-01-03 NOTE — PROGRESS NOTES
"Daily Note     Today's date: 1/3/2024  Patient name: Nina Joseph  : 1980  MRN: [id number]  Referring provider: Greg Watt DO  Dx:   Encounter Diagnosis     ICD-10-CM    1. Chronic shoulder pain, unspecified laterality  M25.519     G89.29           Start Time: 0930  Stop Time: 1025  Total time in clinic (min): 55 minutes    Subjective: Patient reports pain in the R shoulder this morning. She notes over the weekend she was playing with dogs when she tripped over her feet and fell into her countertop striking her face as well as left eye. She states visiting the ED yesterday and had CT imaging performed that was unremarkable.      Objective: See treatment diary below      Assessment: Tolerated treatment well. We progressed the current directed therapeutic exercise program with the addition of scapulothoracic strengthening and AAROM exercises. Patient tolerated progressions well with slight discomfort in the R shoulder. We will continue to progress patient within tolerance. She would benefit from continued PT.      Plan: Continue per plan of care.      Precautions: Cervical Radiculopathy, Hx of R elbow fracture, CPS       Manuals 12/20 12/26 12/27 1/3         R Shoulder PROM   BM CM BM         R Elbow PROM   BM CM BM         STM                          Neuro Re-Ed             Scapular Retraction   2x10  2x10  2x10         High Pull             Mid Pull                                                                 Ther Ex             UT Stretching  3x20\"  3x20\" Hold L  20\"x3 L hold 20\"x3 L Hold          LS Stretching  3x20\" 3x20\" Hold L  20\"x3 Hold L 20\"x3 Hold L         Table Slides Flexion X10  10\" Holds x10  10\" Holds x10  10\" Holds x10          Table Slides Scaption  10\" Holds x10  10\" Holds x10 10\" Holds x10          Pendulums Circular Table Support  x20Cw/CCW  X20 CW/CCW  X20 CW/CCW         Pendulums Flx/Ext   2x10  2x10 2x10         Pendulums Horiz ABD/ADD  2x10  2x10 2x10          Shoulder Shrugs    "  X20          AAROM Pulleys     2x10          HEP Instruction BM            Ther Activity                                       Gait Training                                       Modalities             Ice    10' Post TE         MHP  5' Pre-exercise

## 2024-01-03 NOTE — ED PROVIDER NOTES
History  Chief Complaint   Patient presents with    Head Injury     Pt fell 2023 striking face against countertop seen at urgent care today for left eye injury and sent here for further workup     This is a pleasant 43-year-old female who is here for evaluation of facial trauma.  She states 2 days ago she tripped causing her to fall forward striking her head off of a piece of stationary cabinetry.  She has had pain bruising swelling in the region since was seen in urgent care who recommended ER evaluation.  She is nontoxic-appearing here at bedside no blurred vision double vision.  Denies any penetrating trauma.        Prior to Admission Medications   Prescriptions Last Dose Informant Patient Reported? Taking?   Diclofenac Sodium (VOLTAREN) 1 %  Self No No   Sig: Apply 2 g topically 4 (four) times a day   LORazepam (ATIVAN) 0.5 mg tablet  Self No No   Sig: Take 1-2 about 30 minutes prior to the MRI   ciclopirox (PENLAC) 8 % solution  Self No No   Sig: Apply topically daily at bedtime   fluticasone (FLONASE) 50 mcg/act nasal spray  Self No No   Si sprays into each nostril daily   hydrOXYzine HCL (ATARAX) 10 mg tablet  Self Yes No   ketoconazole (NIZORAL) 2 % cream  Self No No   Sig: Apply topically daily   sertraline (ZOLOFT) 100 mg tablet  Self Yes No   Sig: Take 100 mg by mouth daily   topiramate (TOPAMAX) 25 mg sprinkle capsule  Self Yes No   Sig: Take 25 mg by mouth 2 (two) times a day      Facility-Administered Medications: None       Past Medical History:   Diagnosis Date    Anxiety     Bipolar depression (HCC)     Chronic pelvic pain in female 2021    Endometrial hyperplasia without atypia 2021    Hypertension     Kidney stone     Menometrorrhagia 2021       Past Surgical History:   Procedure Laterality Date     SECTION      EPIDURAL BLOCK INJECTION Right 10/19/2023    Procedure: Right C7-T1 ILESI;  Surgeon: Elsi Saunders MD;  Location: [facility];  Service: Pain  Management     HYSTERECTOMY  04/09/2021       Family History   Problem Relation Age of Onset    Heart disease Father     No Known Problems Sister     No Known Problems Daughter     No Known Problems Maternal Grandmother     No Known Problems Maternal Grandfather     No Known Problems Paternal Grandmother     No Known Problems Paternal Grandfather     No Known Problems Son     No Known Problems Son     No Known Problems Son     No Known Problems Maternal Aunt     No Known Problems Maternal Aunt     No Known Problems Paternal Aunt     No Known Problems Paternal Aunt      I have reviewed and agree with the history as documented.    E-Cigarette/Vaping    E-Cigarette Use Former User     Cartridges/Day 0.5 ppd      E-Cigarette/Vaping Substances    Nicotine No     THC No     CBD No     Flavoring No     Other No     Unknown No      Social History     Tobacco Use    Smoking status: Every Day     Current packs/day: 0.50     Average packs/day: 0.5 packs/day for 28.0 years (14.0 ttl pk-yrs)     Types: Cigarettes    Smokeless tobacco: Never   Vaping Use    Vaping status: Former   Substance Use Topics    Alcohol use: Never    Drug use: Never       Review of Systems   Constitutional:  Negative for chills and fever.   HENT:  Negative for ear pain and sore throat.         Left-sided facial bruising swelling   Eyes:  Negative for pain and visual disturbance.   Respiratory:  Negative for cough and shortness of breath.    Cardiovascular:  Negative for chest pain and palpitations.   Gastrointestinal:  Negative for abdominal pain and vomiting.   Genitourinary:  Negative for dysuria and hematuria.   Musculoskeletal:  Negative for arthralgias and back pain.   Skin:  Negative for color change and rash.   Neurological:  Negative for seizures and syncope.   All other systems reviewed and are negative.      Physical Exam  Physical Exam  Vitals reviewed.   Constitutional:       General: She is not in acute distress.     Appearance: Normal  appearance. She is not ill-appearing, toxic-appearing or diaphoretic.   HENT:      Head: Normocephalic and atraumatic.      Right Ear: Tympanic membrane, ear canal and external ear normal. There is no impacted cerumen.      Left Ear: Tympanic membrane, ear canal and external ear normal. There is no impacted cerumen.      Nose: No congestion or rhinorrhea.      Comments: Negative septal hematoma.     Mouth/Throat:      Pharynx: No oropharyngeal exudate or posterior oropharyngeal erythema.   Eyes:      General: No scleral icterus.        Right eye: No discharge.         Left eye: No discharge.      Extraocular Movements: Extraocular movements intact.      Conjunctiva/sclera: Conjunctivae normal.      Pupils: Pupils are equal, round, and reactive to light.      Comments: There is left-sided periorbital swelling tenderness contusion there is no ocular involvement in terms of the sclera over the globe.  Extraocular muscles/movements are intact.   Cardiovascular:      Rate and Rhythm: Normal rate.      Pulses: Normal pulses.   Pulmonary:      Effort: Pulmonary effort is normal. No respiratory distress.      Breath sounds: No stridor. No wheezing, rhonchi or rales.   Chest:      Chest wall: No tenderness.   Musculoskeletal:         General: No deformity or signs of injury.      Cervical back: Normal range of motion. No rigidity.   Skin:     General: Skin is warm.      Coloration: Skin is not jaundiced.      Findings: No lesion or rash.   Neurological:      General: No focal deficit present.      Mental Status: She is alert and oriented to person, place, and time. Mental status is at baseline.      Gait: Gait normal.   Psychiatric:         Mood and Affect: Mood normal.         Thought Content: Thought content normal.         Judgment: Judgment normal.         Vital Signs  ED Triage Vitals [01/02/24 1835]   Temperature Pulse Respirations Blood Pressure SpO2   (!) 96.8 °F (36 °C) 84 18 138/82 96 %      Temp Source Heart Rate  Source Patient Position - Orthostatic VS BP Location FiO2 (%)   Temporal Monitor Sitting Right arm --      Pain Score       8           Vitals:    01/02/24 1835   BP: 138/82   Pulse: 84   Patient Position - Orthostatic VS: Sitting         Visual Acuity  Visual Acuity      Flowsheet Row Most Recent Value   L Pupil Size (mm) 3   R Pupil Size (mm) 3            ED Medications  Medications - No data to display    Diagnostic Studies  Results Reviewed       None                   CT facial bones without contrast   Final Result by Dylan Duran MD (01/02 2114)      No acute maxillofacial fracture.      No orbital hematoma.         Workstation performed: [id number]                    Procedures  Procedures         ED Course  ED Course as of 01/02/24 2205 Tue Jan 02, 2024 1947 SpO2: 96 %   1947 Respirations: 18   1947 Pulse: 84   1947 Temperature(!): 96.8 °F (36 °C)   1947 Blood Pressure: 138/82  Vital signs reviewed within normal limits   2131 IMPRESSION:     No acute maxillofacial fracture.     No orbital hematoma.                                 SBIRT 22yo+      Flowsheet Row Most Recent Value   Initial Alcohol Screen: US AUDIT-C     1. How often do you have a drink containing alcohol? 0 Filed at: 01/02/2024 1838   2. How many drinks containing alcohol do you have on a typical day you are drinking?  0 Filed at: 01/02/2024 1838   3b. FEMALE Any Age, or MALE 65+: How often do you have 4 or more drinks on one occassion? 0 Filed at: 01/02/2024 1838   Audit-C Score 0 Filed at: 01/02/2024 1838   LE: How many times in the past year have you...    Used an illegal drug or used a prescription medication for non-medical reasons? Never Filed at: 01/02/2024 1838                      Medical Decision Making  43-year-old female sent in by urgent care for evaluation of facial trauma, see HPI for further details has pain swelling contusion left ocular region.  She will be evaluated via CT for evaluation of ocular nasal fracture.    CT  scan was negative for acute fracture, recommend ice Tylenol for pain follow-up with primary care.    Amount and/or Complexity of Data Reviewed  Radiology: ordered.             Disposition  Final diagnoses:   Orbital contusion, left, initial encounter     Time reflects when diagnosis was documented in both MDM as applicable and the Disposition within this note       Time User Action Codes Description Comment    1/2/2024  9:31 PM Niranjan Higgins Add [S05.12XA] Orbital contusion, left, initial encounter           ED Disposition       ED Disposition   Discharge    Condition   Stable    Date/Time   Tue Jan 2, 2024 2131    Comment   Nina Ledesmadeny discharge to home/self care.                   Follow-up Information       Follow up With Specialties Details Why Contact Info    Lo Miles MD Family Medicine Schedule an appointment as soon as possible for a visit  As needed [facility] [facility] [address]  [phone]              Discharge Medication List as of 1/2/2024  9:33 PM        CONTINUE these medications which have NOT CHANGED    Details   ciclopirox (PENLAC) 8 % solution Apply topically daily at bedtime, Starting Mon 11/6/2023, Normal      Diclofenac Sodium (VOLTAREN) 1 % Apply 2 g topically 4 (four) times a day, Starting Thu 10/12/2023, Normal      fluticasone (FLONASE) 50 mcg/act nasal spray 2 sprays into each nostril daily, Starting Mon 11/21/2022, Normal      hydrOXYzine HCL (ATARAX) 10 mg tablet Historical Med      ketoconazole (NIZORAL) 2 % cream Apply topically daily, Starting Mon 11/6/2023, Normal      LORazepam (ATIVAN) 0.5 mg tablet Take 1-2 about 30 minutes prior to the MRI, Normal      sertraline (ZOLOFT) 100 mg tablet Take 100 mg by mouth daily, Starting Wed 4/5/2023, Historical Med      topiramate (TOPAMAX) 25 mg sprinkle capsule Take 25 mg by mouth 2 (two) times a day, Starting Wed 4/5/2023, Historical Med             No discharge procedures on file.    PDMP Review         Value  Time User    PDMP Reviewed  Yes 11/28/2023  9:47 AM Lo Miles MD            ED Provider  Electronically Signed by             Niranjan Higgins PA-C  01/02/24 4079

## 2024-01-05 ENCOUNTER — OFFICE VISIT (OUTPATIENT)
Dept: PHYSICAL THERAPY | Facility: CLINIC | Age: 44
End: 2024-01-05
Payer: COMMERCIAL

## 2024-01-05 DIAGNOSIS — M25.519 CHRONIC SHOULDER PAIN, UNSPECIFIED LATERALITY: Primary | ICD-10-CM

## 2024-01-05 DIAGNOSIS — G89.29 CHRONIC SHOULDER PAIN, UNSPECIFIED LATERALITY: Primary | ICD-10-CM

## 2024-01-05 PROCEDURE — 97110 THERAPEUTIC EXERCISES: CPT

## 2024-01-05 PROCEDURE — 97140 MANUAL THERAPY 1/> REGIONS: CPT

## 2024-01-05 NOTE — PROGRESS NOTES
"Daily Note     Today's date: 2024  Patient name: Nina Joseph  : 1980  MRN: [id number]  Referring provider: Greg Watt DO  Dx:   Encounter Diagnosis     ICD-10-CM    1. Chronic shoulder pain, unspecified laterality  M25.519     G89.29           Start Time: 1100  Stop Time: 1140  Total time in clinic (min): 40 minutes    Subjective: Patient reports continued R shoulder pain. She notes nothing seems to help with the pain.       Objective: See treatment diary below      Assessment: Tolerated treatment fair. Patient has mild discomfort in the R shoulder with completion of the directed therapeutic exercise program and manual therapy. Patient would benefit from continued PT to address functional limitations to return to prior level of function.       Plan: Continue per plan of care.      Precautions: Cervical Radiculopathy, Hx of R elbow fracture, CPS       Manuals 12/20 12/26 12/27 1/3 1/5       R Shoulder PROM   BM CM BM BM        R Elbow PROM   BM CM BM BM        STM                        Neuro Re-Ed            Scapular Retraction   2x10  2x10  2x10 2x10        High Pull            Mid Pull                                                            Ther Ex            UT Stretching  3x20\"  3x20\" Hold L  20\"x3 L hold 20\"x3 L Hold  20\" x3 L Hold        LS Stretching  3x20\" 3x20\" Hold L  20\"x3 Hold L 20\"x3 Hold L 20\" Hold x 3 L        Table Slides Flexion X10  10\" Holds x10  10\" Holds x10  10\" Holds x10  10\" Holds x10        Table Slides Scaption  10\" Holds x10  10\" Holds x10 10\" Holds x10  10\" Holds x10        Pendulums Circular Table Support  x20Cw/CCW  X20 CW/CCW  X20 CW/CCW X20 CW/CCW       Pendulums Flx/Ext   2x10  2x10 2x10 2x10       Pendulums Horiz ABD/ADD  2x10  2x10 2x10  2x10       Shoulder Shrugs     X20  X20        AAROM Pulleys     2x10  2x10        HEP Instruction BM           Ther Activity                                    Gait Training                                    Modalities          "   Ice    10' Post TE Decline        MHP  5' Pre-exercise

## 2024-01-08 ENCOUNTER — OFFICE VISIT (OUTPATIENT)
Dept: PHYSICAL THERAPY | Facility: CLINIC | Age: 44
End: 2024-01-08
Payer: COMMERCIAL

## 2024-01-08 DIAGNOSIS — G89.29 CHRONIC SHOULDER PAIN, UNSPECIFIED LATERALITY: Primary | ICD-10-CM

## 2024-01-08 DIAGNOSIS — M25.519 CHRONIC SHOULDER PAIN, UNSPECIFIED LATERALITY: Primary | ICD-10-CM

## 2024-01-08 PROCEDURE — 97140 MANUAL THERAPY 1/> REGIONS: CPT

## 2024-01-08 PROCEDURE — 97110 THERAPEUTIC EXERCISES: CPT

## 2024-01-08 NOTE — PROGRESS NOTES
"Daily Note     Today's date: 2024  Patient name: Nina Joseph  : 1980  MRN: [id number]  Referring provider: Greg Watt DO  Dx:   Encounter Diagnosis     ICD-10-CM    1. Chronic shoulder pain, unspecified laterality  M25.519     G89.29           Start Time: 1100  Stop Time: 1155  Total time in clinic (min): 55 minutes    Subjective: Patient reports continued R shoulder pain. She notes swelling into her fingers this morning.       Objective: See treatment diary below      Assessment: Tolerated treatment well. We progressed the current directed therapeutic exercise program with the addition of R shoulder AAROM and scapulothoracic strengthening. Patient tolerated progressions well with slight discomfort in the R shoulder. We will continue to progress patient within tolerance. She would benefit from continued PT.      Plan: Continue per plan of care.      Precautions: Cervical Radiculopathy, Hx of R elbow fracture, CPS       Manuals 12/20 12/26 12/27 1/3 1/5 1/8      R Shoulder PROM   BM CM BM BM  BM      R Elbow PROM   BM CM BM BM  BM      STM                        Neuro Re-Ed            Scapular Retraction   2x10  2x10  2x10 2x10  HEP       High Pull      L2 2x10       Mid Pull      L2 2x10                                                       Ther Ex            UT Stretching  3x20\"  3x20\" Hold L  20\"x3 L hold 20\"x3 L Hold  20\" x3 L Hold  HEP      LS Stretching  3x20\" 3x20\" Hold L  20\"x3 Hold L 20\"x3 Hold L 20\" Hold x 3 L  HEP      Table Slides Flexion X10  10\" Holds x10  10\" Holds x10  10\" Holds x10  10\" Holds x10  10\" Holds x10       Table Slides Scaption  10\" Holds x10  10\" Holds x10 10\" Holds x10  10\" Holds x10  10\" Holds x10       Pendulums Circular Table Support  x20Cw/CCW  X20 CW/CCW  X20 CW/CCW X20 CW/CCW HEP      Pendulums Flx/Ext   2x10  2x10 2x10 2x10 HEP      Pendulums Horiz ABD/ADD  2x10  2x10 2x10  2x10 HEP       Supine Wand Flexion       2x10       Supine Shoulder Circles CW/CCW          "   Serratus Anterior Punches             Standing Wand Abduction       2x10       Shoulder Shrugs     X20  X20  1# 2x10       AAROM Pulleys     2x10  2x10  3x10       CC DRE            Machine Retractions             HEP Instruction BM           Ther Activity                                    Gait Training                                    Modalities            Ice    10' Post TE Decline        MHP  5' Pre-exercise     10' Pre-exercise

## 2024-01-10 ENCOUNTER — OFFICE VISIT (OUTPATIENT)
Dept: PHYSICAL THERAPY | Facility: CLINIC | Age: 44
End: 2024-01-10
Payer: COMMERCIAL

## 2024-01-10 DIAGNOSIS — M25.519 CHRONIC SHOULDER PAIN, UNSPECIFIED LATERALITY: Primary | ICD-10-CM

## 2024-01-10 DIAGNOSIS — G89.29 CHRONIC SHOULDER PAIN, UNSPECIFIED LATERALITY: Primary | ICD-10-CM

## 2024-01-10 PROCEDURE — 97140 MANUAL THERAPY 1/> REGIONS: CPT

## 2024-01-10 PROCEDURE — 97110 THERAPEUTIC EXERCISES: CPT

## 2024-01-10 NOTE — PROGRESS NOTES
"Daily Note     Today's date: 1/10/2024  Patient name: Nina Joseph  : 1980  MRN: [id number]  Referring provider: Greg Watt DO  Dx:   Encounter Diagnosis     ICD-10-CM    1. Chronic shoulder pain, unspecified laterality  M25.519     G89.29           Start Time: 0900  Stop Time: 0955  Total time in clinic (min): 55 minutes    Subjective: Patient reports continued chronic R shoulder pain. She notes pain is in the top of the R shoulder and into the R elbow.       Objective: See treatment diary below      Assessment: Tolerated treatment fair. Patient demonstrates R shoulder symptomatology with performance of R shoulder AAROM. She demonstrates R shoulder PROM WNL. We initiated soft tissue mobilization to the R proximal biceps tendon and RTC musculature. Patient responded well to this. We will look to progress patient within tolerance to address functional limitations to return to functional independence with the RUE.      Plan: Continue per plan of care.      Precautions: Cervical Radiculopathy, Hx of R elbow fracture, CPS       Manuals 12/20 12/26 12/27 1/3 1/5 1/8 1/10     R Shoulder PROM   BM CM BM BM  BM BM     R Elbow PROM   BM CM BM BM  BM BM     STM R shoulder        BM                 Neuro Re-Ed            High Pull      L2 2x10  L2 2x10      Mid Pull      L2 2x10  L2 2x10                                                      Ther Ex            Table Slides Flexion X10  10\" Holds x10  10\" Holds x10  10\" Holds x10  10\" Holds x10  10\" Holds x10  10\" Holds x10     Table Slides Scaption  10\" Holds x10  10\" Holds x10 10\" Holds x10  10\" Holds x10  10\" Holds x10  10\" Holds x10      Supine Wand Flexion       2x10  2x10     Wall Slides            Supine Shoulder Circles CW/CCW            Serratus Anterior Punches             Standing Wand Abduction       2x10  2x10      Shoulder Shrugs     X20  X20  1# 2x10  1# 2x10      AAROM Pulleys     2x10  2x10  3x10  3x10      CC DRE            Machine Retractions          "    HEP Instruction BM           Ther Activity                                    Gait Training                                    Modalities            Ice    10' Post TE Decline        MHP  5' Pre-exercise     10' Pre-exercise 10' Pre-exercise

## 2024-01-15 ENCOUNTER — OFFICE VISIT (OUTPATIENT)
Dept: PHYSICAL THERAPY | Facility: CLINIC | Age: 44
End: 2024-01-15
Payer: COMMERCIAL

## 2024-01-15 DIAGNOSIS — M25.519 CHRONIC SHOULDER PAIN, UNSPECIFIED LATERALITY: Primary | ICD-10-CM

## 2024-01-15 DIAGNOSIS — G89.29 CHRONIC SHOULDER PAIN, UNSPECIFIED LATERALITY: Primary | ICD-10-CM

## 2024-01-15 PROCEDURE — 97110 THERAPEUTIC EXERCISES: CPT

## 2024-01-15 PROCEDURE — 97140 MANUAL THERAPY 1/> REGIONS: CPT

## 2024-01-15 NOTE — PROGRESS NOTES
"Daily Note     Today's date: 1/15/2024  Patient name: Nina Joseph  : 1980  MRN: [id number]  Referring provider: Greg Watt DO  Dx:   Encounter Diagnosis     ICD-10-CM    1. Chronic shoulder pain, unspecified laterality  M25.519     G89.29           Start Time: 925  Stop Time: 1020  Total time in clinic (min): 55 minutes    Subjective: Patient reports R shoulder pain continues with no improvement or worsening of symptoms.      Objective: See treatment diary below      Assessment: Tolerated treatment fair. We progressed the current directed therapeutic exercise program with the addition of AAROM exercise. Patient tolerated progressions with a moderate increase in R shoulder discomfort. She would benefit from continued PT to address functional deficits with the R arm.       Plan: Continue per plan of care.      Precautions: Cervical Radiculopathy, Hx of R elbow fracture, CPS       Manuals 12/20 12/26 12/27 1/3 1/5 1/8 1/10 1/15   R Shoulder PROM   BM CM BM BM  BM BM BM   R Elbow PROM   BM CM BM BM  BM BM BM   STM R shoulder        BM BM              Neuro Re-Ed           High Pull      L2 2x10  L2 2x10  L2 2x10    Mid Pull      L2 2x10  L2 2x10  L2 2x10                                                Ther Ex           Table Slides Flexion X10  10\" Holds x10  10\" Holds x10  10\" Holds x10  10\" Holds x10  10\" Holds x10  10\" Holds x10 10\" Holds x10    Table Slides Scaption  10\" Holds x10  10\" Holds x10 10\" Holds x10  10\" Holds x10  10\" Holds x10  10\" Holds x10  10\" Holds x10    Supine Wand Flexion       2x10  2x10    Wall Slides        X8 Flx AAROM   Supine Shoulder Circles CW/CCW           Serratus Anterior Punches            Standing Wand Abduction       2x10  2x10  2x10    Shoulder Shrugs     X20  X20  1# 2x10  1# 2x10  2# 2x10    AAROM Pulleys     2x10  2x10  3x10  3x10  3x10   CC DRE           Machine Retractions            HEP Instruction BM          Ther Activity                                 Gait " Training                                 Modalities           Ice    10' Post TE Decline       MHP  5' Pre-exercise     10' Pre-exercise 10' Pre-exercise  10' Pre-exercise

## 2024-01-17 ENCOUNTER — OFFICE VISIT (OUTPATIENT)
Dept: PHYSICAL THERAPY | Facility: CLINIC | Age: 44
End: 2024-01-17
Payer: COMMERCIAL

## 2024-01-17 DIAGNOSIS — M25.519 CHRONIC SHOULDER PAIN, UNSPECIFIED LATERALITY: Primary | ICD-10-CM

## 2024-01-17 DIAGNOSIS — G89.29 CHRONIC SHOULDER PAIN, UNSPECIFIED LATERALITY: Primary | ICD-10-CM

## 2024-01-17 PROCEDURE — 97140 MANUAL THERAPY 1/> REGIONS: CPT

## 2024-01-17 PROCEDURE — 97112 NEUROMUSCULAR REEDUCATION: CPT

## 2024-01-17 PROCEDURE — 97110 THERAPEUTIC EXERCISES: CPT

## 2024-01-17 NOTE — PROGRESS NOTES
"Daily Note     Today's date: 2024  Patient name: Nina Joseph  : 1980  MRN: [id number]  Referring provider: Greg Watt DO  Dx:   Encounter Diagnosis     ICD-10-CM    1. Chronic shoulder pain, unspecified laterality  M25.519     G89.29                      Subjective: Patient reports that she has R shoulder pain today. An arthrogram and MRI is scheduled for .       Objective: See treatment diary below.       Assessment: Tolerated treatment fair. Patient would benefit from continued PT to improve R shoulder/R UE mobility, stability, strength, and function deficits. Patient is unable to tolerate aggressive stretching and straightening at this time. She tolerates current program w/ a moderate increase in discomfort t/o all planes of motion. STM provided to R post RTC, and UT/levator musculature to provide a decrease in pain symptoms today.       Plan: Continue per plan of care.      Precautions: Cervical Radiculopathy, Hx of R elbow fracture, CPS       Manuals 1/17 12/26 12/27 1/3 1/5 1/8 1/10 1/15   R Shoulder PROM  BM BM CM BM BM  BM BM BM   R Elbow PROM  BM BM CM BM BM  BM BM BM   STM R shoulder  CM      BM BM              Neuro Re-Ed           High Pull L2 2x10     L2 2x10  L2 2x10  L2 2x10    Mid Pull L2 2x10     L2 2x10  L2 2x10  L2 2x10                                                Ther Ex           Table Slides Flexion 10\"x10 10\" Holds x10  10\" Holds x10  10\" Holds x10  10\" Holds x10  10\" Holds x10  10\" Holds x10 10\" Holds x10    Table Slides Scaption 10\"x10  10\" Holds x10  10\" Holds x10 10\" Holds x10  10\" Holds x10  10\" Holds x10  10\" Holds x10  10\" Holds x10    Supine Wand Flexion  2x10     2x10  2x10    Wall Slides X10  Flx AAROM       X8 Flx AAROM   Supine Shoulder Circles CW/CCW           Serratus Anterior Punches            Standing Wand Abduction  2x10     2x10  2x10  2x10    Shoulder Shrugs  2# 2x10   X20  X20  1# 2x10  1# 2x10  2# 2x10    AAROM Pulleys  3x10   2x10  2x10  3x10  " 3x10  3x10   CC DRE           Machine Retractions            HEP Instruction           Ther Activity                                 Gait Training                                 Modalities           Ice    10' Post TE Decline       MHP 10' pre-exercise  5' Pre-exercise     10' Pre-exercise 10' Pre-exercise  10' Pre-exercise

## 2024-01-19 ENCOUNTER — TELEPHONE (OUTPATIENT)
Dept: RADIOLOGY | Facility: HOSPITAL | Age: 44
End: 2024-01-19

## 2024-01-19 NOTE — TELEPHONE ENCOUNTER
Call placed to patient to discuss upcoming MRI arthrogram at [facility]. Allergies reviewed and verified patient does not currently take any anticoagulant medications. Pre-procedure instructions discussed with patient. Patient instructed that she may eat normally and take medications as usual before the procedure. Procedure and post procedure expectations and instructions reviewed with the patient. Recommended to patient to have a  post procedure. Patient verbalizes understanding and denies any questions at this time.

## 2024-01-23 ENCOUNTER — EVALUATION (OUTPATIENT)
Dept: PHYSICAL THERAPY | Facility: CLINIC | Age: 44
End: 2024-01-23
Payer: COMMERCIAL

## 2024-01-23 DIAGNOSIS — M25.519 CHRONIC SHOULDER PAIN, UNSPECIFIED LATERALITY: Primary | ICD-10-CM

## 2024-01-23 DIAGNOSIS — G89.29 CHRONIC SHOULDER PAIN, UNSPECIFIED LATERALITY: Primary | ICD-10-CM

## 2024-01-23 PROCEDURE — 97112 NEUROMUSCULAR REEDUCATION: CPT

## 2024-01-23 PROCEDURE — 97110 THERAPEUTIC EXERCISES: CPT

## 2024-01-23 PROCEDURE — 97140 MANUAL THERAPY 1/> REGIONS: CPT

## 2024-01-24 ENCOUNTER — DOCTOR'S OFFICE (OUTPATIENT)
Dept: URBAN - NONMETROPOLITAN AREA CLINIC 1 | Facility: CLINIC | Age: 44
Setting detail: OPHTHALMOLOGY
End: 2024-01-24
Payer: COMMERCIAL

## 2024-01-24 ENCOUNTER — OPTICAL OFFICE (OUTPATIENT)
Dept: URBAN - NONMETROPOLITAN AREA CLINIC 4 | Facility: CLINIC | Age: 44
Setting detail: OPHTHALMOLOGY
End: 2024-01-24

## 2024-01-24 DIAGNOSIS — H52.223: ICD-10-CM

## 2024-01-24 DIAGNOSIS — H52.4: ICD-10-CM

## 2024-01-24 PROBLEM — H43.393 VITREOUS FLOATERS; BOTH EYES: Status: ACTIVE | Noted: 2024-01-24

## 2024-01-24 PROCEDURE — V2204 LENS SPHCY BIFOCAL 4.00D/2.1: HCPCS | Mod: LT | Performed by: OPTOMETRIST

## 2024-01-24 PROCEDURE — V2020 VISION SVCS FRAMES PURCHASES: HCPCS | Performed by: OPTOMETRIST

## 2024-01-24 PROCEDURE — V2204 LENS SPHCY BIFOCAL 4.00D/2.1: HCPCS | Performed by: OPTOMETRIST

## 2024-01-24 PROCEDURE — 92014 COMPRE OPH EXAM EST PT 1/>: CPT | Performed by: OPTOMETRIST

## 2024-01-24 ASSESSMENT — REFRACTION_AUTOREFRACTION
OS_CYLINDER: -2.25
OD_SPHERE: -0.25
OS_AXIS: 81
OS_SPHERE: -2.00
OD_CYLINDER: -3.50
OD_AXIS: 86

## 2024-01-24 ASSESSMENT — REFRACTION_MANIFEST
OD_CYLINDER: -2.75
OS_SPHERE: -2.00
OS_ADD: +1.25
OD_VA2: 20/25-2
OD_ADD: +1.25
OD_VA1: 20/25-2
OS_VA2: 20/25
OS_AXIS: 080
OS_CYLINDER: -2.25
OD_AXIS: 095
OD_SPHERE: -1.25
OS_VA1: 20/25

## 2024-01-24 ASSESSMENT — CONFRONTATIONAL VISUAL FIELD TEST (CVF)
OD_FINDINGS: FULL
OS_FINDINGS: FULL

## 2024-01-24 ASSESSMENT — REFRACTION_CURRENTRX
OS_AXIS: 73
OD_VPRISM_DIRECTION: SV
OD_SPHERE: -1.75
OD_OVR_VA: 20/
OS_OVR_VA: 20/
OS_SPHERE: -2.00
OD_CYLINDER: -2.25
OD_AXIS: 96
OS_VPRISM_DIRECTION: SV
OS_CYLINDER: -2.00

## 2024-01-24 ASSESSMENT — SPHEQUIV_DERIVED
OD_SPHEQUIV: -2
OD_SPHEQUIV: -2.625
OS_SPHEQUIV: -3.125
OS_SPHEQUIV: -3.125

## 2024-01-26 ENCOUNTER — OFFICE VISIT (OUTPATIENT)
Dept: PHYSICAL THERAPY | Facility: CLINIC | Age: 44
End: 2024-01-26
Payer: COMMERCIAL

## 2024-01-26 DIAGNOSIS — M25.519 CHRONIC SHOULDER PAIN, UNSPECIFIED LATERALITY: Primary | ICD-10-CM

## 2024-01-26 DIAGNOSIS — G89.29 CHRONIC SHOULDER PAIN, UNSPECIFIED LATERALITY: Primary | ICD-10-CM

## 2024-01-26 PROCEDURE — 97112 NEUROMUSCULAR REEDUCATION: CPT

## 2024-01-26 PROCEDURE — 97110 THERAPEUTIC EXERCISES: CPT

## 2024-01-26 PROCEDURE — 97140 MANUAL THERAPY 1/> REGIONS: CPT

## 2024-01-26 NOTE — PROGRESS NOTES
"Daily Note     Today's date: 2024  Patient name: Nina Joseph  : 1980  MRN: [id number]  Referring provider: Greg Watt DO  Dx:   Encounter Diagnosis     ICD-10-CM    1. Chronic shoulder pain, unspecified laterality  M25.519     G89.29           Start Time: 0915  Stop Time: 1010  Total time in clinic (min): 55 minutes    Subjective: Patient reports typical chronic R shoulder pain today. Upon completion of treatment she notes feeling slightly better.      Objective: See treatment diary below      Assessment: Tolerated treatment fair. Patient responded well to manual therapy today with a reduction in R shoulder symptomatology. We will continue to progress patient within tolerance to address functional deficits. Patient would benefit from continued PT.      Plan: Continue per plan of care.      Precautions: Cervical Radiculopathy, Hx of R elbow fracture, CPS     Manuals  1/3 1 1/8 1/10 1/15   R Shoulder PROM  BM BM  BM BM BM  BM BM BM   R Elbow PROM  BM BM  BM BM BM  BM BM BM   STM R shoulder  CM      BM BM              Neuro Re-Ed           High Pull L2 2x10 L3 2x10  L3 2x10    L2 2x10  L2 2x10  L2 2x10    Mid Pull L2 2x10 L3 2x10  L3 2x10    L2 2x10  L2 2x10  L2 2x10                                                  Ther Ex           Table Slides Flexion 10\"x10 10\"x10  10\"x10  10\" Holds x10  10\" Holds x10  10\" Holds x10  10\" Holds x10 10\" Holds x10    Table Slides Scaption 10\"x10  10\"x10  10\"x10  10\" Holds x10  10\" Holds x10  10\" Holds x10  10\" Holds x10  10\" Holds x10    Supine Wand Flexion  2x10 NT 2x10   2x10  2x10    Wall Slides X10  Flx AAROM NT  X10 Flx AAROm      X8 Flx AAROM   Child Pose w/ PB           Cervical Ext w/ towel   X10  X10         Cervical Rot w/ Towel            Standing Wand Abduction  2x10 2x10  2x10   2x10  2x10  2x10    Shoulder Shrugs  2# 2x10 2# 2x10  2# 2x10  X20  X20  1# 2x10  1# 2x10  2# 2x10    AAROM Pulleys  3x10 3x10  3x10  2x10  2x10  3x10  3x10  3x10 "   CC DRE           Machine Retractions            HEP Instruction           Ther Activity                                 Gait Training                                 Modalities           Ice    10' Post TE Decline       MHP 10' pre-exercise  10' pre-exercise  Declined    10' Pre-exercise 10' Pre-exercise  10' Pre-exercise

## 2024-01-29 ENCOUNTER — HOSPITAL ENCOUNTER (OUTPATIENT)
Dept: MRI IMAGING | Facility: HOSPITAL | Age: 44
Discharge: HOME | End: 2024-01-29
Attending: ANESTHESIOLOGY
Payer: COMMERCIAL

## 2024-01-29 ENCOUNTER — HOSPITAL ENCOUNTER (OUTPATIENT)
Dept: RADIOLOGY | Facility: HOSPITAL | Age: 44
Discharge: HOME | End: 2024-01-29
Attending: ANESTHESIOLOGY
Payer: COMMERCIAL

## 2024-01-29 DIAGNOSIS — M75.101 TEAR OF RIGHT ROTATOR CUFF, UNSPECIFIED TEAR EXTENT, UNSPECIFIED WHETHER TRAUMATIC: ICD-10-CM

## 2024-01-29 DIAGNOSIS — S43.431S LABRAL TEAR OF SHOULDER, RIGHT, SEQUELA: ICD-10-CM

## 2024-01-29 DIAGNOSIS — M75.101 TEAR OF RIGHT ROTATOR CUFF: ICD-10-CM

## 2024-01-29 DIAGNOSIS — B35.3 TINEA PEDIS OF BOTH FEET: ICD-10-CM

## 2024-01-29 PROCEDURE — A9585 GADOBUTROL INJECTION: HCPCS | Performed by: ANESTHESIOLOGY

## 2024-01-29 PROCEDURE — 23350 INJECTION FOR SHOULDER X-RAY: CPT

## 2024-01-29 PROCEDURE — G1004 CDSM NDSC: HCPCS

## 2024-01-29 PROCEDURE — 77002 NEEDLE LOCALIZATION BY XRAY: CPT

## 2024-01-29 PROCEDURE — 73222 MRI JOINT UPR EXTREM W/DYE: CPT

## 2024-01-29 RX ORDER — LIDOCAINE HYDROCHLORIDE 10 MG/ML
5 INJECTION, SOLUTION EPIDURAL; INFILTRATION; INTRACAUDAL; PERINEURAL
Status: COMPLETED | OUTPATIENT
Start: 2024-01-29 | End: 2024-01-29

## 2024-01-29 RX ORDER — SODIUM CHLORIDE 9 MG/ML
12 INJECTION INTRAVENOUS
Status: COMPLETED | OUTPATIENT
Start: 2024-01-29 | End: 2024-01-29

## 2024-01-29 RX ORDER — GADOBUTROL 604.72 MG/ML
0.2 INJECTION INTRAVENOUS
Status: COMPLETED | OUTPATIENT
Start: 2024-01-29 | End: 2024-01-29

## 2024-01-29 RX ADMIN — GADOBUTROL 0.2 ML: 604.72 INJECTION INTRAVENOUS at 11:13

## 2024-01-29 RX ADMIN — IOHEXOL 2 ML: 300 INJECTION, SOLUTION INTRAVENOUS at 11:14

## 2024-01-29 RX ADMIN — SODIUM CHLORIDE 12 ML: 9 INJECTION, SOLUTION INTRAMUSCULAR; INTRAVENOUS; SUBCUTANEOUS at 11:14

## 2024-01-29 RX ADMIN — LIDOCAINE HYDROCHLORIDE 5 ML: 10 INJECTION, SOLUTION EPIDURAL; INFILTRATION; INTRACAUDAL; PERINEURAL at 11:14

## 2024-01-30 ENCOUNTER — OFFICE VISIT (OUTPATIENT)
Dept: PHYSICAL THERAPY | Facility: CLINIC | Age: 44
End: 2024-01-30
Payer: COMMERCIAL

## 2024-01-30 DIAGNOSIS — G89.29 CHRONIC SHOULDER PAIN, UNSPECIFIED LATERALITY: Primary | ICD-10-CM

## 2024-01-30 DIAGNOSIS — M25.519 CHRONIC SHOULDER PAIN, UNSPECIFIED LATERALITY: Primary | ICD-10-CM

## 2024-01-30 PROCEDURE — 97140 MANUAL THERAPY 1/> REGIONS: CPT

## 2024-01-30 PROCEDURE — 97110 THERAPEUTIC EXERCISES: CPT

## 2024-01-30 RX ORDER — KETOCONAZOLE 20 MG/G
CREAM TOPICAL
Qty: 60 G | Refills: 0 | Status: SHIPPED | OUTPATIENT
Start: 2024-01-30

## 2024-01-30 NOTE — PROGRESS NOTES
"Daily Note     Today's date: 2024  Patient name: Nina Joseph  : 1980  MRN: [id number]  Referring provider: Greg Watt DO  Dx:   Encounter Diagnosis     ICD-10-CM    1. Chronic shoulder pain, unspecified laterality  M25.519     G89.29           Start Time: 1010  Stop Time: 1105  Total time in clinic (min): 55 minutes    Subjective: Patient reports having MRI performed yesterday on the R shoulder. She notes continued R shoulder pain and difficulty with R shoulder movement.       Objective: See treatment diary below      Assessment: Tolerated treatment fair. We progressed the current directed therapeutic exercise program with the addition of AAROM exercise in the transverse plane of the R GH joint. Patient tolerated progressions with mild discomfort in the R shoulder. She would benefit from continued PT to address functional limitations.      Plan: Continue per plan of care.      Precautions: Cervical Radiculopathy, Hx of R elbow fracture, CPS     Manuals 1/17 1/23 1/26 1/30 1/5 1/8 1/10 1/15   R Shoulder PROM  BM BM  BM BM BM  BM BM BM   R Elbow PROM  BM BM  BM BM BM  BM BM BM   STM R shoulder  CM   BM   BM BM              Neuro Re-Ed           High Pull L2 2x10 L3 2x10  L3 2x10  L3 2x10  L2 2x10  L2 2x10  L2 2x10    Mid Pull L2 2x10 L3 2x10  L3 2x10  L3 2x10   L2 2x10  L2 2x10  L2 2x10                                                  Ther Ex           Table Slides Flexion 10\"x10 10\"x10  10\"x10  10\"x10  10\" Holds x10  10\" Holds x10  10\" Holds x10 10\" Holds x10    Table Slides Scaption 10\"x10  10\"x10  10\"x10  10\"x10  10\" Holds x10  10\" Holds x10  10\" Holds x10  10\" Holds x10    Supine Wand Flexion  2x10 NT 2x10 2x10  2x10  2x10    Supine Wand ER     5\" Hold x10        Wall Slides X10  Flx AAROM NT  X10 Flx AAROm  NT    X8 Flx AAROM   Child Pose w/ PB           Cervical Ext w/ towel   X10  X10  X10        Cervical Rot w/ Towel            Standing Wand Abduction  2x10 2x10  2x10 2x10  2x10  2x10  2x10  "   Shoulder Shrugs  2# 2x10 2# 2x10  2# 2x10  3# 2x10  X20  1# 2x10  1# 2x10  2# 2x10    AAROM Pulleys  3x10 3x10  3x10  3x10  2x10  3x10  3x10  3x10   CC DRE           Machine Retractions            HEP Instruction           Ther Activity                                 Gait Training                                 Modalities           Ice     Decline       MHP 10' pre-exercise  10' pre-exercise  Declined  10' Pre-exercise   10' Pre-exercise 10' Pre-exercise  10' Pre-exercise

## 2024-02-02 ENCOUNTER — OFFICE VISIT (OUTPATIENT)
Dept: PHYSICAL THERAPY | Facility: CLINIC | Age: 44
End: 2024-02-02
Payer: COMMERCIAL

## 2024-02-02 DIAGNOSIS — G89.29 CHRONIC SHOULDER PAIN, UNSPECIFIED LATERALITY: Primary | ICD-10-CM

## 2024-02-02 DIAGNOSIS — M25.519 CHRONIC SHOULDER PAIN, UNSPECIFIED LATERALITY: Primary | ICD-10-CM

## 2024-02-02 PROCEDURE — 97112 NEUROMUSCULAR REEDUCATION: CPT

## 2024-02-02 PROCEDURE — 97140 MANUAL THERAPY 1/> REGIONS: CPT

## 2024-02-02 PROCEDURE — 97110 THERAPEUTIC EXERCISES: CPT

## 2024-02-02 NOTE — PROGRESS NOTES
"Daily Note     Today's date: 2024  Patient name: Nina Joseph  : 1980  MRN: [id number]  Referring provider: Greg Watt DO  Dx:   Encounter Diagnosis     ICD-10-CM    1. Chronic shoulder pain, unspecified laterality  M25.519     G89.29           Start Time: 0800  Stop Time: 0855  Total time in clinic (min): 55 minutes    Subjective: Patient reports continued R shoulder pain that has been limiting her ability to perform functional activities.       Objective: See treatment diary below      Assessment: Tolerated treatment fair. Patient demonstrates moderate discomfort in the R shoulder with PROM in all planes of motion. She continues to demonstrate limitations in R shoulder AROM, R shoulder strength, scapulothoracic control, and pain. We will continue to progress patient within tolerance to address functional limitations.      Plan: Continue per plan of care.      Precautions: Cervical Radiculopathy, Hx of R elbow fracture, CPS     Manuals 1/17 1/23 1/26 1/30 2/2 1/8 1/10 1/15   R Shoulder PROM  BM BM  BM BM BM BM BM BM   R Elbow PROM  BM BM  BM BM BM BM BM BM   STM R shoulder  CM   BM BM   BM BM              Neuro Re-Ed           High Pull L2 2x10 L3 2x10  L3 2x10  L3 2x10 L3 2x10 L2 2x10  L2 2x10  L2 2x10    Mid Pull L2 2x10 L3 2x10  L3 2x10  L3 2x10  L3 2x10  L2 2x10  L2 2x10  L2 2x10                                                  Ther Ex           Table Slides Flexion 10\"x10 10\"x10  10\"x10  10\"x10  10\"x10  10\" Holds x10  10\" Holds x10 10\" Holds x10    Table Slides Scaption 10\"x10  10\"x10  10\"x10  10\"x10  10\"x10  10\" Holds x10  10\" Holds x10  10\" Holds x10    Supine Wand Flexion  2x10 NT 2x10 2x10 2x10 2x10  2x10    Supine Wand ER     5\" Hold x10  5\" Hold x10       Wall Slides X10  Flx AAROM NT  X10 Flx AAROm  NT X10 Flx AAROm    X8 Flx AAROM   Child Pose w/ PB           Cervical Ext w/ towel   X10  X10  X10  HEP       Cervical Rot w/ Towel            Standing Wand Abduction  2x10 2x10  2x10 2x10 " 2x10  2x10  2x10  2x10    Shoulder Shrugs  2# 2x10 2# 2x10  2# 2x10  3# 2x10  3# 2x10  1# 2x10  1# 2x10  2# 2x10    AAROM Pulleys  3x10 3x10  3x10  3x10  3x10 3x10  3x10  3x10   CC DRE           Machine Retractions            HEP Instruction           Ther Activity                                 Gait Training                                 Modalities           Ice           MHP 10' pre-exercise  10' pre-exercise  Declined  10' Pre-exercise  10' Pre-exercise  10' Pre-exercise 10' Pre-exercise  10' Pre-exercise

## 2024-02-05 ENCOUNTER — OFFICE VISIT (OUTPATIENT)
Dept: PHYSICAL THERAPY | Facility: CLINIC | Age: 44
End: 2024-02-05
Payer: COMMERCIAL

## 2024-02-05 DIAGNOSIS — M25.519 CHRONIC SHOULDER PAIN, UNSPECIFIED LATERALITY: Primary | ICD-10-CM

## 2024-02-05 DIAGNOSIS — G89.29 CHRONIC SHOULDER PAIN, UNSPECIFIED LATERALITY: Primary | ICD-10-CM

## 2024-02-05 PROCEDURE — 97110 THERAPEUTIC EXERCISES: CPT

## 2024-02-05 PROCEDURE — 97140 MANUAL THERAPY 1/> REGIONS: CPT

## 2024-02-05 PROCEDURE — 97112 NEUROMUSCULAR REEDUCATION: CPT

## 2024-02-05 NOTE — PROGRESS NOTES
"Daily Note     Today's date: 2024  Patient name: Nina Joseph  : 1980  MRN: [id number]  Referring provider: Greg Watt DO  Dx:   Encounter Diagnosis     ICD-10-CM    1. Chronic shoulder pain, unspecified laterality  M25.519     G89.29           Start Time: 1030  Stop Time: 1125  Total time in clinic (min): 55 minutes    Subjective: Patient reports R anterior shoulder pain this morning.      Objective: See treatment diary below      Assessment: Tolerated treatment fair. We progressed the current directed therapeutic exercise program with the addition of scapulothoracic strengthening and AROM exercise for the R shoulder. Patient tolerated progressions with a mild increase in discomfort in the R shoulder. We will continue to progress patient within tolerance to address functional limitations. Patient would benefit from continued PT.      Plan: Continue per plan of care.      Precautions: Cervical Radiculopathy, Hx of R elbow fracture, CPS     Manuals  2/2 2/5 1/10 1/15   R Shoulder PROM  BM BM  BM BM BM BM BM BM   R Elbow PROM  BM BM  BM BM BM  BM BM   STM R shoulder  CM   BM BM  BM BM BM   Scapular patterns      2x10 ea     Neuro Re-Ed           High Pull L2 2x10 L3 2x10  L3 2x10  L3 2x10 L3 2x10 L3 2x10  L2 2x10  L2 2x10    Mid Pull L2 2x10 L3 2x10  L3 2x10  L3 2x10  L3 2x10  L3 2x10  L2 2x10  L2 2x10                                                  Ther Ex           Table Slides Flexion 10\"x10 10\"x10  10\"x10  10\"x10  10\"x10  10\"x10  10\" Holds x10 10\" Holds x10    Table Slides Scaption 10\"x10  10\"x10  10\"x10  10\"x10  10\"x10  10\"x10  10\" Holds x10  10\" Holds x10    Supine Wand Flexion  2x10 NT 2x10 2x10 2x10 2x10  2x10    Supine Wand ER     5\" Hold x10  5\" Hold x10  5\" Hold x10      Wall Slides X10  Flx AAROM NT  X10 Flx AAROm  NT X10 Flx AAROm  NT  X8 Flx AAROM   Child Pose w/ PB           Standing Wand Abduction  2x10 2x10  2x10 2x10 2x10  2x10  2x10  2x10    Serratus Anterior " Punches      2x10 AAROM     Supine Shoulder Circles      X20 CW/CCW     Shoulder Shrugs  2# 2x10 2# 2x10  2# 2x10  3# 2x10  3# 2x10  3# 2x10  1# 2x10  2# 2x10    AAROM Pulleys  3x10 3x10  3x10  3x10  3x10 3x10  3x10  3x10   CC DRE           Machine Retractions            HEP Instruction           Ther Activity                                 Gait Training                                 Modalities           Ice           MHP 10' pre-exercise  10' pre-exercise  Declined  10' Pre-exercise  10' Pre-exercise  10' Pre-exercise 10' Pre-exercise  10' Pre-exercise

## 2024-02-08 ENCOUNTER — TELEPHONE (OUTPATIENT)
Dept: INTERNAL MEDICINE CLINIC | Facility: CLINIC | Age: 44
End: 2024-02-08

## 2024-02-08 NOTE — TELEPHONE ENCOUNTER
"Patient called stating that she needs B-12 shots because she has \"like no income\" and can't buy them over the counter.  I asked who told her that she needs B-12?  Patient states that her therapist at [facility] told her that she needs B-12 due to her memory, forgetting a lot of stuff.  But then she said that she was also told to see a neurologist.  I asked her if she made an appointment with neuro yet and she responded no.  I told her to call to make the appointment.   She actually requested an appointment with you to get the B-12 shot.  Patient called back.  She made an appointment with  Neurology in [place] but needs a referral.  She thinks it is Dr. Monet.  Does she need to be seen by you first?  " PCP: Jordan Ibarra DO      REASON FOR VISIT:  Type 2  diabetes mellitus with hyperglycemia. Initial consultation August 2021. Portions of this note are brought forward from Dr. Martin's last note; reviewed and edited by me as appropriate.       HISTORY OF PRESENT ILLNESS:  The patient is a pleasant 58 year old female who is being seen at the request of Dr. Jordan Ibarra DO for uncontrolled T2DM. She was diagnosed with diabetes around 2009.    Currently she is on a regimen of Tresiba 45 units nightly and Humalog before meals  Blood Sugar Breakfast Lunch Dinner   <100 10 10 10   100-150 15 15 15   151-200 17 17 17   201-250 19 19 19   >250 21 21 21     She may take bolus 2-3 times daily.  Basal-bolus started August 2021. Also started on Ozempic August 2021, on 0.5 mg weekly for the last 1 week. she is tolerating this well. Taking metformin 850 mg 1 tablet twice daily. Glimepiride discontinued August 2021.     Her  A1c was 13.8% August 2021. Has been >10% since 2018. On Freestyle Robyn 2 CGM as of August 2021. Review of data for 28 days from August 19-September 15, 2021 shows average glucose 149 mg/dL with 26.5% variability. GMI is 6.9%! Time in range is 79%. No hypoglycemia. High 19% and very high 2%. Notes an episode of feeling low when BG was 78 mg/dL.      She has seen a dietitian/educator and is trying to be compliant with diet. She did indulge in a scoop of ice cream last night resulting in high fasting BG this morning.  With regards to activity,limited.    Diet  Breakfast- 1egg muffin, diet coke, hashbrown  Mid morning Snack- cheese cubes, almond rice crackers, Glucerna shake  Lunch- brings from home( fruit, yogurt, salad or leftovers, popcorns  Supper- Rice chicken broccoli, Salads, corn on the cob  May have bran cereal at night    She has no diabetic retinopathy and follows with ophthalmology last June 2021.  Denies any blurriness of vision.  She has no neuropathy complaint ; Denies any sore or  ulceration.  Her urine shows negative microalbuminuria   MICROALBUMIN/CREATININE (mg/g)   Date Value   01/13/2020 23.2   on valsartan 320 mg.    With regards to macrovascular complications, she denies hx of CAD/CVA/PVD/Claudication; has Afib. She is following with cardiology.     With regard to hypertension, she is on amlodipine, diovan, metoprolol tatrate, hydrochlorothiazide and her blood pressure is controlled.  Denies any headache or dizziness.     With regard to dyslipidemia, she is on pravastatin 10mg  Daily.  LDL (mg/dL)   Date Value   09/23/2020 89     Triglycerides (mg/dL)   Date Value   09/23/2020 211 (H)     Hypothyroid on levothyroxine 25 mcg nightly  TSH (mcUnits/mL)   Date Value   09/23/2020 1.338     Current Outpatient Medications   Medication Sig Dispense Refill   • metFORMIN (GLUCOPHAGE) 850 MG tablet TAKE 1 TABLET BY MOUTH TWICE DAILY 180 tablet 3   • amLODIPine (NORVASC) 10 MG tablet TAKE 1 TABLET BY MOUTH DAILY 90 tablet 0   • metoPROLOL tartrate (LOPRESSOR) 100 MG tablet TAKE 1 TABLET BY MOUTH TWICE DAILY 180 tablet 0   • pravastatin (PRAVACHOL) 10 MG tablet TAKE 1 TABLET BY MOUTH DAILY 90 tablet 0   • NovoLOG FlexPen 100 UNIT/ML pen-injector Inject 10-21 Units into the skin 3 times daily (before meals). Plus correction MDD 63 units Prime 2 units before each dose. 60 mL 1   • FreeStyle Robyn 2 Sensor Misc 1 each every 14 days. 6 each 3   • blood glucose (FreeStyle Precision Marino Test) test strip Test blood sugar up to 1 time daily as directed by Freestyle Robyn. Meter: Freestyle Robyn 2 50 each 3   • Insulin Pen Needle (Pen Needles) 32G X 4 MM Misc Use to inject insulin 4 times daily 400 each 3   • semaglutide,0.25 or 0.5 mg/DOSE, (Ozempic, 0.25 or 0.5 MG/DOSE,) (1.34 mg/ml) 0.25 or 0.5 MG/DOSE injection Inject 0.5 mg into the skin every 7 days. Do not start before August 30, 2021. 4.5 mL 1   • levothyroxine 25 MCG tablet TAKE 1 TABLET BY MOUTH DAILY 90 tablet 1   • valsartan (DIOVAN) 320 MG  tablet Take 1 tablet by mouth daily. 30 tablet 3   • rivaroxaban (Xarelto) 20 MG Tab Take 1 tablet by mouth daily (with dinner). 90 tablet 3   • omeprazole (PrilOSEC) 20 MG capsule Take 1 capsule by mouth daily. 90 capsule 3   • hydrochlorothiazide (HYDRODIURIL) 25 MG tablet Take 1 tablet by mouth daily. 90 tablet 3   • FLUoxetine (PROzac) 20 MG capsule Take 1 capsule by mouth 2 times daily. 180 capsule 4   • Pirbuterol Acetate (MAXAIR AUTOHALER IN) Inhale 2 puffs into the lungs as needed.      • Glucos-MSM-C-Fi-Tlqsqe-Azdodk (GLUCOSAMINE MSM COMPLEX) Tab Take 1-2 tablets by mouth as directed. Take 1 tablets every morning and 1 tablet every evening.     • Cholecalciferol (VITAMIN D) 2000 UNITS tablet Take 2,000 Units by mouth every morning.     • Multiple Vitamins Tab Take 1 tablet by mouth every morning.     • CRANBERRY PO Take 1 tablet by mouth every morning.     • BLACK COHOSH PO Take 1 tablet by mouth 2 times daily.     • Probiotic Product (PROBIOTIC DAILY PO) Take 1 tablet by mouth every morning.     • Omega-3 Fatty Acids (FISH OIL PO) Take 1 tablet by mouth 2 times daily.     • Fexofenadine HCl (ALLEGRA PO) Take 1 tablet by mouth daily as needed. Indications: Allergies     • insulin degludec (Tresiba FlexTouch) 200 UNIT/ML pen-injector Inject 46 Units into the skin nightly. Prime 2 units before each dose. 9 mL 3     No current facility-administered medications for this visit.     Histories unchanged from initial visit.I have reviewed the patient's medications and allergies, past medical, surgical, social and family history, updating these as appropriate.  See Histories section of the EMR for a display of this information.     REVIEW OF SYSTEMS:   I conducted a 7 point review of systems with the patient that included General, Neurological, Cardiovascular, Pulmonary, GI,  and Musculoskeletal. Pertinent positives discussed in the HPI. All other systems were negative.     PHYSICAL EXAMINATION:  Visit Vitals  BP  129/71   Pulse 71   Ht 5' 7\" (1.702 m)   Wt (!) 149 kg   LMP 06/22/2015   BMI 51.45 kg/m²     Constitutional: Resting, obese female and NAD.  Eyes: Anicteric and conjunctivae not pale. No exophthalmos.   ENT: External ears normal. Nose and mouth covered by mask.      Head: Normocephalic, atraumatic  Neck: Supple, no JVD. No thyromegaly or nodules. No lymphadenopathy.   Respiratory: CTAB, non-labored. No cyanosis.   Cardiac: S1, S2, RRR, no murmur, no LE edema bilaterally.   GI: Soft, nontender, nondistended and no masses or guarding. No splenohepatomegaly. No umbilical hernia noted. Bowel sounds audible.   Skin: Warm and dry, normal texture and temperature and no rashes.    Psych: Normal mood and affect.  Musculoskeletal: ANNE x4, no deformities  Neuro: Awake, alert and oriented x3. EOMI and no tremor. Follows commands. Speech normal.     LABORATORY DATA:   DIABETIC HEALTH MAINTENANCE LAB REVIEW    Hemoglobin A1C (%)   Date Value   05/04/2021 12.0 (H)     LDL (mg/dL)   Date Value   09/23/2020 89       HDL (mg/dL)   Date Value   09/23/2020 47 (L)      Triglycerides (mg/dL)   Date Value   09/23/2020 211 (H)       Cholesterol (mg/dL)   Date Value   09/23/2020 178     HCT (%)   Date Value   09/23/2020 43.2       GPT/ALT (Units/L)   Date Value   05/04/2021 26       GOT/AST (Units/L)   Date Value   05/04/2021 10     MICROALBUMIN/CREATININE (mg/g)   Date Value   01/13/2020 23.2       TSH (mcUnits/mL)   Date Value   09/23/2020 1.338       VITAMIND, 25 HYDROXY (ng/mL)   Date Value   07/31/2014 29.6 (L)     ASSESSMENT:  1. Type 2 DM with Hyperglycemia: A1c 13.8% August 2021. GMI 6.9% September 2021 after starting basal-bolus regimen  2. Dyslipidemia on pravastatin 10 mg.   3. Hypertension controlled on current regimen  4. Hypothyroid on levothyroxine 25 mcg daily.   5. Obesity Body mass index is 51.45 kg/m².     RECOMMENDATION  1. Recheck fasting lipid panel and consider changing statin to atorvastatin  2. Change Tresiba to  U200 46 units nightly  3. Continue metformin 850 mg 2 tablets daily  4. Continue Ozempic 0.5 mg weekly  5. Call in 3 weeks to review CGM and increase Ozempic 1 mg weekly  For now, continue Novolog. consider decreasing once Ozempic increased.   Blood Sugar Breakfast Lunch Dinner   <100 10 10 10   100-150 15 15 15   151-200 17 17 17   201-250 19 19 19   >250 21 21 21     I explained the importance of regular exercise in helping to control the blood glucose level.   I discussed the importance of a well-balanced diet and avoidance of high-glycemic index foods.   I advise checking blood sugars 3-4 times daily, before meals and at bedtime. Continue Freestyle Robyn 2 cgm.  Goals for BG and time in range reviewed.  I reviewed the signs and symptoms of hypoglycemia: sweating, tremors, tachycardia (very fast heart rate), palpitations, nervousness, and hunger.   For treatment of hypoglycemia (<70 mg/dL), I instructed the patient to immediately treat with 15 g of a fast-acting concentrated source of carbohydrate such as: 3 or 4 glucose tablets, 4 to 6 oz of fruit juice or regular soda, 6 to 10 candies, or 2 to 3 tsp of sugar or honey. Then retest the blood glucose level in 15 minutes and retreat if the level is less than 70 mg/dL. Once symptoms resolve, consume a snack that contains protein, unless it is time to eat a regular meal.   I recommend an annual flu vaccine, annual dilated eye exam, and dental cleaning every 6 months.   Follow up with CDE as scheduled December 2021.  Follow up with me February 2021.     Thank you for allowing me to participate in the care of this patient. Please let me know if you have any questions or concerns.    I spent a total of 44 minutes on the day of the visit.  This includes pre-charting, chart review and documenting.    KLAUDIA Hoyos  09/15/21

## 2024-02-09 ENCOUNTER — OFFICE VISIT (OUTPATIENT)
Dept: PHYSICAL THERAPY | Facility: CLINIC | Age: 44
End: 2024-02-09
Payer: COMMERCIAL

## 2024-02-09 DIAGNOSIS — M25.519 CHRONIC SHOULDER PAIN, UNSPECIFIED LATERALITY: Primary | ICD-10-CM

## 2024-02-09 DIAGNOSIS — G89.29 CHRONIC SHOULDER PAIN, UNSPECIFIED LATERALITY: Primary | ICD-10-CM

## 2024-02-09 PROCEDURE — 97112 NEUROMUSCULAR REEDUCATION: CPT

## 2024-02-09 PROCEDURE — 97140 MANUAL THERAPY 1/> REGIONS: CPT

## 2024-02-09 PROCEDURE — 97110 THERAPEUTIC EXERCISES: CPT

## 2024-02-09 NOTE — PROGRESS NOTES
"Daily Note     Today's date: 2024  Patient name: Nina Joseph  : 1980  MRN: [id number]  Referring provider: Greg Watt DO  Dx:   Encounter Diagnosis     ICD-10-CM    1. Chronic shoulder pain, unspecified laterality  M25.519     G89.29           Start Time: 0930  Stop Time: 1015  Total time in clinic (min): 45 minutes    Subjective: Patient reports typical chronic R shoulder pain this morning. She notes having a follow up appointment next Thursday with pain management for the R shoulder.      Objective: See treatment diary below      Assessment: Tolerated treatment fair. Patient experiences increased R shoulder discomfort with shoulder external rotation and abduction PROM. She is able to complete exercise program with mild discomfort in the R shoulder. We will continue to progress patient within tolerance to address functional deficits. She would benefit from continued PT.      Plan: Continue per plan of care.      Precautions: Cervical Radiculopathy, Hx of R elbow fracture, CPS     Manuals 1/17 1/23 1/26 1/30 2/2 2/5 2/9 1/15   R Shoulder PROM  BM BM  BM BM BM BM BM BM   R Elbow PROM  BM BM  BM BM BM   BM   STM R shoulder  CM   BM BM  BM  BM   Scapular patterns      2x10 ea 2x10 ea    Neuro Re-Ed           High Pull L2 2x10 L3 2x10  L3 2x10  L3 2x10 L3 2x10 L3 2x10  L3 2x10 L2 2x10    Mid Pull L2 2x10 L3 2x10  L3 2x10  L3 2x10  L3 2x10  L3 2x10  L3 2x10 L2 2x10                                                Ther Ex           Table Slides Flexion 10\"x10 10\"x10  10\"x10  10\"x10  10\"x10  10\"x10  10\"x10  10\" Holds x10    Table Slides Scaption 10\"x10  10\"x10  10\"x10  10\"x10  10\"x10  10\"x10  10\"x10  10\" Holds x10    Supine Wand Flexion  2x10 NT 2x10 2x10 2x10 2x10  2x10     Supine Wand ER     5\" Hold x10  5\" Hold x10  5\" Hold x10  5\" Hold x10     Wall Slides X10  Flx AAROM NT  X10 Flx AAROm  NT X10 Flx AAROm  NT X10 Flx AAROM X8 Flx AAROM   Child Pose w/ PB           Standing Wand Abduction  2x10 2x10  2x10 " 2x10 2x10  2x10  2x10 2x10    Serratus Anterior Punches      2x10 AAROM 2x10 AAROM    Supine Shoulder Circles      X20 CW/CCW X20 CW/CCW    Shoulder Shrugs  2# 2x10 2# 2x10  2# 2x10  3# 2x10  3# 2x10  3# 2x10  3# 2x10 2# 2x10    AAROM Pulleys  3x10 3x10  3x10  3x10  3x10 3x10  3x10  3x10   CC DRE           Machine Retractions            HEP Instruction           Ther Activity                                 Gait Training                                 Modalities           Ice           MHP 10' pre-exercise  10' pre-exercise  Declined  10' Pre-exercise  10' Pre-exercise  10' Pre-exercise NT 10' Pre-exercise

## 2024-02-13 NOTE — TELEPHONE ENCOUNTER
Vitamin B are only covered if someone has very specific diagnoses.  Would need to have definite assessment for vitamin B12 deficiency first.  Sure that neurology comes into [facility]?

## 2024-02-14 RX ORDER — TOPIRAMATE 25 MG/1
25 TABLET ORAL 2 TIMES DAILY
COMMUNITY
Start: 2024-01-30

## 2024-02-14 RX ORDER — BUSPIRONE HYDROCHLORIDE 5 MG/1
TABLET ORAL
COMMUNITY
Start: 2024-02-07

## 2024-02-15 ENCOUNTER — OFFICE VISIT (OUTPATIENT)
Dept: PAIN MEDICINE | Facility: CLINIC | Age: 44
End: 2024-02-15
Payer: COMMERCIAL

## 2024-02-15 VITALS
SYSTOLIC BLOOD PRESSURE: 139 MMHG | BODY MASS INDEX: 31.15 KG/M2 | DIASTOLIC BLOOD PRESSURE: 99 MMHG | HEART RATE: 73 BPM | WEIGHT: 175.8 LBS | RESPIRATION RATE: 16 BRPM | HEIGHT: 63 IN

## 2024-02-15 DIAGNOSIS — M54.12 CERVICAL RADICULOPATHY: ICD-10-CM

## 2024-02-15 DIAGNOSIS — M47.812 CERVICAL SPONDYLOSIS: ICD-10-CM

## 2024-02-15 DIAGNOSIS — M25.511 CHRONIC RIGHT SHOULDER PAIN: ICD-10-CM

## 2024-02-15 DIAGNOSIS — G89.4 CHRONIC PAIN SYNDROME: Primary | ICD-10-CM

## 2024-02-15 DIAGNOSIS — M54.2 NECK PAIN: ICD-10-CM

## 2024-02-15 DIAGNOSIS — G89.29 CHRONIC RIGHT SHOULDER PAIN: ICD-10-CM

## 2024-02-15 PROCEDURE — 99214 OFFICE O/P EST MOD 30 MIN: CPT | Performed by: NURSE PRACTITIONER

## 2024-02-15 RX ORDER — CELECOXIB 200 MG/1
200 CAPSULE ORAL DAILY
Qty: 30 CAPSULE | Refills: 1 | Status: SHIPPED | OUTPATIENT
Start: 2024-02-15

## 2024-02-15 NOTE — PROGRESS NOTES
Assessment:  1. Chronic pain syndrome    2. Chronic right shoulder pain    3. Neck pain    4. Cervical radiculopathy    5. Cervical spondylosis        Plan:  While the patient was in the office today, I did have a thorough conversation regarding their chronic pain syndrome, medication management, and treatment plan options.  Patient is being seen for a follow-up visit.  She was last seen here on 12/18/2023 at which time she was referred to physical therapy.  An MRI arthrogram of her right shoulder was ordered.  Patient has been attending physical therapy regularly since 12/20/2023.  So far, no significant improvement in her symptoms.  In fact, she states that her pain, in general, seems to be getting worse.  Most of the pain that she experiences is in the right shoulder and right upper arm, however pain can radiate to her right hand where she reports numbness and tingling as well.    She previously underwent a right subacromial bursa injection in March 2023 which failed to provide her with relief.  She underwent a right-sided C7-T1 interlaminar epidural steroid injection on 10/19/2023 which failed to provide her with relief.    Recent MRI arthrogram of the right shoulder reveals a complete infraspinatus tear and a full-thickness partial width supraspinatus tear.  At this point, we will refer patient for orthopedic surgical evaluation.    We will update an MRI of her cervical spine to rule out worsening intraspinal pathology.    Order an EMG of her right upper extremity.    Start Celebrex 200 mg once daily with food.  Prescription was sent to her pharmacy with 1 refill.    Follow-up in 6 weeks.      History of Present Illness:  The patient is a 43 y.o. female who presents for a follow up office visit in regards to Shoulder Pain (right), Arm Pain (right), and Neck Pain.   The patient’s current symptoms include complaints of neck pain, right shoulder pain, right upper extremity pain.  She reports numbness and tingling  in the right upper extremity.  She reports weakness in the right upper extremity.  She states that she cannot lift her right arm without the help of her left arm.  Current pain level is a 10/10.  Quality pain is described as burning, sharp, throbbing, cramping, pressure-like, shooting, numb.    Current pain medications includes: Tylenol if needed.     I have personally reviewed and/or updated the patient's past medical history, past surgical history, family history, social history, current medications, allergies, and vital signs today.         Review of Systems  Review of Systems   Musculoskeletal:  Positive for myalgias.        Decreased ROM  Joint stiffness  Swelling  Pain    Neurological:  Positive for dizziness.        Memory loss   All other systems reviewed and are negative.          Past Medical History:   Diagnosis Date    Anxiety     Bipolar depression (HCC)     Chronic pelvic pain in female 2021    Endometrial hyperplasia without atypia 2021    Hypertension     Kidney stone     Menometrorrhagia 2021       Past Surgical History:   Procedure Laterality Date     SECTION      EPIDURAL BLOCK INJECTION Right 10/19/2023    Procedure: Right C7-T1 ILESI;  Surgeon: Elsi Saunders MD;  [facility];  Service: Pain Management     FL INJECTION RIGHT SHOULDER (ARTHROGRAM)  2024    HYSTERECTOMY  2021       Family History   Problem Relation Age of Onset    Heart disease Father     No Known Problems Sister     No Known Problems Daughter     No Known Problems Maternal Grandmother     No Known Problems Maternal Grandfather     No Known Problems Paternal Grandmother     No Known Problems Paternal Grandfather     No Known Problems Son     No Known Problems Son     No Known Problems Son     No Known Problems Maternal Aunt     No Known Problems Maternal Aunt     No Known Problems Paternal Aunt     No Known Problems Paternal Aunt        Social History     Occupational History     "Not on file   Tobacco Use    Smoking status: Every Day     Current packs/day: 0.50     Average packs/day: 0.5 packs/day for 28.0 years (14.0 ttl pk-yrs)     Types: Cigarettes    Smokeless tobacco: Never   Vaping Use    Vaping status: Former   Substance and Sexual Activity    Alcohol use: Never    Drug use: Never    Sexual activity: Not Currently         Current Outpatient Medications:     busPIRone (BUSPAR) 5 mg tablet, , Disp: , Rfl:     celecoxib (CeleBREX) 200 mg capsule, Take 1 capsule (200 mg total) by mouth daily Take with food, Disp: 30 capsule, Rfl: 1    ciclopirox (PENLAC) 8 % solution, Apply topically daily at bedtime, Disp: 6.6 mL, Rfl: 0    Diclofenac Sodium (VOLTAREN) 1 %, Apply 2 g topically 4 (four) times a day, Disp: 2 g, Rfl: 0    fluticasone (FLONASE) 50 mcg/act nasal spray, 2 sprays into each nostril daily, Disp: 11.1 mL, Rfl: 0    ketoconazole (NIZORAL) 2 % cream, APPLY  CREAM TOPICALLY ONCE DAILY, Disp: 60 g, Rfl: 0    LORazepam (ATIVAN) 0.5 mg tablet, Take 1-2 about 30 minutes prior to the MRI, Disp: 2 tablet, Rfl: 0    sertraline (ZOLOFT) 100 mg tablet, Take 100 mg by mouth daily, Disp: , Rfl:     topiramate (TOPAMAX) 25 mg sprinkle capsule, Take 25 mg by mouth 2 (two) times a day, Disp: , Rfl:     topiramate (TOPAMAX) 25 mg tablet, Take 25 mg by mouth 2 (two) times a day, Disp: , Rfl:     Allergies   Allergen Reactions    Clonazepam Other (See Comments)     Excessive sedation  Vomiting    Latex     Methylphenidate Other (See Comments)     Hyperactivity\"       Physical Exam:    /99   Pulse 73   Resp 16   Ht 5' 3\" (1.6 m)   Wt 79.7 kg (175 lb 12.8 oz)   LMP 12/25/2020   BMI 31.14 kg/m²     Constitutional:normal, well developed, well nourished, alert, in no distress and non-toxic and no overt pain behavior.  Eyes:anicteric  HEENT:grossly intact  Neck:supple, symmetric, trachea midline and no masses   Pulmonary:even and unlabored  Cardiovascular:No edema or pitting edema " present  Skin:Normal without rashes or lesions and well hydrated  Psychiatric:Mood and affect appropriate  Neurologic:Cranial Nerves II-XII grossly intact  Musculoskeletal: Patient is unable to perform any range of motion of the right shoulder without the help of her left arm, even so, range of motion is extremely limited in all planes.  Motor strength is decreased in the entire right upper extremity compared to the left.  Deep tendon reflexes are decreased in the right biceps and triceps compared to left.  She reports hypoesthesia in the right C6-7 dermatome.    Imaging      Study Result    Narrative & Impression   MRI ARTHROGRAM RIGHT SHOULDER     INDICATION:   S43.431S: Superior glenoid labrum lesion of right shoulder, sequela  M75.101: Unspecified rotator cuff tear or rupture of right shoulder, not specified as traumatic.     COMPARISON: 1/26/2023     TECHNIQUE:  Multiplanar/multisequence MR of the right shoulder was performed. Scan was performed after intraarticular injection of dilute gadolinium under fluoroscopic guidance into the joint.        FINDINGS:     SUBCUTANEOUS TISSUES: Normal     JOINT CAPSULE: Intact, without inferior extravasation of contrast.     SUBACROMIAL/SUBDELTOID BURSA: Fluid decompression     ACROMION PROCESS: Normal.     ROTATOR CUFF: Complete infraspinatus tear retracted to the superior humeral head. Full-thickness partial width tear involving the middle to posterior supraspinatus, which is retracted from the insertion to the superior humeral head. Mild supraspinatus   atrophy with moderate infraspinatus atrophy     LONG HEAD OF BICEPS TENDON: Normal.     GLENOID LABRUM: Intact.     GLENOHUMERAL JOINT: Intact.     ACROMIOCLAVICULAR JOINT:  Normal.     BONES: Normal.     IMPRESSION:     Complete infraspinatus tear.     Full-thickness partial width supraspinatus tear, as described.           MRI cervical spine wo contrast    (Results Pending)       Orders Placed This Encounter    Procedures    MRI cervical spine wo contrast    Ambulatory Referral to Orthopedic Surgery    EMG 1 Limb

## 2024-02-16 ENCOUNTER — OFFICE VISIT (OUTPATIENT)
Dept: PHYSICAL THERAPY | Facility: CLINIC | Age: 44
End: 2024-02-16
Payer: COMMERCIAL

## 2024-02-16 DIAGNOSIS — M25.519 CHRONIC SHOULDER PAIN, UNSPECIFIED LATERALITY: Primary | ICD-10-CM

## 2024-02-16 DIAGNOSIS — G89.29 CHRONIC SHOULDER PAIN, UNSPECIFIED LATERALITY: Primary | ICD-10-CM

## 2024-02-16 PROCEDURE — 97110 THERAPEUTIC EXERCISES: CPT

## 2024-02-16 PROCEDURE — 97140 MANUAL THERAPY 1/> REGIONS: CPT

## 2024-02-16 PROCEDURE — 97112 NEUROMUSCULAR REEDUCATION: CPT

## 2024-02-16 NOTE — PROGRESS NOTES
"Daily Note     Today's date: 2024  Patient name: Nina Joseph  : 1980  MRN: [id number]  Referring provider: Greg Watt DO  Dx:   Encounter Diagnosis     ICD-10-CM    1. Chronic shoulder pain, unspecified laterality  M25.519     G89.29           Start Time: 1015  Stop Time: 1105  Total time in clinic (min): 50 minutes    Subjective: Patient reports having a follow up appointment with pain management yesterday. She notes having a consultation scheduled with an Ortho specialist on 24. She notes pain symptoms have not been improving. She states feeling like pain symptoms are getting worse.      Objective: See treatment diary below      Assessment: Patient tolerated treatment session with a moderate increase in R shoulder discomfort this morning. We are going to hold Physical Therapy services at this time due limited ability to make appropriate progressions with Physical Therapy treatment due to symptomatology in the R shoulder. Patient was encouraged to call the office if she has any questions or concerns.      Plan: Continue per plan of care.      Precautions: Cervical Radiculopathy, Hx of R elbow fracture, CPS     Manuals  2   R Shoulder PROM  BM BM  BM BM BM BM BM BM   R Elbow PROM  BM BM  BM BM BM      STM R shoulder  CM   BM BM  BM     Scapular patterns      2x10 ea 2x10 ea    Neuro Re-Ed           High Pull L2 2x10 L3 2x10  L3 2x10  L3 2x10 L3 2x10 L3 2x10  L3 2x10 L3 2x10    Mid Pull L2 2x10 L3 2x10  L3 2x10  L3 2x10  L3 2x10  L3 2x10  L3 2x10 L3 2x10                                                Ther Ex           Table Slides Flexion 10\"x10 10\"x10  10\"x10  10\"x10  10\"x10  10\"x10  10\"x10  10\" Hold x10    Table Slides Scaption 10\"x10  10\"x10  10\"x10  10\"x10  10\"x10  10\"x10  10\"x10  10\" Hold x10    Supine Wand Flexion  2x10 NT 2x10 2x10 2x10 2x10  2x10  2x10    Supine Wand ER     5\" Hold x10  5\" Hold x10  5\" Hold x10  5\" Hold x10  5\" Hold x10    Wall Slides " X10  Flx AAROM NT  X10 Flx AAROm  NT X10 Flx AAROm  NT X10 Flx AAROM NT   Child Pose w/ PB           Standing Wand Abduction  2x10 2x10  2x10 2x10 2x10  2x10  2x10 2x10   Serratus Anterior Punches      2x10 AAROM 2x10 AAROM 2x10 AAROM   Supine Shoulder Circles      X20 CW/CCW X20 CW/CCW X20 CW/CCW   Shoulder Shrugs  2# 2x10 2# 2x10  2# 2x10  3# 2x10  3# 2x10  3# 2x10  3# 2x10 NT   AAROM Pulleys  3x10 3x10  3x10  3x10  3x10 3x10  3x10  3x10   CC DRE           Machine Retractions            HEP Instruction           Ther Activity                                 Gait Training                                 Modalities           Ice           MHP 10' pre-exercise  10' pre-exercise  Declined  10' Pre-exercise  10' Pre-exercise  10' Pre-exercise NT 10' Pre-exercise

## 2024-02-19 DIAGNOSIS — F40.240 CLAUSTROPHOBIA: ICD-10-CM

## 2024-02-20 RX ORDER — LORAZEPAM 0.5 MG/1
TABLET ORAL
Qty: 2 TABLET | Refills: 0 | Status: SHIPPED | OUTPATIENT
Start: 2024-02-20

## 2024-02-22 ENCOUNTER — OFFICE VISIT (OUTPATIENT)
Dept: OBGYN CLINIC | Facility: CLINIC | Age: 44
End: 2024-02-22
Payer: COMMERCIAL

## 2024-02-22 VITALS
HEIGHT: 63 IN | WEIGHT: 175 LBS | HEART RATE: 75 BPM | SYSTOLIC BLOOD PRESSURE: 130 MMHG | BODY MASS INDEX: 31.01 KG/M2 | DIASTOLIC BLOOD PRESSURE: 80 MMHG

## 2024-02-22 DIAGNOSIS — M75.01 ADHESIVE CAPSULITIS OF RIGHT SHOULDER: Primary | ICD-10-CM

## 2024-02-22 DIAGNOSIS — G89.29 CHRONIC RIGHT SHOULDER PAIN: ICD-10-CM

## 2024-02-22 DIAGNOSIS — M75.121 NONTRAUMATIC COMPLETE TEAR OF RIGHT ROTATOR CUFF: ICD-10-CM

## 2024-02-22 DIAGNOSIS — M25.511 CHRONIC RIGHT SHOULDER PAIN: ICD-10-CM

## 2024-02-22 PROCEDURE — 99214 OFFICE O/P EST MOD 30 MIN: CPT | Performed by: STUDENT IN AN ORGANIZED HEALTH CARE EDUCATION/TRAINING PROGRAM

## 2024-02-22 NOTE — PROGRESS NOTES
Ortho Sports Medicine Shoulder New Patient Visit     Assesment:   43 y.o. female with right shoulder pain for approximately 1 year without a specific injury and MRI showing tears of the supraspinatus and infraspinatus with retraction and mild atrophy.  Exam concerning for both rotator cuff tear as well as adhesive capsulitis.    Plan:  I reviewed the history, exam, and imaging with the patient in clinic today.  I did review the results of the patient's MRI which show full-thickness tears of the infraspinatus and supraspinatus with retraction and mild atrophy.  On exam, she does have weakness particular with empty can and external rotation consistent with her MRI findings.  However, in addition to the weakness she also has decreased range of motion both active and passive concerning for adhesive capsulitis.  I discussed with the patient that given her failure of conservative management and the findings on the MRI she would most likely need a rotator cuff repair.  I did discuss that we could continue conservative management but she has done physical therapy, medications, injections in the past with no improvement in her pain and persistent weakness.  Discussed with the patient that before we could treat the rotator cuff tear with surgery we would need to improve her shoulder range of motion given significant stiffness on exam today.  Therefore, I recommended physical therapy for 1 month focusing on improving her shoulder range of motion.  I advised her to return to clinic in 1 month for repeat evaluation at which point we will check the range of motion of her shoulder.  If her shoulder range of motion has improved, we will have a further discussion about surgery for her rotator cuff tear.  We briefly discussed what we would be involved in a rotator cuff surgery as well as the timeline for postoperative rehabilitation.  Patient demonstrated understanding discussion was agreed with the plan.  All her questions were  answered.  I will see her back in 1 month for repeat evaluation.  She can reach out to clinic with any questions or concerns anytime.    Conservative treatment:  PT for ROM and stretching.  Continue NSAIDs as prescribed.  Future surgical planning at next appointment, pending results from physical therapy.    Imaging:  All imaging from today was reviewed by myself and explained to the patient.     Injection:  No Injection planned at this time.    Surgery:  Future surgical planning follow-ing physical therapy for adhesive capsulitis.     Follow up:  Return in about 4 weeks (around 3/21/2024).      Chief Complaint   Patient presents with    Right Shoulder - Pain         History of Present Illness:  The patient is a 43 y.o. RHD female seen in clinic for evaluation of her right shoulder. The patient reports chronic pain in her right shoulder, for approximately 1 year. She cannot recall any specific mechanism of injury or trauma to her shoulder. She states that the pain has progressively worsened over time. The patient was seen initially by Dr. Watt, who provided a CS injection to her right shoulder, without benefit. She then followed up with Dr. Waddell for cervical pathologies. An MRI was ordered, showing a RTC tear.  She was referred here for further management of the rotator cuff tear.  The patient reports pain at all times.  The pain is localized over the lateral aspect of the shoulder and radiates down towards the elbow but not past it.  She has pain at rest which is significantly exacerbated by movement. She reports trouble with overhead activity and reaching backwards. She reports weakness. The patient has also tried physical therapy. She states that she recently began a prescription of Celebrex.  She denies any prior injuries or surgeries on the shoulder.  Patient does not have diabetes, but does smoke approximately half pack per day.    Occupation: Unemployed    The patient has the following co-morbidities:  "Bipolar disorder    Shoulder Surgical History:  None    Past Medical, Social and Family History:  Past Medical History:   Diagnosis Date    Anxiety     Bipolar depression (HCC)     Chronic pelvic pain in female 2021    Endometrial hyperplasia without atypia 2021    Hypertension     Kidney stone     Menometrorrhagia 2021     Past Surgical History:   Procedure Laterality Date     SECTION      EPIDURAL BLOCK INJECTION Right 10/19/2023    Procedure: Right C7-T1 ILESI;  Surgeon: Elsi Saunders MD;  Location: [facility];  Service: Pain Management     FL INJECTION RIGHT SHOULDER (ARTHROGRAM)  2024    HYSTERECTOMY  2021     Allergies   Allergen Reactions    Clonazepam Other (See Comments)     Excessive sedation  Vomiting    Latex     Methylphenidate Other (See Comments)     Hyperactivity\"     Current Outpatient Medications on File Prior to Visit   Medication Sig Dispense Refill    busPIRone (BUSPAR) 5 mg tablet       celecoxib (CeleBREX) 200 mg capsule Take 1 capsule (200 mg total) by mouth daily Take with food 30 capsule 1    fluticasone (FLONASE) 50 mcg/act nasal spray 2 sprays into each nostril daily 11.1 mL 0    ketoconazole (NIZORAL) 2 % cream APPLY  CREAM TOPICALLY ONCE DAILY 60 g 0    LORazepam (ATIVAN) 0.5 mg tablet Take 1-2 about 30 minutes prior to the MRI 2 tablet 0    sertraline (ZOLOFT) 100 mg tablet Take 100 mg by mouth daily      topiramate (TOPAMAX) 25 mg sprinkle capsule Take 25 mg by mouth 2 (two) times a day      topiramate (TOPAMAX) 25 mg tablet Take 25 mg by mouth 2 (two) times a day      ciclopirox (PENLAC) 8 % solution Apply topically daily at bedtime (Patient not taking: Reported on 2024) 6.6 mL 0    Diclofenac Sodium (VOLTAREN) 1 % Apply 2 g topically 4 (four) times a day (Patient not taking: Reported on 2024) 2 g 0     No current facility-administered medications on file prior to visit.     Social History     Socioeconomic History    Marital " "status: /Civil Union     Spouse name: Not on file    Number of children: Not on file    Years of education: Not on file    Highest education level: Not on file   Occupational History    Not on file   Tobacco Use    Smoking status: Every Day     Current packs/day: 0.50     Average packs/day: 0.5 packs/day for 28.0 years (14.0 ttl pk-yrs)     Types: Cigarettes    Smokeless tobacco: Never   Vaping Use    Vaping status: Former   Substance and Sexual Activity    Alcohol use: Never    Drug use: Never    Sexual activity: Not Currently   Other Topics Concern    Not on file   Social History Narrative    Not on file     Social Determinants of Health     Financial Resource Strain: Not on file   Food Insecurity: Not on file   Transportation Needs: Not on file   Physical Activity: Not on file   Stress: Not on file   Social Connections: Not on file   Intimate Partner Violence: Not on file   Housing Stability: Not on file       I have reviewed the past medical, surgical, social and family history, medications and allergies as documented in the EMR.    Review of systems: ROS is negative other than that noted in the HPI.  Constitutional: Negative for fatigue and fever.      Physical Exam:    Blood pressure 130/80, pulse 75, height 5' 3\" (1.6 m), weight 79.4 kg (175 lb), last menstrual period 12/25/2020.    General/Constitutional: NAD, well developed, well nourished  HENT: Normocephalic, atraumatic  CV: Intact distal pulses, regular rate  Resp: No respiratory distress or labored breathing  Neuro: Alert and Oriented x 3  Psych: Normal mood, normal affect, normal judgement, normal behavior  Skin: Warm, dry, no rashes, no erythema      Focused right shoulder exam:  No paracervical tenderness.   No cervical tenderness.   No pain with neck flexion, extension, side-to-side bending, or rotation.   Negative Spurling's bilaterally.    The skin is intact without evidence of erythema or ecchymosis. Symmetric shoulders with no evidence of " supraspinatus or infraspinatus muscle atrophy. There is no evidence neurologic medial or lateral scapular winging. Anterior tilt, protracted scapular positioning.    Palpation demonstrates diffuse tenderness over the SC joint, bilateral clavicle, lateral aspect of the acromion or posterior joint line, AC joint, and supraspinatus.     Shoulder ROM demonstrates 80 degrees of active and 100 degrees of passive forward elevation. External rotation with the arms at the side demonstrates 30 degrees of active and 30 degrees of passive motion.  Internal rotation not tested due to pain.        Strength testing demonstrates 4/5 strength in empty can position, 4/5 with resisted external rotation with the arm at the side.  4+/5 strength of the subscapularis and a negative belly press.  Hornblower sign is negative, external rotation lag sign is negative    Provocative testing for the following demonstrate-  Impingement- positive Hawkin's impingement test  Biceps- negative Yeagerson, negative Speeds test.  Labrum- positive Burleson test, negative sulcus sign      UE NV Exam: +2 Radial pulses bilaterally. Fingers are warm and well-perfused.  Sensation intact to light touch C5-T1 bilaterally, Radial/median/ulnar nerve motor intact      Shoulder Imaging    Radiographs of the right shoulder were obtained on 1/26/2023 and reviewed with the patient.  Based on my independent evaluation, the imaging shows no acute osseous abnormalities.  No fracture or dislocation noted.  Humeral head is well centered on the glenoid.  No significant degenerative changes in the glenohumeral or AC joint.    MRI Arthrogram of the right shoulder was obtained on 1/29/2024 and reviewed with the patient.  Based on my independent evaluation, the imaging shows a full-thickness infraspinatus tear with retraction to the superior humeral head.  There is a full-thickness partial width tear involving the middle to posterior supraspinatus, which is retracted from the  insertion to the superior humeral head. Mild supraspinatus atrophy with moderate infraspinatus atrophy.  Tendinosis but no tearing of the subscapularis.  Mild chondrosis of the glenohumeral joint.      Scribe Attestation      I,:  Becca Malone am acting as a scribe while in the presence of the attending physician.:       I,:  Demond Cardenas MD personally performed the services described in this documentation    as scribed in my presence.:

## 2024-02-26 ENCOUNTER — HOSPITAL ENCOUNTER (OUTPATIENT)
Dept: MRI IMAGING | Facility: HOSPITAL | Age: 44
Discharge: HOME | End: 2024-02-26
Payer: COMMERCIAL

## 2024-02-26 DIAGNOSIS — M54.12 CERVICAL RADICULOPATHY: ICD-10-CM

## 2024-02-26 DIAGNOSIS — G89.4 CHRONIC PAIN SYNDROME: ICD-10-CM

## 2024-02-26 DIAGNOSIS — M54.2 NECK PAIN: ICD-10-CM

## 2024-02-26 DIAGNOSIS — M47.812 CERVICAL SPONDYLOSIS: ICD-10-CM

## 2024-02-26 PROCEDURE — 72141 MRI NECK SPINE W/O DYE: CPT

## 2024-02-26 PROCEDURE — G1004 CDSM NDSC: HCPCS

## 2024-02-28 ENCOUNTER — OFFICE VISIT (OUTPATIENT)
Dept: PHYSICAL THERAPY | Facility: CLINIC | Age: 44
End: 2024-02-28
Payer: COMMERCIAL

## 2024-02-28 DIAGNOSIS — M25.519 CHRONIC SHOULDER PAIN, UNSPECIFIED LATERALITY: Primary | ICD-10-CM

## 2024-02-28 DIAGNOSIS — G89.29 CHRONIC SHOULDER PAIN, UNSPECIFIED LATERALITY: Primary | ICD-10-CM

## 2024-02-28 PROCEDURE — 97140 MANUAL THERAPY 1/> REGIONS: CPT

## 2024-02-28 PROCEDURE — 97110 THERAPEUTIC EXERCISES: CPT

## 2024-02-28 NOTE — PROGRESS NOTES
"Daily Note     Today's date: 2024  Patient name: Nina Joseph  : 1980  MRN: [id number]  Referring provider: Greg Watt DO  Dx:   Encounter Diagnosis     ICD-10-CM    1. Chronic shoulder pain, unspecified laterality  M25.519     G89.29           Start Time: 0800  Stop Time: 0855  Total time in clinic (min): 55 minutes    Subjective: Patient reports having a follow up appointment with Ortho surgery on 24. She notes needing to improve R shoulder ROM before further surgical discussion. She states continuing to experience pain in the R shoulder this morning       Objective: See treatment diary below      Assessment: Tolerated treatment well. Patient continues to demonstrate limitations in R shoulder AROM and PROM. We will continue to address R GH ROM deficits to meet functional goals. Patient would benefit from continued PT.      Plan: Continue per plan of care.      Precautions: Cervical Radiculopathy, Hx of R elbow fracture, CPS     Manuals    R Shoulder PROM  BM BM  BM BM BM BM BM BM   R Elbow PROM   BM  BM BM BM      STM R shoulder     BM BM  BM     Scapular patterns      2x10 ea 2x10 ea    Neuro Re-Ed           High Pull D/C L3 2x10  L3 2x10  L3 2x10 L3 2x10 L3 2x10  L3 2x10 L3 2x10    Mid Pull D/C L3 2x10  L3 2x10  L3 2x10  L3 2x10  L3 2x10  L3 2x10 L3 2x10                                                Ther Ex           Table Slides Flexion 10\" Hold x10  10\"x10  10\"x10  10\"x10  10\"x10  10\"x10  10\"x10  10\" Hold x10    Table Slides Scaption 10\" Hold x10  10\"x10  10\"x10  10\"x10  10\"x10  10\"x10  10\"x10  10\" Hold x10    Supine Wand Flexion  2x10  NT 2x10 2x10 2x10 2x10  2x10  2x10    Supine Wand ER  5\" Hold x10    5\" Hold x10  5\" Hold x10  5\" Hold x10  5\" Hold x10  5\" Hold x10    Wall Slides X10 Flx NT  X10 Flx AAROm  NT X10 Flx AAROm  NT X10 Flx AAROM NT   Child Pose w/ PB 5\" Hold x10           Standing Wand Abduction  2x10  2x10  2x10 2x10 2x10  2x10  2x10 2x10 "   Serratus Anterior Punches NT     2x10 AAROM 2x10 AAROM 2x10 AAROM   Supine Shoulder Circles NT      X20 CW/CCW X20 CW/CCW X20 CW/CCW   Shoulder Shrugs  D/C 2# 2x10  2# 2x10  3# 2x10  3# 2x10  3# 2x10  3# 2x10 NT   AAROM Pulleys  3x10 3x10  3x10  3x10  3x10 3x10  3x10  3x10   HEP Instruction           Ther Activity                                 Gait Training                                 Modalities           Ice           MHP 10' Pre-exercise  10' pre-exercise  Declined  10' Pre-exercise  10' Pre-exercise  10' Pre-exercise NT 10' Pre-exercise

## 2024-03-01 ENCOUNTER — EVALUATION (OUTPATIENT)
Dept: PHYSICAL THERAPY | Facility: CLINIC | Age: 44
End: 2024-03-01
Payer: COMMERCIAL

## 2024-03-01 DIAGNOSIS — M25.519 CHRONIC SHOULDER PAIN, UNSPECIFIED LATERALITY: Primary | ICD-10-CM

## 2024-03-01 DIAGNOSIS — G89.29 CHRONIC SHOULDER PAIN, UNSPECIFIED LATERALITY: Primary | ICD-10-CM

## 2024-03-01 PROCEDURE — 97110 THERAPEUTIC EXERCISES: CPT

## 2024-03-01 PROCEDURE — 97140 MANUAL THERAPY 1/> REGIONS: CPT

## 2024-03-01 NOTE — PROGRESS NOTES
PT Re-Evaluation     Today's date: 3/1/2024  Patient name: Nina Joseph  : 1980  MRN: [id number]  Referring provider: Greg Watt DO  Dx:   Encounter Diagnosis     ICD-10-CM    1. Chronic shoulder pain, unspecified laterality  M25.519     G89.29               Start Time: 0800  Stop Time: 0855  Total time in clinic (min): 55 minutes    Assessment  Assessment details: The patient is a 43 y.o. female presenting to Physical Therapy today with chronic R sided shoulder, neck, and elbow pain. Upon completion of the re-evaluation the patient is demonstrating with limitations in cervical mobility, R shoulder mobility, R shoulder strength, R elbow mobility, R elbow strength, scapulothoracic control, and pain. We will continue to progress patient to address functional deficits to meet functional goals. She would continue to benefit from skilled Physical Therapy services at this time.   Impairments: abnormal muscle firing, abnormal muscle tone, abnormal or restricted ROM, abnormal movement, activity intolerance, impaired physical strength, lacks appropriate home exercise program, pain with function and poor posture     Symptom irritability: highUnderstanding of Dx/Px/POC: good   Prognosis: fair    Goals  ST.) Patient will initiate HEP Independently MET  2.) Patient will demonstrate improved R elbow strength evidenced by a 1-2 MMT grade increase Progressing   3.) Patient will demonstrate improved R shoulder strength evidenced by a 1-2 MMT grade increase Progressing   4.) Patient will demonstrate improved cervical mobility evidenced by </= minimal restriction in all planes of motion Progressing  5.) Patient will demonstrate a </= 7/10 pain level at its worse with activity Progressing    LT.) Patient will be d/c to an HEP independently Progressing  2.) Patient will improve functional abilities evidenced by FOTO scores Progressing  3.) Patient will demonstrate a </= 4/10 pain level at its worse with activity  Progressing  4.) Patient will demonstrate improved ability to lift, push, pull, and carry Progressing  5.) Return to PLOF Progressing    Plan  Plan details: Plan of care was reviewed and discussed thoroughly with the patient on their current condition. Patient was instructed on a HEP with written instructions. Patient is in agreement with PT recommendations and will attend Physical Therapy 2x/week for the next 6 weeks to address current deficits.    Patient would benefit from: skilled physical therapy  Referral necessary: No  Planned modality interventions: cryotherapy and thermotherapy: hydrocollator packs  Planned therapy interventions: flexibility, functional ROM exercises, graded activity, graded exercise, graded motor, home exercise program, manual therapy, joint mobilization, massage, neuromuscular re-education, patient education, postural training, strengthening, stretching, therapeutic activities, therapeutic exercise and therapeutic training  Frequency: 2x week  Duration in weeks: 6  Plan of Care beginning date: 3/1/2024  Plan of Care expiration date: 4/12/2024  Treatment plan discussed with: patient    Subjective Evaluation    History of Present Illness  Mechanism of injury: The patient reports today with chronic R sided shoulder, elbow, and neck pain. She notes intermittent numbness and tingling into the R arm. She states having a R elbow fracture when she was a child that did not heal correctly and is unable to bend/extend her elbow fully. She reports clicking/popping at times in the R shoulder when she moves the arm. She reports having a series of injections in the neck and R shoulder with no relief of symptoms. The patient states she is currently having difficulty with activities such as sleeping, lifting, pushing, pulling, and carrying due to pain.     Update 1-23-24: The patient reports today with pain in the R shoulder. She notes since the start of Physical Therapy treatment she has been able to  tolerate pain symptoms more than before. She states still having difficulty with sleeping, lifting, pushing, pulling, and carrying due to pain. She notes being scheduled for an MRI on 24 for the R shoulder.    Update 3-1-24: The patient reports today with continued R shoulder pain. She notes following up with Ortho surgery on . She states needing to improve range of motion in the shoulder before further surgical discussion. She notes still having difficulty with lifting, carrying, pushing, and pulling due to R shoulder pain.           Recurrent probem    Quality of life: fair    Patient Goals  Patient goals for therapy: decreased pain, increased motion, increased strength, independence with ADLs/IADLs and return to sport/leisure activities    Pain  Current pain ratin  At best pain ratin  At worst pain rating: 10  Location: R neck, shoulder, and elbow with radiating numbness and tingling into the hand.  Quality: burning, dull ache, sharp, throbbing and discomfort  Alleviating factors: Nothing.  Aggravating factors: lifting and overhead activity (pushing, pulling, carrying, lifting)      Diagnostic Tests  X-ray: abnormal  Treatments  Previous treatment: physical therapy and injection treatment  Current treatment: physical therapy    Objective     Concurrent Complaints  Positive for night pain and disturbed sleep. Negative for dizziness, faints, headaches, nausea/motion sickness and tinnitus    Postural Observations  Seated posture: poor  Standing posture: poor    Additional Postural Observation Details  Patient presents with a forward head, thoracic kyphosis, and rounded shoulder posture.     Palpation     Right   Hypertonic in the biceps, suboccipitals, subscapularis, supraspinatus and upper trapezius.   Tenderness of the biceps, suboccipitals, subscapularis, supraspinatus and upper trapezius.     Tenderness   Cervical Spine   No tenderness in the spinous process, left transverse process and right  transverse process.     Neurological Testing     Sensation   Cervical/Thoracic   Left   Intact: light touch    Right   Hyposensation: light touch    Reflexes   Left   Deltoid (C5): normal (2+)  Brachioradialis (C6): normal (2+)    Right   Deltoid (C5): normal (2+)  Brachioradialis (C6): normal (2+)    Active Range of Motion   Cervical/Thoracic Spine       Cervical    Flexion:  Restriction level: minimal  Extension:  with pain Restriction level: moderate  Left lateral flexion:  with pain Restriction level: moderate  Right lateral flexion:  with pain Restriction level moderate  Left rotation:  with pain Restriction level: moderate  Right rotation:  with pain Restriction level: moderate    Right Shoulder   Flexion: 146 degrees with pain  Abduction: 115 degrees with pain  External rotation 0°: WFL  External rotation 90°: 40 degrees  Internal rotation 0°: WFL  Internal rotation 90°: 35 degrees     Additional Active Range of Motion Details  Patient demonstrates improvement in R shoulder mobility in all planes of motion .  Functional R ER : R earlobe   Functional R IR: L5     Passive Range of Motion     Right Shoulder   Flexion: 170 degrees with pain  Abduction: 165 degrees with pain  External rotation 0°: WFL  External rotation 90°: WFL and with pain  Internal rotation 0°: WFL  Internal rotation 90°: WFL and with pain    Scapular Mobility     Right Shoulder   Scapular Mobility with Shoulder to 90° FF   Upward rotation: inadequate  Downward rotation: inadequate    Scapular Mobility beyond 90° FF   Upward rotation: inadequate  Downward rotation: inadequate    Strength/Myotome Testing   Cervical Spine     Left   Normal strength    Right Shoulder     Planes of Motion   Flexion: 2   Extension: 2   Abduction: 2   External rotation at 0°: 2   Internal rotation at 0°: 2     Left Elbow   Normal strength    Right Elbow   Flexion: 3-  Extension: 3-    Additional Strength Details  Patient demonstrates improvements in R shoulder  "strength in all planes of motion.  Patient demonstrates improvements in R elbow strength     Tests   Cervical   Negative VBI.     Left   Negative Spurling's Test A.     Right   Negative Spurling's Test A.     Right Shoulder   Positive drop arm, empty can, full can, Hawkin's, Neer's and Speed's.   Neuro Exam:     Headaches   Patient reports headaches: No.            Precautions: Cervical Radiculopathy, Hx of R elbow fracture, CPS     Manuals 2/28  3/1 1/26 1/30 2/2 2/5 2/9 2/16   R Shoulder PROM  BM BM  BM BM BM BM BM BM   R Elbow PROM    BM BM BM      STM R shoulder     BM BM  BM     Scapular patterns      2x10 ea 2x10 ea    Neuro Re-Ed                                                       Ther Ex           Table Slides Flexion 10\" Hold x10  10\" Hold x10  10\"x10  10\"x10  10\"x10  10\"x10  10\"x10  10\" Hold x10    Table Slides Scaption 10\" Hold x10  10\" Hold x10  10\"x10  10\"x10  10\"x10  10\"x10  10\"x10  10\" Hold x10    Supine Wand Flexion  2x10  2x10  2x10 2x10 2x10 2x10  2x10  2x10    Supine Wand ER  5\" Hold x10  NT  5\" Hold x10  5\" Hold x10  5\" Hold x10  5\" Hold x10  5\" Hold x10    Wall Slides X10 Flx X10  X10 Flx AAROm  NT X10 Flx AAROm  NT X10 Flx AAROM NT   Child Pose w/ PB 5\" Hold x10  5\" Hold x10          Standing Wand Abduction  2x10  2x10  2x10 2x10 2x10  2x10  2x10 2x10   Serratus Anterior Punches NT     2x10 AAROM 2x10 AAROM 2x10 AAROM   Supine Shoulder Circles NT      X20 CW/CCW X20 CW/CCW X20 CW/CCW   AAROM Pulleys  3x10 3x10  3x10  3x10  3x10 3x10  3x10  3x10   HEP Instruction           Ther Activity                                 Gait Training                                 Modalities           Ice           MHP 10' Pre-exercise  10' Pre-exercise  Declined  10' Pre-exercise  10' Pre-exercise  10' Pre-exercise NT 10' Pre-exercise             "

## 2024-03-03 NOTE — PROGRESS NOTES
"Daily Note     Today's date: 3/4/2024  Patient name: Nina Joseph  : 1980  MRN: [id number]  Referring provider: Greg Watt DO  Dx:   Encounter Diagnosis     ICD-10-CM    1. Chronic shoulder pain, unspecified laterality  M25.519     G89.29                      Subjective: The patient states that she got the results of her MRI for her neck.  She states that the results showed some arthritis.  She will be going back to see Dr. Cardenas on 3/22 for her next appointment about her shoulder.          Objective: See treatment diary below      Assessment: Tolerated treatment fair.  She did note some \"popping\" in her shoulder with certain movements.  She demonstrates decreased ROM and strength t/o her shoulder.  Pain level remains the same at end of session.  Patient demonstrated fatigue post treatment and would benefit from continued PT to improve function.        Plan: Continue per plan of care.      Precautions: Cervical Radiculopathy, Hx of R elbow fracture, CPS     Manuals 2/28  3/1 3/4 1/30 2/2 2/5 2/9 2/16   R Shoulder PROM  BM BM  ML BM BM BM BM BM   R Elbow PROM     BM BM                 STM R shoulder     BM BM  BM     Scapular patterns      2x10 ea 2x10 ea    Neuro Re-Ed                                                       Ther Ex           Table Slides Flexion 10\" Hold x10  10\" Hold x10  10\"  x10  10\"x10  10\"x10  10\"x10  10\"x10  10\" Hold x10    Table Slides Scaption 10\" Hold x10  10\" Hold x10  10\"  x10  10\"x10  10\"x10  10\"x10  10\"x10  10\" Hold x10    Supine Wand Flexion  2x10  2x10  2x10 2x10 2x10 2x10  2x10  2x10    Supine Wand ER  5\" Hold x10  NT -- 5\" Hold x10  5\" Hold x10  5\" Hold x10  5\" Hold x10  5\" Hold x10    Wall Slides X10 Flx X10  10x Flex NT X10 Flx AAROm  NT X10 Flx AAROM NT   Child Pose w/ PB 5\" Hold x10  5\" Hold x10  5\"x10        Standing Wand Abduction  2x10  2x10  2x10 2x10 2x10  2x10  2x10 2x10   Serratus Anterior Punches NT     2x10 AAROM 2x10 AAROM 2x10 AAROM   Supine Shoulder Circles " NT      X20 CW/CCW X20 CW/CCW X20 CW/CCW   AAROM Pulleys  3x10 3x10  3x10  3x10  3x10 3x10  3x10  3x10   HEP Instruction           Ther Activity                                 Gait Training                                 Modalities           Ice           MHP 10' Pre-exercise  10' Pre-exercise  10' Pre exercise 10' Pre-exercise  10' Pre-exercise  10' Pre-exercise NT 10' Pre-exercise

## 2024-03-04 ENCOUNTER — OFFICE VISIT (OUTPATIENT)
Dept: PHYSICAL THERAPY | Facility: CLINIC | Age: 44
End: 2024-03-04
Payer: COMMERCIAL

## 2024-03-04 ENCOUNTER — TELEPHONE (OUTPATIENT)
Dept: PAIN MEDICINE | Facility: CLINIC | Age: 44
End: 2024-03-04

## 2024-03-04 DIAGNOSIS — G89.29 CHRONIC SHOULDER PAIN, UNSPECIFIED LATERALITY: Primary | ICD-10-CM

## 2024-03-04 DIAGNOSIS — M25.519 CHRONIC SHOULDER PAIN, UNSPECIFIED LATERALITY: Primary | ICD-10-CM

## 2024-03-04 PROCEDURE — 97110 THERAPEUTIC EXERCISES: CPT | Performed by: PHYSICAL THERAPIST

## 2024-03-04 PROCEDURE — 97140 MANUAL THERAPY 1/> REGIONS: CPT | Performed by: PHYSICAL THERAPIST

## 2024-03-04 NOTE — TELEPHONE ENCOUNTER
S/w pt and advised of same. Pt verbalized understanding. Pt states she is seeing ortho for her shoulder this month as well as cont PT. RN advised to cont as sched and BK will see her on 3/29/24. Pt verbalized understanding and apprec of call.

## 2024-03-04 NOTE — TELEPHONE ENCOUNTER
----- Message from Barbara Kocher, CRNP sent at 3/4/2024  7:42 AM EST -----  Call patient and let her know that the recent MRI of her cervical spine reveals mild degenerative changes throughout the cervical spine.  No large disc herniation.  No critical stenosis.

## 2024-03-05 NOTE — PROGRESS NOTES
"Daily Note     Today's date: 3/6/2024  Patient name: Nina Joseph  : 1980  MRN: [id number]  Referring provider: Greg Watt DO  Dx:   Encounter Diagnosis     ICD-10-CM    1. Chronic shoulder pain, unspecified laterality  M25.519     G89.29                      Subjective: The patient states that her shoulder feels the same.  Reports that she felt okay when she left therapy on Monday but then had a HA for the rest of the day.        Objective: See treatment diary below      Assessment: Tolerated treatment fair.  She had pain with most movement and activity and it is difficult to progress patient because of her symptoms.  Patient demonstrated fatigue post treatment and would benefit from continued PT to improve function.        Plan: Continue per plan of care.   Continue to monitor symptoms and progress as able in upcoming visits.       Precautions: Cervical Radiculopathy, Hx of R elbow fracture, CPS     Manuals 2/28  3/1 3/4 3/6 2/2 2/5 2/9 2/16   R Shoulder PROM  BM BM  ML ML BM BM BM BM   R Elbow PROM      BM                 STM R shoulder      BM  BM     Scapular patterns      2x10 ea 2x10 ea    Neuro Re-Ed                                                       Ther Ex           Table Slides Flexion 10\" Hold x10  10\" Hold x10  10\"  x10  10\"  x10  10\"x10  10\"x10  10\"x10  10\" Hold x10    Table Slides Scaption 10\" Hold x10  10\" Hold x10  10\"  x10  10\"  x10  10\"x10  10\"x10  10\"x10  10\" Hold x10    Supine Wand Flexion  2x10  2x10  2x10 2x10 2x10 2x10  2x10  2x10    Supine Wand ER  5\" Hold x10  NT -- -- 5\" Hold x10  5\" Hold x10  5\" Hold x10  5\" Hold x10    Wall Slides X10 Flx X10  10x Flex 10x Flex X10 Flx AAROm  NT X10 Flx AAROM NT   Child Pose w/ PB 5\" Hold x10  5\" Hold x10  5\"x10 5\"x10       Standing Wand Abduction  2x10  2x10  2x10 2x10 2x10  2x10  2x10 2x10   Serratus Anterior Punches NT     2x10 AAROM 2x10 AAROM 2x10 AAROM   Supine Shoulder Circles NT      X20 CW/CCW X20 CW/CCW X20 CW/CCW   MATEO Tyler  " 3x10 3x10  3x10  3x10  3x10 3x10  3x10  3x10   HEP Instruction           Ther Activity                                 Gait Training                                 Modalities           Ice           MHP 10' Pre-exercise  10' Pre-exercise  10' Pre exercise 10' Pre-exercise  10' Pre-exercise  10' Pre-exercise NT 10' Pre-exercise

## 2024-03-06 ENCOUNTER — OFFICE VISIT (OUTPATIENT)
Dept: PHYSICAL THERAPY | Facility: CLINIC | Age: 44
End: 2024-03-06
Payer: COMMERCIAL

## 2024-03-06 DIAGNOSIS — M25.519 CHRONIC SHOULDER PAIN, UNSPECIFIED LATERALITY: Primary | ICD-10-CM

## 2024-03-06 DIAGNOSIS — G89.29 CHRONIC SHOULDER PAIN, UNSPECIFIED LATERALITY: Primary | ICD-10-CM

## 2024-03-06 PROCEDURE — 97110 THERAPEUTIC EXERCISES: CPT | Performed by: PHYSICAL THERAPIST

## 2024-03-06 PROCEDURE — 97140 MANUAL THERAPY 1/> REGIONS: CPT | Performed by: PHYSICAL THERAPIST

## 2024-03-11 ENCOUNTER — OFFICE VISIT (OUTPATIENT)
Dept: PHYSICAL THERAPY | Facility: CLINIC | Age: 44
End: 2024-03-11
Payer: COMMERCIAL

## 2024-03-11 DIAGNOSIS — G89.29 CHRONIC SHOULDER PAIN, UNSPECIFIED LATERALITY: Primary | ICD-10-CM

## 2024-03-11 DIAGNOSIS — M25.519 CHRONIC SHOULDER PAIN, UNSPECIFIED LATERALITY: Primary | ICD-10-CM

## 2024-03-11 PROCEDURE — 97140 MANUAL THERAPY 1/> REGIONS: CPT

## 2024-03-11 PROCEDURE — 97110 THERAPEUTIC EXERCISES: CPT

## 2024-03-11 NOTE — PROGRESS NOTES
"Daily Note     Today's date: 3/11/2024  Patient name: Nina Joseph  : 1980  MRN: [id number]  Referring provider: Greg Watt DO  Dx:   Encounter Diagnosis     ICD-10-CM    1. Chronic shoulder pain, unspecified laterality  M25.519     G89.29           Start Time: 1100  Stop Time: 1155  Total time in clinic (min): 55 minutes    Subjective: Patient reports typical R chronic shoulder pain this morning.       Objective: See treatment diary below      Assessment: Tolerated treatment fair. We will continue to progress patient within tolerance to address R shoulder ROM deficits. Patient would benefit from continued PT.      Plan: Continue per plan of care.      Precautions: Cervical Radiculopathy, Hx of R elbow fracture, CPS     Manuals 2/28  3/1 3/4 3/6 3/11 2/5 2/9 2/16   R Shoulder PROM  BM BM  ML ML BM BM BM BM   R Elbow PROM                       STM R shoulder       BM     Scapular patterns      2x10 ea 2x10 ea    Neuro Re-Ed                                                       Ther Ex           Table Slides Flexion 10\" Hold x10  10\" Hold x10  10\"  x10  10\"  x10  10\" Hold x10  10\"x10  10\"x10  10\" Hold x10    Table Slides Scaption 10\" Hold x10  10\" Hold x10  10\"  x10  10\"  x10  10\" Hold x10  10\"x10  10\"x10  10\" Hold x10    Supine Wand Flexion  2x10  2x10  2x10 2x10 2x10  2x10  2x10  2x10    Supine Wand ER  5\" Hold x10  NT -- -- 2x10  5\" Hold x10  5\" Hold x10  5\" Hold x10    Wall Slides X10 Flx X10  10x Flex 10x Flex 10x Flex NT X10 Flx AAROM NT   Child Pose w/ PB 5\" Hold x10  5\" Hold x10  5\"x10 5\"x10 5\" x10       Standing Wand Abduction  2x10  2x10  2x10 2x10 2x10 2x10  2x10 2x10   Serratus Anterior Punches NT     2x10 AAROM 2x10 AAROM 2x10 AAROM   Supine Shoulder Circles NT      X20 CW/CCW X20 CW/CCW X20 CW/CCW   AAROM Pulleys  3x10 3x10  3x10  3x10  3x10  3x10  3x10  3x10   HEP Instruction           Ther Activity                                 Gait Training                                 Modalities        "    Ice           MHP 10' Pre-exercise  10' Pre-exercise  10' Pre exercise 10' Pre-exercise  10' Pre-exercise  10' Pre-exercise NT 10' Pre-exercise

## 2024-03-15 ENCOUNTER — OFFICE VISIT (OUTPATIENT)
Dept: PHYSICAL THERAPY | Facility: CLINIC | Age: 44
End: 2024-03-15
Payer: COMMERCIAL

## 2024-03-15 DIAGNOSIS — G89.29 CHRONIC SHOULDER PAIN, UNSPECIFIED LATERALITY: Primary | ICD-10-CM

## 2024-03-15 DIAGNOSIS — M25.519 CHRONIC SHOULDER PAIN, UNSPECIFIED LATERALITY: Primary | ICD-10-CM

## 2024-03-15 PROCEDURE — 97140 MANUAL THERAPY 1/> REGIONS: CPT

## 2024-03-15 PROCEDURE — 97110 THERAPEUTIC EXERCISES: CPT

## 2024-03-15 NOTE — PROGRESS NOTES
"Daily Note     Today's date: 3/15/2024  Patient name: Nina Joseph  : 1980  MRN: [id number]  Referring provider: Greg Watt DO  Dx:   Encounter Diagnosis     ICD-10-CM    1. Chronic shoulder pain, unspecified laterality  M25.519     G89.29           Start Time: 09  Stop Time: 1025  Total time in clinic (min): 55 minutes    Subjective: Patient reports R elbow soreness this morning. She feels that the R elbow locks up on her at times.      Objective: See treatment diary below      Assessment: Tolerated treatment well. Patient is demonstrating improvement in AAROM in all planes of motion of the R GH joint. We will continue to progress patient within tolerance to address R shoulder mobility deficits. Patient would benefit from continued PT.      Plan: Continue per plan of care.      Precautions: Cervical Radiculopathy, Hx of R elbow fracture, CPS     Manuals 2/28  3/1 3/4 3/6 3/11 3/15 2/9 2/16   R Shoulder PROM  BM BM  ML ML BM BM BM BM   R Elbow PROM                       STM R shoulder            Scapular patterns       2x10 ea    Neuro Re-Ed                                                       Ther Ex           Table Slides Flexion 10\" Hold x10  10\" Hold x10  10\"  x10  10\"  x10  10\" Hold x10  10\" Hold x10  10\"x10  10\" Hold x10    Table Slides Scaption 10\" Hold x10  10\" Hold x10  10\"  x10  10\"  x10  10\" Hold x10  10\" Hold x10  10\"x10  10\" Hold x10    Supine Wand Flexion  2x10  2x10  2x10 2x10 2x10  2x10 2x10  2x10    Supine Wand ER  5\" Hold x10  NT -- -- 2x10  2x10 5\" Hold x10  5\" Hold x10    Wall Slides X10 Flx X10  10x Flex 10x Flex 10x Flex 10x flex  X10 Flx AAROM NT   Child Pose w/ PB 5\" Hold x10  5\" Hold x10  5\"x10 5\"x10 5\" x10  5\" Hold x10      Standing Wand Abduction  2x10  2x10  2x10 2x10 2x10 2x10 2x10 2x10   Serratus Anterior Punches NT      2x10 AAROM 2x10 AAROM   Supine Shoulder Circles NT       X20 CW/CCW X20 CW/CCW   AAROM Pulleys  3x10 3x10  3x10  3x10  3x10  3x10  3x10  3x10   HEP " Instruction           Ther Activity                                 Gait Training                                 Modalities           Ice           MHP 10' Pre-exercise  10' Pre-exercise  10' Pre exercise 10' Pre-exercise  10' Pre-exercise  10' Pre-exercise NT 10' Pre-exercise

## 2024-03-18 ENCOUNTER — OFFICE VISIT (OUTPATIENT)
Dept: PHYSICAL THERAPY | Facility: CLINIC | Age: 44
End: 2024-03-18
Payer: COMMERCIAL

## 2024-03-18 DIAGNOSIS — M25.519 CHRONIC SHOULDER PAIN, UNSPECIFIED LATERALITY: Primary | ICD-10-CM

## 2024-03-18 DIAGNOSIS — G89.29 CHRONIC SHOULDER PAIN, UNSPECIFIED LATERALITY: Primary | ICD-10-CM

## 2024-03-18 PROCEDURE — 97110 THERAPEUTIC EXERCISES: CPT

## 2024-03-18 PROCEDURE — 97140 MANUAL THERAPY 1/> REGIONS: CPT

## 2024-03-18 NOTE — PROGRESS NOTES
"Daily Note     Today's date: 3/18/2024  Patient name: Nina Joseph  : 1980  MRN: [id number]  Referring provider: Greg Watt DO  Dx:   Encounter Diagnosis     ICD-10-CM    1. Chronic shoulder pain, unspecified laterality  M25.519     G89.29           Start Time: 1015  Stop Time: 1100  Total time in clinic (min): 45 minutes    Subjective: Patient reports an increase in L shoulder soreness this morning. She notes the L shoulder feels worse than the R shoulder today.      Objective: See treatment diary below      Assessment: Tolerated treatment well. Patient was able to demonstrate full R shoulder ABD and Flx AROM with moderate discomfort in the R GH joint. She is continuing to make appropriate progressions in R shoulder ROM in all planes of motion. Patient would benefit from continued PT.      Plan: Continue per plan of care.      Precautions: Cervical Radiculopathy, Hx of R elbow fracture, CPS     Manuals 2/28  3/1 3/4 3/6 3/11 3/15 3/18 2/16   R Shoulder PROM  BM BM  ML ML BM BM BM  BM   R Elbow PROM                       STM R shoulder            Scapular patterns           Neuro Re-Ed                                                       Ther Ex           Table Slides Flexion 10\" Hold x10  10\" Hold x10  10\"  x10  10\"  x10  10\" Hold x10  10\" Hold x10  10\" Hold x10  10\" Hold x10    Table Slides Scaption 10\" Hold x10  10\" Hold x10  10\"  x10  10\"  x10  10\" Hold x10  10\" Hold x10  10\" Hold x10  10\" Hold x10    Supine Wand Flexion  2x10  2x10  2x10 2x10 2x10  2x10 2x10 2x10    Supine Wand ER  5\" Hold x10  NT -- -- 2x10  2x10 2x10 5\" Hold x10    Wall Slides X10 Flx X10  10x Flex 10x Flex 10x Flex 10x flex  10x flex NT   Child Pose w/ PB 5\" Hold x10  5\" Hold x10  5\"x10 5\"x10 5\" x10  5\" Hold x10  5\" Hold x10    Standing Wand Abduction  2x10  2x10  2x10 2x10 2x10 2x10 2x10  2x10   Serratus Anterior Punches NT       2x10 AAROM   Supine Shoulder Circles NT        X20 CW/CCW   AAROM Pulleys  3x10 3x10  3x10  3x10  " 3x10  3x10  3x10  3x10   HEP Instruction           Ther Activity                                 Gait Training                                 Modalities           Ice           MHP 10' Pre-exercise  10' Pre-exercise  10' Pre exercise 10' Pre-exercise  10' Pre-exercise  10' Pre-exercise 10' Pre-exercise  10' Pre-exercise

## 2024-03-21 ENCOUNTER — OFFICE VISIT (OUTPATIENT)
Dept: PHYSICAL THERAPY | Facility: CLINIC | Age: 44
End: 2024-03-21
Payer: COMMERCIAL

## 2024-03-21 DIAGNOSIS — G89.29 CHRONIC SHOULDER PAIN, UNSPECIFIED LATERALITY: Primary | ICD-10-CM

## 2024-03-21 DIAGNOSIS — M25.519 CHRONIC SHOULDER PAIN, UNSPECIFIED LATERALITY: Primary | ICD-10-CM

## 2024-03-21 PROCEDURE — 97110 THERAPEUTIC EXERCISES: CPT

## 2024-03-21 NOTE — PROGRESS NOTES
"Daily Note     Today's date: 3/21/2024  Patient name: Nina Joseph  : 1980  MRN: [id number]  Referring provider: Greg Watt DO  Dx:   Encounter Diagnosis     ICD-10-CM    1. Chronic shoulder pain, unspecified laterality  M25.519     G89.29           Start Time: 0945  Stop Time: 1030  Total time in clinic (min): 45 minutes    Subjective: Patient reports L shoulder is feeling better this morning. She notes still having pain in the R shoulder but is able to move her arm overhead and out to the side. She states R elbow tends to lock up at times.       Objective: See treatment diary below      Assessment: Tolerated treatment well. Patient demonstrates full R shoulder PROM in all planes of motion. She demonstrates full FF and ABD AROM with a moderate increase in symptomatology in the R shoulder. We will continue to focus Physical Therapy treatment on improving ROM deficits to meet functional goals. Patient would benefit from continued PT.      Plan: Continue per plan of care.      Precautions: Cervical Radiculopathy, Hx of R elbow fracture, CPS     Manuals 2/28  3/1 3/4 3/6 3/11 3/15 3/18 3/21   R Shoulder PROM  BM BM  ML ML BM BM BM  BM   R Elbow PROM                       STM R shoulder            Scapular patterns           Neuro Re-Ed                                                       Ther Ex           Table Slides Flexion 10\" Hold x10  10\" Hold x10  10\"  x10  10\"  x10  10\" Hold x10  10\" Hold x10  10\" Hold x10  10\" Hold x10   Table Slides Scaption 10\" Hold x10  10\" Hold x10  10\"  x10  10\"  x10  10\" Hold x10  10\" Hold x10  10\" Hold x10  10\" Hold x10    Supine Wand Flexion  2x10  2x10  2x10 2x10 2x10  2x10 2x10 2x10    Supine Wand ER  5\" Hold x10  NT -- -- 2x10  2x10 2x10 2x10    Wall Slides X10 Flx X10  10x Flex 10x Flex 10x Flex 10x flex  10x flex 10x Flx   Child Pose w/ PB 5\" Hold x10  5\" Hold x10  5\"x10 5\"x10 5\" x10  5\" Hold x10  5\" Hold x10 5\" Hold x10    Standing Wand Abduction  2x10  2x10  2x10 2x10 " 2x10 2x10 2x10  2x10   Serratus Anterior Punches NT          Supine Shoulder Circles NT           AAROM Pulleys  3x10 3x10  3x10  3x10  3x10  3x10  3x10  3x10   HEP Instruction           Ther Activity                                 Gait Training                                 Modalities           Ice           MHP 10' Pre-exercise  10' Pre-exercise  10' Pre exercise 10' Pre-exercise  10' Pre-exercise  10' Pre-exercise 10' Pre-exercise  10' Pre-exercise

## 2024-03-22 ENCOUNTER — OFFICE VISIT (OUTPATIENT)
Dept: OBGYN CLINIC | Facility: CLINIC | Age: 44
End: 2024-03-22
Payer: COMMERCIAL

## 2024-03-22 VITALS
BODY MASS INDEX: 31.01 KG/M2 | HEART RATE: 80 BPM | SYSTOLIC BLOOD PRESSURE: 136 MMHG | HEIGHT: 63 IN | DIASTOLIC BLOOD PRESSURE: 84 MMHG | WEIGHT: 175 LBS

## 2024-03-22 DIAGNOSIS — M75.121 NONTRAUMATIC COMPLETE TEAR OF RIGHT ROTATOR CUFF: Primary | ICD-10-CM

## 2024-03-22 PROCEDURE — 99213 OFFICE O/P EST LOW 20 MIN: CPT | Performed by: STUDENT IN AN ORGANIZED HEALTH CARE EDUCATION/TRAINING PROGRAM

## 2024-03-22 NOTE — PROGRESS NOTES
[facility] Sports Medicine Shoulder Follow-up Patient Visit     Assesment:   43 y.o. female with right shoulder pain for approximately 1 year without a specific injury and MRI showing tears of the supraspinatus and infraspinatus with retraction and mild atrophy.      Plan:  I reviewed the history and exam with the patient in clinic today.  On exam, the patient's shoulder range of motion has improved significantly and is close to the contralateral side.  She does state that she continues to have pain and weakness in the right shoulder but that has not responded to physical therapy.  At this point, she has tried physical therapy and anti-inflammatory medications as well as rest without any improvement in her pain and weakness in the right shoulder.  She does have an MRI that shows tears of the supraspinatus and infraspinatus with tendon retraction and mild atrophy.  I did discuss with the patient that we could continue to try conservative management versus surgical intervention.  I did discuss that conservative management at this point would entail continued physical therapy and a corticosteroid injection.  The patient was not interested in a corticosteroid injection and would prefer to move forward with surgical intervention at this point given the protracted course of her injury along with her symptoms being refractory to conservative management up to this point and an MRI confirmed rotator cuff tear.  I did discuss with the surgery would entail as well as the postoperative recovery process.  After long discussion with the patient about the risks, benefits, and alternative treatments, the patient opted for surgical intervention.  I did discuss that failure of her rotator cuff repair is elevated due to her smoking history and encouraged her to reduce her smoking as much as possible before the surgery as well as afterwards.  I also discussed with the patient that she would need preoperative labs and clearance prior to  surgical intervention.  Patient demonstrated understanding our discussion was agreed the plan.  All her questions were answered.  She can reach out to the clinic with any questions or concerns anytime.  We will schedule for surgery when she has completed the labs and preoperative clearance.    I explained to the patient what would be involved in their surgery and their recovery.  I explained that it would be an outpatient procedure as the patient would come in and go home the same day.  The shoulder will be immobilized in a sling postoperatively for approximately 4-6 weeks, depending on the size of the tear and the extent of the repair.  The patient will be seen for the 1st follow-up visit approximately 1-2 weeks following surgery.  At the follow-up visit, I would go over the arthroscopic pictures with the patient and perform a wound check.  The patient would then begin formal physical therapy twice a week to begin passive, careful range of motion, while also working on scapular stabilization exercises.  The patient's 2nd post-operative appointment would be at 6 weeks following surgery, after which they will advance in physical therapy to begin performing active assisted range of motion as well as isometric cuff exercises.  The patient would then be seen at approximately 3 months following surgery and at that point we would advance to strengthening exercises.  I discussed with the patient that they can expect a 90-95% recovery by approximately 4-5 months, although it can typically take up to a one year to reach full recovery.  I discussed the risks and benefits to surgery, including but not limited to the risk of infection, the risk of anesthesia, the risk of DVT, the risk of nerve and blood vessel injury, the risk of loose anchor requiring additional surgery, the risk of retear or failure to heal, the risk of arthrofibrosis or stiffness requiring additional surgery, the risk of re-injury, and the risk of limited,  delayed or short lasting improvement in symptoms.  The patient demonstrates an understanding of their injury, treatment alternatives and indications for surgery, in addition to the risks and benefits, and what will be involved during their recovery from the surgery.  They wished to proceed with surgery.  We will schedule the surgery at their convenience.  All questions were answered and the patient was in agreement with the plan.       Conservative treatment:  Continue with PT for ROM and stretching until surgery  Continue NSAIDs as prescribed.    Imaging:  All imaging from today was reviewed by myself and explained to the patient.     Injection:  No Injection planned at this time.    Surgery:  The risk, benefits, and alternatives to surgery were discussed with the patient.  Patient opted for surgical intervention.  Will plan for right shoulder arthroscopy with rotator cuff repair.  Patient will need preoperative labs and PCP clearance prior to surgery.    Follow up:  Return s/p rotator cuff repair.      Chief Complaint   Patient presents with    Right Shoulder - Pain, Follow-up         History of Present Illness:  Previous HPI: The patient is a 43 y.o. RHD female following up in clinic for evaluation of her right shoulder.  At the patient's previous visit approximately 1 month ago, she was diagnosed with rotator cuff tear as well as adhesive capsulitis.  She was sent for physical therapy to focus on improving her shoulder range of motion and instructed to follow-up in 4 weeks.  She is returning to clinic today and states she has been working with physical therapy over the past month.  She states that her range of motion has improved significantly and is close to her baseline.  However, she does note continued pain and weakness in the right shoulder that has not improved with physical therapy.  She also had a EMG done on 2/15/2024 which showed no nerve impingement.  She denies any numbness or tingling in the right  upper extremity at this point.  She does state that her smoking has decreased and she is down to approximately 4 to 5 cigarettes/day.    Prior history:  The patient reports chronic pain in her right shoulder, for approximately 1 year. She cannot recall any specific mechanism of injury or trauma to her shoulder. She states that the pain has progressively worsened over time. The patient was seen initially by Dr. Watt, who provided a CS injection to her right shoulder, without benefit. She then followed up with Dr. Waddell for cervical pathologies. An MRI was ordered, showing a RTC tear.  She was referred here for further management of the rotator cuff tear.  The patient reports pain at all times.  The pain is localized over the lateral aspect of the shoulder and radiates down towards the elbow but not past it.  She has pain at rest which is significantly exacerbated by movement. She reports trouble with overhead activity and reaching backwards. She reports weakness. The patient has also tried physical therapy. She states that she recently began a prescription of Celebrex.  She denies any prior injuries or surgeries on the shoulder.  Patient does not have diabetes, but does smoke approximately half pack per day.    Occupation: Unemployed    The patient has the following co-morbidities: Bipolar disorder    Shoulder Surgical History:  None    Past Medical, Social and Family History:  Past Medical History:   Diagnosis Date    Anxiety     Bipolar depression (HCC)     Chronic pelvic pain in female 2021    Endometrial hyperplasia without atypia 2021    Hypertension     Kidney stone     Menometrorrhagia 2021     Past Surgical History:   Procedure Laterality Date     SECTION      EPIDURAL BLOCK INJECTION Right 10/19/2023    Procedure: Right C7-T1 ILESI;  Surgeon: Esli Saunders MD;  Location: [facility];  Service: Pain Management     FL INJECTION RIGHT SHOULDER (ARTHROGRAM)  2024     "HYSTERECTOMY  04/09/2021     Allergies   Allergen Reactions    Clonazepam Other (See Comments)     Excessive sedation  Vomiting    Latex     Methylphenidate Other (See Comments)     Hyperactivity\"     Current Outpatient Medications on File Prior to Visit   Medication Sig Dispense Refill    busPIRone (BUSPAR) 5 mg tablet       celecoxib (CeleBREX) 200 mg capsule Take 1 capsule (200 mg total) by mouth daily Take with food 30 capsule 1    fluticasone (FLONASE) 50 mcg/act nasal spray 2 sprays into each nostril daily 11.1 mL 0    ketoconazole (NIZORAL) 2 % cream APPLY  CREAM TOPICALLY ONCE DAILY 60 g 0    LORazepam (ATIVAN) 0.5 mg tablet Take 1-2 about 30 minutes prior to the MRI 2 tablet 0    sertraline (ZOLOFT) 100 mg tablet Take 100 mg by mouth daily      topiramate (TOPAMAX) 25 mg sprinkle capsule Take 25 mg by mouth 2 (two) times a day      topiramate (TOPAMAX) 25 mg tablet Take 25 mg by mouth 2 (two) times a day      ciclopirox (PENLAC) 8 % solution Apply topically daily at bedtime (Patient not taking: Reported on 2/22/2024) 6.6 mL 0    Diclofenac Sodium (VOLTAREN) 1 % Apply 2 g topically 4 (four) times a day (Patient not taking: Reported on 2/22/2024) 2 g 0     No current facility-administered medications on file prior to visit.     Social History     Socioeconomic History    Marital status: /Civil Union     Spouse name: Not on file    Number of children: Not on file    Years of education: Not on file    Highest education level: Not on file   Occupational History    Not on file   Tobacco Use    Smoking status: Every Day     Current packs/day: 0.50     Average packs/day: 0.5 packs/day for 28.0 years (14.0 ttl pk-yrs)     Types: Cigarettes    Smokeless tobacco: Never   Vaping Use    Vaping status: Former   Substance and Sexual Activity    Alcohol use: Never    Drug use: Never    Sexual activity: Not Currently   Other Topics Concern    Not on file   Social History Narrative    Not on file     Social " "Determinants of Health     Financial Resource Strain: Not on file   Food Insecurity: Not on file   Transportation Needs: Not on file   Physical Activity: Not on file   Stress: Not on file   Social Connections: Not on file   Intimate Partner Violence: Not on file   Housing Stability: Not on file       I have reviewed the past medical, surgical, social and family history, medications and allergies as documented in the EMR.    Review of systems: ROS is negative other than that noted in the HPI.  Constitutional: Negative for fatigue and fever.      Physical Exam:    Blood pressure 136/84, pulse 80, height 5' 3\" (1.6 m), weight 79.4 kg (175 lb), last menstrual period 12/25/2020.    General/Constitutional: NAD, well developed, well nourished  HENT: Normocephalic, atraumatic  CV: Intact distal pulses, regular rate  Resp: No respiratory distress or labored breathing  Neuro: Alert and Oriented x 3  Psych: Normal mood, normal affect, normal judgement, normal behavior  Skin: Warm, dry, no rashes, no erythema      Focused right shoulder exam:  No paracervical tenderness.   No cervical tenderness.   No pain with neck flexion, extension, side-to-side bending, or rotation.   Negative Spurling's bilaterally.    The skin is intact without evidence of erythema or ecchymosis. Symmetric shoulders with no evidence of supraspinatus or infraspinatus muscle atrophy. There is no evidence neurologic medial or lateral scapular winging. Anterior tilt, protracted scapular positioning.    Palpation demonstrates diffuse tenderness over the SC joint, bilateral clavicle, lateral aspect of the acromion or posterior joint line, AC joint, and supraspinatus.     Shoulder ROM demonstrates 170 degrees of active forward flexion. External rotation with the arms at the side demonstrates 70 degrees of active and 70 degrees of passive motion.  Internal rotation at level of T5.        Strength testing demonstrates 4/5 strength in empty can position, 4/5 with " resisted external rotation with the arm at the side.  4+/5 strength of the subscapularis and a negative belly press.  Hornblower sign is negative, external rotation lag sign is negative    Provocative testing for the following demonstrate-  Impingement- positive Hawkin's impingement test  Biceps- negative Yeagerson, negative Speeds test.  Labrum- positive Oberlin test, negative sulcus sign      UE NV Exam: +2 Radial pulses bilaterally. Fingers are warm and well-perfused.  Sensation intact to light touch C5-T1 bilaterally, Radial/median/ulnar nerve motor intact      Shoulder Imaging    Radiographs of the right shoulder were obtained on 1/26/2023 and reviewed with the patient.  Based on my independent evaluation, the imaging shows no acute osseous abnormalities.  No fracture or dislocation noted.  Humeral head is well centered on the glenoid.  No significant degenerative changes in the glenohumeral or AC joint.    MRI Arthrogram of the right shoulder was obtained on 1/29/2024 and reviewed with the patient.  Based on my independent evaluation, the imaging shows a full-thickness infraspinatus tear with retraction to the superior humeral head.  There is a full-thickness partial width tear involving the middle to posterior supraspinatus, which is retracted from the insertion to the superior humeral head. Mild supraspinatus atrophy with moderate infraspinatus atrophy.  Tendinosis but no tearing of the subscapularis.  Mild chondrosis of the glenohumeral joint.      Scribe Attestation      I,:  Catalina Keyes am acting as a scribe while in the presence of the attending physician.:       I,:  Demond Cardenas MD personally performed the services described in this documentation    as scribed in my presence.:

## 2024-03-25 ENCOUNTER — PREP FOR PROCEDURE (OUTPATIENT)
Dept: OBGYN CLINIC | Facility: CLINIC | Age: 44
End: 2024-03-25

## 2024-03-25 ENCOUNTER — OFFICE VISIT (OUTPATIENT)
Dept: PHYSICAL THERAPY | Facility: CLINIC | Age: 44
End: 2024-03-25
Payer: COMMERCIAL

## 2024-03-25 ENCOUNTER — TELEPHONE (OUTPATIENT)
Dept: NEUROLOGY | Facility: CLINIC | Age: 44
End: 2024-03-25

## 2024-03-25 ENCOUNTER — APPOINTMENT (OUTPATIENT)
Dept: LAB | Facility: MEDICAL CENTER | Age: 44
End: 2024-03-25
Payer: COMMERCIAL

## 2024-03-25 DIAGNOSIS — L60.3 NAIL DYSTROPHY: ICD-10-CM

## 2024-03-25 DIAGNOSIS — M79.674 PAIN IN TOES OF BOTH FEET: ICD-10-CM

## 2024-03-25 DIAGNOSIS — M79.671 BILATERAL FOOT PAIN: ICD-10-CM

## 2024-03-25 DIAGNOSIS — M25.519 CHRONIC SHOULDER PAIN, UNSPECIFIED LATERALITY: Primary | ICD-10-CM

## 2024-03-25 DIAGNOSIS — B35.1 ONYCHOMYCOSIS: ICD-10-CM

## 2024-03-25 DIAGNOSIS — M79.675 PAIN IN TOES OF BOTH FEET: ICD-10-CM

## 2024-03-25 DIAGNOSIS — B35.3 TINEA PEDIS OF BOTH FEET: ICD-10-CM

## 2024-03-25 DIAGNOSIS — M79.672 BILATERAL FOOT PAIN: ICD-10-CM

## 2024-03-25 DIAGNOSIS — G89.29 CHRONIC SHOULDER PAIN, UNSPECIFIED LATERALITY: Primary | ICD-10-CM

## 2024-03-25 LAB
ALBUMIN SERPL BCP-MCNC: 4.3 G/DL (ref 3.5–5)
ALP SERPL-CCNC: 96 U/L (ref 34–104)
ALT SERPL W P-5'-P-CCNC: 42 U/L (ref 7–52)
AST SERPL W P-5'-P-CCNC: 27 U/L (ref 13–39)
BILIRUB DIRECT SERPL-MCNC: 0.05 MG/DL (ref 0–0.2)
BILIRUB SERPL-MCNC: 0.5 MG/DL (ref 0.2–1)
PROT SERPL-MCNC: 6.2 G/DL (ref 6.4–8.4)

## 2024-03-25 PROCEDURE — 36415 COLL VENOUS BLD VENIPUNCTURE: CPT

## 2024-03-25 PROCEDURE — 97140 MANUAL THERAPY 1/> REGIONS: CPT

## 2024-03-25 PROCEDURE — 97110 THERAPEUTIC EXERCISES: CPT

## 2024-03-25 PROCEDURE — 80076 HEPATIC FUNCTION PANEL: CPT

## 2024-03-25 RX ORDER — CEFAZOLIN SODIUM 2 G/50ML
2000 SOLUTION INTRAVENOUS ONCE
OUTPATIENT
Start: 2024-03-25 | End: 2024-03-25

## 2024-03-25 RX ORDER — CHLORHEXIDINE GLUCONATE 4 G/100ML
SOLUTION TOPICAL DAILY PRN
OUTPATIENT
Start: 2024-03-25

## 2024-03-25 RX ORDER — CHLORHEXIDINE GLUCONATE ORAL RINSE 1.2 MG/ML
15 SOLUTION DENTAL ONCE
OUTPATIENT
Start: 2024-03-25 | End: 2024-03-25

## 2024-03-25 NOTE — TELEPHONE ENCOUNTER
ADD ON   3/27 @ 11:00 with Dr. Barton in [place].     Pt had requested PCP referral but provider did not place it. She is going to call them now to have referral for appt.

## 2024-03-25 NOTE — PROGRESS NOTES
"Daily Note     Today's date: 3/25/2024  Patient name: Nina Joseph  : 1980  MRN: [id number]  Referring provider: Greg Watt DO  Dx:   Encounter Diagnosis     ICD-10-CM    1. Chronic shoulder pain, unspecified laterality  M25.519     G89.29           Start Time: 0858  Stop Time: 0945  Total time in clinic (min): 47 minutes    Subjective: Patient reports having a follow up appointment with Ortho surgery last Friday. She states she will be undergoing R RTC surgery in April.       Objective: See treatment diary below      Assessment: Tolerated treatment well. Patient is continuing to demonstrate improvement in R shoulder PROM/AROM with Physical Therapy treatment. We will continue to focus on R shoulder mobility within patient tolerance. She would benefit from continued PT.      Plan: Continue per plan of care.      Precautions: Cervical Radiculopathy, Hx of R elbow fracture, CPS     Manuals 3/25 3/1 3/4 3/6 3/11 3/15 3/18 3/21   R Shoulder PROM  BM BM  ML ML BM BM BM  BM   R Elbow PROM            STM R shoulder            Scapular patterns           Neuro Re-Ed                                                       Ther Ex           Table Slides Flexion 10\" Hold x10  10\" Hold x10  10\"  x10  10\"  x10  10\" Hold x10  10\" Hold x10  10\" Hold x10  10\" Hold x10   Table Slides Scaption 10\" Hold x10  10\" Hold x10  10\"  x10  10\"  x10  10\" Hold x10  10\" Hold x10  10\" Hold x10  10\" Hold x10    Supine Wand Flexion  2x10 2x10  2x10 2x10 2x10  2x10 2x10 2x10    Supine Wand ER  2x10 NT -- -- 2x10  2x10 2x10 2x10    Wall Slides 10x Flx X10  10x Flex 10x Flex 10x Flex 10x flex  10x flex 10x Flx   Child Pose w/ PB 5\" Hold x10  5\" Hold x10  5\"x10 5\"x10 5\" x10  5\" Hold x10  5\" Hold x10 5\" Hold x10    Standing Wand Abduction  2x10  2x10  2x10 2x10 2x10 2x10 2x10  2x10   Serratus Anterior Punches           Supine Shoulder Circles           AAROM Pulleys  3x10  3x10  3x10  3x10  3x10  3x10  3x10  3x10   HEP Instruction           Ther " Activity                                 Gait Training                                 Modalities           Ice           MHP 10' Pre-Exercise  10' Pre-exercise  10' Pre exercise 10' Pre-exercise  10' Pre-exercise  10' Pre-exercise 10' Pre-exercise  10' Pre-exercise

## 2024-03-26 ENCOUNTER — TELEPHONE (OUTPATIENT)
Dept: INTERNAL MEDICINE CLINIC | Facility: CLINIC | Age: 44
End: 2024-03-26

## 2024-03-26 DIAGNOSIS — R41.3 MEMORY LOSS OF: Primary | ICD-10-CM

## 2024-03-26 NOTE — TELEPHONE ENCOUNTER
Pt came into office asking for a referral for neurology.  Stated she had called earlier.  She has an appt scheduled for tomorrow.  Referral put in her chart.

## 2024-03-27 ENCOUNTER — TELEPHONE (OUTPATIENT)
Dept: ADMINISTRATIVE | Facility: HOSPITAL | Age: 44
End: 2024-03-27

## 2024-03-27 ENCOUNTER — OFFICE VISIT (OUTPATIENT)
Dept: NEUROLOGY | Facility: CLINIC | Age: 44
End: 2024-03-27
Payer: COMMERCIAL

## 2024-03-27 VITALS
SYSTOLIC BLOOD PRESSURE: 130 MMHG | BODY MASS INDEX: 31.01 KG/M2 | TEMPERATURE: 97.4 F | HEIGHT: 63 IN | DIASTOLIC BLOOD PRESSURE: 82 MMHG | WEIGHT: 175 LBS | HEART RATE: 93 BPM

## 2024-03-27 DIAGNOSIS — R42 DIZZINESS: Primary | ICD-10-CM

## 2024-03-27 DIAGNOSIS — G31.84 MILD COGNITIVE IMPAIRMENT: ICD-10-CM

## 2024-03-27 PROCEDURE — 99205 OFFICE O/P NEW HI 60 MIN: CPT | Performed by: PSYCHIATRY & NEUROLOGY

## 2024-03-27 NOTE — PROGRESS NOTES
Review of Systems   Constitutional:  Negative for appetite change, fatigue and fever.   HENT: Negative.  Negative for hearing loss, tinnitus, trouble swallowing and voice change.    Eyes: Negative.  Negative for photophobia, pain and visual disturbance.   Respiratory: Negative.  Negative for shortness of breath.    Cardiovascular: Negative.  Negative for palpitations.   Gastrointestinal: Negative.  Negative for nausea and vomiting.   Endocrine: Negative.  Negative for cold intolerance.   Genitourinary: Negative.  Negative for dysuria, frequency and urgency.   Musculoskeletal:  Negative for back pain, gait problem, myalgias, neck pain and neck stiffness.        Fell ALEXIS hit face on counter no LOC went to ER few days later   Skin: Negative.  Negative for rash.   Allergic/Immunologic: Negative.    Neurological:  Positive for dizziness. Negative for tremors, seizures, syncope, facial asymmetry, speech difficulty, weakness, light-headedness, numbness and headaches.        Patient having dizzy spells thinks may be vertigo.    Hematological: Negative.  Does not bruise/bleed easily.   Psychiatric/Behavioral: Negative.  Negative for confusion, hallucinations and sleep disturbance.    All other systems reviewed and are negative.
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
## 2024-03-27 NOTE — TELEPHONE ENCOUNTER
Gonzalo Barton!       Patient scheduled MRI appointment while in office. Patient stated she does suffer from claustrophobia and will need a premed before MRI appointment.     Can you please place order for medication for pt? I did advise pt she will need someone bring and take her to and from appointment,     Thank you!

## 2024-03-28 ENCOUNTER — TELEPHONE (OUTPATIENT)
Age: 44
End: 2024-03-28

## 2024-03-28 ENCOUNTER — EVALUATION (OUTPATIENT)
Dept: PHYSICAL THERAPY | Facility: CLINIC | Age: 44
End: 2024-03-28
Payer: COMMERCIAL

## 2024-03-28 DIAGNOSIS — F40.240 CLAUSTROPHOBIA: ICD-10-CM

## 2024-03-28 DIAGNOSIS — G89.29 CHRONIC SHOULDER PAIN, UNSPECIFIED LATERALITY: Primary | ICD-10-CM

## 2024-03-28 DIAGNOSIS — M25.519 CHRONIC SHOULDER PAIN, UNSPECIFIED LATERALITY: Primary | ICD-10-CM

## 2024-03-28 PROCEDURE — 97110 THERAPEUTIC EXERCISES: CPT

## 2024-03-28 PROCEDURE — 97140 MANUAL THERAPY 1/> REGIONS: CPT

## 2024-03-28 RX ORDER — LORAZEPAM 0.5 MG/1
TABLET ORAL
Qty: 2 TABLET | Refills: 0 | Status: SHIPPED | OUTPATIENT
Start: 2024-03-28

## 2024-03-28 NOTE — TELEPHONE ENCOUNTER
Let pt know I did put order in for ativan for pre med for mri.  Pt needs . Please let pt know an alert comes up due to allergy with klonopin.looks like pt has taken ativan in past but this is similar med to klonopin. Please make sure no issue with ativan. I did put in rx as this was previously given by pcp in past as well.

## 2024-03-28 NOTE — PROGRESS NOTES
PT Re-Evaluation     Today's date: 3/28/2024  Patient name: Nina Joseph  : 1980  MRN: [id number]  Referring provider: Greg Watt DO  Dx:   Encounter Diagnosis     ICD-10-CM    1. Chronic shoulder pain, unspecified laterality  M25.519     G89.29                            Assessment  Assessment details: The patient is a 43 y.o. female presenting to Physical Therapy today with chronic R sided shoulder pain. Upon completion of the re-evaluation today she is demonstrating with improvement in R shoulder AROM/PROM, strength, and symptoms with Physical Therapy treatment. She is scheduled for R RTC surgery on 24. She is continuing to demonstrate with with limitations in R shoulder mobility, R shoulder strength, R elbow mobility, R elbow strength, scapulothoracic control, and pain. She is having difficulty with the performance of overhead activity, lifting, pushing, pulling, and ADL's due to symptomatology. She would continue to benefit from Physical Therapy treatment at this time to address functional deficits.  Impairments: abnormal muscle firing, abnormal muscle tone, abnormal or restricted ROM, abnormal movement, activity intolerance, impaired physical strength, lacks appropriate home exercise program, pain with function and poor posture     Symptom irritability: highUnderstanding of Dx/Px/POC: good   Prognosis: fair    Goals  ST.) Patient will initiate HEP Independently MET  2.) Patient will demonstrate improved R elbow strength evidenced by a 1-2 MMT grade increase Partially Met  3.) Patient will demonstrate improved R shoulder strength evidenced by a 1-2 MMT grade increase Partially Met  4.) Patient will demonstrate improved cervical mobility evidenced by </= minimal restriction in all planes of motion Partially Met  5.) Patient will demonstrate a </= 7/10 pain level at its worse with activity Partially Met    LT.) Patient will be d/c to an HEP independently Progressing  2.) Patient will  improve functional abilities evidenced by FOTO scores Progressing  3.) Patient will demonstrate a </= 4/10 pain level at its worse with activity Progressing  4.) Patient will demonstrate improved ability to lift, push, pull, and carry Progressing  5.) Return to PLOF Progressing    Plan  Plan details: Plan of care was reviewed and discussed thoroughly with the patient on their current condition. Patient was instructed on a HEP with written instructions. Patient is in agreement with PT recommendations and will attend Physical Therapy 1x/week for the next 3 weeks to continue addressing functional limitations.    Patient would benefit from: skilled physical therapy  Referral necessary: No  Planned modality interventions: cryotherapy and thermotherapy: hydrocollator packs  Planned therapy interventions: flexibility, functional ROM exercises, graded activity, graded exercise, graded motor, home exercise program, manual therapy, joint mobilization, massage, neuromuscular re-education, patient education, postural training, strengthening, stretching, therapeutic activities, therapeutic exercise and therapeutic training  Frequency: 2x week  Duration in weeks: 3  Plan of Care beginning date: 3/28/2024  Plan of Care expiration date: 4/18/2024  Treatment plan discussed with: patient    Subjective Evaluation    History of Present Illness  Mechanism of injury: The patient reports today with chronic R sided shoulder, elbow, and neck pain. She notes intermittent numbness and tingling into the R arm. She states having a R elbow fracture when she was a child that did not heal correctly and is unable to bend/extend her elbow fully. She reports clicking/popping at times in the R shoulder when she moves the arm. She reports having a series of injections in the neck and R shoulder with no relief of symptoms. The patient states she is currently having difficulty with activities such as sleeping, lifting, pushing, pulling, and carrying due  to pain.     Update 24: The patient reports today with pain in the R shoulder. She notes since the start of Physical Therapy treatment she has been able to tolerate pain symptoms more than before. She states still having difficulty with sleeping, lifting, pushing, pulling, and carrying due to pain. She notes being scheduled for an MRI on 24 for the R shoulder.    Update 3-1-24: The patient reports today with continued R shoulder pain. She notes following up with Ortho surgery on . She states needing to improve range of motion in the shoulder before further surgical discussion. She notes still having difficulty with lifting, carrying, pushing, and pulling due to R shoulder pain.     Update 3-28-24: The patient reports today with slight improvement in R shoulder symptoms and mobility. She will be undergoing a R RTC repair on 24. She notes still having difficulty with overhead activity, lifting, carrying, pushing, and pulling due to R shoulder pain.            Recurrent probem    Quality of life: fair    Patient Goals  Patient goals for therapy: decreased pain, increased motion, increased strength, independence with ADLs/IADLs and return to sport/leisure activities    Pain  Current pain ratin  At best pain ratin  At worst pain ratin  Location: R neck, shoulder, and elbow with radiating numbness and tingling into the hand.  Quality: burning, dull ache, sharp, throbbing and discomfort  Alleviating factors: Nothing.  Aggravating factors: lifting and overhead activity (pushing, pulling, carrying, lifting)      Diagnostic Tests  X-ray: abnormal  Treatments  Previous treatment: physical therapy and injection treatment  Current treatment: physical therapy    Objective     Concurrent Complaints  Positive for night pain and disturbed sleep. Negative for dizziness, faints, headaches, nausea/motion sickness and tinnitus    Postural Observations  Seated posture: poor  Standing posture:  poor    Additional Postural Observation Details  Patient presents with a forward head, thoracic kyphosis, and rounded shoulder posture.     Palpation     Right   Hypertonic in the biceps, suboccipitals, subscapularis, supraspinatus and upper trapezius.   Tenderness of the biceps, suboccipitals, subscapularis, supraspinatus and upper trapezius.     Tenderness   Cervical Spine   No tenderness in the spinous process, left transverse process and right transverse process.     Neurological Testing     Sensation   Cervical/Thoracic   Left   Intact: light touch    Right   Hyposensation: light touch    Reflexes   Left   Deltoid (C5): normal (2+)  Brachioradialis (C6): normal (2+)    Right   Deltoid (C5): normal (2+)  Brachioradialis (C6): normal (2+)    Active Range of Motion   Cervical/Thoracic Spine       Cervical    Flexion:  Restriction level: minimal  Extension:  with pain Restriction level: moderate  Left lateral flexion:  with pain Restriction level: moderate  Right lateral flexion:  with pain Restriction level moderate  Left rotation:  with pain Restriction level: moderate  Right rotation:  with pain Restriction level: moderate    Right Shoulder   Flexion: 165 degrees with pain  Abduction: 155 degrees with pain  External rotation 0°: WFL  External rotation 90°: 50 degrees  Internal rotation 0°: WFL  Internal rotation 90°: 45 degrees     Additional Active Range of Motion Details  Patient demonstrates improvement in R shoulder mobility in all planes of motion .  Functional R ER : R occiput   Functional R IR: L2-L3    Passive Range of Motion     Right Shoulder   Flexion: 175 degrees   Abduction: 170 degrees   External rotation 0°: WFL  External rotation 90°: WFL and with pain  Internal rotation 0°: WFL  Internal rotation 90°: WFL and with pain    Scapular Mobility     Right Shoulder     Scapular Mobility beyond 90° FF   Upward rotation: inadequate  Downward rotation: inadequate    Strength/Myotome Testing   Cervical  "Spine     Left   Normal strength    Right Shoulder     Planes of Motion   Flexion: 3-   Extension: 3+   Abduction: 3-   External rotation at 0°: 3-   Internal rotation at 0°: 3-     Left Elbow   Normal strength    Right Elbow   Flexion: 3+  Extension: 3+    Additional Strength Details  Patient demonstrates improvements in R shoulder strength in all planes of motion.  Patient demonstrates improvements in R elbow strength     Tests   Cervical   Negative VBI.     Left   Negative Spurling's Test A.     Right   Negative Spurling's Test A.     Right Shoulder   Positive drop arm, empty can, full can, Hawkin's, Neer's and Speed's.   Neuro Exam:     Headaches   Patient reports headaches: No.            Precautions: Cervical Radiculopathy, Hx of R elbow fracture, CPS     Manuals 3/25 3/28 3/4 3/6 3/11 3/15 3/18 3/21   R Shoulder PROM  BM BM ML ML BM BM BM  BM   R Elbow PROM            STM R shoulder            Scapular patterns           Neuro Re-Ed                                                       Ther Ex           Table Slides Flexion 10\" Hold x10  10\" Hold x10  10\"  x10  10\"  x10  10\" Hold x10  10\" Hold x10  10\" Hold x10  10\" Hold x10   Table Slides Scaption 10\" Hold x10  10\" Hold x10  10\"  x10  10\"  x10  10\" Hold x10  10\" Hold x10  10\" Hold x10  10\" Hold x10    Supine Wand Flexion  2x10 2x10 2x10 2x10 2x10  2x10 2x10 2x10    Supine Wand ER  2x10 2x10 -- -- 2x10  2x10 2x10 2x10    Wall Slides 10x Flx 10x Flx 10x Flex 10x Flex 10x Flex 10x flex  10x flex 10x Flx   Child Pose w/ PB 5\" Hold x10  5\" Hold x10 5\"x10 5\"x10 5\" x10  5\" Hold x10  5\" Hold x10 5\" Hold x10    Standing Wand Abduction  2x10  2x10 2x10 2x10 2x10 2x10 2x10  2x10   Serratus Anterior Punches           Supine Shoulder Circles           AAROM Pulleys  3x10  3x10  3x10  3x10  3x10  3x10  3x10  3x10   HEP Instruction           Ther Activity                                 Gait Training                                 Modalities           Ice           MHP " 10' Pre-Exercise  10' Pre-exercise  10' Pre exercise 10' Pre-exercise  10' Pre-exercise  10' Pre-exercise 10' Pre-exercise  10' Pre-exercise

## 2024-03-28 NOTE — TELEPHONE ENCOUNTER
Called patient to schedule from referral. She stated she was not in her apartment at the moment and will call back when it is convenient.   
3

## 2024-03-29 ENCOUNTER — APPOINTMENT (OUTPATIENT)
Dept: LAB | Facility: MEDICAL CENTER | Age: 44
End: 2024-03-29
Payer: COMMERCIAL

## 2024-03-29 ENCOUNTER — OFFICE VISIT (OUTPATIENT)
Dept: PAIN MEDICINE | Facility: CLINIC | Age: 44
End: 2024-03-29
Payer: COMMERCIAL

## 2024-03-29 VITALS
SYSTOLIC BLOOD PRESSURE: 122 MMHG | BODY MASS INDEX: 31.01 KG/M2 | DIASTOLIC BLOOD PRESSURE: 88 MMHG | HEART RATE: 74 BPM | WEIGHT: 175 LBS | HEIGHT: 63 IN | RESPIRATION RATE: 16 BRPM

## 2024-03-29 DIAGNOSIS — M54.50 CHRONIC BILATERAL LOW BACK PAIN WITHOUT SCIATICA: ICD-10-CM

## 2024-03-29 DIAGNOSIS — M54.2 NECK PAIN: ICD-10-CM

## 2024-03-29 DIAGNOSIS — M75.121 NONTRAUMATIC COMPLETE TEAR OF RIGHT ROTATOR CUFF: ICD-10-CM

## 2024-03-29 DIAGNOSIS — G89.29 CHRONIC BILATERAL LOW BACK PAIN WITHOUT SCIATICA: ICD-10-CM

## 2024-03-29 DIAGNOSIS — G31.84 MILD COGNITIVE IMPAIRMENT: ICD-10-CM

## 2024-03-29 DIAGNOSIS — M47.816 LUMBAR SPONDYLOSIS: ICD-10-CM

## 2024-03-29 DIAGNOSIS — G89.4 CHRONIC PAIN SYNDROME: Primary | ICD-10-CM

## 2024-03-29 DIAGNOSIS — M54.12 CERVICAL RADICULOPATHY: ICD-10-CM

## 2024-03-29 DIAGNOSIS — M47.812 CERVICAL SPONDYLOSIS: ICD-10-CM

## 2024-03-29 LAB
ALBUMIN SERPL BCP-MCNC: 3.9 G/DL (ref 3.5–5)
ALP SERPL-CCNC: 91 U/L (ref 34–104)
ALT SERPL W P-5'-P-CCNC: 39 U/L (ref 7–52)
ANION GAP SERPL CALCULATED.3IONS-SCNC: 4 MMOL/L (ref 4–13)
APTT PPP: 31 SECONDS (ref 23–37)
AST SERPL W P-5'-P-CCNC: 34 U/L (ref 13–39)
BASOPHILS # BLD AUTO: 0.1 THOUSANDS/ÂΜL (ref 0–0.1)
BASOPHILS NFR BLD AUTO: 1 % (ref 0–1)
BILIRUB SERPL-MCNC: 0.42 MG/DL (ref 0.2–1)
BUN SERPL-MCNC: 11 MG/DL (ref 5–25)
CALCIUM SERPL-MCNC: 8.5 MG/DL (ref 8.4–10.2)
CHLORIDE SERPL-SCNC: 108 MMOL/L (ref 96–108)
CO2 SERPL-SCNC: 26 MMOL/L (ref 21–32)
CREAT SERPL-MCNC: 0.56 MG/DL (ref 0.6–1.3)
EOSINOPHIL # BLD AUTO: 0.28 THOUSAND/ÂΜL (ref 0–0.61)
EOSINOPHIL NFR BLD AUTO: 3 % (ref 0–6)
ERYTHROCYTE [DISTWIDTH] IN BLOOD BY AUTOMATED COUNT: 12.9 % (ref 11.6–15.1)
FOLATE SERPL-MCNC: 5.3 NG/ML
GFR SERPL CREATININE-BSD FRML MDRD: 114 ML/MIN/1.73SQ M
GLUCOSE P FAST SERPL-MCNC: 79 MG/DL (ref 65–99)
HCT VFR BLD AUTO: 44.1 % (ref 34.8–46.1)
HGB BLD-MCNC: 15.1 G/DL (ref 11.5–15.4)
IMM GRANULOCYTES # BLD AUTO: 0.07 THOUSAND/UL (ref 0–0.2)
IMM GRANULOCYTES NFR BLD AUTO: 1 % (ref 0–2)
INR PPP: 0.97 (ref 0.84–1.19)
LYMPHOCYTES # BLD AUTO: 2.94 THOUSANDS/ÂΜL (ref 0.6–4.47)
LYMPHOCYTES NFR BLD AUTO: 32 % (ref 14–44)
MCH RBC QN AUTO: 34.2 PG (ref 26.8–34.3)
MCHC RBC AUTO-ENTMCNC: 34.2 G/DL (ref 31.4–37.4)
MCV RBC AUTO: 100 FL (ref 82–98)
MONOCYTES # BLD AUTO: 0.71 THOUSAND/ÂΜL (ref 0.17–1.22)
MONOCYTES NFR BLD AUTO: 8 % (ref 4–12)
NEUTROPHILS # BLD AUTO: 5.08 THOUSANDS/ÂΜL (ref 1.85–7.62)
NEUTS SEG NFR BLD AUTO: 55 % (ref 43–75)
NRBC BLD AUTO-RTO: 0 /100 WBCS
PLATELET # BLD AUTO: 258 THOUSANDS/UL (ref 149–390)
PMV BLD AUTO: 10.7 FL (ref 8.9–12.7)
POTASSIUM SERPL-SCNC: 4.1 MMOL/L (ref 3.5–5.3)
PROT SERPL-MCNC: 6.3 G/DL (ref 6.4–8.4)
PROTHROMBIN TIME: 12.8 SECONDS (ref 11.6–14.5)
RBC # BLD AUTO: 4.42 MILLION/UL (ref 3.81–5.12)
SODIUM SERPL-SCNC: 138 MMOL/L (ref 135–147)
TSH SERPL DL<=0.05 MIU/L-ACNC: 2.16 UIU/ML (ref 0.45–4.5)
VIT B12 SERPL-MCNC: 144 PG/ML (ref 180–914)
WBC # BLD AUTO: 9.18 THOUSAND/UL (ref 4.31–10.16)

## 2024-03-29 PROCEDURE — 84207 ASSAY OF VITAMIN B-6: CPT

## 2024-03-29 PROCEDURE — 85610 PROTHROMBIN TIME: CPT

## 2024-03-29 PROCEDURE — 82607 VITAMIN B-12: CPT

## 2024-03-29 PROCEDURE — 99213 OFFICE O/P EST LOW 20 MIN: CPT | Performed by: NURSE PRACTITIONER

## 2024-03-29 PROCEDURE — 84425 ASSAY OF VITAMIN B-1: CPT

## 2024-03-29 PROCEDURE — 36415 COLL VENOUS BLD VENIPUNCTURE: CPT

## 2024-03-29 PROCEDURE — 82746 ASSAY OF FOLIC ACID SERUM: CPT

## 2024-03-29 PROCEDURE — 85730 THROMBOPLASTIN TIME PARTIAL: CPT

## 2024-03-29 PROCEDURE — 84443 ASSAY THYROID STIM HORMONE: CPT

## 2024-03-29 PROCEDURE — 85025 COMPLETE CBC W/AUTO DIFF WBC: CPT

## 2024-03-29 PROCEDURE — 80053 COMPREHEN METABOLIC PANEL: CPT

## 2024-03-29 NOTE — PROGRESS NOTES
Assessment:  1. Chronic pain syndrome    2. Neck pain    3. Cervical radiculopathy    4. Cervical spondylosis    5. Chronic bilateral low back pain without sciatica    6. Lumbar spondylosis        Plan:  While the patient was in the office today, I did have a thorough conversation regarding their chronic pain syndrome, medication management, and treatment plan options.  Patient is being seen for a follow-up visit.  She was last seen here on 2/15/2024.  At that time she was referred to orthopedic surgeon for evaluation of right shoulder pain.  She did complete physical therapy but continues with right shoulder pain.  She is scheduled for right arthroscopic shoulder surgery with rotator cuff repair on 4/30/2024.    MRI of her cervical spine was ordered.  MRI reveals multilevel degenerative changes with out critical stenosis.  No large disc herniations.  MRI results were reviewed with patient during today's visit.    EMG of the right upper extremity is scheduled or August.    She was started on Celebrex 200 mg daily.  Fortunately, she denies any improvement.  At this point, we will discontinue Celebrex.    Will not plan interventional therapy at this time since she is scheduled for shoulder surgery in 1 month.    Follow-up in 3 months.        History of Present Illness:  The patient is a 43 y.o. female who presents for a follow up office visit in regards to Shoulder Pain (right) and Neck Pain.   The patient’s current symptoms include complaints of neck pain that radiates to the right upper extremity and the left shoulder.  Current pain level is an 8/10.  Quality pain is described as burning, dull, aching, sharp, throbbing, cramping, pressure-like, shooting, numb.    Current pain medications includes: Celebrex 200 mg daily.  The patient reports that this regimen is providing 0% pain relief.  The patient is reporting no side effects from this pain medication regimen.    I have personally reviewed and/or updated the  patient's past medical history, past surgical history, family history, social history, current medications, allergies, and vital signs today.         Review of Systems  Review of Systems        Past Medical History:   Diagnosis Date    Anxiety     Bipolar depression (HCC)     Chronic pelvic pain in female 2021    Endometrial hyperplasia without atypia 2021    Hypertension     Kidney stone     Menometrorrhagia 2021       Past Surgical History:   Procedure Laterality Date     SECTION      EPIDURAL BLOCK INJECTION Right 10/19/2023    Procedure: Right C7-T1 ILESI;  Surgeon: Elsi Saunders MD;  Location: [facility];  Service: Pain Management     FL INJECTION RIGHT SHOULDER (ARTHROGRAM)  2024    HYSTERECTOMY  2021       Family History   Problem Relation Age of Onset    Heart disease Father     No Known Problems Sister     No Known Problems Daughter     No Known Problems Maternal Grandmother     No Known Problems Maternal Grandfather     No Known Problems Paternal Grandmother     No Known Problems Paternal Grandfather     No Known Problems Son     No Known Problems Son     No Known Problems Son     No Known Problems Maternal Aunt     No Known Problems Maternal Aunt     No Known Problems Paternal Aunt     No Known Problems Paternal Aunt        Social History     Occupational History    Not on file   Tobacco Use    Smoking status: Every Day     Current packs/day: 0.50     Average packs/day: 0.5 packs/day for 28.0 years (14.0 ttl pk-yrs)     Types: Cigarettes    Smokeless tobacco: Never   Vaping Use    Vaping status: Former   Substance and Sexual Activity    Alcohol use: Never    Drug use: Never    Sexual activity: Not Currently         Current Outpatient Medications:     busPIRone (BUSPAR) 5 mg tablet, , Disp: , Rfl:     ciclopirox (PENLAC) 8 % solution, Apply topically daily at bedtime, Disp: 6.6 mL, Rfl: 0    fluticasone (FLONASE) 50 mcg/act nasal spray, 2 sprays into each nostril  "daily, Disp: 11.1 mL, Rfl: 0    LORazepam (ATIVAN) 0.5 mg tablet, Take 1-2 about 30 minutes prior to the MRI, Disp: 2 tablet, Rfl: 0    sertraline (ZOLOFT) 100 mg tablet, Take 100 mg by mouth daily, Disp: , Rfl:     topiramate (TOPAMAX) 25 mg sprinkle capsule, Take 25 mg by mouth 2 (two) times a day, Disp: , Rfl:     topiramate (TOPAMAX) 25 mg tablet, Take 25 mg by mouth 2 (two) times a day, Disp: , Rfl:     Diclofenac Sodium (VOLTAREN) 1 %, Apply 2 g topically 4 (four) times a day (Patient not taking: Reported on 2/22/2024), Disp: 2 g, Rfl: 0    ketoconazole (NIZORAL) 2 % cream, APPLY  CREAM TOPICALLY ONCE DAILY (Patient not taking: Reported on 3/27/2024), Disp: 60 g, Rfl: 0    Allergies   Allergen Reactions    Clonazepam Other (See Comments)     Excessive sedation  Vomiting    Latex     Methylphenidate Other (See Comments)     Hyperactivity\"       Physical Exam:    /88   Pulse 74   Resp 16   Ht 5' 3\" (1.6 m)   Wt 79.4 kg (175 lb)   LMP 12/25/2020   BMI 31.00 kg/m²     Constitutional:normal, well developed, well nourished, alert, in no distress and non-toxic and no overt pain behavior.  Eyes:anicteric  HEENT:grossly intact  Neck:supple, symmetric, trachea midline and no masses   Pulmonary:even and unlabored  Cardiovascular:No edema or pitting edema present  Skin:Normal without rashes or lesions and well hydrated  Psychiatric:Mood and affect appropriate  Neurologic:Cranial Nerves II-XII grossly intact  Musculoskeletal: Range of motion of the cervical spine is limited in all planes.  There are cervical paraspinal muscle spasms, bilaterally.    Imaging    Study Result    Narrative & Impression   MRI ARTHROGRAM RIGHT SHOULDER     INDICATION:   S43.431S: Superior glenoid labrum lesion of right shoulder, sequela  M75.101: Unspecified rotator cuff tear or rupture of right shoulder, not specified as traumatic.     COMPARISON: 1/26/2023     TECHNIQUE:  Multiplanar/multisequence MR of the right shoulder was " performed. Scan was performed after intraarticular injection of dilute gadolinium under fluoroscopic guidance into the joint.        FINDINGS:     SUBCUTANEOUS TISSUES: Normal     JOINT CAPSULE: Intact, without inferior extravasation of contrast.     SUBACROMIAL/SUBDELTOID BURSA: Fluid decompression     ACROMION PROCESS: Normal.     ROTATOR CUFF: Complete infraspinatus tear retracted to the superior humeral head. Full-thickness partial width tear involving the middle to posterior supraspinatus, which is retracted from the insertion to the superior humeral head. Mild supraspinatus   atrophy with moderate infraspinatus atrophy     LONG HEAD OF BICEPS TENDON: Normal.     GLENOID LABRUM: Intact.     GLENOHUMERAL JOINT: Intact.     ACROMIOCLAVICULAR JOINT:  Normal.     BONES: Normal.     IMPRESSION:     Complete infraspinatus tear.     Full-thickness partial width supraspinatus tear, as described.         No orders to display       No orders of the defined types were placed in this encounter.

## 2024-03-31 ENCOUNTER — TELEPHONE (OUTPATIENT)
Dept: OTHER | Facility: HOSPITAL | Age: 44
End: 2024-03-31

## 2024-03-31 NOTE — TELEPHONE ENCOUNTER
Attempted to contact the patient in regards to the labwork. I left a message that I will have the clinical team reach out to the patient at another time.

## 2024-04-01 NOTE — TELEPHONE ENCOUNTER
Spoke with pt and advised her of Dr. Bullock's message.    Pt says she did not have any issues with the medication her PCP ordered for her with MRI in the past (Ativan). Pt says she will have  day of MRI.

## 2024-04-02 ENCOUNTER — TELEPHONE (OUTPATIENT)
Dept: NEUROLOGY | Facility: CLINIC | Age: 44
End: 2024-04-02

## 2024-04-02 ENCOUNTER — EVALUATION (OUTPATIENT)
Dept: PHYSICAL THERAPY | Facility: CLINIC | Age: 44
End: 2024-04-02
Payer: COMMERCIAL

## 2024-04-02 DIAGNOSIS — R42 DIZZINESS: ICD-10-CM

## 2024-04-02 LAB — VIT B1 BLD-SCNC: 145.5 NMOL/L (ref 66.5–200)

## 2024-04-02 PROCEDURE — 97162 PT EVAL MOD COMPLEX 30 MIN: CPT | Performed by: PHYSICAL THERAPIST

## 2024-04-02 PROCEDURE — 97535 SELF CARE MNGMENT TRAINING: CPT | Performed by: PHYSICAL THERAPIST

## 2024-04-02 NOTE — PROGRESS NOTES
PT Evaluation     Today's date: 2024  Patient name: Nina Joseph  : 1980  MRN: [id number]  Referring provider: Aaron Barton DO  Dx:   Encounter Diagnosis     ICD-10-CM    1. Dizziness  R42 Ambulatory Referral to Physical Therapy          Start Time: 1310  Stop Time: 1345  Total time in clinic (min): 35 minutes    Assessment  Assessment details: The patient is a 43 female who presents to physical therapy with signs and symptoms consistent with vertigo. She fell and hit her head on [employer]. A few weeks later she began to have dizziness described as a spinning sensation with positional changes. She reports frequent headaches with nausea.  Oculomotor testing is normal. Static balance is fair. Patient became anxious with testing with eyes closed but no significant LOB noted. Positional testing was normal bilaterally for posterior canal as well as horizontal canals. She will be reassessed later this week and treated accordingly.      Other impairment: Dizziness  Understanding of Dx/Px/POC: fair   Prognosis: fair    Goals  STG (1 week)   1. Patient will reports no episodes of vertigo for 1 week   2. Patient will resume all daily activities with no vertigo    LTG (3 weeks)   1. Patient will remain vertigo free   2. Return to PLOF   3. Patient will be independent with HEP      Plan  Patient would benefit from: skilled physical therapy  Planned therapy interventions: canalith repositioning, balance, neuromuscular re-education, therapeutic exercise, therapeutic activities and home exercise program  Frequency: 1-2x/week.  Duration in weeks: 4  Plan of Care beginning date: 2024  Plan of Care expiration date: 2024  Treatment plan discussed with: patient        Subjective Evaluation    History of Present Illness  Mechanism of injury: The patient reports that she fell on [employer] and hit the front of her head on a counter. She had no LOC. 3 days later, she had a headache and went to urgent care.  "She was sent over to the ER. Xrays were done on the L orbital area which were unremarkable. A few weeks later, she began to experience random episodes of dizziness describes as \"spinning\". She reports that bending over or rolling in bed causes dizziness. She saw a neurologist. An MRI of the brain has been ordered as well as an ENT consult. She is now referred to OPPT.   Quality of life: good    Patient Goals  Patient goal: To get rid of dizziness and headaches  Pain  Current pain ratin  Location: neck  Quality: dull ache and tight    Social Support  Steps to enter house: no  Stairs in house: no   Lives in: apartment  Lives with: alone    Employment status: not working        Objective     Concurrent Complaints  Positive for headaches and poor concentration. Negative for nausea/motion sickness, tinnitus, visual change, hearing loss, memory loss, aural fullness and peripheral neuropathy    Active Range of Motion   Cervical/Thoracic Spine       Cervical    Flexion: Neck active flexion: WFL.   Extension: Neck active extension: WFL.      Left lateral flexion: Neck active lateral bend left: WFL.      Right lateral flexion: Neck active lateral bend right: WFL.      Left rotation: Neck active rotation left: WFL.  Right rotation: Neck active rotation right: WFL.     Neuro Exam:     Dizziness  Positive for vertigo, light-headedness and diplopia.   Negative for disequilibrium, oscillopsia, motion sickness, rocking or swaying and floating or swimming.     Exacerbating factors  Positive for bending over, rolling in bed and supine to/from sitting.   Negative for looking up, walking, turning head, optokinetic movement and walking in busy environment.     Symptoms   Duration: 30 minutes    Headaches   Patient reports headaches: Yes.   Frequency: 3x/week  Duration: 12 hours  Intensity at best: 0/10  Intensity at worst: 10/10  Average intensity: 5/10  Location: posterior head    Oculomotor exam   Oculomotor ROM: WNL  Resting " nystagmus: not present   Gaze holding nystagmus: not present left  and not present right  Smooth pursuits: within normal limits  Vertical saccades: hypometric and pt reports lightheadedness  Horizontal saccades: hypometric  and pt reports lightheadedness  Convergence: normal    Positional testing   [place]   Right posterior canal: WNL      Balance assessments   MCTSIB   Eyes open level surface: 30 sec  Eyes closed level surface: 14 sec             Precautions: N/A       4/2            Manuals                                                    Neuro Re-Ed             Assessment JM                                                                                          Ther Ex                                                                                                                     Ther Activity                                       Gait Training                                       Modalities

## 2024-04-02 NOTE — TELEPHONE ENCOUNTER
Spoke with p[patient and directed message from Dr. Barton for patient to follow up with PCP in reference to folate and B12 levels. Notified patient Dr. Barton reached out to PCP also.

## 2024-04-03 LAB — VIT B6 SERPL-MCNC: 2.2 UG/L (ref 3.4–65.2)

## 2024-04-04 DIAGNOSIS — M75.121 NONTRAUMATIC COMPLETE TEAR OF RIGHT ROTATOR CUFF: Primary | ICD-10-CM

## 2024-04-05 ENCOUNTER — APPOINTMENT (OUTPATIENT)
Dept: PHYSICAL THERAPY | Facility: CLINIC | Age: 44
End: 2024-04-05
Payer: COMMERCIAL

## 2024-04-05 ENCOUNTER — OFFICE VISIT (OUTPATIENT)
Dept: PHYSICAL THERAPY | Facility: CLINIC | Age: 44
End: 2024-04-05
Payer: COMMERCIAL

## 2024-04-05 DIAGNOSIS — M25.519 CHRONIC SHOULDER PAIN, UNSPECIFIED LATERALITY: Primary | ICD-10-CM

## 2024-04-05 DIAGNOSIS — G89.29 CHRONIC SHOULDER PAIN, UNSPECIFIED LATERALITY: Primary | ICD-10-CM

## 2024-04-05 PROCEDURE — 97140 MANUAL THERAPY 1/> REGIONS: CPT

## 2024-04-05 PROCEDURE — 97110 THERAPEUTIC EXERCISES: CPT

## 2024-04-05 NOTE — PROGRESS NOTES
"Daily Note     Today's date: 2024  Patient name: Nina Joseph  : 1980  MRN: [id number]  Referring provider: Aaron Barton DO  Dx:   Encounter Diagnosis     ICD-10-CM    1. Chronic shoulder pain, unspecified laterality  M25.519     G89.29           Start Time: 0845  Stop Time: 0940  Total time in clinic (min): 55 minutes    Subjective: Patient reports continued pain symptoms in the R shoulder this morning.      Objective: See treatment diary below      Assessment: Tolerated treatment well. Patient demonstrates full R shoulder PROM as well as AROM with complaints of pain in all planes of motion. We will look to transition patient to an independent HEP next visit as she will be undergoing surgery for a R RTC tear on 24. Patient would benefit from continued PT.      Plan: Continue per plan of care.      Precautions: Cervical Radiculopathy, Hx of R elbow fracture, CPS     Manuals 3/25 3/28 4/5 3/6 3/11 3/15 3/18 3/21   R Shoulder PROM  BM BM BM ML BM BM BM  BM   R Elbow PROM            STM R shoulder            Scapular patterns           Neuro Re-Ed                                                       Ther Ex           Table Slides Flexion 10\" Hold x10  10\" Hold x10  10\" Hold x10  10\"  x10  10\" Hold x10  10\" Hold x10  10\" Hold x10  10\" Hold x10   Table Slides Scaption 10\" Hold x10  10\" Hold x10  10\" Hold x10  10\"  x10  10\" Hold x10  10\" Hold x10  10\" Hold x10  10\" Hold x10    Supine Wand Flexion  2x10 2x10 2x10  2x10 2x10  2x10 2x10 2x10    Supine Wand ER  2x10 2x10 2x10  -- 2x10  2x10 2x10 2x10    Wall Slides 10x Flx 10x Flx 10x flx  10x Flex 10x Flex 10x flex  10x flex 10x Flx   Child Pose w/ PB 5\" Hold x10  5\" Hold x10 5\" Hold 2x10  5\"x10 5\" x10  5\" Hold x10  5\" Hold x10 5\" Hold x10    Standing Wand Abduction  2x10  2x10 2x10  2x10 2x10 2x10 2x10  2x10   Serratus Anterior Punches           Supine Shoulder Circles           AAROM Pulleys  3x10  3x10  3x10 3x10  3x10  3x10  3x10  3x10   HEP " Instruction           Ther Activity                                 Gait Training                                 Modalities           Ice           P 10' Pre-Exercise  10' Pre-exercise  10' Pre-exercise  10' Pre-exercise  10' Pre-exercise  10' Pre-exercise 10' Pre-exercise  10' Pre-exercise

## 2024-04-08 ENCOUNTER — OFFICE VISIT (OUTPATIENT)
Dept: PHYSICAL THERAPY | Facility: CLINIC | Age: 44
End: 2024-04-08
Payer: COMMERCIAL

## 2024-04-08 DIAGNOSIS — R42 DIZZINESS: Primary | ICD-10-CM

## 2024-04-08 PROCEDURE — 97112 NEUROMUSCULAR REEDUCATION: CPT | Performed by: PHYSICAL THERAPIST

## 2024-04-08 NOTE — PROGRESS NOTES
Daily Note     Today's date: 2024  Patient name: Nina Joseph  : 1980  MRN: [id number]  Referring provider: Aaron Barton DO  Dx:   Encounter Diagnosis     ICD-10-CM    1. Dizziness  R42           Start Time: 1315  Stop Time: 1340  Total time in clinic (min): 25 minutes    Subjective: The patient states that she has felt lightheaded since she woke up this morning.       Objective: See treatment diary below      Assessment: (+) R Tatum-Hallpike. R Epley maneuver performed x2. Patient will be reassessed later this week. Tolerated treatment well.      Plan: Reassess and treat accordingly     Precautions: Cervical Radiculopathy, Hx of R elbow fracture, CPS      4/2            Manuals                                                    Neuro Re-Ed             Assessment JM                         Canalith repositioning  R Epleyx2                                                               Ther Ex                                                                                                                     Ther Activity                                       Gait Training                                       Modalities

## 2024-04-10 ENCOUNTER — APPOINTMENT (OUTPATIENT)
Dept: LAB | Facility: HOSPITAL | Age: 44
End: 2024-04-10
Payer: COMMERCIAL

## 2024-04-10 ENCOUNTER — OFFICE VISIT (OUTPATIENT)
Dept: INTERNAL MEDICINE CLINIC | Facility: CLINIC | Age: 44
End: 2024-04-10
Payer: COMMERCIAL

## 2024-04-10 ENCOUNTER — OFFICE VISIT (OUTPATIENT)
Dept: PHYSICAL THERAPY | Facility: CLINIC | Age: 44
End: 2024-04-10
Payer: COMMERCIAL

## 2024-04-10 VITALS
TEMPERATURE: 97.9 F | DIASTOLIC BLOOD PRESSURE: 68 MMHG | BODY MASS INDEX: 31.55 KG/M2 | HEIGHT: 63 IN | WEIGHT: 178.1 LBS | SYSTOLIC BLOOD PRESSURE: 124 MMHG | HEART RATE: 81 BPM | OXYGEN SATURATION: 98 %

## 2024-04-10 DIAGNOSIS — G89.29 CHRONIC SHOULDER PAIN, UNSPECIFIED LATERALITY: Primary | ICD-10-CM

## 2024-04-10 DIAGNOSIS — Z01.818 PREOPERATIVE CLEARANCE: ICD-10-CM

## 2024-04-10 DIAGNOSIS — Z01.818 PREOPERATIVE CLEARANCE: Primary | ICD-10-CM

## 2024-04-10 DIAGNOSIS — M75.121 NONTRAUMATIC COMPLETE TEAR OF RIGHT ROTATOR CUFF: ICD-10-CM

## 2024-04-10 DIAGNOSIS — M25.519 CHRONIC SHOULDER PAIN, UNSPECIFIED LATERALITY: Primary | ICD-10-CM

## 2024-04-10 LAB
ATRIAL RATE: 74 BPM
P AXIS: 35 DEGREES
PR INTERVAL: 140 MS
QRS AXIS: 17 DEGREES
QRSD INTERVAL: 78 MS
QT INTERVAL: 390 MS
QTC INTERVAL: 432 MS
T WAVE AXIS: 8 DEGREES
VENTRICULAR RATE: 74 BPM

## 2024-04-10 PROCEDURE — 99213 OFFICE O/P EST LOW 20 MIN: CPT | Performed by: NURSE PRACTITIONER

## 2024-04-10 PROCEDURE — 97110 THERAPEUTIC EXERCISES: CPT

## 2024-04-10 PROCEDURE — 93010 ELECTROCARDIOGRAM REPORT: CPT | Performed by: INTERNAL MEDICINE

## 2024-04-10 PROCEDURE — 97140 MANUAL THERAPY 1/> REGIONS: CPT

## 2024-04-10 NOTE — PROGRESS NOTES
PT Discharge    Today's date: 4/10/2024  Patient name: Nina Joseph  : 1980  MRN: [id number]  Referring provider: Greg Watt DO  Dx:   Encounter Diagnosis     ICD-10-CM    1. Chronic shoulder pain, unspecified laterality  M25.519     G89.29           Start Time: 1315  Stop Time: 1405  Total time in clinic (min): 50 minutes    Assessment  Assessment details: The patient is a 43 y.o. female presenting to Physical Therapy today with chronic R sided shoulder pain. She will be undergoing R RTC repair surgery on 24 and has made a notable improvement in R GH AROM/PROM in all planes of motion. We will d/c patient today from Physical Therapy services and transition to an independent HEP to continue managing her condition. Patient was encouraged to call the office if she has any questions or concerns.     Goals  ST.) Patient will initiate HEP Independently MET  2.) Patient will demonstrate improved R elbow strength evidenced by a 1-2 MMT grade increase MET  3.) Patient will demonstrate improved R shoulder strength evidenced by a 1-2 MMT grade increase MET  4.) Patient will demonstrate improved cervical mobility evidenced by </= minimal restriction in all planes of motion MET  5.) Patient will demonstrate a </= 7/10 pain level at its worse with activity ONGOING    LT.) Patient will be d/c to an HEP independently MET  2.) Patient will improve functional abilities evidenced by FOTO scores MET  3.) Patient will demonstrate a </= 4/10 pain level at its worse with activity ONGOING  4.) Patient will demonstrate improved ability to lift, push, pull, and carry ONGOING  5.) Return to PLOF ONGOING    Plan  Plan details: Plan of care was reviewed and discussed thoroughly with the patient on their current condition. Patient was instructed on a HEP with written instructions. We will d/c patient from Physical Therapy services and transition to an independent HEP to continue managing her condition as she will be  undergoing R RTC repair surgery on 4-30-24. She was encouraged to call the office if she has any questions or concerns.   Treatment plan discussed with: patient    Subjective Evaluation    History of Present Illness  Mechanism of injury: The patient reports today with chronic R sided shoulder, elbow, and neck pain. She notes intermittent numbness and tingling into the R arm. She states having a R elbow fracture when she was a child that did not heal correctly and is unable to bend/extend her elbow fully. She reports clicking/popping at times in the R shoulder when she moves the arm. She reports having a series of injections in the neck and R shoulder with no relief of symptoms. The patient states she is currently having difficulty with activities such as sleeping, lifting, pushing, pulling, and carrying due to pain.     Update 1-23-24: The patient reports today with pain in the R shoulder. She notes since the start of Physical Therapy treatment she has been able to tolerate pain symptoms more than before. She states still having difficulty with sleeping, lifting, pushing, pulling, and carrying due to pain. She notes being scheduled for an MRI on 1-29-24 for the R shoulder.    Update 3-1-24: The patient reports today with continued R shoulder pain. She notes following up with Ortho surgery on 2/22. She states needing to improve range of motion in the shoulder before further surgical discussion. She notes still having difficulty with lifting, carrying, pushing, and pulling due to R shoulder pain.     Update 3-28-24: The patient reports today with slight improvement in R shoulder symptoms and mobility. She will be undergoing a R RTC repair on 4-30-24. She notes still having difficulty with overhead activity, lifting, carrying, pushing, and pulling due to R shoulder pain.    Update 4-10-24: The patient reports today with improvement in R shoulder mobility in all planes of motion. She will be undergoing R RTC surgery on  4-30-24. She feels that her R shoulder ROM is more manageable. She would like to transition to an independent exercise program at this time to continue managing her condition.            Recurrent probem    Quality of life: fair    Patient Goals  Patient goals for therapy: decreased pain, increased motion, increased strength, independence with ADLs/IADLs and return to sport/leisure activities    Pain  Current pain ratin  At best pain ratin  At worst pain ratin  Location: R shoulder  Quality: burning, dull ache, sharp, throbbing and discomfort  Aggravating factors: lifting and overhead activity (pushing, pulling, carrying, lifting)      Diagnostic Tests  X-ray: abnormal  Treatments  Previous treatment: physical therapy and injection treatment  Current treatment: physical therapy    Objective     Concurrent Complaints  Positive for night pain and disturbed sleep. Negative for dizziness, faints, headaches, nausea/motion sickness and tinnitus    Postural Observations  Seated posture: fair  Standing posture: fair    Additional Postural Observation Details  Patient presents with a forward head, thoracic kyphosis, and rounded shoulder posture.     Palpation     Right   Hypertonic in the suboccipitals.   Tenderness of the suboccipitals.     Tenderness   Cervical Spine   No tenderness in the spinous process, left transverse process and right transverse process.     Neurological Testing     Sensation   Cervical/Thoracic   Left   Intact: light touch    Right   Hyposensation: light touch    Reflexes   Left   Deltoid (C5): normal (2+)  Brachioradialis (C6): normal (2+)    Right   Deltoid (C5): normal (2+)  Brachioradialis (C6): normal (2+)    Active Range of Motion   Cervical/Thoracic Spine       Cervical    Flexion:  Restriction level: minimal  Extension:  with pain Restriction level: minimal  Left lateral flexion:  with pain Restriction level: minimal  Right lateral flexion:  with pain Restriction level  "minimal  Left rotation:  with pain Restriction level: minimal  Right rotation:  with pain Restriction level: minimal    Right Shoulder   Flexion: WFL  Abduction: WFL  External rotation 0°: WFL  External rotation 90°: WFL  Internal rotation 0°: WFL  Internal rotation 90°: WFL    Additional Active Range of Motion Details  Patient demonstrates improvement in R shoulder mobility in all planes of motion .  Functional R ER : WFL  Functional R IR: WFL     Passive Range of Motion     Right Shoulder   Flexion: WFL  Abduction: WFL  External rotation 0°: WFL  External rotation 90°: WFL  Internal rotation 0°: WFL  Internal rotation 90°: WFL    Strength/Myotome Testing   Cervical Spine     Left   Normal strength    Right Shoulder     Planes of Motion   Flexion: 3   Extension: 3+   Abduction: 3   External rotation at 0°: 3   Internal rotation at 0°: 3     Left Elbow   Normal strength    Right Elbow   Flexion: 4-  Extension: 4-    Additional Strength Details  Patient demonstrates improvements in R shoulder strength in all planes of motion.  Patient demonstrates improvements in R elbow strength     Tests   Cervical   Negative VBI.     Left   Negative Spurling's Test A.     Right   Negative Spurling's Test A.     Right Shoulder   Positive drop arm, empty can, full can, Hawkin's and Neer's.   Negative Speed's.   Neuro Exam:     Headaches   Patient reports headaches: No.            Precautions: Cervical Radiculopathy, Hx of R elbow fracture, CPS     Manuals 3/25 3/28 4/5 4/10  3/11 3/15 3/18 3/21   R Shoulder PROM  BM BM BM BM BM BM BM  BM   R Elbow PROM            STM R shoulder            Scapular patterns           Neuro Re-Ed                                                       Ther Ex           Table Slides Flexion 10\" Hold x10  10\" Hold x10  10\" Hold x10  10\" Hold x10  10\" Hold x10  10\" Hold x10  10\" Hold x10  10\" Hold x10   Table Slides Scaption 10\" Hold x10  10\" Hold x10  10\" Hold x10  10\" Hold x10  10\" Hold x10  10\" Hold x10 " " 10\" Hold x10  10\" Hold x10    Supine Wand Flexion  2x10 2x10 2x10  2x10 2x10  2x10 2x10 2x10    Supine Wand ER  2x10 2x10 2x10  2x10 2x10  2x10 2x10 2x10    Wall Slides 10x Flx 10x Flx 10x flx  10x flx 10x Flex 10x flex  10x flex 10x Flx   Child Pose w/ PB 5\" Hold x10  5\" Hold x10 5\" Hold 2x10  5\" Hold 2x10  5\" x10  5\" Hold x10  5\" Hold x10 5\" Hold x10    Standing Wand Abduction  2x10  2x10 2x10  2x10  2x10 2x10 2x10  2x10   Serratus Anterior Punches           Supine Shoulder Circles           AAROM Pulleys  3x10  3x10  3x10 3x10  3x10  3x10  3x10  3x10   HEP Instruction           Ther Activity                                 Gait Training                                 Modalities           Ice           MHP 10' Pre-Exercise  10' Pre-exercise  10' Pre-exercise  10' Pre-exercise  10' Pre-exercise  10' Pre-exercise 10' Pre-exercise  10' Pre-exercise             "

## 2024-04-11 ENCOUNTER — HOSPITAL ENCOUNTER (OUTPATIENT)
Dept: MRI IMAGING | Facility: HOSPITAL | Age: 44
Discharge: HOME | End: 2024-04-11
Payer: COMMERCIAL

## 2024-04-11 DIAGNOSIS — R42 DIZZINESS: ICD-10-CM

## 2024-04-11 PROCEDURE — 70553 MRI BRAIN STEM W/O & W/DYE: CPT

## 2024-04-11 PROCEDURE — A9585 GADOBUTROL INJECTION: HCPCS

## 2024-04-11 RX ORDER — GADOBUTROL 604.72 MG/ML
8 INJECTION INTRAVENOUS
Status: COMPLETED | OUTPATIENT
Start: 2024-04-11 | End: 2024-04-11

## 2024-04-11 RX ADMIN — GADOBUTROL 8 ML: 604.72 INJECTION INTRAVENOUS at 10:48

## 2024-04-12 ENCOUNTER — OFFICE VISIT (OUTPATIENT)
Dept: PHYSICAL THERAPY | Facility: CLINIC | Age: 44
End: 2024-04-12
Payer: COMMERCIAL

## 2024-04-12 ENCOUNTER — TELEPHONE (OUTPATIENT)
Dept: INTERNAL MEDICINE CLINIC | Facility: CLINIC | Age: 44
End: 2024-04-12

## 2024-04-12 DIAGNOSIS — R42 DIZZINESS: Primary | ICD-10-CM

## 2024-04-12 PROCEDURE — 97112 NEUROMUSCULAR REEDUCATION: CPT | Performed by: PHYSICAL THERAPIST

## 2024-04-12 NOTE — PROGRESS NOTES
"Daily Note     Today's date: 2024  Patient name: Nina Joseph  : 1980  MRN: [id number]  Referring provider: Greg Watt DO  Dx:   Encounter Diagnosis     ICD-10-CM    1. Dizziness  R42           Start Time: 930  Stop Time: 955  Total time in clinic (min): 25 minutes    Subjective: The patient reports feeling weak and light headed for the past several days. She denies dizziness but \"feels off\". She was using her cane for balance for a short time.       Objective: See treatment diary below      Assessment: Positional testing (-) today for B posterior canals and horizontals. Patient demonstrates difficulty with vertical and horizontal saccades. She is instructed to perform at home daily. Tolerated treatment well. Patient would benefit from continued PT      Plan: Continue per plan of care.      Precautions: Cervical Radiculopathy, Hx of R elbow fracture, CPS      4/2 8           Manuals                                                    Neuro Re-Ed             Assessment KENNY  JM                       Canalith repositioning  R Epleyx2           Saccades   H/V 30\"                                                 Ther Ex                                                                                                                     Ther Activity                                       Gait Training                                       Modalities                                         "

## 2024-04-12 NOTE — TELEPHONE ENCOUNTER
Hi, my name is Radha. I'm calling from [facility] in [place]. We have a patient in the office by the name of Nina STUART, date of birth 1980. Her primary care doctor is Lo Miles and we have been seeing her based on an order from [facility]. However, it's a resident that signed off on the order and as you may know, the system is prompting us to not use a residence. We were wondering if there was any way that Doctor Jd can place an order for her Vertigo. Our phone number here is [phone]. If someone can kindly get back to me, I would greatly appreciate it. Thank you. Kamila

## 2024-04-15 ENCOUNTER — OFFICE VISIT (OUTPATIENT)
Dept: URGENT CARE | Facility: MEDICAL CENTER | Age: 44
End: 2024-04-15
Payer: COMMERCIAL

## 2024-04-15 VITALS
DIASTOLIC BLOOD PRESSURE: 90 MMHG | WEIGHT: 178 LBS | OXYGEN SATURATION: 97 % | RESPIRATION RATE: 18 BRPM | SYSTOLIC BLOOD PRESSURE: 118 MMHG | TEMPERATURE: 98.2 F | BODY MASS INDEX: 31.53 KG/M2 | HEART RATE: 93 BPM

## 2024-04-15 DIAGNOSIS — J02.9 SORE THROAT: Primary | ICD-10-CM

## 2024-04-15 LAB — S PYO AG THROAT QL: NEGATIVE

## 2024-04-15 PROCEDURE — 87147 CULTURE TYPE IMMUNOLOGIC: CPT

## 2024-04-15 PROCEDURE — 99212 OFFICE O/P EST SF 10 MIN: CPT

## 2024-04-15 PROCEDURE — 87070 CULTURE OTHR SPECIMN AEROBIC: CPT

## 2024-04-15 RX ORDER — FLUTICASONE PROPIONATE 50 MCG
1 SPRAY, SUSPENSION (ML) NASAL DAILY
Qty: 11.1 ML | Refills: 0 | Status: SHIPPED | OUTPATIENT
Start: 2024-04-15

## 2024-04-15 NOTE — PROGRESS NOTES
[facility]        NAME: Nina Joseph is a 43 y.o. female  : 1980    MRN: [id number]  DATE: April 15, 2024  TIME: 5:30 PM    Assessment and Plan   Sore throat [J02.9]  1. Sore throat  POCT rapid strepA    Throat culture    fluticasone (FLONASE) 50 mcg/act nasal spray            Patient Instructions       Follow up with PCP in 3-5 days.  Proceed to  ER if symptoms worsen.    If tests are performed, our office will contact you with results only if changes need to made to the care plan discussed with you at the visit. You can review your full results on [facility].    Chief Complaint     Chief Complaint   Patient presents with   • Sore Throat     Sx stated Saturday. Taking tea and honey, cough drops and nyquil. No fevers or stomach issues.    • Cough         History of Present Illness       Patient here with sore throat since Saturday. She has been drinking tea with honey, taking nyquil, and using cough drops. She has rotator cuff repair surgery on  so wanted to make sure she is feeling better. She has not had any fevers or chills. No trouble chewing/swallowing but has pain with swallowing. She has had a cough, and she has had ongoing PND but hasn't been able to take anything for this at home due to finances- she has been OOW for her shoulder injury. Discussed options for affordable treatments. Patient does smoke but reports she hasn't been smoking since she started with the sore throat. She reports she does not have money to get nasal spray OTC for her PND. Will call Rx in to help with insurance coverage.         Review of Systems   Review of Systems   Constitutional:  Negative for appetite change, chills, fatigue and fever.   HENT:  Positive for sore throat. Negative for congestion, postnasal drip, rhinorrhea, sinus pressure, sinus pain and trouble swallowing.    Respiratory:  Positive for cough. Negative for chest tightness and shortness of breath.    Gastrointestinal:  Negative for  abdominal pain, constipation, diarrhea, nausea and vomiting.   Musculoskeletal:  Negative for myalgias.   Skin:  Negative for color change and rash.   Neurological:  Negative for dizziness, light-headedness and headaches.         Current Medications       Current Outpatient Medications:   •  busPIRone (BUSPAR) 5 mg tablet, , Disp: , Rfl:   •  ciclopirox (PENLAC) 8 % solution, Apply topically daily at bedtime, Disp: 6.6 mL, Rfl: 0  •  Diclofenac Sodium (VOLTAREN) 1 %, Apply 2 g topically 4 (four) times a day, Disp: 2 g, Rfl: 0  •  fluticasone (FLONASE) 50 mcg/act nasal spray, 1 spray into each nostril daily, Disp: 11.1 mL, Rfl: 0  •  ketoconazole (NIZORAL) 2 % cream, APPLY  CREAM TOPICALLY ONCE DAILY, Disp: 60 g, Rfl: 0  •  LORazepam (ATIVAN) 0.5 mg tablet, Take 1-2 about 30 minutes prior to the MRI, Disp: 2 tablet, Rfl: 0  •  sertraline (ZOLOFT) 100 mg tablet, Take 100 mg by mouth daily, Disp: , Rfl:   •  topiramate (TOPAMAX) 25 mg tablet, Take 25 mg by mouth 2 (two) times a day, Disp: , Rfl:     Current Allergies     Allergies as of 04/15/2024 - Reviewed 04/15/2024   Allergen Reaction Noted   • Clonazepam Other (See Comments) 2019   • Latex  2019   • Methylphenidate Other (See Comments) 2020            The following portions of the patient's history were reviewed and updated as appropriate: allergies, current medications, past family history, past medical history, past social history, past surgical history and problem list.     Past Medical History:   Diagnosis Date   • Anxiety    • Bipolar depression (HCC)    • Chronic pelvic pain in female 2021   • Endometrial hyperplasia without atypia 2021   • Hypertension    • Kidney stone    • Menometrorrhagia 2021   • Vertigo 2024       Past Surgical History:   Procedure Laterality Date   •  SECTION     • EPIDURAL BLOCK INJECTION Right 10/19/2023    Procedure: Right C7-T1 ILESI;  Surgeon: Elsi Saunders MD;  Location:  [facility];  Service: Pain Management    • FL INJECTION RIGHT SHOULDER (ARTHROGRAM)  1/29/2024   • HYSTERECTOMY  04/09/2021       Family History   Problem Relation Age of Onset   • Heart disease Father    • No Known Problems Sister    • No Known Problems Daughter    • No Known Problems Maternal Grandmother    • No Known Problems Maternal Grandfather    • No Known Problems Paternal Grandmother    • No Known Problems Paternal Grandfather    • No Known Problems Son    • No Known Problems Son    • No Known Problems Son    • No Known Problems Maternal Aunt    • No Known Problems Maternal Aunt    • No Known Problems Paternal Aunt    • No Known Problems Paternal Aunt          Medications have been verified.        Objective   /90   Pulse 93   Temp 98.2 °F (36.8 °C)   Resp 18   Wt 80.7 kg (178 lb)   LMP 12/25/2020   SpO2 97%   BMI 31.53 kg/m²        Physical Exam     Physical Exam  Vitals and nursing note reviewed.   Constitutional:       General: She is not in acute distress.     Appearance: Normal appearance. She is normal weight. She is not ill-appearing.   HENT:      Head: Normocephalic and atraumatic.      Right Ear: Ear canal and external ear normal. A middle ear effusion is present. Tympanic membrane is not erythematous or bulging.      Left Ear: Ear canal and external ear normal. A middle ear effusion is present. Tympanic membrane is not erythematous or bulging.      Nose: Nose normal.      Mouth/Throat:      Lips: Pink.      Mouth: Mucous membranes are moist.      Pharynx: Oropharynx is clear. Uvula midline. Posterior oropharyngeal erythema present. No pharyngeal swelling, oropharyngeal exudate or uvula swelling.      Tonsils: No tonsillar exudate or tonsillar abscesses. 0 on the right. 0 on the left.   Eyes:      Extraocular Movements: Extraocular movements intact.      Conjunctiva/sclera: Conjunctivae normal.      Pupils: Pupils are equal, round, and reactive to light.   Cardiovascular:      Rate and  Rhythm: Normal rate and regular rhythm.      Pulses: Normal pulses.      Heart sounds: Normal heart sounds.   Pulmonary:      Effort: Pulmonary effort is normal.      Breath sounds: Normal breath sounds.   Abdominal:      General: Abdomen is flat. Bowel sounds are normal.      Palpations: Abdomen is soft.   Musculoskeletal:         General: Normal range of motion.      Cervical back: Full passive range of motion without pain, normal range of motion and neck supple.   Skin:     General: Skin is warm and dry.      Capillary Refill: Capillary refill takes less than 2 seconds.      Findings: No rash.   Neurological:      General: No focal deficit present.      Mental Status: She is alert and oriented to person, place, and time.   Psychiatric:         Mood and Affect: Mood normal.         Behavior: Behavior normal.

## 2024-04-17 ENCOUNTER — OFFICE VISIT (OUTPATIENT)
Dept: PHYSICAL THERAPY | Facility: CLINIC | Age: 44
End: 2024-04-17
Payer: COMMERCIAL

## 2024-04-17 ENCOUNTER — TELEPHONE (OUTPATIENT)
Dept: NEUROLOGY | Facility: CLINIC | Age: 44
End: 2024-04-17

## 2024-04-17 DIAGNOSIS — R42 DIZZINESS: Primary | ICD-10-CM

## 2024-04-17 PROCEDURE — 97112 NEUROMUSCULAR REEDUCATION: CPT | Performed by: PHYSICAL THERAPIST

## 2024-04-17 NOTE — TELEPHONE ENCOUNTER
Patient called  along with  from PT to report that the referral from the Resident will not go thru and [facility] has flagged the referral'    According to the PT they need a referral from the Attending for the patient to receive PT for the dizziness DX.     Please assist     Thank you!

## 2024-04-17 NOTE — PROGRESS NOTES
"Daily Note     Today's date: 2024  Patient name: Nina Joseph  : 1980  MRN: [id number]  Referring provider: Greg Watt DO  Dx:   Encounter Diagnosis     ICD-10-CM    1. Dizziness  R42           Start Time: 1010  Stop Time: 1025  Total time in clinic (min): 15 minutes    Subjective: The patient reports no significant dizziness over the past week.       Objective: See treatment diary below      Assessment: improved horizontal saccades.L eye difficulty with vertical saccades. Positional testing (-). No treatment indicated today.     Plan: D/C from PT at this time     Precautions: Cervical Radiculopathy, Hx of R elbow fracture, CPS      /         Manuals                                                    Neuro Re-Ed             Assessment KENNY COX JM                      Canalith repositioning  R Epleyx2           Saccades   H/V 30\"                                                 Ther Ex                                                                                                                     Ther Activity                                       Gait Training                                       Modalities                                              "

## 2024-04-17 NOTE — PROGRESS NOTES
"PT Discharge    Today's date: 2024  Patient name: Nina Joseph  : 1980  MRN: [id number]  Referring provider: Greg Watt DO  Dx:   Encounter Diagnosis     ICD-10-CM    1. Dizziness  R42           Start Time: 1010  Stop Time: 1025  Total time in clinic (min): 15 minutes    Assessment  Assessment details: The patient has responded well to physical therapy treatment. She has not experienced any vertigo for the past week. She is agreeable to discharge from physical therapy at this time.     Goals  STG (1 week)   1. Patient will reports no episodes of vertigo for 1 week  MET   2. Patient will resume all daily activities with no vertigo  MET    LTG (3 weeks)   1. Patient will remain vertigo free  MET   2. Return to PLOF  MET   3. Patient will be independent with HEP  MET    Plan  Frequency: 1-2x/week.  Treatment plan discussed with: patient        Subjective Evaluation    History of Present Illness  Mechanism of injury: The patient reports that she fell on [employer] [place] and hit the front of her head on a counter. She had no LOC. 3 days later, she had a headache and went to urgent care. She was sent over to the ER. Xrays were done on the L orbital area which were unremarkable. A few weeks later, she began to experience random episodes of dizziness describes as \"spinning\". She reports that bending over or rolling in bed causes dizziness. She saw a neurologist. An MRI of the brain has been ordered as well as an ENT consult. She is now referred to OPPT.   Quality of life: good    Patient Goals  Patient goal: To get rid of dizziness and headaches  Pain  Current pain ratin  Location: neck  Quality: dull ache and tight    Social Support  Steps to enter house: no  Stairs in house: no   Lives in: apartment  Lives with: alone    Employment status: not working        Objective     Concurrent Complaints  Positive for headaches and poor concentration. Negative for nausea/motion sickness, tinnitus, visual change, " hearing loss, memory loss, aural fullness and peripheral neuropathy    Active Range of Motion   Cervical/Thoracic Spine       Cervical    Flexion: Neck active flexion: WFL.   Extension: Neck active extension: WFL.      Left lateral flexion: Neck active lateral bend left: WFL.      Right lateral flexion: Neck active lateral bend right: WFL.      Left rotation: Neck active rotation left: WFL.  Right rotation: Neck active rotation right: WFL.     Neuro Exam:     Dizziness  Negative for disequilibrium, vertigo, oscillopsia, motion sickness, light-headedness, rocking or swaying, diplopia and floating or swimming.     Exacerbating factors  Negative for bending over, rolling in bed, looking up, walking, turning head, supine to/from sitting, optokinetic movement and walking in busy environment.     Symptoms   Duration: 30 minutes    Headaches   Patient reports headaches: Yes.   Frequency: 3x/week  Duration: 12 hours  Intensity at best: 0/10  Intensity at worst: 10/10  Average intensity: 5/10  Location: posterior head    Oculomotor exam   Oculomotor ROM: WNL  Resting nystagmus: not present   Gaze holding nystagmus: not present left  and not present right  Smooth pursuits: within normal limits  Vertical saccades: hypometric and pt reports lightheadedness  Horizontal saccades: normal  Convergence: normal    Positional testing   [place]   Left posterior canal: WNL  Right posterior canal: WNL  Roll test   Left horizontal canal: WNL  Right horizontal canal: WNL      Balance assessments   MCTSIB   Eyes open level surface: 30 sec  Eyes closed level surface: 20 sec             Precautions: N/A

## 2024-04-18 DIAGNOSIS — R42 VERTIGO: Primary | ICD-10-CM

## 2024-04-18 NOTE — TELEPHONE ENCOUNTER
4/17 at 9:23 am:    Hi, my name is Radha, and I'm calling from [facility] physical therapy. [phone]. We share a mutual patient, Nina Joseph, date of birth  1980. I am calling to possibly have one of the physicians in the neurology department send us over an order for physical therapy. She is being seen in our office for some dizziness. However, the original order on April the 2nd was from a Aaron Barton, and she is a resident, which I'm sure as you know, our Epic system is flagging us if the referring is a resident. Any questions, please give us a call [phone]. Otherwise, if you can just put that order in, we would greatly appreciate it. Thank you.

## 2024-04-19 ENCOUNTER — TELEPHONE (OUTPATIENT)
Dept: URGENT CARE | Facility: MEDICAL CENTER | Age: 44
End: 2024-04-19

## 2024-04-19 LAB — BACTERIA THROAT CULT: ABNORMAL

## 2024-04-19 NOTE — PRE-PROCEDURE INSTRUCTIONS
Pre-Surgery Instructions:   Medication Instructions    busPIRone (BUSPAR) 5 mg tablet Take day of surgery.    ciclopirox (PENLAC) 8 % solution Hold day of surgery.    fluticasone (FLONASE) 50 mcg/act nasal spray Take day of surgery.    sertraline (ZOLOFT) 100 mg tablet Take day of surgery.    topiramate (TOPAMAX) 25 mg tablet Take day of surgery.    Medication instructions for day surgery reviewed. Please use only a sip of water to take your instructed medications. Avoid all over the counter vitamins, supplements and NSAIDS for one week prior to surgery per anesthesia guidelines. Tylenol is ok to take as needed.     You will receive a call one business day prior to surgery with an arrival time and hospital directions. If your surgery is scheduled on a Monday, the hospital will be calling you on the Friday prior to your surgery. If you have not heard from anyone by 8pm, please call the hospital supervisor through the hospital  at [phone]. ([place] [phone] or [place] [phone]).    Do not eat or drink anything after midnight the night before your surgery, including candy, mints, lifesavers, or chewing gum. Do not drink alcohol 24hrs before your surgery. Try not to smoke at least 24hrs before your surgery.       Follow the pre surgery showering instructions as listed in the “My Surgical Experience Booklet” or otherwise provided by your surgeon's office. Do not use a blade to shave the surgical area 1 week before surgery. It is okay to use a clean electric clippers up to 24 hours before surgery. Do not apply any lotions, creams, including makeup, cologne, deodorant, or perfumes after showering on the day of your surgery. Do not use dry shampoo, hair spray, hair gel, or any type of hair products.     No contact lenses, eye make-up, or artificial eyelashes. Remove nail polish, including gel polish, and any artificial, gel, or acrylic nails if possible. Remove all jewelry including rings and body  piercing jewelry.     Wear causal clothing that is easy to take on and off. Consider your type of surgery.    Keep any valuables, jewelry, piercings at home. Please bring any specially ordered equipment (sling, braces) if indicated.    Arrange for a responsible person to drive you to and from the hospital on the day of your surgery. Please confirm the visitor policy for the day of your procedure when you receive your phone call with an arrival time.     Call the surgeon's office with any new illnesses, exposures, or additional questions prior to surgery.    Please reference your “My Surgical Experience Booklet” for additional information to prepare for your upcoming surgery.

## 2024-04-19 NOTE — TELEPHONE ENCOUNTER
Spoke with patient in regard to throat culture which revealed a few colonies of Group A beta hemolytic strep not group A. Patient's sore throat resolved, no treatment indicated at this time.

## 2024-04-29 ENCOUNTER — ANESTHESIA EVENT (OUTPATIENT)
Dept: PERIOP | Facility: HOSPITAL | Age: 44
End: 2024-04-29
Payer: COMMERCIAL

## 2024-04-30 ENCOUNTER — HOSPITAL ENCOUNTER (OUTPATIENT)
Facility: HOSPITAL | Age: 44
Setting detail: OUTPATIENT SURGERY
Discharge: HOME | End: 2024-04-30
Attending: STUDENT IN AN ORGANIZED HEALTH CARE EDUCATION/TRAINING PROGRAM | Admitting: STUDENT IN AN ORGANIZED HEALTH CARE EDUCATION/TRAINING PROGRAM
Payer: COMMERCIAL

## 2024-04-30 ENCOUNTER — ANESTHESIA (OUTPATIENT)
Dept: PERIOP | Facility: HOSPITAL | Age: 44
End: 2024-04-30
Payer: COMMERCIAL

## 2024-04-30 VITALS
BODY MASS INDEX: 31.54 KG/M2 | SYSTOLIC BLOOD PRESSURE: 115 MMHG | TEMPERATURE: 97.5 F | HEIGHT: 63 IN | OXYGEN SATURATION: 96 % | DIASTOLIC BLOOD PRESSURE: 70 MMHG | WEIGHT: 178 LBS | HEART RATE: 85 BPM | RESPIRATION RATE: 22 BRPM

## 2024-04-30 DIAGNOSIS — M25.511 CHRONIC RIGHT SHOULDER PAIN: Primary | ICD-10-CM

## 2024-04-30 DIAGNOSIS — G89.29 CHRONIC RIGHT SHOULDER PAIN: Primary | ICD-10-CM

## 2024-04-30 DIAGNOSIS — Z98.890 S/P ARTHROSCOPY OF RIGHT SHOULDER: ICD-10-CM

## 2024-04-30 PROBLEM — IMO0001 SMOKING: Status: ACTIVE | Noted: 2024-04-30

## 2024-04-30 PROBLEM — F17.200 SMOKING: Status: ACTIVE | Noted: 2024-04-30

## 2024-04-30 PROCEDURE — C9290 INJ, BUPIVACAINE LIPOSOME: HCPCS | Performed by: ANESTHESIOLOGY

## 2024-04-30 PROCEDURE — C1713 ANCHOR/SCREW BN/BN,TIS/BN: HCPCS | Performed by: STUDENT IN AN ORGANIZED HEALTH CARE EDUCATION/TRAINING PROGRAM

## 2024-04-30 PROCEDURE — NC001 PR NO CHARGE: Performed by: STUDENT IN AN ORGANIZED HEALTH CARE EDUCATION/TRAINING PROGRAM

## 2024-04-30 PROCEDURE — 99024 POSTOP FOLLOW-UP VISIT: CPT | Performed by: STUDENT IN AN ORGANIZED HEALTH CARE EDUCATION/TRAINING PROGRAM

## 2024-04-30 PROCEDURE — 29827 SHO ARTHRS SRG RT8TR CUF RPR: CPT | Performed by: PHYSICIAN ASSISTANT

## 2024-04-30 PROCEDURE — 29827 SHO ARTHRS SRG RT8TR CUF RPR: CPT | Performed by: STUDENT IN AN ORGANIZED HEALTH CARE EDUCATION/TRAINING PROGRAM

## 2024-04-30 DEVICE — 4.75MM BC KNOTLESS SWIVELOCK
Type: IMPLANTABLE DEVICE | Site: SHOULDER | Status: FUNCTIONAL
Brand: ARTHREX®

## 2024-04-30 RX ORDER — PROPOFOL 10 MG/ML
INJECTION, EMULSION INTRAVENOUS AS NEEDED
Status: DISCONTINUED | OUTPATIENT
Start: 2024-04-30 | End: 2024-04-30

## 2024-04-30 RX ORDER — BUPIVACAINE HYDROCHLORIDE 5 MG/ML
INJECTION, SOLUTION EPIDURAL; INTRACAUDAL
Status: COMPLETED | OUTPATIENT
Start: 2024-04-30 | End: 2024-04-30

## 2024-04-30 RX ORDER — ONDANSETRON 2 MG/ML
INJECTION INTRAMUSCULAR; INTRAVENOUS AS NEEDED
Status: DISCONTINUED | OUTPATIENT
Start: 2024-04-30 | End: 2024-04-30

## 2024-04-30 RX ORDER — MAGNESIUM HYDROXIDE 1200 MG/15ML
LIQUID ORAL AS NEEDED
Status: DISCONTINUED | OUTPATIENT
Start: 2024-04-30 | End: 2024-04-30 | Stop reason: HOSPADM

## 2024-04-30 RX ORDER — PHENYLEPHRINE HCL IN 0.9% NACL 1 MG/10 ML
SYRINGE (ML) INTRAVENOUS AS NEEDED
Status: DISCONTINUED | OUTPATIENT
Start: 2024-04-30 | End: 2024-04-30

## 2024-04-30 RX ORDER — ONDANSETRON 2 MG/ML
4 INJECTION INTRAMUSCULAR; INTRAVENOUS ONCE AS NEEDED
Status: DISCONTINUED | OUTPATIENT
Start: 2024-04-30 | End: 2024-04-30 | Stop reason: HOSPADM

## 2024-04-30 RX ORDER — PROPOFOL 10 MG/ML
INJECTION, EMULSION INTRAVENOUS CONTINUOUS PRN
Status: DISCONTINUED | OUTPATIENT
Start: 2024-04-30 | End: 2024-04-30

## 2024-04-30 RX ORDER — DEXAMETHASONE SODIUM PHOSPHATE 10 MG/ML
INJECTION, SOLUTION INTRAMUSCULAR; INTRAVENOUS AS NEEDED
Status: DISCONTINUED | OUTPATIENT
Start: 2024-04-30 | End: 2024-04-30

## 2024-04-30 RX ORDER — ACETAMINOPHEN 325 MG/1
975 TABLET ORAL ONCE
Status: COMPLETED | OUTPATIENT
Start: 2024-04-30 | End: 2024-04-30

## 2024-04-30 RX ORDER — SODIUM CHLORIDE, SODIUM LACTATE, POTASSIUM CHLORIDE, CALCIUM CHLORIDE 600; 310; 30; 20 MG/100ML; MG/100ML; MG/100ML; MG/100ML
INJECTION, SOLUTION INTRAVENOUS CONTINUOUS PRN
Status: DISCONTINUED | OUTPATIENT
Start: 2024-04-30 | End: 2024-04-30

## 2024-04-30 RX ORDER — CELECOXIB 100 MG/1
100 CAPSULE ORAL ONCE
Status: COMPLETED | OUTPATIENT
Start: 2024-04-30 | End: 2024-04-30

## 2024-04-30 RX ORDER — CEFAZOLIN SODIUM 2 G/50ML
2000 SOLUTION INTRAVENOUS ONCE
Status: COMPLETED | OUTPATIENT
Start: 2024-04-30 | End: 2024-04-30

## 2024-04-30 RX ORDER — HYDROMORPHONE HCL/PF 1 MG/ML
0.2 SYRINGE (ML) INJECTION
Status: DISCONTINUED | OUTPATIENT
Start: 2024-04-30 | End: 2024-04-30 | Stop reason: HOSPADM

## 2024-04-30 RX ORDER — ONDANSETRON 4 MG/1
4 TABLET, FILM COATED ORAL EVERY 8 HOURS PRN
Qty: 10 TABLET | Refills: 0 | Status: SHIPPED | OUTPATIENT
Start: 2024-04-30

## 2024-04-30 RX ORDER — MIDAZOLAM HYDROCHLORIDE 2 MG/2ML
INJECTION, SOLUTION INTRAMUSCULAR; INTRAVENOUS AS NEEDED
Status: DISCONTINUED | OUTPATIENT
Start: 2024-04-30 | End: 2024-04-30

## 2024-04-30 RX ORDER — CHLORHEXIDINE GLUCONATE ORAL RINSE 1.2 MG/ML
15 SOLUTION DENTAL ONCE
Status: COMPLETED | OUTPATIENT
Start: 2024-04-30 | End: 2024-04-30

## 2024-04-30 RX ORDER — ROCURONIUM BROMIDE 10 MG/ML
INJECTION, SOLUTION INTRAVENOUS AS NEEDED
Status: DISCONTINUED | OUTPATIENT
Start: 2024-04-30 | End: 2024-04-30

## 2024-04-30 RX ORDER — OXYCODONE HYDROCHLORIDE 5 MG/1
5 TABLET ORAL EVERY 4 HOURS PRN
Qty: 15 TABLET | Refills: 0 | Status: SHIPPED | OUTPATIENT
Start: 2024-04-30

## 2024-04-30 RX ORDER — FENTANYL CITRATE 50 UG/ML
INJECTION, SOLUTION INTRAMUSCULAR; INTRAVENOUS AS NEEDED
Status: DISCONTINUED | OUTPATIENT
Start: 2024-04-30 | End: 2024-04-30

## 2024-04-30 RX ORDER — ACETAMINOPHEN 500 MG
1000 TABLET ORAL EVERY 8 HOURS
Qty: 168 TABLET | Refills: 0 | Status: SHIPPED | OUTPATIENT
Start: 2024-04-30 | End: 2024-05-28

## 2024-04-30 RX ORDER — FENTANYL CITRATE/PF 50 MCG/ML
50 SYRINGE (ML) INJECTION
Status: DISCONTINUED | OUTPATIENT
Start: 2024-04-30 | End: 2024-04-30 | Stop reason: HOSPADM

## 2024-04-30 RX ORDER — LIDOCAINE HYDROCHLORIDE 10 MG/ML
INJECTION, SOLUTION EPIDURAL; INFILTRATION; INTRACAUDAL; PERINEURAL AS NEEDED
Status: DISCONTINUED | OUTPATIENT
Start: 2024-04-30 | End: 2024-04-30

## 2024-04-30 RX ORDER — OXYCODONE HYDROCHLORIDE 5 MG/1
5 TABLET ORAL EVERY 4 HOURS PRN
Status: DISCONTINUED | OUTPATIENT
Start: 2024-04-30 | End: 2024-04-30 | Stop reason: HOSPADM

## 2024-04-30 RX ORDER — LABETALOL HYDROCHLORIDE 5 MG/ML
INJECTION, SOLUTION INTRAVENOUS AS NEEDED
Status: DISCONTINUED | OUTPATIENT
Start: 2024-04-30 | End: 2024-04-30

## 2024-04-30 RX ORDER — MELOXICAM 7.5 MG/1
7.5 TABLET ORAL DAILY
Qty: 28 TABLET | Refills: 0 | Status: SHIPPED | OUTPATIENT
Start: 2024-04-30 | End: 2024-04-30

## 2024-04-30 RX ORDER — MELOXICAM 15 MG/1
15 TABLET ORAL DAILY
Qty: 28 TABLET | Refills: 0 | Status: CANCELLED | OUTPATIENT
Start: 2024-04-30 | End: 2024-05-28

## 2024-04-30 RX ADMIN — FENTANYL CITRATE 50 MCG: 50 INJECTION, SOLUTION INTRAMUSCULAR; INTRAVENOUS at 09:34

## 2024-04-30 RX ADMIN — SUGAMMADEX 200 MG: 100 INJECTION, SOLUTION INTRAVENOUS at 13:00

## 2024-04-30 RX ADMIN — DEXAMETHASONE SODIUM PHOSPHATE 10 MG: 10 INJECTION, SOLUTION INTRAMUSCULAR; INTRAVENOUS at 10:15

## 2024-04-30 RX ADMIN — LABETALOL HYDROCHLORIDE 5 MG: 5 INJECTION, SOLUTION INTRAVENOUS at 11:16

## 2024-04-30 RX ADMIN — CEFAZOLIN SODIUM 2000 MG: 2 SOLUTION INTRAVENOUS at 09:56

## 2024-04-30 RX ADMIN — SODIUM CHLORIDE, SODIUM LACTATE, POTASSIUM CHLORIDE, AND CALCIUM CHLORIDE: .6; .31; .03; .02 INJECTION, SOLUTION INTRAVENOUS at 09:53

## 2024-04-30 RX ADMIN — ONDANSETRON 4 MG: 2 INJECTION INTRAMUSCULAR; INTRAVENOUS at 12:41

## 2024-04-30 RX ADMIN — LABETALOL HYDROCHLORIDE 5 MG: 5 INJECTION, SOLUTION INTRAVENOUS at 11:13

## 2024-04-30 RX ADMIN — PROPOFOL 150 MG: 10 INJECTION, EMULSION INTRAVENOUS at 09:56

## 2024-04-30 RX ADMIN — LABETALOL HYDROCHLORIDE 5 MG: 5 INJECTION, SOLUTION INTRAVENOUS at 11:30

## 2024-04-30 RX ADMIN — PROPOFOL 100 MG: 10 INJECTION, EMULSION INTRAVENOUS at 11:08

## 2024-04-30 RX ADMIN — CELECOXIB 100 MG: 100 CAPSULE ORAL at 09:10

## 2024-04-30 RX ADMIN — PHENYLEPHRINE HYDROCHLORIDE 20 MCG/MIN: 10 INJECTION INTRAVENOUS at 10:20

## 2024-04-30 RX ADMIN — ACETAMINOPHEN 975 MG: 325 TABLET ORAL at 09:09

## 2024-04-30 RX ADMIN — Medication 100 MCG: at 10:24

## 2024-04-30 RX ADMIN — LIDOCAINE HYDROCHLORIDE 50 MG: 10 INJECTION, SOLUTION EPIDURAL; INFILTRATION; INTRACAUDAL; PERINEURAL at 09:56

## 2024-04-30 RX ADMIN — MIDAZOLAM 2 MG: 1 INJECTION INTRAMUSCULAR; INTRAVENOUS at 09:34

## 2024-04-30 RX ADMIN — PROPOFOL 50 MG: 10 INJECTION, EMULSION INTRAVENOUS at 11:04

## 2024-04-30 RX ADMIN — CHLORHEXIDINE GLUCONATE 15 ML: 1.2 RINSE ORAL at 09:10

## 2024-04-30 RX ADMIN — ROCURONIUM BROMIDE 10 MG: 10 INJECTION, SOLUTION INTRAVENOUS at 12:19

## 2024-04-30 RX ADMIN — BUPIVACAINE 10 ML: 13.3 INJECTION, SUSPENSION, LIPOSOMAL INFILTRATION at 09:38

## 2024-04-30 RX ADMIN — ROCURONIUM BROMIDE 50 MG: 10 INJECTION, SOLUTION INTRAVENOUS at 09:56

## 2024-04-30 RX ADMIN — BUPIVACAINE HYDROCHLORIDE 10 ML: 5 INJECTION, SOLUTION EPIDURAL; INTRACAUDAL; PERINEURAL at 09:38

## 2024-04-30 RX ADMIN — FENTANYL CITRATE 50 MCG: 50 INJECTION, SOLUTION INTRAMUSCULAR; INTRAVENOUS at 09:56

## 2024-04-30 RX ADMIN — FENTANYL CITRATE 50 MCG: 50 INJECTION, SOLUTION INTRAMUSCULAR; INTRAVENOUS at 11:57

## 2024-04-30 RX ADMIN — Medication 100 MCG: at 12:27

## 2024-04-30 RX ADMIN — PROPOFOL 50 MCG/KG/MIN: 10 INJECTION, EMULSION INTRAVENOUS at 10:10

## 2024-04-30 RX ADMIN — ROCURONIUM BROMIDE 20 MG: 10 INJECTION, SOLUTION INTRAVENOUS at 11:31

## 2024-04-30 NOTE — H&P
H&P Exam - Orthopedics   Nina Joseph 43 y.o. female MRN: [id number]  Unit/Bed#: OR POOL Encounter: [id number]    Assessment/Plan     Assessment:  43 y.o. female with right shoulder pain for approximately 1 year without a specific injury and MRI showing tears of the supraspinatus and infraspinatus with retraction and mild atrophy.      Plan:  Right shoulder arthroscopy, rotator cuff repair      History of Present Illness   HPI:  Nina Joseph is a 43 y.o. female who presents with  chronic pain in her right shoulder, for approximately 1 year. She cannot recall any specific mechanism of injury or trauma to her shoulder. She states that the pain has progressively worsened over time. The patient was seen initially by Dr. Watt, who provided a CS injection to her right shoulder, without benefit. She then followed up with Dr. Waddell for cervical pathologies. An MRI was ordered, showing a RTC tear.  She was referred here for further management of the rotator cuff tear.  The patient reports pain at all times.  The pain is localized over the lateral aspect of the shoulder and radiates down towards the elbow but not past it.  She has pain at rest which is significantly exacerbated by movement. She reports trouble with overhead activity and reaching backwards. She reports weakness. The patient has also tried physical therapy. She states that she recently began a prescription of Celebrex.  She denies any prior injuries or surgeries on the shoulder.  Patient does not have diabetes, but does smoke approximately half pack per day.    She was sent for physical therapy to focus on improving her shoulder range of motion and instructed to follow-up in 4 weeks. She states that her range of motion has improved significantly and is close to her baseline. However, she does note continued pain and weakness in the right shoulder that has not improved with physical therapy. She also had a EMG done on 2/15/2024 which showed no nerve  "impingement. She denies any numbness or tingling in the right upper extremity at this point. She does state that her smoking has decreased and she is down to approximately 4 to 5 cigarettes/day.      Occupation: Unemployed     The patient has the following co-morbidities: Bipolar disorder.        Historical Information   Past Medical History:   Diagnosis Date    Anxiety     Bipolar depression (HCC)     Chronic pelvic pain in female 2021    Endometrial hyperplasia without atypia 2021    Hypertension     Kidney stone     Menometrorrhagia 2021    Vertigo 2024     Past Surgical History:   Procedure Laterality Date     SECTION      EPIDURAL BLOCK INJECTION Right 10/19/2023    Procedure: Right C7-T1 ILESI;  Surgeon: Elsi Saunders MD;  Location: [facility];  Service: Pain Management     FL INJECTION RIGHT SHOULDER (ARTHROGRAM)  2024    HYSTERECTOMY  2021     Social History   Social History     Substance and Sexual Activity   Alcohol Use Never     Social History     Substance and Sexual Activity   Drug Use Never     Social History     Tobacco Use   Smoking Status Some Days    Current packs/day: 0.50    Average packs/day: 0.5 packs/day for 28.0 years (14.0 ttl pk-yrs)    Types: Cigarettes   Smokeless Tobacco Never     Family History: non-contributory    Meds/Allergies   all medications and allergies reviewed  Allergies   Allergen Reactions    Clonazepam Other (See Comments)     Excessive sedation  Vomiting    Methylphenidate Other (See Comments)     Hyperactivity\"    Latex Rash       Objective   Vitals: Blood pressure 142/76, pulse 78, temperature 97.5 °F (36.4 °C), temperature source Temporal, resp. rate 18, height 5' 3\" (1.6 m), weight 80.7 kg (178 lb), last menstrual period 2020, SpO2 96%.,Body mass index is 31.53 kg/m².    No intake or output data in the 24 hours ending 24    No intake/output data recorded.    Invasive Devices       Peripheral Intravenous " Line  Duration             Peripheral IV 04/30/24 Left;Proximal;Ventral (anterior) Forearm <1 day                    Physical Exam    Focused right shoulder exam:     The skin is intact without evidence of erythema or ecchymosis.      Palpation demonstrates diffuse tenderness over the SC joint, bilateral clavicle, lateral aspect of the acromion or posterior joint line, AC joint, and supraspinatus.      Shoulder ROM demonstrates 170 degrees of active forward flexion. External rotation with the arms at the side demonstrates 70 degrees of active and 70 degrees of passive motion.  Internal rotation at level of T5.         Strength testing demonstrates 4/5 strength in empty can position, 4/5 with resisted external rotation with the arm at the side.  4+/5 strength of the subscapularis and a negative belly press.  Hornblower sign is negative, external rotation lag sign is negative     Provocative testing for the following demonstrate-  Impingement- positive Hawkin's impingement test  Biceps- negative [place], negative Speeds test.  Labrum- positive Burgoon test, negative sulcus sign     UE NV Exam: +2 Radial pulses bilaterally. Fingers are warm and well-perfused.  Sensation intact to light touch C5-T1 bilaterally, Radial/median/ulnar nerve motor intact        Shoulder Imaging     Radiographs of the right shoulder were obtained on 1/26/2023 and reviewed with the patient.  Based on my independent evaluation, the imaging shows no acute osseous abnormalities.  No fracture or dislocation noted.  Humeral head is well centered on the glenoid.  No significant degenerative changes in the glenohumeral or AC joint.     MRI Arthrogram of the right shoulder was obtained on 1/29/2024 and reviewed with the patient.  Based on my independent evaluation, the imaging shows a full-thickness infraspinatus tear with retraction to the superior humeral head.  There is a full-thickness partial width tear involving the middle to posterior  supraspinatus, which is retracted from the insertion to the superior humeral head. Mild supraspinatus atrophy with moderate infraspinatus atrophy.  Tendinosis but no tearing of the subscapularis.  Mild chondrosis of the glenohumeral joint.       Lab Results: I have personally reviewed pertinent lab results.  Imaging: I have personally reviewed pertinent reports.    EKG, Pathology, and Other Studies: I have personally reviewed pertinent reports.

## 2024-04-30 NOTE — DISCHARGE INSTR - AVS FIRST PAGE
POSTOPERATIVE INSTRUCTIONS - SHOULDER SURGERY    MEDICATIONS:  - Resume all home medications unless otherwise instructed by your surgeon.  - Pain Medication:  Oxycodone (5 mg, 1 tablet every 4 hours as needed) and Tylenol (500 mg, 2 tablets every 8 hours for 4 weeks)  - If you were given a regional anesthetic (nerve block), please begin taking the pain medication as soon as you get home, even if you have minimal or no pain.  DO NOT WAIT FOR THE NERVE BLOCK TO WEAR OFF.  - Possible side effects include nausea, constipation, and urinary retention.  If you experience these side effects, please call our office for assistance.  - Pain medication refills are authorized only during office hours (8am-4pm, Mon-Fri).  - Nausea Medication:  Zofran (4 mg, take one tablet every 8 hours as needed for nausea or vomiting)    WOUND CARE:  - Maintain your operative dressing. Keep the dressings clean and dry.    - It is normal for the shoulder to bleed and swell following surgery. If blood soaks through the bandage, do not become alarmed, reinforce with additional dressing.  - Dressings should remain in place until your first post-operative appointment. Do not remove any of the dressings prior to the appointment.  - DO NOT place shoulder incisions under water (bath, pool) until given approval by our office.  - Please call our office ([phone]) if you experience either of the following:  - Sudden increase in swelling, redness, or warmth at the surgical site  - Excessive incisional drainage that persists beyond the 3rd day after surgery  - Oral temperature greater than 101 degrees, not relieved with Tylenol  - Shortness of breath, chest pain, nausea, or any other concerning symptoms  - Severe distal arm pain or significant swelling of the distal arm and/or hand.    SLING:  - Wear your sling at all times, including sleep.  - With your elbow at your side you may come out of the sling only for hygiene, dressing, and home exercises  including elbow range of motion exercises.    ACTIVITY:   - You are non-weightbearing on the operative arm.  - DO NOT lift, carry, push, or pull anything with your operative arm.  - Place a pillow behind the elbow while lying down.  - Sleeping in a more upright position (recliner) may be more comfortable initially.  - Avoid long periods of sitting or long distance traveling for 2 weeks.  - May return to sedentary work ONLY or school 3-4 days after surgery, if pain is tolerable    DRIVING:  - You are not allowed to drive while on narcotic medications (oxycodone) or while in a sling.    DIET:  - Begin with clear liquids and light foods (jellos, soups, etc.)  - Progress to your normal diet if you are not nauseated    SWELLING CONTROL:  - Icing is very important in the initial post-operative period and should begin immediately after surgery.  - Cold Therapy: apply ice (20 min on, 20 min off) as often as you feel is necessary.  - Care should be taken when icing to avoid frostbite to the skin.    PHYSICAL THERAPY:  - While maintaining your elbow by the side, begin elbow, hand, and wrist range of motion exercises 24 hours after surgery.  - Formal physical therapy (PT) typically begins after you are seen at your 1st post-operative appointment 1 week after surgery. A prescription and protocol will be provided at your 1st post-operative visit.  - You can call your preferred physical therapy office to schedule your first visit if you have not done so already.    FOLLOW-UP APPOINTMENT:  - You have your first post-op appointment scheduled for 5/8/24 at the [facility] with Dr. Cardenas  - If you do not already have a post-operative appointment scheduled, please contact our office at [phone] to schedule.  - Typically the 1st post-operative appointment following surgery is 7-10 days following surgery  - At the first post-operative visit, Dr. Cardenas will do a wound check, go over therapy protocols and answer any questions  you may have.  - If you have any further questions please contact the office.    **EMERGENCIES**  - Contact Dr. Cardenas's office at [phone] if any of the following are present:  - Painful swelling or numbness (note that some swelling and numbness is normal)  - Unrelenting pain  - Fever (over 101° - it is normal to have a low-grade fever or chills for the 1st day or 2 following surgery)  - Redness around incisions  - Color change in the operative extremity  - Continuous drainage or bleeding from incision (a small amount of drainage is expected)  - Difficulty breathing  - Excessive nausea/vomiting  - Calf pain  - If you have an emergency after office hours or on the weekend, proceed to the nearest emergency room.

## 2024-04-30 NOTE — INTERIM OP NOTE
Right shoulder arthroscopy, rotator cuff repair  Postoperative Note  PATIENT NAME: Nina Joseph  : 1980  MRN: [id number]  [facility] OR ROOM 02    Surgery Date: 2024    Preop Diagnosis:  Nontraumatic complete tear of right rotator cuff [M75.121]    Post-Op Diagnosis Codes:     * Nontraumatic complete tear of right rotator cuff [M75.121]    Procedure(s) (LRB):  Right shoulder arthroscopy, rotator cuff repair (Right)    Surgeons and Role:     * Demond Cardenas MD - Primary     * Cj Spain PA-C - Assisting    Specimens:  * No specimens in log *    Estimated Blood Loss:   Minimal    Anesthesia Type:   General w/ Interscalene Block     Findings:   Full thickness tear and retraction of the infraspinatus tendon       Complications:   None      SIGNATURE: Demond Cardenas MD   DATE: 2024   TIME: 1:17 PM

## 2024-04-30 NOTE — PROGRESS NOTES
Post Op Check Note        Subjective:  I saw the patient in the PACU and updated the patient that surgery went well without any complications.  I briefly discussed the intra-operative findings and procedure that was performed.  I also called the patient's designated  to update them on the procedure and that the patient was now in the PACU. The patient's pain was well controlled.       Physical Exam:   Right upper extremity  Dressings clean, dry, intact  Sensation intact to light touch in the axillary, median, ulnar, and radial nerves   Radial/median/ulnar/PIN/AIN nerve motor intact  2+ radial pulse  Fingers are warm and well-perfused.    Bilateral lower extremity  Sensation intact to light touch in the SPN, DPN, sural, saphenous, and tibial nerve distributions  Positive EHL/FHL, ankle DF/PF  2+ DP pulse, capillary refill less than 2 seconds, toes warm and well-perfused        Assessment:  43 y.o. female status post right shoulder arthroscopy, rotator cuff repair     Plan:   Patient was provided with discharge instructions and post-op medications were sent to the designated pharmacy.  The patient has a follow-up appointment scheduled in 1 week.  The patient can reach out to the clinic with any questions or concerns prior to the first postoperative visit.        Demond Cardenas MD

## 2024-04-30 NOTE — ANESTHESIA POSTPROCEDURE EVALUATION
Post-Op Assessment Note    CV Status:  Stable  Pain Score: 0    Pain management: adequate       Mental Status:  Sleepy   Hydration Status:  Euvolemic   PONV Controlled:  Controlled   Airway Patency:  Patent     Post Op Vitals Reviewed: Yes    No anethesia notable event occurred.    Staff: CRNA               BP   120/59   Temp 97.5   Pulse 79   Resp 18   SpO2 99

## 2024-04-30 NOTE — ANESTHESIA PREPROCEDURE EVALUATION
"Procedure:  Right shoulder arthroscopy, rotator cuff repair (Right: Shoulder)    Relevant Problems   ANESTHESIA (within normal limits)      CARDIO   (+) Mixed hyperlipidemia   (-) MI (myocardial infarction) (HCC)      ENDO (within normal limits)      GI/HEPATIC  NPO confirmed  BMI 31.5   (+) Fatty liver      HEMATOLOGY   (+) Iron deficiency anemia due to chronic blood loss      MUSCULOSKELETAL   (+) Cervical spondylosis   (+) Chronic bilateral low back pain without sciatica   (+) Lumbar spondylosis   (+) Myofascial pain syndrome      NEURO/PSYCH   (+) Chronic bilateral low back pain without sciatica   (+) Chronic pain syndrome   (+) Chronic right shoulder pain   (+) Myofascial pain syndrome   (+) PTSD (post-traumatic stress disorder)   (-) CVA (cerebral vascular accident) (HCC)      PULMONARY   (+) SHOLA (obstructive sleep apnea)   (+) Smoking   (-) URI (upper respiratory infection)      Allergies   Allergen Reactions    Clonazepam Other (See Comments)     Excessive sedation  Vomiting    Methylphenidate Other (See Comments)     Hyperactivity\"    Latex Rash     Social History     Tobacco Use    Smoking status: Some Days     Current packs/day: 0.50     Average packs/day: 0.5 packs/day for 28.0 years (14.0 ttl pk-yrs)     Types: Cigarettes    Smokeless tobacco: Never   Vaping Use    Vaping status: Never Used   Substance Use Topics    Alcohol use: Never    Drug use: Never     Current Outpatient Medications   Medication Instructions    busPIRone (BUSPAR) 5 mg tablet     ciclopirox (PENLAC) 8 % solution Topical, Daily at bedtime    Diclofenac Sodium (VOLTAREN) 2 g, Topical, 4 times daily    fluticasone (FLONASE) 50 mcg/act nasal spray 1 spray, Nasal, Daily    ketoconazole (NIZORAL) 2 % cream APPLY  CREAM TOPICALLY ONCE DAILY    LORazepam (ATIVAN) 0.5 mg tablet Take 1-2 about 30 minutes prior to the MRI    sertraline (ZOLOFT) 100 mg, Oral, Daily    topiramate (TOPAMAX) 25 mg, Oral, 2 times daily     Lab Results   Component " Value Date    WBC 9.18 03/29/2024    HGB 15.1 03/29/2024    HCT 44.1 03/29/2024     03/29/2024    SODIUM 138 03/29/2024    K 4.1 03/29/2024     03/29/2024    CO2 26 03/29/2024    BUN 11 03/29/2024    CREATININE 0.56 (L) 03/29/2024    GLUC 79 08/02/2023    AST 34 03/29/2024    ALT 39 03/29/2024    ALKPHOS 91 03/29/2024    TBILI 0.42 03/29/2024    ALB 3.9 03/29/2024    PROTIME 12.8 03/29/2024    PTT 31 03/29/2024    INR 0.97 03/29/2024     Vitals:    04/30/24 0901   BP: 142/76   Pulse: 78   Resp: 18   Temp: 97.5 °F (36.4 °C)   SpO2: 96%     EKG 4/10/24  Normal sinus rhythm  Normal ECG  When compared with ECG of 02-AUG-2023 15:50,  T wave inversion now evident in Inferior leads  Confirmed by Alberto Green ([id number]) on 4/10/2024 2:05:55 PM     Physical Exam    Airway    Mallampati score: II  TM Distance: >3 FB  Neck ROM: full     Dental   Comment: Denies loose/chipped teeth, No notable dental hx     Cardiovascular  Rhythm: regular, Rate: normal, Cardiovascular exam normal    Pulmonary  Pulmonary exam normal Breath sounds clear to auscultation    Other Findings  post-pubertal.      Anesthesia Plan  ASA Score- 2     Anesthesia Type- general with ASA Monitors.         Additional Monitors:     Airway Plan:     Comment: Consent obtained for single shot Right interscalene block for post-op pain management. Discussed risks including bleeding, infection, nerve damage, phrenic nerve blockade, Jatinder's syndrome, local anesthetic systemic toxicity and failure..       Plan Factors-Exercise tolerance (METS): >4 METS.    Chart reviewed. EKG reviewed.  Existing labs reviewed. Patient summary reviewed.    Patient is not a current smoker.              Induction- intravenous.    Postoperative Plan- Plan for postoperative opioid use.     Informed Consent- Anesthetic plan and risks discussed with patient.  I personally reviewed this patient with the CRNA. Discussed and agreed on the Anesthesia Plan with the  CRNA..

## 2024-04-30 NOTE — ANESTHESIA PROCEDURE NOTES
Peripheral Block    Patient location during procedure: holding area  Start time: 4/30/2024 9:38 AM  Reason for block: at surgeon's request and post-op pain management  Staffing  Performed by: Mazin Cruz MD  Authorized by: Mazin Cruz MD    Preanesthetic Checklist  Completed: patient identified, IV checked, site marked, risks and benefits discussed, surgical consent, monitors and equipment checked, pre-op evaluation and timeout performed  Peripheral Block  Patient position: supine  Prep: ChloraPrep  Patient monitoring: frequent blood pressure checks, continuous pulse oximetry and heart rate  Block type: Interscalene  Laterality: right  Injection technique: single-shot  Procedures: ultrasound guided, Ultrasound guidance required for the procedure to increase accuracy and safety of medication placement and decrease risk of complications.  Ultrasound permanent image saved  bupivacaine (PF) (MARCAINE) 0.5 % injection 20 mL - Perineural   10 mL - 4/30/2024 9:38:00 AM  bupivacaine liposomal (EXPAREL) 1.3 % injection 20 mL - Perineural   10 mL - 4/30/2024 9:38:00 AM  Needle  Needle type: Stimuplex   Needle gauge: 20 G  Needle length: 4 in  Needle localization: anatomical landmarks and ultrasound guidance  Assessment  Injection assessment: incremental injection, frequent aspiration, injected with ease, negative aspiration, negative for heart rate change, no paresthesia on injection, no symptoms of intraneural/intravenous injection and needle tip visualized at all times  Paresthesia pain: none  Post-procedure:  site cleaned  patient tolerated the procedure well with no immediate complications

## 2024-05-08 ENCOUNTER — OFFICE VISIT (OUTPATIENT)
Dept: OBGYN CLINIC | Facility: CLINIC | Age: 44
End: 2024-05-08

## 2024-05-08 ENCOUNTER — OFFICE VISIT (OUTPATIENT)
Dept: PODIATRY | Facility: CLINIC | Age: 44
End: 2024-05-08
Payer: COMMERCIAL

## 2024-05-08 VITALS
DIASTOLIC BLOOD PRESSURE: 78 MMHG | BODY MASS INDEX: 31.54 KG/M2 | HEART RATE: 68 BPM | HEIGHT: 63 IN | SYSTOLIC BLOOD PRESSURE: 115 MMHG | WEIGHT: 178 LBS

## 2024-05-08 VITALS
WEIGHT: 178 LBS | SYSTOLIC BLOOD PRESSURE: 127 MMHG | HEART RATE: 72 BPM | DIASTOLIC BLOOD PRESSURE: 85 MMHG | BODY MASS INDEX: 31.53 KG/M2

## 2024-05-08 DIAGNOSIS — B35.3 TINEA PEDIS OF BOTH FEET: ICD-10-CM

## 2024-05-08 DIAGNOSIS — B35.1 ONYCHOMYCOSIS: Primary | ICD-10-CM

## 2024-05-08 DIAGNOSIS — Z98.890 S/P RIGHT ROTATOR CUFF REPAIR: Primary | ICD-10-CM

## 2024-05-08 PROCEDURE — 99024 POSTOP FOLLOW-UP VISIT: CPT | Performed by: STUDENT IN AN ORGANIZED HEALTH CARE EDUCATION/TRAINING PROGRAM

## 2024-05-08 PROCEDURE — 99213 OFFICE O/P EST LOW 20 MIN: CPT | Performed by: PODIATRIST

## 2024-05-08 RX ORDER — KETOCONAZOLE 20 MG/G
CREAM TOPICAL DAILY
Qty: 60 G | Refills: 2 | Status: SHIPPED | OUTPATIENT
Start: 2024-05-08

## 2024-05-08 RX ORDER — HYDROXYZINE HYDROCHLORIDE 10 MG/1
TABLET, FILM COATED ORAL
COMMUNITY
Start: 2024-04-24

## 2024-05-08 NOTE — PROGRESS NOTES
Assessment/Plan:    No problem-specific Assessment & Plan notes found for this encounter.       Diagnoses and all orders for this visit:    Onychomycosis    Tinea pedis of both feet    Other orders  -     hydrOXYzine HCL (ATARAX) 10 mg tablet; TAKE 1/2 (ONE-HALF) TABLET BY MOUTH THREE TIMES DAILY AS NEEDED      -No q findings for at risk footcare, I discussed that she needs to go to a pedicure location for her nail care  - Otherwise this would not be covered  - Rx given for repeat refill on ketoconazole  - I did discuss diligence with the use of nail treatment and athlete's foot treatment  - Return as needed for continued care  - hepatic panel reviewed and is abnormal, would defer oral tx at this time.     Subjective:      Patient ID: Nina Joseph is a 43 y.o. female.    Patient transfer evaluation management of bilateral feet, she has not been very diligent in overall applying antifungals and notes no overall change.  She recently had rotator cuff surgery and cannot bend down and cut her toenails herself.  She has no other at risk findings however.        The following portions of the patient's history were reviewed and updated as appropriate: allergies, current medications, past family history, past medical history, past social history, past surgical history, and problem list.    Review of Systems   Constitutional:  Negative for chills and fever.   HENT:  Negative for ear pain and sore throat.    Eyes:  Negative for pain and visual disturbance.   Respiratory:  Negative for cough and shortness of breath.    Cardiovascular:  Negative for chest pain and palpitations.   Gastrointestinal:  Negative for abdominal pain and vomiting.   Genitourinary:  Negative for dysuria and hematuria.   Musculoskeletal:  Negative for arthralgias and back pain.   Skin:  Negative for color change and rash.   Neurological:  Negative for seizures and syncope.   All other systems reviewed and are negative.        Objective:      /85    Pulse 72   Wt 80.7 kg (178 lb)   LMP 12/25/2020   BMI 31.53 kg/m²          Physical Exam  Musculoskeletal:      Comments: Nails 1 to on the right and 1 on the left are dystrophic and fungal lesser digits are slightly fungal in nature.  Pulses are within normal limits, range of motion is within normal limits, no acute findings

## 2024-05-08 NOTE — PROGRESS NOTES
Shoulder Post Operative Visit     Assesment:   43 y.o. female s/p surgical arthroscopy of the right shoulder with rotator cuff repair, DOS: 4/30/2024    Plan:  Patient is doing well 1 week status post the above procedure.  She states that she is having minimal pain and is only taking Tylenol for pain control.  She has been compliant with wearing the sling at all times and remaining nonweightbearing on the right upper extremity.  Incisions appear to be healing well with no signs of infection.  Nylon's will remain in for another week.  I did review the arthroscopy images from that procedure I discussed both the findings and the procedure performed.  All obstructions.  Patient has no numbness or tingling in the right upper extremity at this point.  I discussed with the patient that I will see her back in 1 week for repeat evaluation.  I discussed that at that point we could remove the sutures and will provide her with a prescription for physical therapy.  I discussed that she should remain nonweightbearing in the sling at all times.  I did discuss that she could start showering at this time but no scrubbing or soaking of the incisions.  I also discussed with the patient the importance of smoking cessation to increase her chances of a successful outcome from her surgery.  Patient demonstrated understanding discussion was agreed with the plan.  All of her questions were answered.  She will reach out to clinic with any question concerns at any time.  I will see her back in clinic 1 week for repeat evaluation.      Post-Operative treatment:  Ice to shoulder 1-2 times daily, for 20 minutes at a time.  Remain in the sling at all times.   Patient was instructed on smoking cessation.     Imaging:  All imaging from today was reviewed by myself and explained to the patient.     Sling:  at all times other than showering    DVT Prophylaxis:  Ambulation    Follow up:   1 week    Patient was advised that if they have any fevers,  "chills, chest pain, shortness of breath, redness or drainage from the incision, please let our office know immediately.        Chief Complaint   Patient presents with    Right Shoulder - Post-op, Pain       History of Present Illness:    The patient is a 43 y.o. female who is being evaluated post operatively 1 week status post rotator repair of her right shoulder.     The patient states that her pain is well controlled.  She states that her pain score is rated at 1/10 at its worst. The patient is using ice to control swelling. She states that she occasionally uses Tylenol for discomfort. She states that she only used two doses of the narcotic pain medications. She states that she has been sleeping in a recliner for comfort. The patient states that her smoking is limited.    She has not started physical therapy. Patient has continued to wear the sling and remain nonweightbearing on the right upper extremity.      The patient is ambulating for DVT ppx.    The patient denies any fevers, chills, calf pain, chest pain/shortness of breath, redness or drainage from the incision.       I have reviewed the past medical, surgical, social and family history, medications and allergies as documented in the EMR.      Review of systems: ROS is negative other than that noted in the HPI.  Constitutional: Negative for fatigue and fever.       Physical Exam:    Blood pressure 115/78, pulse 68, height 5' 3\" (1.6 m), weight 80.7 kg (178 lb), last menstrual period 12/25/2020.    General/Constitutional: NAD, well developed, well nourished  HENT: Normocephalic, atraumatic  CV: Intact distal pulses, regular rate  Resp: No respiratory distress or labored breathing  GI: Soft and non-tender   Lymphatic: No lymphadenopathy palpated  Neuro: Alert and Oriented x 3, no focal deficits  Psych: Normal mood, normal affect, normal judgement, normal behavior  Skin: Warm, dry, no rashes, no erythema    right Shoulder focused exam:    Incisions show no " erythema, no drainage, no dehiscence.  Nylon sutures in place  No significant tenderness.   ROM: deferred due to post-operative period  Rotator cuff strength: deferred due to post-operative period    UE NV Exam:   +2 Radial pulses   Sensation intact to light touch C5-T1 bilaterally, SILT median/ulnar/radial/axillary distributions  Radial/median/ulnar/PIN/AIN nerve motor intact      Scribe Attestation      I,:  Becca Malone am acting as a scribe while in the presence of the attending physician.:       I,:  Demond Cardenas MD personally performed the services described in this documentation    as scribed in my presence.:

## 2024-05-15 ENCOUNTER — OFFICE VISIT (OUTPATIENT)
Dept: OBGYN CLINIC | Facility: CLINIC | Age: 44
End: 2024-05-15

## 2024-05-15 VITALS
HEIGHT: 63 IN | DIASTOLIC BLOOD PRESSURE: 80 MMHG | HEART RATE: 76 BPM | BODY MASS INDEX: 31.89 KG/M2 | WEIGHT: 180 LBS | SYSTOLIC BLOOD PRESSURE: 111 MMHG

## 2024-05-15 DIAGNOSIS — Z98.890 S/P RIGHT ROTATOR CUFF REPAIR: Primary | ICD-10-CM

## 2024-05-15 PROCEDURE — 99024 POSTOP FOLLOW-UP VISIT: CPT | Performed by: STUDENT IN AN ORGANIZED HEALTH CARE EDUCATION/TRAINING PROGRAM

## 2024-05-15 NOTE — PROGRESS NOTES
Shoulder Post Operative Visit     Assesment:   43 y.o. female s/p surgical arthroscopy of the right shoulder with rotator cuff repair, DOS: 4/30/2024    Plan:  Patient is doing well 2 week status post the above procedure.  She states that she is having no pain and is only taking Tylenol for pain control.  She has been compliant with the sling.  Her incisions have healed well with no signs of infection.  Nylon's were removed today and replaced with Steri-Strips.  She was instructed that she can shower but not scrub or soak the incisions.  Provided the patient with a prescription for physical therapy and instructed her to start this week.  I discussed the physical therapy will show her stretching exercises that she can do at home but that that will mostly be passive range of motion using the other arm as opposed to actively firing her operative arm.  She denies any numbness or tingling in the right upper extremity.  I discussed with the patient can follow-up in 4 weeks for repeat evaluation.  Patient demonstrated understanding discussion was in agreement the plan.  All of her questions were answered.  She can reach out to clinic with any question concerns anytime.    Post-Operative treatment:  Ice to shoulder 1-2 times daily, for 20 minutes at a time.  Remain in the sling at all times.   Patient was instructed on smoking cessation.   Gentle range of motion of elbow, wrist, and hand.   No active range of motion of her shoulder. She was instructed on passive range of motion exercises in office.  Prescription provided for physical therapy, with rotator cuff repair protocol.     Imaging:  All imaging from today was reviewed by myself and explained to the patient.     Sling:  at all times other than showering    DVT Prophylaxis:  Ambulation    Follow up:   4 weeks    Patient was advised that if they have any fevers, chills, chest pain, shortness of breath, redness or drainage from the incision, please let our office know  "immediately.        Chief Complaint   Patient presents with    Right Shoulder - Post-op       History of Present Illness:    The patient is a 43 y.o. female who is being evaluated post operatively 2 weeks status post rotator repair of her right shoulder.     The patient states that her pain is well controlled.  She states that her pain score is rated at 0/10. The patient is using ice to control swelling. She states that she occasionally uses Tylenol for discomfort. The patient states that she has continued use of the sling. She reports some small active motion of her shoulder to pull her pants up, etc. She states that she is still sleeping in the recliner.     She has not started physical therapy. Patient has continued to wear the sling and remain nonweightbearing on the right upper extremity.      The patient is ambulating for DVT ppx.    The patient denies any fevers, chills, calf pain, chest pain/shortness of breath, redness or drainage from the incision.       I have reviewed the past medical, surgical, social and family history, medications and allergies as documented in the EMR.      Review of systems: ROS is negative other than that noted in the HPI.  Constitutional: Negative for fatigue and fever.       Physical Exam:    Blood pressure 111/80, pulse 76, height 5' 3\" (1.6 m), weight 81.6 kg (180 lb), last menstrual period 12/25/2020.    General/Constitutional: NAD, well developed, well nourished  HENT: Normocephalic, atraumatic  CV: Intact distal pulses, regular rate  Resp: No respiratory distress or labored breathing  GI: Soft and non-tender   Lymphatic: No lymphadenopathy palpated  Neuro: Alert and Oriented x 3, no focal deficits  Psych: Normal mood, normal affect, normal judgement, normal behavior  Skin: Warm, dry, no rashes, no erythema    right Shoulder focused exam:    Incisions show no erythema, no drainage, no dehiscence.  Nylon sutures removed at time of visit. Steri-strips applied.   No significant " tenderness.   ROM: deferred due to post-operative period  Rotator cuff strength: deferred due to post-operative period    UE NV Exam:   +2 Radial pulses   Sensation intact to light touch C5-T1 bilaterally, SILT median/ulnar/radial/axillary distributions  Radial/median/ulnar/PIN/AIN nerve motor intact      Scribe Attestation      I,:  Becca Malone am acting as a scribe while in the presence of the attending physician.:       I,:  Demond Cardenas MD personally performed the services described in this documentation    as scribed in my presence.:

## 2024-05-20 ENCOUNTER — EVALUATION (OUTPATIENT)
Dept: PHYSICAL THERAPY | Facility: CLINIC | Age: 44
End: 2024-05-20
Payer: COMMERCIAL

## 2024-05-20 DIAGNOSIS — Z98.890 S/P RIGHT ROTATOR CUFF REPAIR: Primary | ICD-10-CM

## 2024-05-20 PROCEDURE — 97140 MANUAL THERAPY 1/> REGIONS: CPT

## 2024-05-20 PROCEDURE — 97110 THERAPEUTIC EXERCISES: CPT

## 2024-05-20 PROCEDURE — 97161 PT EVAL LOW COMPLEX 20 MIN: CPT

## 2024-05-20 NOTE — PROGRESS NOTES
PT Evaluation     Today's date: 2024  Patient name: Nina Joseph  : 1980  MRN: [id number]  Referring provider: Demond Cardenas MD  Dx:   Encounter Diagnosis     ICD-10-CM    1. S/P right rotator cuff repair  Z98.890 Ambulatory Referral to Physical Therapy          Start Time: 1300  Stop Time: 1355  Total time in clinic (min): 55 minutes    Assessment  Impairments: abnormal or restricted ROM, abnormal movement, activity intolerance, impaired physical strength, lacks appropriate home exercise program, pain with function, weight-bearing intolerance and poor posture   Symptom irritability: low    Assessment details: The patient is a 43 y.o. female presenting to Physical Therapy today s/p arthroscopy of the R shoulder with RTC repair performed on 24. Upon completion of the initial PT evaluation the patient is demonstrating with limitations in R shoulder ROM, R shoulder strength, scapulothoracic control, R elbow mobility, R elbow strength, and pain. Patient is currently having difficulty with the performance of all functional activities with the RUE due to symptomatology. Patient would benefit from a course of skilled Physical Therapy services to address functional limitations to return to prior level of function. Thank you for your referral.   Understanding of Dx/Px/POC: good     Prognosis: good    Goals  STG: (6 weeks)  1.) Patient will initiate HEP Independently   2.) Patient will have a 50% reduction in overall symptoms   3.) Patient will demonstrate improved strength evidenced by a 1-2 MMT grade increase  4.) Patient will demonstrate improved R shoulder PROM evidenced by: R shoulder Flexion >/= 160 deg, R ER >/= 40 deg in the scapular plane  5.) Patient will initiate R shoulder AROM  6.) Patient will discontinue utilization of sling    LTG: (12 weeks)   1.) Patient will be d/c to an HEP independently  2.) Patient will improve functional abilities evidenced by FOTO scores  3.) Eliminate pain with  functional activity   4.) Patient will demonstrate improved ability to lift, push, pull, and carry safely w/o symptoms   5.) Return to PLOF   6.) Patient will demonstrate improved ability to perform overhead activity >/= 2 minutes   7.) Patient will demonstrate improved R shoulder AROM evidenced by: R Shoulder Flexion/Abduction >/= 160 deg, R ER at 90 deg WNL, R IR at 90 deg WNL    Plan  Patient would benefit from: PT eval and skilled physical therapy  Referral necessary: No  Planned modality interventions: cryotherapy, thermotherapy: hydrocollator packs and TENS    Planned therapy interventions: functional ROM exercises, graded activity, graded exercise, graded motor, home exercise program, flexibility, joint mobilization, manual therapy, neuromuscular re-education, patient education, postural training, self care, strengthening, stretching, therapeutic activities, therapeutic exercise and therapeutic training    Frequency: 2x week  Duration in weeks: 8  Plan of Care beginning date: 5/20/2024  Plan of Care expiration date: 7/15/2024  Treatment plan discussed with: patient  Plan details: Plan of care was reviewed and discussed thoroughly with the patient on their current condition. Patient was instructed on a HEP with written instructions. Patient is in agreement with PT recommendations and will attend Physical Therapy 2x/week for the next 8 weeks to address current deficits.        Subjective Evaluation    History of Present Illness  Date of surgery: 4/30/2024  Mechanism of injury: surgery  Mechanism of injury: The patient reports today s/p R shoulder RTC arthroscopy performed on 4-30-24 by Dr. Cardenas. Patient states that her pain levels have been manageable. Patient utilizes ice for swelling and Tylenol PRN for symptoms in the R shoulder. Patient has been compliant with wearing her sling. She followed up with Dr. Cardenas on 5/15/24 and had incision sites with Nylons replaced with Steri-Strips. She is now referred  to OPPT.          Recurrent probem    Quality of life: good    Patient Goals  Patient goals for therapy: decreased edema, decreased pain, increased motion, increased strength, independence with ADLs/IADLs and return to sport/leisure activities    Pain  Current pain ratin  At best pain ratin  At worst pain ratin  Location: R Shoulder, R elbow  Quality: dull ache and burning  Relieving factors: medications and ice  Aggravating factors: overhead activity and lifting (carrying, pushing, pulling, ADL's.)    Social Support    Employment status: not working  Hand dominance: right    Treatments  Previous treatment: physical therapy  Current treatment: physical therapy      Objective     Postural Observations  Seated posture: poor  Standing posture: poor      Observations     Right Shoulder  Positive for incision. Negative for drainage.     Additional Observation Details  Patient is wearing sling appropriately. Incisions are healing well. No redness. No warmth. No drainage.    Palpation     Right   Tenderness of the biceps and supraspinatus.     Tenderness     Right Shoulder  Tenderness in the biceps tendon (proximal), bicipital groove, scapular spine and supraspinatus tendon.     Neurological Testing     Sensation     Shoulder   Left Shoulder   Intact: light touch    Right Shoulder   Intact: Light touch    Additional Neurological Details  Patient denies any numbness or tingling into BUE's.    Active Range of Motion   Cervical/Thoracic Spine       Cervical    Flexion:  Restriction level: minimal  Extension:  Restriction level: minimal  Left lateral flexion:  Restriction level: moderate  Right lateral flexion:  Restriction level moderate  Left rotation:  Restriction level: moderate  Right rotation:  Restriction level: moderate  Left Shoulder   Normal active range of motion    Right Elbow   Flexion: WFL  Extension: -15 degrees     Additional Active Range of Motion Details  PT will assess R shoulder AROM when  appropriate.    Passive Range of Motion     Additional Passive Range of Motion Details  R Shoulder PROM Scaption: 140 deg     Scapular Mobility     Right Shoulder   Scapular Mobility with Shoulder to 90° FF   Upward rotation: inadequate  Downward rotation: inadequate    Scapular Mobility beyond 90° FF   Upward rotation: inadequate  Downward rotation: inadequate    Strength/Myotome Testing     Left Shoulder     Planes of Motion   Flexion: WFL   Abduction: WFL   External rotation at 0°: WFL   Internal rotation at 0°: WFL     Additional Strength Details  PT will assess R shoulder strength when appropriate.    Tests     Additional Tests Details  No special tests were performed.             Precautions: CPS, s/p R RTC repair (4/30)    *No R Shoulder AROM for 6 weeks*       5/20             Manuals             R Shoulder PROM  BM scapular plane to 140 deg, ER to tolerance w/ arm at side            R Elbow PROM  BM             Scapular Patterns all planes                           Neuro Re-Ed                                                                                                        Ther Ex             R Shoulder Pendulums CW/CCW w/ table support  X20             R Shoulder Pendulums Flx/Ext w/ table support  X20             R Shoulder Pendulums Horizontal w/ table support  x20            Elbow Ext/Flex AROM X10             Elbow Pronation/Supination AROM  x10             Putty Squeeze x10            Wrist Flexion/Extension AROM             Gentle UT Stretch              Gentle LS Stretch              Ther Activity                                       Gait Training                                       Modalities             Ice 5'

## 2024-05-23 ENCOUNTER — OFFICE VISIT (OUTPATIENT)
Dept: PHYSICAL THERAPY | Facility: CLINIC | Age: 44
End: 2024-05-23
Payer: COMMERCIAL

## 2024-05-23 DIAGNOSIS — Z98.890 S/P RIGHT ROTATOR CUFF REPAIR: Primary | ICD-10-CM

## 2024-05-23 PROCEDURE — 97140 MANUAL THERAPY 1/> REGIONS: CPT

## 2024-05-23 PROCEDURE — 97110 THERAPEUTIC EXERCISES: CPT

## 2024-05-23 NOTE — PROGRESS NOTES
Daily Note     Today's date: 2024  Patient name: Nina Joseph  : 1980  MRN: [id number]  Referring provider: eDmond Cardenas MD  Dx:   Encounter Diagnosis     ICD-10-CM    1. S/P right rotator cuff repair  Z98.890           Start Time: 1030  Stop Time: 1120  Total time in clinic (min): 50 minutes    Subjective: Patient reports pain levels continue to be manageable. She notes performance of home exercise program is going well.      Objective: See treatment diary below      Assessment: Tolerated treatment well with a minimal increase in symptomatology in the R shoulder. Patient demonstrated good tolerance to manual therapy with slight discomfort at 140 deg of scaption PROM. Patient exhibited good technique with therapeutic exercises and would benefit from continued PT at this time.       Plan: Continue per plan of care.      Precautions: CPS, s/p R RTC repair ()    *No R Shoulder AROM for 6 weeks*               Manuals          R Shoulder PROM  BM scapular plane to 140 deg, ER to tolerance w/ arm at side BM scapular plane to 140 deg, ER to tolerance w/ arm at side        R Elbow PROM  BM  BM         Scapular Patterns all planes                     Neuro Re-Ed                                                                                Ther Ex          R Shoulder Pendulums CW/CCW w/ table support  X20  X20         R Shoulder Pendulums Flx/Ext w/ table support  X20  X20         R Shoulder Pendulums Horizontal w/ table support  x20 X20         Elbow Ext/Flex AROM X10  2x10         Elbow Pronation/Supination AROM  x10  2x10         Putty Squeeze x10 Ball 2x10         Wrist Flexion/Extension AROM  2x10         Gentle UT Stretch           Gentle LS Stretch             Ther Activity                              Gait Training                              Modalities          Ice '  10'

## 2024-05-28 ENCOUNTER — OFFICE VISIT (OUTPATIENT)
Dept: PHYSICAL THERAPY | Facility: CLINIC | Age: 44
End: 2024-05-28
Payer: COMMERCIAL

## 2024-05-28 DIAGNOSIS — Z98.890 S/P RIGHT ROTATOR CUFF REPAIR: Primary | ICD-10-CM

## 2024-05-28 PROCEDURE — 97110 THERAPEUTIC EXERCISES: CPT

## 2024-05-28 PROCEDURE — 97140 MANUAL THERAPY 1/> REGIONS: CPT

## 2024-05-28 NOTE — PROGRESS NOTES
Daily Note     Today's date: 2024  Patient name: Nina Joseph  : 1980  MRN: [id number]  Referring provider: Demond Cardenas MD  Dx:   Encounter Diagnosis     ICD-10-CM    1. S/P right rotator cuff repair  Z98.890                      Subjective: Patient reports pain levels continue to be manageable. She notes performance of home exercise program is going well.      Objective: See treatment diary below      Assessment: Tolerated treatment well. Patient exhibited good technique with therapeutic exercises and would benefit from continued PT to address symptoms as well as functional limitations within MD protocol to meet functional goals.      Plan: Continue per plan of care.      Precautions: CPS, s/p R RTC repair ()    *No R Shoulder AROM for 6 weeks*              Manuals          R Shoulder PROM  BM scapular plane to 140 deg, ER to tolerance w/ arm at side BM scapular plane to 140 deg, ER to tolerance w/ arm at side BM scapular plane to 140 deg, ER to tolerance w/ arm at side       R Elbow PROM  BM  BM  BM       Scapular Patterns all planes                     Neuro Re-Ed                                                                                Ther Ex          R Shoulder Pendulums CW/CCW w/ table support  X20  X20  x20       R Shoulder Pendulums Flx/Ext w/ table support  X20  X20  x20       R Shoulder Pendulums Horizontal w/ table support  x20 X20  x20       Elbow Ext/Flex AROM X10  2x10  2x10       Elbow Pronation/Supination AROM  x10  2x10  2x10       Putty Squeeze x10 Ball 2x10  Ball 2x10        Wrist Flexion/Extension AROM  2x10  2x10        Gentle UT Stretch           Gentle LS Stretch             Ther Activity                              Gait Training                              Modalities          Ice 5'  10' 5'

## 2024-05-30 ENCOUNTER — OFFICE VISIT (OUTPATIENT)
Dept: PHYSICAL THERAPY | Facility: CLINIC | Age: 44
End: 2024-05-30
Payer: COMMERCIAL

## 2024-05-30 DIAGNOSIS — Z98.890 S/P RIGHT ROTATOR CUFF REPAIR: Primary | ICD-10-CM

## 2024-05-30 PROCEDURE — 97140 MANUAL THERAPY 1/> REGIONS: CPT

## 2024-05-30 PROCEDURE — 97110 THERAPEUTIC EXERCISES: CPT

## 2024-05-30 NOTE — PROGRESS NOTES
Daily Note     Today's date: 2024  Patient name: Nina Joseph  : 1980  MRN: [id number]  Referring provider: Demond Cardenas MD  Dx:   Encounter Diagnosis     ICD-10-CM    1. S/P right rotator cuff repair  Z98.890           Start Time: 1657  Stop Time: 1740  Total time in clinic (min): 43 minutes    Subjective: Patient reports no new complaints with the R shoulder this afternoon.       Objective: See treatment diary below      Assessment: Tolerated treatment well with a minimal increase in symptomatology in the R shoulder. Patient exhibited good technique with therapeutic exercises and would benefit from continued PT.      Plan: Continue per plan of care.      Precautions: CPS, s/p R RTC repair ()    *No R Shoulder AROM for 6 weeks*              Manuals          R Shoulder PROM  BM scapular plane to 140 deg, ER to tolerance w/ arm at side BM scapular plane to 140 deg, ER to tolerance w/ arm at side BM scapular plane to 140 deg, ER to tolerance w/ arm at side BM scapular plane to 140 deg, ER to tolerance w/ arm at side      R Elbow PROM  BM  BM  BM BM      Scapular Patterns all planes                     Neuro Re-Ed                                                                                Ther Ex          R Shoulder Pendulums CW/CCW w/ table support  X20  X20  x20 X20       R Shoulder Pendulums Flx/Ext w/ table support  X20  X20  x20 X20       R Shoulder Pendulums Horizontal w/ table support  x20 X20  x20 X20       Elbow Ext/Flex AROM X10  2x10  2x10 2x10       Elbow Pronation/Supination AROM  x10  2x10  2x10 2x10       Putty Squeeze x10 Ball 2x10  Ball 2x10  Ball 2x10       Wrist Flexion/Extension AROM  2x10  2x10  2x10       Gentle UT Stretch           Gentle LS Stretch             Ther Activity                              Gait Training                              Modalities          Ice 5'  10' 5'  8'

## 2024-06-03 ENCOUNTER — OFFICE VISIT (OUTPATIENT)
Dept: PHYSICAL THERAPY | Facility: CLINIC | Age: 44
End: 2024-06-03
Payer: COMMERCIAL

## 2024-06-03 DIAGNOSIS — Z98.890 S/P RIGHT ROTATOR CUFF REPAIR: Primary | ICD-10-CM

## 2024-06-03 PROCEDURE — 97110 THERAPEUTIC EXERCISES: CPT

## 2024-06-03 PROCEDURE — 97140 MANUAL THERAPY 1/> REGIONS: CPT

## 2024-06-03 NOTE — PROGRESS NOTES
Daily Note     Today's date: 6/3/2024  Patient name: Nina Joseph  : 1980  MRN: [id number]  Referring provider: Demond Cardenas MD  Dx:   Encounter Diagnosis     ICD-10-CM    1. S/P right rotator cuff repair  Z98.890           Start Time: 1100  Stop Time: 1150  Total time in clinic (min): 50 minutes    Subjective: Patient reports no new complaints in the R shoulder this morning. She notes pain levels continue to be manageable.       Objective: See treatment diary below      Assessment: Tolerated treatment well. We progressed the current program with the addition of gentle b/l cervical spine stretching today. Patient demonstrated good tolerance to progressions. Patient exhibited good technique with therapeutic exercises and would benefit from continued PT at this time.       Plan: Continue per plan of care.      Precautions: CPS, s/p R RTC repair ()    *No R Shoulder AROM for 6 weeks*       5/20  5/23 5/28 5/30  6/3    Manuals         R Shoulder PROM  BM scapular plane to 140 deg, ER to tolerance w/ arm at side BM scapular plane to 140 deg, ER to tolerance w/ arm at side BM scapular plane to 140 deg, ER to tolerance w/ arm at side BM scapular plane to 140 deg, ER to tolerance w/ arm at side BM scapular plane to 140 deg, ER to tolerance w/ arm at side    R Elbow PROM  BM  BM  BM BM BM     Scapular Patterns all planes                   Neuro Re-Ed                                                                        Ther Ex         R Shoulder Pendulums CW/CCW w/ table support  X20  X20  x20 X20  X40 ea    R Shoulder Pendulums Flx/Ext w/ table support  X20  X20  x20 X20  X20 ea    R Shoulder Pendulums Horizontal w/ table support  x20 X20  x20 X20  X20 ea    Elbow Ext/Flex AROM X10  2x10  2x10 2x10  2x10    Elbow Pronation/Supination AROM  x10  2x10  2x10 2x10  2x15    Putty Squeeze x10 Ball 2x10  Ball 2x10  Ball 2x10  Ball 2x10    Wrist Flexion/Extension AROM  2x10  2x10  2x10  2x15     Gentle UT Stretch      " 20\" Hold x2 ea    Gentle LS Stretch      20\" Hold x2 ea      Ther Activity                           Gait Training                           Modalities         Ice 5'  10' 5'  8' 10'                         "

## 2024-06-05 ENCOUNTER — OFFICE VISIT (OUTPATIENT)
Dept: OBGYN CLINIC | Facility: CLINIC | Age: 44
End: 2024-06-05

## 2024-06-05 VITALS
WEIGHT: 178 LBS | BODY MASS INDEX: 31.54 KG/M2 | HEART RATE: 80 BPM | DIASTOLIC BLOOD PRESSURE: 85 MMHG | HEIGHT: 63 IN | SYSTOLIC BLOOD PRESSURE: 132 MMHG

## 2024-06-05 DIAGNOSIS — M25.511 CHRONIC RIGHT SHOULDER PAIN: ICD-10-CM

## 2024-06-05 DIAGNOSIS — G89.29 CHRONIC RIGHT SHOULDER PAIN: ICD-10-CM

## 2024-06-05 PROCEDURE — 99024 POSTOP FOLLOW-UP VISIT: CPT | Performed by: STUDENT IN AN ORGANIZED HEALTH CARE EDUCATION/TRAINING PROGRAM

## 2024-06-05 RX ORDER — MELOXICAM 15 MG/1
15 TABLET ORAL DAILY
Qty: 28 TABLET | Refills: 0 | Status: SHIPPED | OUTPATIENT
Start: 2024-06-05

## 2024-06-05 RX ORDER — OXYCODONE HYDROCHLORIDE 5 MG/1
5 TABLET ORAL EVERY 4 HOURS PRN
Qty: 10 TABLET | Refills: 0 | Status: SHIPPED | OUTPATIENT
Start: 2024-06-05

## 2024-06-05 NOTE — PROGRESS NOTES
Shoulder Post Operative Visit     Assesment:   43 y.o. female s/p surgical arthroscopy of the right shoulder with rotator cuff repair, DOS: 4/30/2024    Plan:  Patient was doing well 5 weeks status post the above procedure until 2 days ago when she had a PTSD induced nightmares and woke up with significant pain in the right shoulder.  Prior to the incident, she had no pain and was not taking any pain medications.  She had been working with physical therapy and was compliant with the sling.  She is concerned that she may have injured the shoulder.  She does state the pain has improved slightly over the past 2 days since the onset.  I discussed that given that it is only been 2 days since the onset of symptoms I would recommend taking the Tylenol and oxycodone that she has to help with pain control and going to therapy next week to see where she is relative to prior to the incident.  She already has a follow-up appointment for next week.  I discussed that if her symptoms remain significant and she shows significant drop-off in her physical therapy that we would consider getting an MRI to evaluate for possible retear of her rotator cuff repair.  Patient demonstrated understanding of the discussion and was in agreement the plan.  All of her questions were answered.  I will see her back in 1 week for repeat evaluation.  She should continue with physical therapy and take pain medications as needed.  She can reach out to the clinic with any question concerns anytime.    Post-Operative treatment:  Ice to shoulder 1-2 times daily, for 20 minutes at a time.  Remain in the sling at all times.   Patient was instructed on smoking cessation.   Continue with physical therapy - rotator cuff repair protocol.   Tylenol and oxycodone as needed for pain control    Imaging:  All imaging from today was reviewed by myself and explained to the patient.     Sling:  at all times other than showering    DVT Prophylaxis:  Ambulation    Follow  "up:  1 week    Patient was advised that if they have any fevers, chills, chest pain, shortness of breath, redness or drainage from the incision, please let our office know immediately.        Chief Complaint   Patient presents with    Right Shoulder - Follow-up, Post-op     Increased pain x2d.  Shooting pain in down arm       History of Present Illness:    The patient is a 43 y.o. female who is being evaluated post operatively 5 weeks status post rotator repair of her right shoulder.     The patient is following up in clinic for concern of an injury to the right shoulder 5 weeks status post the above procedure.  Patient states that she was doing well with no pain and was progressing with physical therapy until 2 days ago when she had a PTSD nightmare causing her to wake up in the middle of the night with significant pain in her right shoulder.  She is concerned that she may have done something to injure the shoulder.  She states that she has pain around the posterior scapula, neck, and radiating down the lateral aspect of the shoulder towards the elbow.  She denies any numbness or tingling in the distal right upper extremity.  Patient is still in the sling at this point.  She decided to postpone therapy until she was seen in the office.  She states that the pain is an 8 out of 10 whereas previously it was 0 out of 10 prior to the incident.    The patient is ambulating for DVT ppx.    The patient denies any fevers, chills, calf pain, chest pain/shortness of breath, redness or drainage from the incision.       I have reviewed the past medical, surgical, social and family history, medications and allergies as documented in the EMR.      Review of systems: ROS is negative other than that noted in the HPI.  Constitutional: Negative for fatigue and fever.       Physical Exam:    Blood pressure 132/85, pulse 80, height 5' 3\" (1.6 m), weight 80.7 kg (178 lb), last menstrual period 12/25/2020.    General/Constitutional: NAD, " well developed, well nourished  HENT: Normocephalic, atraumatic  CV: Intact distal pulses, regular rate  Resp: No respiratory distress or labored breathing  GI: Soft and non-tender   Lymphatic: No lymphadenopathy palpated  Neuro: Alert and Oriented x 3, no focal deficits  Psych: Normal mood, normal affect, normal judgement, normal behavior  Skin: Warm, dry, no rashes, no erythema    right Shoulder focused exam:    Incisions healing well with no erythema, no drainage, no dehiscence.    Tenderness over the posterior shoulder.  ROM: deferred due to post-operative period  Rotator cuff strength: deferred due to post-operative period    UE NV Exam:   +2 Radial pulses   Sensation intact to light touch C5-T1 bilaterally, SILT median/ulnar/radial/axillary distributions  Radial/median/ulnar/PIN/AIN nerve motor intact      Scribe Attestation      I,:   am acting as a scribe while in the presence of the attending physician.:       I,:   personally performed the services described in this documentation    as scribed in my presence.:

## 2024-06-06 ENCOUNTER — OFFICE VISIT (OUTPATIENT)
Dept: PHYSICAL THERAPY | Facility: CLINIC | Age: 44
End: 2024-06-06
Payer: COMMERCIAL

## 2024-06-06 DIAGNOSIS — Z98.890 S/P RIGHT ROTATOR CUFF REPAIR: Primary | ICD-10-CM

## 2024-06-06 PROCEDURE — 97140 MANUAL THERAPY 1/> REGIONS: CPT

## 2024-06-06 PROCEDURE — 97110 THERAPEUTIC EXERCISES: CPT

## 2024-06-06 NOTE — PROGRESS NOTES
Daily Note     Today's date: 2024  Patient name: Nina Joseph  : 1980  MRN: [id number]  Referring provider: Demond Cardenas MD  Dx:   Encounter Diagnosis     ICD-10-CM    1. S/P right rotator cuff repair  Z98.890           Start Time: 1100  Stop Time: 1145  Total time in clinic (min): 45 minutes    Subjective: Patient reports increased R shoulder discomfort yesterday when she woke up. She notes having a nightmare in her sleep and moved her R shoulder the wrong way. Patient states following up with Dr. Cardenas yesterday.       Objective: See treatment diary below      Assessment: Tolerated treatment well. Patient demonstrated good tolerance to manual therapy this morning with slight discomfort in the R medial scapula region. Patient exhibited good technique with therapeutic exercises and would benefit from continued PT at this time.      Plan: Continue per plan of care.      Precautions: CPS, s/p R RTC repair ()    *No R Shoulder AROM for 6 weeks*       5/20  5/23 5/28 5/30  6/3 6/6   Manuals         R Shoulder PROM  BM scapular plane to 140 deg, ER to tolerance w/ arm at side BM scapular plane to 140 deg, ER to tolerance w/ arm at side BM scapular plane to 140 deg, ER to tolerance w/ arm at side BM scapular plane to 140 deg, ER to tolerance w/ arm at side BM scapular plane to 140 deg, ER to tolerance w/ arm at side BM scapular plane to 140 deg, ER to tolerance w/ arm at side   R Elbow PROM  BM  BM  BM BM BM  BM    Scapular Patterns all planes                   Neuro Re-Ed                                                                        Ther Ex         R Shoulder Pendulums CW/CCW w/ table support  X20  X20  x20 X20  X40 ea X40 ea   R Shoulder Pendulums Flx/Ext w/ table support  X20  X20  x20 X20  X20 ea X20 ea   R Shoulder Pendulums Horizontal w/ table support  x20 X20  x20 X20  X20 ea X20 ea   Elbow Ext/Flex AROM X10  2x10  2x10 2x10  2x10 2x10    Elbow Pronation/Supination AROM  x10  2x10  2x10  "2x10  2x15 2x15   Putty Squeeze x10 Ball 2x10  Ball 2x10  Ball 2x10  Ball 2x10 Ball 2x10    Wrist Flexion/Extension AROM  2x10  2x10  2x10  2x15  2x15   Gentle UT Stretch      20\" Hold x2 ea 20\" Hold x2 ea   Gentle LS Stretch      20\" Hold x2 ea 20\" Hold x2 ea     Ther Activity                           Gait Training                           Modalities         Ice 5'  10' 5'  8' 10'  10'                          "

## 2024-06-10 ENCOUNTER — OFFICE VISIT (OUTPATIENT)
Dept: PHYSICAL THERAPY | Facility: CLINIC | Age: 44
End: 2024-06-10
Payer: COMMERCIAL

## 2024-06-10 DIAGNOSIS — Z98.890 S/P RIGHT ROTATOR CUFF REPAIR: Primary | ICD-10-CM

## 2024-06-10 PROCEDURE — 97140 MANUAL THERAPY 1/> REGIONS: CPT

## 2024-06-10 PROCEDURE — 97110 THERAPEUTIC EXERCISES: CPT

## 2024-06-10 NOTE — PROGRESS NOTES
Daily Note     Today's date: 6/10/2024  Patient name: Nina Joseph  : 1980  MRN: [id number]  Referring provider: Demond Cardenas MD  Dx:   Encounter Diagnosis     ICD-10-CM    1. S/P right rotator cuff repair  Z98.890           Start Time: 1100  Stop Time: 1145  Total time in clinic (min): 45 minutes    Subjective: Patient reports stiffness in the R shoulder this morning. She notes some R bicep discomfort at times.      Objective: See treatment diary below      Assessment: Tolerated treatment well. Patient demonstrated a mild increase in R shoulder discomfort with manual R GH scaption PROM this morning. We will continue to progress patient within tolerance. Patient exhibited good technique with therapeutic exercises and would benefit from continued PT at this time.       Plan: Continue per plan of care.      Precautions: CPS, s/p R RTC repair ()    *No R Shoulder AROM for 6 weeks*       6/10 5/23 5/28 5/30  6/3 6/6   Manuals         R Shoulder PROM  BM scapular plane to 140 deg, ER to tolerance w/ arm at side BM scapular plane to 140 deg, ER to tolerance w/ arm at side BM scapular plane to 140 deg, ER to tolerance w/ arm at side BM scapular plane to 140 deg, ER to tolerance w/ arm at side BM scapular plane to 140 deg, ER to tolerance w/ arm at side BM scapular plane to 140 deg, ER to tolerance w/ arm at side   R Elbow PROM  BM  BM  BM BM BM  BM    Scapular Patterns all planes                   Neuro Re-Ed                                                                        Ther Ex         R Shoulder Pendulums CW/CCW w/ table support  X40 X20  x20 X20  X40 ea X40 ea   R Shoulder Pendulums Flx/Ext w/ table support  X20 ea X20  x20 X20  X20 ea X20 ea   R Shoulder Pendulums Horizontal w/ table support  X20 ea X20  x20 X20  X20 ea X20 ea   Elbow Ext/Flex AROM 2X10  2x10  2x10 2x10  2x10 2x10    Elbow Pronation/Supination AROM  x10  2x10  2x10 2x10  2x15 2x15   Putty Squeeze x10 Ball 2x10  Ball 2x10  Ball  "2x10  Ball 2x10 Ball 2x10    Wrist Flexion/Extension AROM 2x15 2x10  2x10  2x10  2x15  2x15   Gentle UT Stretch  20\" Hold x3 ea    20\" Hold x2 ea 20\" Hold x2 ea   Gentle LS Stretch  20\" Hold x3 ea    20\" Hold x2 ea 20\" Hold x2 ea     Ther Activity                           Gait Training                           Modalities         Ice 5'  10' 5'  8' 10'  10'                            "

## 2024-06-11 NOTE — PROGRESS NOTES
PT Re-Evaluation     Today's date: 2024  Patient name: Nina Joseph  : 1980  MRN: [id number]  Referring provider: Demond Cardenas MD  Dx:   Encounter Diagnosis     ICD-10-CM    1. S/P right rotator cuff repair  Z98.890             Start Time: 1100  Stop Time: 1150  Total time in clinic (min): 50 minutes    Assessment  Impairments: abnormal or restricted ROM, abnormal movement, activity intolerance, impaired physical strength, lacks appropriate home exercise program, pain with function, weight-bearing intolerance and poor posture   Symptom irritability: low    Assessment details: The patient is a 43 y.o. female presenting to Physical Therapy today s/p arthroscopy of the R shoulder with RTC repair performed on 24 with Dr. Cardenas. Upon completion of the PT re-evaluation the patient is demonstrating with limitations in R shoulder ROM, R shoulder strength, scapulothoracic control, R elbow mobility, R elbow strength, and pain. Patient is currently having difficulty with the performance of all functional activities with the RUE due to symptomatology. Patient would continue to benefit from skilled Physical Therapy services to address functional limitations to return to prior level of function.  Understanding of Dx/Px/POC: good     Prognosis: good    Goals  STG: (6 weeks)  1.) Patient will initiate HEP Independently MET  2.) Patient will have a 50% reduction in overall symptoms  PROGRESSING  3.) Patient will demonstrate improved strength evidenced by a 1-2 MMT grade increase PROGRESSING  4.) Patient will demonstrate improved R shoulder PROM evidenced by: R shoulder Flexion >/= 160 deg, R ER >/= 40 deg in the scapular plane PROGRESSING  5.) Patient will initiate R shoulder AAROM/AROM PROGRESSING  6.) Patient will discontinue utilization of sling PROGRESSING    LTG: (12 weeks)   1.) Patient will be d/c to an HEP independently PROGRESSING  2.) Patient will improve functional abilities evidenced by FOTO scores  PROGRESSING  3.) Eliminate pain with functional activity  PROGRESSING  4.) Patient will demonstrate improved ability to lift, push, pull, and carry safely w/o symptoms  PROGRESSING  5.) Return to PLOF  PROGRESSING  6.) Patient will demonstrate improved ability to perform overhead activity >/= 2 minutes  PROGRESSING  7.) Patient will demonstrate improved R shoulder AROM evidenced by: R Shoulder Flexion/Abduction >/= 160 deg, R ER at 90 deg WNL, R IR at 90 deg WNL PROGRESSING    Plan  Patient would benefit from: skilled physical therapy  Referral necessary: No  Planned modality interventions: cryotherapy, thermotherapy: hydrocollator packs and TENS    Planned therapy interventions: functional ROM exercises, graded activity, graded exercise, graded motor, home exercise program, flexibility, joint mobilization, manual therapy, neuromuscular re-education, patient education, postural training, self care, strengthening, stretching, therapeutic activities, therapeutic exercise and therapeutic training    Frequency: 2x week  Duration in weeks: 4  Plan of Care beginning date: 5/20/2024  Plan of Care expiration date: 7/15/2024  Treatment plan discussed with: patient  Plan details: Plan of care was reviewed and discussed thoroughly with the patient on their current condition. Patient was instructed on a HEP with written instructions. Patient is in agreement with PT recommendations and will attend Physical Therapy 2x/week for the next 4 weeks to address current deficits.          Subjective Evaluation    History of Present Illness  Date of surgery: 4/30/2024  Mechanism of injury: surgery  Mechanism of injury: The patient reports today s/p R shoulder RTC arthroscopy performed on 4-30-24 by Dr. Cardenas. Patient states that her pain levels have been manageable. Patient utilizes ice for swelling and Tylenol PRN for symptoms in the R shoulder. Patient has been compliant with wearing her sling. She followed up with Dr. Cardenas on  5/15/24 and had incision sites with Nylons replaced with Steri-Strips. She is now referred to OPPT.    Update 24: The patient reports today 6 weeks s/p R shoulder RTC arthroscopy. She notes experiencing pain in her R upper arm and in the R elbow. She states pain levels have been manageable. She notes being compliant with wearing her sling. She reports following up with Dr. Cardenas on .           Recurrent probem    Quality of life: good    Patient Goals  Patient goals for therapy: decreased edema, decreased pain, increased motion, increased strength, independence with ADLs/IADLs and return to sport/leisure activities    Pain  Current pain ratin  At best pain ratin  At worst pain ratin  Location: R Shoulder, R elbow  Quality: dull ache and burning  Relieving factors: medications and ice  Aggravating factors: overhead activity and lifting (carrying, pushing, pulling, ADL's.)    Social Support    Employment status: not working  Hand dominance: right    Treatments  Previous treatment: physical therapy  Current treatment: physical therapy        Objective     Postural Observations  Seated posture: poor  Standing posture: poor      Observations     Right Shoulder  Positive for incision. Negative for drainage.     Additional Observation Details  Patient is wearing sling appropriately. Incisions are healing well. No redness. No warmth. No drainage.    Palpation     Right   Tenderness of the biceps and supraspinatus.     Tenderness     Right Shoulder  Tenderness in the biceps tendon (proximal), bicipital groove, scapular spine and supraspinatus tendon.     Neurological Testing     Sensation     Shoulder   Left Shoulder   Intact: light touch    Right Shoulder   Intact: Light touch    Additional Neurological Details  Patient denies any numbness or tingling into BUE's.    Active Range of Motion   Cervical/Thoracic Spine       Cervical    Flexion:  Restriction level: minimal  Extension:  Restriction level:  minimal  Left lateral flexion:  Restriction level: moderate  Right lateral flexion:  Restriction level moderate  Left rotation:  Restriction level: moderate  Right rotation:  Restriction level: moderate  Left Shoulder   Normal active range of motion    Right Elbow   Flexion: WFL  Extension: -15 degrees     Additional Active Range of Motion Details  PT will assess R shoulder AROM when appropriate.    Passive Range of Motion     Additional Passive Range of Motion Details  R Shoulder PROM Scaption: 140 deg     Scapular Mobility     Right Shoulder   Scapular Mobility with Shoulder to 90° FF   Upward rotation: inadequate  Downward rotation: inadequate    Scapular Mobility beyond 90° FF   Upward rotation: inadequate  Downward rotation: inadequate    Strength/Myotome Testing     Left Shoulder     Planes of Motion   Flexion: WFL   Abduction: WFL   External rotation at 0°: WFL   Internal rotation at 0°: WFL     Additional Strength Details  PT will assess R shoulder strength when appropriate.    Tests     Additional Tests Details  No special tests were performed.             Precautions: CPS, s/p R RTC repair (4/30)    *No R Shoulder AROM for 6 weeks*     6/10 6/12 5/28 5/30  6/3 6/6   Manuals         R Shoulder PROM  BM scapular plane to 140 deg, ER to tolerance w/ arm at side BM scapular plane to 140 deg, ER to tolerance w/ arm at side BM scapular plane to 140 deg, ER to tolerance w/ arm at side BM scapular plane to 140 deg, ER to tolerance w/ arm at side BM scapular plane to 140 deg, ER to tolerance w/ arm at side BM scapular plane to 140 deg, ER to tolerance w/ arm at side   R Elbow PROM  BM  BM  BM BM BM  BM    Scapular Patterns all planes                   Neuro Re-Ed                                                                        Ther Ex         R Shoulder Pendulums CW/CCW w/ table support  X40 X40 x20 X20  X40 ea X40 ea   R Shoulder Pendulums Flx/Ext w/ table support  X20 ea X20  x20 X20  X20 ea X20 ea   R  "Shoulder Pendulums Horizontal w/ table support  X20 ea X20  x20 X20  X20 ea X20 ea   Elbow Ext/Flex AROM 2X10  2x10  2x10 2x10  2x10 2x10    Elbow Pronation/Supination AROM  x10  2x10  2x10 2x10  2x15 2x15   Putty Squeeze x10 Ball 2x10  Ball 2x10  Ball 2x10  Ball 2x10 Ball 2x10    Wrist Flexion/Extension AROM 2x15 2x10  2x10  2x10  2x15  2x15   Gentle UT Stretch  20\" Hold x3 ea 20\" Hold x3 ea   20\" Hold x2 ea 20\" Hold x2 ea   Gentle LS Stretch  20\" Hold x3 ea 20\" Hold x3 ea   20\" Hold x2 ea 20\" Hold x2 ea     AAROM Wand Flex         AAROM Wand IR/ER         AAROM Wand ABD         AAROM Pulleys Scaption         AAROM Wall Slides         Table Slides Flexion         Table Slides Scaption         Iso: Flx/Ext/IR/ER         UBE for muscular endurance         Ther Activity                           Gait Training                           Modalities         Ice 5'   5'  8' 10'  10'                    "

## 2024-06-12 ENCOUNTER — EVALUATION (OUTPATIENT)
Dept: PHYSICAL THERAPY | Facility: CLINIC | Age: 44
End: 2024-06-12
Payer: COMMERCIAL

## 2024-06-12 DIAGNOSIS — Z98.890 S/P RIGHT ROTATOR CUFF REPAIR: Primary | ICD-10-CM

## 2024-06-12 PROCEDURE — 97140 MANUAL THERAPY 1/> REGIONS: CPT

## 2024-06-12 PROCEDURE — 97110 THERAPEUTIC EXERCISES: CPT

## 2024-06-13 ENCOUNTER — OFFICE VISIT (OUTPATIENT)
Dept: OBGYN CLINIC | Facility: CLINIC | Age: 44
End: 2024-06-13

## 2024-06-13 VITALS
SYSTOLIC BLOOD PRESSURE: 130 MMHG | BODY MASS INDEX: 30.3 KG/M2 | HEIGHT: 63 IN | HEART RATE: 80 BPM | WEIGHT: 171 LBS | DIASTOLIC BLOOD PRESSURE: 75 MMHG

## 2024-06-13 DIAGNOSIS — Z98.890 S/P RIGHT ROTATOR CUFF REPAIR: Primary | ICD-10-CM

## 2024-06-13 PROCEDURE — 99024 POSTOP FOLLOW-UP VISIT: CPT | Performed by: STUDENT IN AN ORGANIZED HEALTH CARE EDUCATION/TRAINING PROGRAM

## 2024-06-13 NOTE — PROGRESS NOTES
Shoulder Post Operative Visit     Assesment:   43 y.o. female s/p surgical arthroscopy of the right shoulder with rotator cuff repair, DOS: 4/30/2024    Plan:  Patient is doing well 6 weeks status post the above procedure.  She states that her pain improved compared to at her visit last week.  She has had several physical therapy since last week and states that she is back on track in terms of her range of motion.  Her incisions have healed well.  I discussed with the patient at this point she can discontinue use of the sling.  I encouraged her to continue to work with physical therapy transitioning from passive to active assisted and active range of motion at this point.  I discussed that over the next 6 weeks the goal is to improve her active range of motion.  I discussed with the patient that I will see her back in 6 weeks for repeat evaluation.  Patient demonstrated understanding the discussion was agreed with the plan.  All questions were answered.  She will reach out to clinic with any questions or concerns anytime.    Post-Operative treatment:  Ice to shoulder 1-2 times daily, for 20 minutes at a time.  Discontinue use of the sling.  Continue with physical therapy - rotator cuff repair protocol.     Imaging:  All imaging from today was reviewed by myself and explained to the patient.     Sling:  discontinue use of the sling     DVT Prophylaxis:  Ambulation    Follow up:  6 weeks    Patient was advised that if they have any fevers, chills, chest pain, shortness of breath, redness or drainage from the incision, please let our office know immediately.        Chief Complaint   Patient presents with    Right Shoulder - Post-op, Pain       History of Present Illness:    The patient is a 43 y.o. female who is being evaluated post operatively 6 weeks status post rotator repair of her right shoulder.     The patient notes improvements since her visit last week. She states that she used the meloxicam as prescribed,  "which assisted with her symptoms. She states that her pain is well controlled, with occasional soreness. She has continued physical therapy noting improvements with range of motion.  She states that she is back to her baseline with physical therapy at this point.  She states that she has continued use of the sling, however, she states that she is eager to discontinue the sling. She denies any numbness and tingling. She denies weakness or instability.     The patient denies any fevers, chills, calf pain, chest pain/shortness of breath, redness or drainage from the incision.       I have reviewed the past medical, surgical, social and family history, medications and allergies as documented in the EMR.      Review of systems: ROS is negative other than that noted in the HPI.  Constitutional: Negative for fatigue and fever.       Physical Exam:    Blood pressure 130/75, pulse 80, height 5' 3\" (1.6 m), weight 77.6 kg (171 lb), last menstrual period 12/25/2020.    General/Constitutional: NAD, well developed, well nourished  HENT: Normocephalic, atraumatic  CV: Intact distal pulses, regular rate  Resp: No respiratory distress or labored breathing  GI: Soft and non-tender   Lymphatic: No lymphadenopathy palpated  Neuro: Alert and Oriented x 3, no focal deficits  Psych: Normal mood, normal affect, normal judgement, normal behavior  Skin: Warm, dry, no rashes, no erythema    right Shoulder focused exam:    Incisions healing well with no erythema, no drainage, no dehiscence.    Tenderness over the posterior shoulder.  Passive range of motion: 130 degrees of forward flexion, 30 degrees ER, IR not tested   Rotator cuff strength: deferred     UE NV Exam:   +2 Radial pulses   Sensation intact to light touch C5-T1 bilaterally  SILT median/ulnar/radial/axillary distributions  Radial/median/ulnar/PIN/AIN/axillary nerve motor intact      Scribe Attestation      I,:  Becca Malone am acting as a scribe while in the presence of " the attending physician.:       I,:  Demond Cardenas MD personally performed the services described in this documentation    as scribed in my presence.:

## 2024-06-17 ENCOUNTER — OFFICE VISIT (OUTPATIENT)
Dept: PHYSICAL THERAPY | Facility: CLINIC | Age: 44
End: 2024-06-17
Payer: COMMERCIAL

## 2024-06-17 DIAGNOSIS — Z98.890 S/P RIGHT ROTATOR CUFF REPAIR: Primary | ICD-10-CM

## 2024-06-17 PROCEDURE — 97140 MANUAL THERAPY 1/> REGIONS: CPT

## 2024-06-17 PROCEDURE — 97110 THERAPEUTIC EXERCISES: CPT

## 2024-06-17 NOTE — PROGRESS NOTES
Daily Note     Today's date: 2024  Patient name: Nina Joseph  : 1980  MRN: [id number]  Referring provider: Demond Cardenas MD  Dx:   Encounter Diagnosis     ICD-10-CM    1. S/P right rotator cuff repair  Z98.890           Start Time: 1615  Stop Time: 1700  Total time in clinic (min): 45 minutes    Subjective: Patient reports following up with Dr. Cardenas on . She states discontinuing sling wear.       Objective: See treatment diary below      Assessment: Tolerated treatment well. We initiated R shoulder AAROM exercise, UBE, and gentle isometric strengthening this afternoon. Patient demonstrated good tolerance to progressions with a mild increase in R shoulder discomfort. We will continue to progress patient within tolerance. Patient demonstrated fatigue post treatment, exhibited good technique with therapeutic exercises, and would benefit from continued PT at this time.       Plan: Continue per plan of care.      Precautions: CPS, s/p R RTC repair ()    *No R Shoulder AROM for 6 weeks*     6/10 6/12 6/17 5/30  6/3 6/6   Manuals         R Shoulder PROM  BM scapular plane to 140 deg, ER to tolerance w/ arm at side BM scapular plane to 140 deg, ER to tolerance w/ arm at side BM  BM scapular plane to 140 deg, ER to tolerance w/ arm at side BM scapular plane to 140 deg, ER to tolerance w/ arm at side BM scapular plane to 140 deg, ER to tolerance w/ arm at side   R Elbow PROM  BM  BM   BM BM  BM    Scapular Patterns all planes    2x10 ea direction                Neuro Re-Ed                                                                        Ther Ex         R Shoulder Pendulums CW/CCW w/ table support  X40 X40 D/C X20  X40 ea X40 ea   R Shoulder Pendulums Flx/Ext w/ table support  X20 ea X20  D/C X20  X20 ea X20 ea   R Shoulder Pendulums Horizontal w/ table support  X20 ea X20  D/C X20  X20 ea X20 ea   Elbow Ext/Flex AROM 2X10  2x10  D/C 2x10  2x10 2x10    Elbow Pronation/Supination AROM  x10  2x10   "D/C 2x10  2x15 2x15   Putty Squeeze x10 Ball 2x10  D/C  Ball 2x10  Ball 2x10 Ball 2x10    Wrist Flexion/Extension AROM 2x15 2x10  D/C 2x10  2x15  2x15   Gentle UT Stretch  20\" Hold x3 ea 20\" Hold x3 ea D/C  20\" Hold x2 ea 20\" Hold x2 ea   Gentle LS Stretch  20\" Hold x3 ea 20\" Hold x3 ea D/C  20\" Hold x2 ea 20\" Hold x2 ea     AAROM Wand Flex         AAROM Wand IR/ER         AAROM Wand ABD         AAROM Pulleys Scaption   2x10       AAROM Wall Slides         Table Slides Flexion   X10 w/ sliding board       Table Slides Scaption   X10 w/ sliding board       Iso: Flx/Ext/IR/ER   5\" Hold x10 ea      UBE for muscular endurance   5' Forward  5' Backward L1      Ther Activity                           Gait Training                           Modalities         Ice 5'    8' 10'  10'                    "

## 2024-06-19 ENCOUNTER — OFFICE VISIT (OUTPATIENT)
Dept: PHYSICAL THERAPY | Facility: CLINIC | Age: 44
End: 2024-06-19
Payer: COMMERCIAL

## 2024-06-19 DIAGNOSIS — Z98.890 S/P RIGHT ROTATOR CUFF REPAIR: Primary | ICD-10-CM

## 2024-06-19 PROCEDURE — 97140 MANUAL THERAPY 1/> REGIONS: CPT

## 2024-06-19 PROCEDURE — 97110 THERAPEUTIC EXERCISES: CPT

## 2024-06-19 NOTE — PROGRESS NOTES
"Daily Note     Today's date: 2024  Patient name: Nina Joseph  : 1980  MRN: [id number]  Referring provider: Demond Cardenas MD  Dx:   Encounter Diagnosis     ICD-10-CM    1. S/P right rotator cuff repair  Z98.890           Start Time: 0900  Stop Time: 0945  Total time in clinic (min): 45 minutes    Subjective: Patient reports a slight increase in soreness in the R shoulder after last treatment session.      Objective: See treatment diary below      Assessment: Tolerated treatment well. We progressed the current program with the addition of transverse and sagittal plane AAROM this morning. Patient demonstrated good tolerance to progressions with a mild increase in R shoulder discomfort. Patient demonstrated fatigue post treatment, exhibited good technique with therapeutic exercises, and would benefit from continued PT at this time.      Plan: Continue per plan of care.      Precautions: CPS, s/p R RTC repair ()    *No R Shoulder AROM for 6 weeks*     6/10 6/12 6/17 6/19 6/3 6/6   Manuals         R Shoulder PROM  BM scapular plane to 140 deg, ER to tolerance w/ arm at side BM scapular plane to 140 deg, ER to tolerance w/ arm at side BM  BM BM scapular plane to 140 deg, ER to tolerance w/ arm at side BM scapular plane to 140 deg, ER to tolerance w/ arm at side   R Elbow PROM  BM  BM    BM  BM    Scapular Patterns all planes    2x10 ea direction  2x10 ea direction               Neuro Re-Ed                                                                        Ther Ex         AAROM Wand Flex    X10      AAROM Wand IR/ER    X10      AAROM Wand ABD         AAROM Pulleys Scaption   2x10  2x10      AAROM Wall Slides         Table Slides Flexion   X10 w/ sliding board  X10 w/ sliding board      Table Slides Scaption   X10 w/ sliding board  X10 w/ sliding board      Iso: Flx/Ext/IR/ER   5\" Hold x10 ea 5\" hold x10 ea     UBE for muscular endurance   5' Forward  5' Backward L1 5' Forward  5' Backward L1     Ther " Activity                           Gait Training                           Modalities         Ice 5'     10'  10'

## 2024-06-24 ENCOUNTER — OFFICE VISIT (OUTPATIENT)
Dept: PHYSICAL THERAPY | Facility: CLINIC | Age: 44
End: 2024-06-24
Payer: COMMERCIAL

## 2024-06-24 DIAGNOSIS — Z98.890 S/P RIGHT ROTATOR CUFF REPAIR: Primary | ICD-10-CM

## 2024-06-24 PROCEDURE — 97110 THERAPEUTIC EXERCISES: CPT

## 2024-06-24 PROCEDURE — 97140 MANUAL THERAPY 1/> REGIONS: CPT

## 2024-06-24 NOTE — PROGRESS NOTES
"Daily Note     Today's date: 2024  Patient name: Nina Joseph  : 1980  MRN: [id number]  Referring provider: Demond Cardenas MD  Dx:   Encounter Diagnosis     ICD-10-CM    1. S/P right rotator cuff repair  Z98.890           Start Time: 1345  Stop Time: 1430  Total time in clinic (min): 45 minutes    Subjective: Patient reports having difficulty with reaching across her body with the R arm.       Objective: See treatment diary below      Assessment: Tolerated treatment well. We progressed the current program with the addition of R GH joint AAROM in the frontal and sagittal planes. Patient demonstrated good tolerance to progressions with a mild increase in discomfort in the R shoulder and fatigue. Patient exhibited good technique with therapeutic exercises and would benefit from continued PT at this time.       Plan: Continue per plan of care.      Precautions: CPS, s/p R RTC repair ()         6/10 6/12 6/17 6/19 6/24 6/6   Manuals         R Shoulder PROM  BM scapular plane to 140 deg, ER to tolerance w/ arm at side BM scapular plane to 140 deg, ER to tolerance w/ arm at side BM  BM BM BM scapular plane to 140 deg, ER to tolerance w/ arm at side   R Elbow PROM  BM  BM     BM    Scapular Patterns all planes    2x10 ea direction  2x10 ea direction               Neuro Re-Ed                                                                        Ther Ex         AAROM Wand Flex    X10  2x10     AAROM Wand IR/ER    X10  X10     AAROM Wand ABD     x10    AAROM Pulleys Scaption   2x10  2x10  2x10     AAROM Wall Slides     X10     Table Slides Flexion   X10 w/ sliding board  X10 w/ sliding board  X10 w/ sliding board     Table Slides Scaption   X10 w/ sliding board  X10 w/ sliding board  X10 w/ sliding board     Iso: Flx/Ext/IR/ER   5\" Hold x10 ea 5\" hold x10 ea 5\" Hold x10 ea    UBE for muscular endurance   5' Forward  5' Backward L1 5' Forward  5' Backward L1 5' Forward 5' Backward L1     Ther Activity          "                  Gait Training                           Modalities         Ice 5'      10'

## 2024-06-27 ENCOUNTER — OFFICE VISIT (OUTPATIENT)
Dept: PHYSICAL THERAPY | Facility: CLINIC | Age: 44
End: 2024-06-27
Payer: COMMERCIAL

## 2024-06-27 DIAGNOSIS — M25.511 CHRONIC RIGHT SHOULDER PAIN: ICD-10-CM

## 2024-06-27 DIAGNOSIS — Z98.890 S/P RIGHT ROTATOR CUFF REPAIR: Primary | ICD-10-CM

## 2024-06-27 DIAGNOSIS — G89.29 CHRONIC RIGHT SHOULDER PAIN: ICD-10-CM

## 2024-06-27 PROCEDURE — 97140 MANUAL THERAPY 1/> REGIONS: CPT

## 2024-06-27 PROCEDURE — 97110 THERAPEUTIC EXERCISES: CPT

## 2024-06-27 RX ORDER — MELOXICAM 15 MG/1
15 TABLET ORAL DAILY
Qty: 28 TABLET | Refills: 1 | Status: SHIPPED | OUTPATIENT
Start: 2024-06-27

## 2024-06-27 NOTE — PROGRESS NOTES
"Daily Note     Today's date: 2024  Patient name: Nina Joseph  : 1980  MRN: [id number]  Referring provider: Demond Cardenas MD  Dx:   Encounter Diagnosis     ICD-10-CM    1. S/P right rotator cuff repair  Z98.890           Start Time: 1630  Stop Time: 1720  Total time in clinic (min): 50 minutes    Subjective: Patient reports stiffness in the R shoulder this afternoon.       Objective: See treatment diary below      Assessment: Tolerated treatment well. Patient continues to respond well to Physical Therapy treatment and is making appropriate progressions with the current directed therapeutic exercise program. Patient exhibited good technique with therapeutic exercises and would benefit from continued PT at this time.      Plan: Continue per plan of care.      Precautions: CPS, s/p R RTC repair ()         6/10 6/12 6/17 6/19 6/24 6/27   Manuals         R Shoulder PROM  BM scapular plane to 140 deg, ER to tolerance w/ arm at side BM scapular plane to 140 deg, ER to tolerance w/ arm at side BM  BM BM BM    R Elbow PROM  BM  BM     BM    Scapular Patterns all planes    2x10 ea direction  2x10 ea direction               Neuro Re-Ed                                                                        Ther Ex         AAROM Wand Flex    X10  2x10  2x10   AAROM Wand IR/ER    X10  X10  2x10   AAROM Wand ABD     x10 2x10    AAROM Pulleys Scaption   2x10  2x10  2x10  3x10   AAROM Wall Slides     X10  2X10    Table Slides Flexion   X10 w/ sliding board  X10 w/ sliding board  X10 w/ sliding board  X10 w/ sliding board    Table Slides Scaption   X10 w/ sliding board  X10 w/ sliding board  X10 w/ sliding board  X10 w/ sliding board    Iso: Flx/Ext/IR/ER   5\" Hold x10 ea 5\" hold x10 ea 5\" Hold x10 ea 5\" Hold x10 ea   UBE for muscular endurance   5' Forward  5' Backward L1 5' Forward  5' Backward L1 5' Forward 5' Backward L1  5' Forward  5' Backward L2   Ther Activity                           Gait Training          "                  Modalities         Ice 5'

## 2024-07-01 ENCOUNTER — OFFICE VISIT (OUTPATIENT)
Dept: PHYSICAL THERAPY | Facility: CLINIC | Age: 44
End: 2024-07-01
Payer: COMMERCIAL

## 2024-07-01 ENCOUNTER — OFFICE VISIT (OUTPATIENT)
Dept: PAIN MEDICINE | Facility: CLINIC | Age: 44
End: 2024-07-01
Payer: COMMERCIAL

## 2024-07-01 VITALS
RESPIRATION RATE: 16 BRPM | DIASTOLIC BLOOD PRESSURE: 84 MMHG | TEMPERATURE: 98.1 F | HEART RATE: 79 BPM | WEIGHT: 182 LBS | SYSTOLIC BLOOD PRESSURE: 121 MMHG | HEIGHT: 63 IN | BODY MASS INDEX: 32.25 KG/M2

## 2024-07-01 DIAGNOSIS — M54.2 NECK PAIN: ICD-10-CM

## 2024-07-01 DIAGNOSIS — M54.50 CHRONIC BILATERAL LOW BACK PAIN WITHOUT SCIATICA: ICD-10-CM

## 2024-07-01 DIAGNOSIS — M47.816 LUMBAR SPONDYLOSIS: ICD-10-CM

## 2024-07-01 DIAGNOSIS — M54.12 CERVICAL RADICULOPATHY: ICD-10-CM

## 2024-07-01 DIAGNOSIS — Z98.890 S/P RIGHT ROTATOR CUFF REPAIR: Primary | ICD-10-CM

## 2024-07-01 DIAGNOSIS — G89.29 CHRONIC BILATERAL LOW BACK PAIN WITHOUT SCIATICA: ICD-10-CM

## 2024-07-01 DIAGNOSIS — M47.812 CERVICAL SPONDYLOSIS: ICD-10-CM

## 2024-07-01 DIAGNOSIS — G89.4 CHRONIC PAIN SYNDROME: Primary | ICD-10-CM

## 2024-07-01 PROCEDURE — 97112 NEUROMUSCULAR REEDUCATION: CPT

## 2024-07-01 PROCEDURE — 99213 OFFICE O/P EST LOW 20 MIN: CPT | Performed by: NURSE PRACTITIONER

## 2024-07-01 PROCEDURE — 97110 THERAPEUTIC EXERCISES: CPT

## 2024-07-01 PROCEDURE — 97140 MANUAL THERAPY 1/> REGIONS: CPT

## 2024-07-01 NOTE — PROGRESS NOTES
Assessment:  1. Chronic pain syndrome    2. Neck pain    3. Cervical radiculopathy    4. Cervical spondylosis    5. Chronic bilateral low back pain without sciatica    6. Lumbar spondylosis        Plan:  While the patient was in the office today, I did have a thorough conversation regarding their chronic pain syndrome, medication management, and treatment plan options.  Patient is being seen for a follow-up visit.  She underwent right arthroscopic shoulder surgery on 4/30/2024.  She is reporting significant improvement in pain and range of motion with regards to the right shoulder.  She is currently involved in physical therapy and is doing really well.    She continues with some neck pain that radiates to her shoulders.  She is reporting intermittent numbness and tingling in the left upper extremity.  She previously underwent a C7-T1 interlaminar epidural steroid injection without relief.  She is scheduled for an EMG of her upper extremities next month.    She had previously tried Celebrex which was discontinued due to lack of efficacy.  Currently Ortho has her on Mobic 15 mg daily which is somewhat helpful.    She previously participated in physical therapy for both her neck and back with minimal improvement.    Follow-up in 2 months.      History of Present Illness:  The patient is a 43 y.o. female who presents for a follow up office visit in regards to Neck Pain, Back Pain, and Shoulder Pain (right).   The patient’s current symptoms include complaints of neck pain that radiates to her shoulders.  Intermittent numbness and tingling in the left upper extremity.  Complaints of low back pain.  Current pain level is a 2/10.  Quality pain is described as dull and aching.    Current pain medications includes: Ortho prescribes Mobic 15 mg daily.  The patient reports that this regimen is providing 25-30% pain relief.  The patient is reporting no side effects from this pain medication regimen.    I have personally  reviewed and/or updated the patient's past medical history, past surgical history, family history, social history, current medications, allergies, and vital signs today.         Review of Systems  Review of Systems   Musculoskeletal:  Positive for back pain, neck pain and neck stiffness.        Decreased ROM  Joint stiffness  Right shoulder pain   All other systems reviewed and are negative.          Past Medical History:   Diagnosis Date    Anxiety     Bipolar depression (HCC)     Chronic pelvic pain in female 2021    Endometrial hyperplasia without atypia 2021    Hypertension     Kidney stone     Menometrorrhagia 2021    Vertigo 2024       Past Surgical History:   Procedure Laterality Date     SECTION      EPIDURAL BLOCK INJECTION Right 10/19/2023    Procedure: Right C7-T1 ILESI;  Surgeon: Elsi Saunders MD;  Location: [facility];  Service: Pain Management     FL INJECTION RIGHT SHOULDER (ARTHROGRAM)  2024    HYSTERECTOMY  2021    ID SURGICAL ARTHROSCOPY SHOULDER W/ROTATOR CUFF RPR Right 2024    Procedure: Right shoulder arthroscopy, rotator cuff repair;  Surgeon: Demond Cardenas MD;  Location: [facility];  Service: Orthopedics       Family History   Problem Relation Age of Onset    Heart disease Father     No Known Problems Sister     No Known Problems Daughter     No Known Problems Maternal Grandmother     No Known Problems Maternal Grandfather     No Known Problems Paternal Grandmother     No Known Problems Paternal Grandfather     No Known Problems Son     No Known Problems Son     No Known Problems Son     No Known Problems Maternal Aunt     No Known Problems Maternal Aunt     No Known Problems Paternal Aunt     No Known Problems Paternal Aunt        Social History     Occupational History    Not on file   Tobacco Use    Smoking status: Some Days     Current packs/day: 0.50     Average packs/day: 0.5 packs/day for 28.0 years (14.0 ttl pk-yrs)     Types:  "Cigarettes    Smokeless tobacco: Never   Vaping Use    Vaping status: Never Used   Substance and Sexual Activity    Alcohol use: Never    Drug use: Never    Sexual activity: Not Currently         Current Outpatient Medications:     busPIRone (BUSPAR) 5 mg tablet, , Disp: , Rfl:     ciclopirox (PENLAC) 8 % solution, Apply topically daily at bedtime, Disp: 6.6 mL, Rfl: 0    fluticasone (FLONASE) 50 mcg/act nasal spray, 1 spray into each nostril daily, Disp: 11.1 mL, Rfl: 0    hydrOXYzine HCL (ATARAX) 10 mg tablet, TAKE 1/2 (ONE-HALF) TABLET BY MOUTH THREE TIMES DAILY AS NEEDED, Disp: , Rfl:     ketoconazole (NIZORAL) 2 % cream, Apply topically daily, Disp: 60 g, Rfl: 2    LORazepam (ATIVAN) 0.5 mg tablet, Take 1-2 about 30 minutes prior to the MRI, Disp: 2 tablet, Rfl: 0    meloxicam (MOBIC) 15 mg tablet, Take 1 tablet by mouth once daily, Disp: 28 tablet, Rfl: 1    ondansetron (ZOFRAN) 4 mg tablet, Take 1 tablet (4 mg total) by mouth every 8 (eight) hours as needed for nausea or vomiting, Disp: 10 tablet, Rfl: 0    oxyCODONE (Roxicodone) 5 immediate release tablet, Take 1 tablet (5 mg total) by mouth every 4 (four) hours as needed for severe pain Max Daily Amount: 30 mg, Disp: 10 tablet, Rfl: 0    sertraline (ZOLOFT) 100 mg tablet, Take 100 mg by mouth daily, Disp: , Rfl:     topiramate (TOPAMAX) 25 mg tablet, Take 25 mg by mouth 2 (two) times a day, Disp: , Rfl:     ketoconazole (NIZORAL) 2 % cream, APPLY  CREAM TOPICALLY ONCE DAILY (Patient not taking: Reported on 5/8/2024), Disp: 60 g, Rfl: 0    Allergies   Allergen Reactions    Clonazepam Other (See Comments)     Excessive sedation  Vomiting    Methylphenidate Other (See Comments)     Hyperactivity\"    Latex Rash       Physical Exam:    /84   Pulse 79   Temp 98.1 °F (36.7 °C)   Resp 16   Ht 5' 3\" (1.6 m)   Wt 82.6 kg (182 lb)   LMP 12/25/2020   BMI 32.24 kg/m²     Constitutional:normal, well developed, well nourished, alert, in no distress and " non-toxic and no overt pain behavior.  Eyes:anicteric  HEENT:grossly intact  Neck:supple, symmetric, trachea midline and no masses   Pulmonary:even and unlabored  Cardiovascular:No edema or pitting edema present  Skin:Normal without rashes or lesions and well hydrated  Psychiatric:Mood and affect appropriate  Neurologic:Cranial Nerves II-XII grossly intact  Musculoskeletal:normal    Imaging    Study Result    Narrative & Impression   MRI CERVICAL SPINE WITHOUT CONTRAST     INDICATION: G89.4: Chronic pain syndrome  M54.2: Cervicalgia  M54.12: Radiculopathy, cervical region  M47.812: Spondylosis without myelopathy or radiculopathy, cervical region.     COMPARISON: CT 8/2/2023.     TECHNIQUE:  Multiplanar, multisequence imaging of the cervical spine was performed. .        IMAGE QUALITY:  Diagnostic     FINDINGS:     ALIGNMENT: Mild reversal of cervical lordosis may be positional.  No compression fracture.  No subluxation.  No scoliosis.     MARROW SIGNAL:  Normal marrow signal is identified within the visualized bony structures.  No discrete marrow lesion.     CERVICAL AND VISUALIZED THORACIC CORD:  Normal signal within the visualized cord.     PREVERTEBRAL AND PARASPINAL SOFT TISSUES:  Normal.     VISUALIZED POSTERIOR FOSSA:  The visualized posterior fossa demonstrates no abnormal signal.     CERVICAL DISC SPACES:     C2-C3:  Normal.     C3-C4:  Normal.     C4-C5: Disc desiccation with loss of disc height. Modic type II endplate changes noted disc osteophyte complex with left greater than right uncovertebral hypertrophy and facet arthrosis. Mild left foraminal narrowing. Right neuroforamen remains patent.     C5-C6: Disc desiccation with loss of disc height. Disc osteophyte complex with left greater than right uncovertebral hypertrophy and bilateral facet arthrosis. Mild left foraminal narrowing. Right neural foramen is patent.     C6-C7: Disc desiccation with mild loss of disc height. Disc osteophyte complex with  facet hypertrophy. No significant central canal or foraminal encroachment.     C7-T1: Right greater than left uncovertebral hypertrophy and bilateral facet arthrosis resulting in mild bilateral foraminal stenosis. Central canal remains patent.     UPPER THORACIC DISC SPACES:  Normal.     OTHER FINDINGS:  None.     IMPRESSION:  Mild spondylotic changes of the cervical spine as detailed above. No critical stenosis.        Workstation performed: [id number]         No orders to display       No orders of the defined types were placed in this encounter.

## 2024-07-01 NOTE — PROGRESS NOTES
"Daily Note     Today's date: 2024  Patient name: Nina Joseph  : 1980  MRN: [id number]  Referring provider: Demond Cardenas MD  Dx:   Encounter Diagnosis     ICD-10-CM    1. S/P right rotator cuff repair  Z98.890           Start Time: 1630  Stop Time: 1713  Total time in clinic (min): 43 minutes    Subjective: Patient reports no new complaints with the R shoulder.      Objective: See treatment diary below      Assessment: Tolerated treatment well. We progressed the current program today with the addition of R RTC and scapulothoracic strengthening. Patient demonstrated good tolerance to progressions with a minimal increase in symptoms. Patient demonstrated fatigue post treatment, exhibited good technique with therapeutic exercises, and would benefit from continued PT at this time.       Plan: Continue per plan of care.      Precautions: CPS, s/p R RTC repair ()            Manuals         R Shoulder PROM  BM  BM scapular plane to 140 deg, ER to tolerance w/ arm at side BM  BM BM BM    R Elbow PROM  BM  BM     BM    Scapular Patterns all planes    2x10 ea direction  2x10 ea direction               Neuro Re-Ed         MTP  L2 2x10         LTP L2 2x10                                                      Ther Ex         AAROM Wand Flex 2x10    X10  2x10  2x10   AAROM Wand IR/ER 2x10   X10  X10  2x10   AAROM Wand ABD 2x10    x10 2x10    AAROM Pulleys Scaption 2x10  2x10  2x10  2x10  3x10   AAROM Pulleys Abduction  2x10        AAROM Pulleys Flexion  2x10         AAROM Wall Slides 2x10     X10  2X10    Table Slides Flexion HEP  X10 w/ sliding board  X10 w/ sliding board  X10 w/ sliding board  X10 w/ sliding board    Table Slides Scaption HEP   X10 w/ sliding board  X10 w/ sliding board  X10 w/ sliding board  X10 w/ sliding board    TB IR L2 2x10         TB ER L2 2x10         Iso: Flx/Ext/IR/ER HEP   5\" Hold x10 ea 5\" hold x10 ea 5\" Hold x10 ea 5\" Hold x10 ea   UBE for muscular " endurance 5' Forward 5' Backward L1.2, L1.3  5' Forward  5' Backward L1 5' Forward  5' Backward L1 5' Forward 5' Backward L1  5' Forward  5' Backward L2   Ther Activity                           Gait Training                           Modalities         Ice

## 2024-07-03 ENCOUNTER — OFFICE VISIT (OUTPATIENT)
Dept: PHYSICAL THERAPY | Facility: CLINIC | Age: 44
End: 2024-07-03
Payer: COMMERCIAL

## 2024-07-03 DIAGNOSIS — Z98.890 S/P RIGHT ROTATOR CUFF REPAIR: Primary | ICD-10-CM

## 2024-07-03 PROCEDURE — 97140 MANUAL THERAPY 1/> REGIONS: CPT

## 2024-07-03 PROCEDURE — 97110 THERAPEUTIC EXERCISES: CPT

## 2024-07-03 NOTE — PROGRESS NOTES
Daily Note     Today's date: 7/3/2024  Patient name: Nina Joseph  : 1980  MRN: [id number]  Referring provider: Demond Cardenas MD  Dx:   Encounter Diagnosis     ICD-10-CM    1. S/P right rotator cuff repair  Z98.890           Start Time: 930  Stop Time: 1015  Total time in clinic (min): 45 minutes    Subjective: Patient reports no new complaints with the R shoulder.       Objective: See treatment diary below      Assessment: Tolerated treatment well. We progressed the current program with the addition of R shoulder and scapulothoracic strengthening. Patient demonstrated good tolerance to progressions with a minimal increase in symptomatology. Patient exhibited good technique with therapeutic exercises and would benefit from continued PT.      Plan: Continue per plan of care.      Precautions: CPS, s/p R RTC repair ()         7/1 7/3 6/17 6/19 6/24 6/27   Manuals         R Shoulder PROM  BM  BM  BM  BM BM BM    R Elbow PROM  BM  BM     BM    Scapular Patterns all planes    2x10 ea direction  2x10 ea direction               Neuro Re-Ed         MTP  L2 2x10  L3 2x10        LTP L2 2x10  L3 2x10                                                     Ther Ex         AAROM Wand Flex 2x10  2x10  X10  2x10  2x10   AAROM Wand IR/ER 2x10 2x10   X10  X10  2x10   AAROM Wand ABD 2x10 2x10    x10 2x10    AAROM Pulleys Scaption 2x10 2x10 2x10  2x10  2x10  3x10   AAROM Pulleys Abduction  2x10 2x10       AAROM Pulleys Flexion  2x10  2x10       AAROM Wall Slides 2x10  2x10    X10  2X10    Front Shoulder Raise w/ TB  L1 2x10        Serratus Anterior Raises   1lb 2x10        TB IR L2 2x10  L3 2x10        TB ER L2 2x10  L3 2x10        UBE for muscular endurance 5' Forward 5' Backward L1.2, L1.3 5' Forward 5' Backward L1.3 5' Forward  5' Backward L1 5' Forward  5' Backward L1 5' Forward 5' Backward L1  5' Forward  5' Backward L2   Ther Activity                           Gait Training                           Modalities          Ice

## 2024-07-08 ENCOUNTER — OFFICE VISIT (OUTPATIENT)
Dept: PHYSICAL THERAPY | Facility: CLINIC | Age: 44
End: 2024-07-08
Payer: COMMERCIAL

## 2024-07-08 DIAGNOSIS — Z98.890 S/P RIGHT ROTATOR CUFF REPAIR: Primary | ICD-10-CM

## 2024-07-08 PROCEDURE — 97112 NEUROMUSCULAR REEDUCATION: CPT

## 2024-07-08 PROCEDURE — 97140 MANUAL THERAPY 1/> REGIONS: CPT

## 2024-07-08 NOTE — PROGRESS NOTES
Daily Note     Today's date: 2024  Patient name: Nina Joseph  : 1980  MRN: [id number]  Referring provider: Demond Cardenas MD  Dx:   Encounter Diagnosis     ICD-10-CM    1. S/P right rotator cuff repair  Z98.890           Start Time: 1400  Stop Time: 1450  Total time in clinic (min): 50 minutes    Subjective: Patient reports no new complaints with the R shoulder this morning. She notes being able to perform more household activities with less difficulty.      Objective: See treatment diary below      Assessment: Tolerated treatment well. Patient demonstrated fatigue post treatment, exhibited good technique with therapeutic exercises, and would benefit from continued PT. We progressed the current program with the addition of scapular stabilization and R shoulder strengthening. Patient demonstrated good tolerance to progressions with a minimal increase in symptomatology.       Plan: Continue per plan of care.      Precautions: CPS, s/p R RTC repair ()          7/3 7/8 6/19 6/24 6/27   Manuals         R Shoulder PROM  BM  BM  BM  BM BM BM    R Elbow PROM  BM  BM     BM    Scapular Patterns all planes     2x10 ea direction               Neuro Re-Ed         MTP  L2 2x10  L3 2x10  L4 2x10       LTP L2 2x10  L3 2x10  L4 2x10       Scapular Stabilization w/ PB    X5 CW/CCW x5      Serratus Anterior Activation w/ Foam Roll         Standing Scaption    1# 2x10                         Ther Ex         AAROM Wand Flex 2x10  2x10 2x10 X10  2x10  2x10   AAROM Wand IR/ER 2x10 2x10  2x10  X10  X10  2x10   AAROM Wand ABD 2x10 2x10  2x10   x10 2x10    AAROM Pulleys Scaption 2x10 2x10 2x10 2x10  2x10  3x10   AAROM Pulleys Abduction  2x10 2x10 2x10      AAROM Pulleys Flexion  2x10  2x10 2x10       AAROM Wall Slides 2x10  2x10  2x10   X10  2X10    Front Shoulder Raise w/ TB  L1 2x10  L1 2x10       Serratus Anterior Raises   1lb 2x10  1lb 2x10       TB IR L2 2x10  L3 2x10  L3 2x10       TB ER L2 2x10  L3 2x10  L3 2x10        UBE for muscular endurance 5' Forward 5' Backward L1.2, L1.3 5' Forward 5' Backward L1.3 5' Forward  5' Backward L1.3 5' Forward  5' Backward L1 5' Forward 5' Backward L1  5' Forward  5' Backward L2   Ther Activity                           Gait Training                           Modalities         Ice

## 2024-07-11 ENCOUNTER — APPOINTMENT (OUTPATIENT)
Dept: PHYSICAL THERAPY | Facility: CLINIC | Age: 44
End: 2024-07-11
Payer: COMMERCIAL

## 2024-07-11 ENCOUNTER — OFFICE VISIT (OUTPATIENT)
Dept: INTERNAL MEDICINE CLINIC | Facility: CLINIC | Age: 44
End: 2024-07-11
Payer: COMMERCIAL

## 2024-07-11 VITALS
WEIGHT: 184 LBS | SYSTOLIC BLOOD PRESSURE: 124 MMHG | HEIGHT: 63 IN | DIASTOLIC BLOOD PRESSURE: 82 MMHG | HEART RATE: 83 BPM | OXYGEN SATURATION: 97 % | TEMPERATURE: 98.3 F | BODY MASS INDEX: 32.6 KG/M2

## 2024-07-11 DIAGNOSIS — E78.2 MIXED HYPERLIPIDEMIA: ICD-10-CM

## 2024-07-11 DIAGNOSIS — Z12.31 ENCOUNTER FOR SCREENING MAMMOGRAM FOR BREAST CANCER: ICD-10-CM

## 2024-07-11 DIAGNOSIS — L73.9 FOLLICULITIS: Primary | ICD-10-CM

## 2024-07-11 DIAGNOSIS — E53.1 VITAMIN B6 DEFICIENCY: ICD-10-CM

## 2024-07-11 DIAGNOSIS — E53.8 FOLATE DEFICIENCY: ICD-10-CM

## 2024-07-11 DIAGNOSIS — E53.8 VITAMIN B12 DEFICIENCY: ICD-10-CM

## 2024-07-11 DIAGNOSIS — E55.9 VITAMIN D DEFICIENCY: ICD-10-CM

## 2024-07-11 PROCEDURE — 99214 OFFICE O/P EST MOD 30 MIN: CPT | Performed by: FAMILY MEDICINE

## 2024-07-11 PROCEDURE — 96372 THER/PROPH/DIAG INJ SC/IM: CPT | Performed by: FAMILY MEDICINE

## 2024-07-11 RX ORDER — FOLIC ACID 1 MG/1
1 TABLET ORAL DAILY
Qty: 30 TABLET | Refills: 5 | Status: SHIPPED | OUTPATIENT
Start: 2024-07-11

## 2024-07-11 RX ORDER — MULTIVITAMIN WITH IRON
100 TABLET ORAL DAILY
Qty: 30 TABLET | Refills: 5 | Status: SHIPPED | OUTPATIENT
Start: 2024-07-11

## 2024-07-11 RX ORDER — TRIAMCINOLONE ACETONIDE 1 MG/G
CREAM TOPICAL 2 TIMES DAILY
Qty: 30 G | Refills: 1 | Status: SHIPPED | OUTPATIENT
Start: 2024-07-11

## 2024-07-11 RX ORDER — AMOXICILLIN 500 MG/1
500 TABLET, FILM COATED ORAL 3 TIMES DAILY
Qty: 30 TABLET | Refills: 0 | Status: SHIPPED | OUTPATIENT
Start: 2024-07-11 | End: 2024-07-21

## 2024-07-11 RX ORDER — CYANOCOBALAMIN 1000 UG/ML
1000 INJECTION, SOLUTION INTRAMUSCULAR; SUBCUTANEOUS
Status: SHIPPED | OUTPATIENT
Start: 2024-07-11

## 2024-07-11 RX ADMIN — CYANOCOBALAMIN 1000 MCG: 1000 INJECTION, SOLUTION INTRAMUSCULAR; SUBCUTANEOUS at 15:07

## 2024-07-12 ENCOUNTER — EVALUATION (OUTPATIENT)
Dept: PHYSICAL THERAPY | Facility: CLINIC | Age: 44
End: 2024-07-12
Payer: COMMERCIAL

## 2024-07-12 DIAGNOSIS — Z98.890 S/P RIGHT ROTATOR CUFF REPAIR: Primary | ICD-10-CM

## 2024-07-12 PROCEDURE — 97140 MANUAL THERAPY 1/> REGIONS: CPT

## 2024-07-12 PROCEDURE — 97112 NEUROMUSCULAR REEDUCATION: CPT

## 2024-07-12 PROCEDURE — 97110 THERAPEUTIC EXERCISES: CPT

## 2024-07-12 NOTE — PROGRESS NOTES
Ambulatory Visit  Name: Nina Joseph      : 1980      MRN: [id number]  Encounter Provider: Lo Miles MD  Encounter Date: 2024   Encounter department: [facility]    Assessment & Plan   1. Folliculitis  -     amoxicillin (AMOXIL) 500 MG tablet; Take 1 tablet (500 mg total) by mouth 3 (three) times a day for 10 days  -     triamcinolone (KENALOG) 0.1 % cream; Apply topically 2 (two) times a day  -     CBC and differential; Future  2. Encounter for screening mammogram for breast cancer  -     Mammo screening bilateral w 3d & cad; Future; Expected date: 2024  -     CBC and differential; Future  3. Vitamin B6 deficiency  -     pyridoxine (VITAMIN B6) 100 mg tablet; Take 1 tablet (100 mg total) by mouth daily  -     CBC and differential; Future  -     Vitamin B6; Future  4. Vitamin B12 deficiency  -     cyanocobalamin injection 1,000 mcg  -     CBC and differential; Future  -     Vitamin B12; Future  5. Mixed hyperlipidemia  -     CBC and differential; Future  -     Comprehensive metabolic panel; Future  -     Lipid panel; Future  -     TSH, 3rd generation; Future  6. Folate deficiency  -     folic acid (FOLVITE) 1 mg tablet; Take 1 tablet (1 mg total) by mouth daily  -     CBC and differential; Future  -     Folate; Future  7. Vitamin D deficiency  -     Vitamin D 25 hydroxy; Future    Orders and recommendations as noted above.  Previous laboratory testing reviewed with her.  Vitamin deficiencies discussed.  Will begin with vitamin B12 injections every 2 weeks for 4 shots and then monthly thereafter.  Discussed supplementation of folic acid as well as pyridoxine.  Other laboratory testing reviewed with her.  Cholesterol elevated.  Watch diet.  Difficulty eating healthy because of financial issues.  Community programs/options discussed.  Continue with vitamin D supplementation.  Previous surgery on the right shoulder discussed.  She has been doing well from the  standpoint.  Rash on the arms and back discussed.  This appears to be folliculitis.  Try to stop scratching the area.  Antibiotic as noted.  Steroid cream as noted.  Watch for any worsening.  Will have her follow-up for routine visit in about 3 to 4 months with laboratory testing prior to that visit.  Follow-up sooner if needed.     History of Present Illness     She presents for routine follow-up.  Has generally been doing relatively well.  Has a lot of financial concerns.  Feels stressed at times.  Continues to follow-up with psychiatry.  Tolerating her medications without difficulty.  Does feel more tired and rundown.  She did recover from her previous right shoulder surgery without difficulty.  Has been noticing rash over her upper arms and back.  Skin will feel rough and itchy and she will get small pustules.  She will usually scratch these and they will become scabbed.  Denies any new exposures.  Appetite has been relatively stable although she admits she does not eat as healthy as she should.        Review of Systems   Constitutional:  Positive for appetite change and fatigue. Negative for activity change, chills and fever.   HENT:  Negative for congestion and rhinorrhea.    Eyes:  Negative for visual disturbance.   Respiratory:  Negative for chest tightness and shortness of breath.    Cardiovascular:  Negative for chest pain and palpitations.   Gastrointestinal:  Negative for abdominal pain, blood in stool, diarrhea, nausea and vomiting.   Endocrine: Negative for polydipsia, polyphagia and polyuria.   Genitourinary:  Negative for dysuria, frequency and urgency.   Musculoskeletal:  Positive for arthralgias. Negative for gait problem.   Skin:  Positive for rash. Negative for color change.   Neurological:  Negative for dizziness and headaches.   Hematological:  Does not bruise/bleed easily.   Psychiatric/Behavioral:  Positive for decreased concentration and dysphoric mood. Negative for confusion and sleep  disturbance. The patient is nervous/anxious.      Medical History Reviewed by provider this encounter:       Past Medical History   Past Medical History:   Diagnosis Date    Anxiety     Bipolar depression (HCC)     Chronic pelvic pain in female 2021    Endometrial hyperplasia without atypia 2021    Hypertension     Kidney stone     Menometrorrhagia 2021    Vertigo 2024     Past Surgical History:   Procedure Laterality Date     SECTION      EPIDURAL BLOCK INJECTION Right 10/19/2023    Procedure: Right C7-T1 ILESI;  Surgeon: Elsi Saunders MD;  Location: [facility];  Service: Pain Management     FL INJECTION RIGHT SHOULDER (ARTHROGRAM)  2024    HYSTERECTOMY  2021    [place] SURGICAL ARTHROSCOPY SHOULDER W/ROTATOR CUFF RPR Right 2024    Procedure: Right shoulder arthroscopy, rotator cuff repair;  Surgeon: Demond Cardenas MD;  Location: [facility];  Service: Orthopedics     Family History   Problem Relation Age of Onset    Heart disease Father     No Known Problems Sister     No Known Problems Daughter     No Known Problems Maternal Grandmother     No Known Problems Maternal Grandfather     No Known Problems Paternal Grandmother     No Known Problems Paternal Grandfather     No Known Problems Son     No Known Problems Son     No Known Problems Son     No Known Problems Maternal Aunt     No Known Problems Maternal Aunt     No Known Problems Paternal Aunt     No Known Problems Paternal Aunt      Current Outpatient Medications on File Prior to Visit   Medication Sig Dispense Refill    busPIRone (BUSPAR) 5 mg tablet       ciclopirox (PENLAC) 8 % solution Apply topically daily at bedtime 6.6 mL 0    fluticasone (FLONASE) 50 mcg/act nasal spray 1 spray into each nostril daily 11.1 mL 0    ketoconazole (NIZORAL) 2 % cream Apply topically daily 60 g 2    meloxicam (MOBIC) 15 mg tablet Take 1 tablet by mouth once daily 28 tablet 1    sertraline (ZOLOFT) 100 mg tablet Take 100 mg by  "mouth daily      topiramate (TOPAMAX) 25 mg tablet Take 25 mg by mouth 2 (two) times a day      hydrOXYzine HCL (ATARAX) 10 mg tablet TAKE 1/2 (ONE-HALF) TABLET BY MOUTH THREE TIMES DAILY AS NEEDED      ketoconazole (NIZORAL) 2 % cream APPLY  CREAM TOPICALLY ONCE DAILY 60 g 0    LORazepam (ATIVAN) 0.5 mg tablet Take 1-2 about 30 minutes prior to the MRI (Patient not taking: Reported on 7/11/2024) 2 tablet 0    ondansetron (ZOFRAN) 4 mg tablet Take 1 tablet (4 mg total) by mouth every 8 (eight) hours as needed for nausea or vomiting 10 tablet 0    oxyCODONE (Roxicodone) 5 immediate release tablet Take 1 tablet (5 mg total) by mouth every 4 (four) hours as needed for severe pain Max Daily Amount: 30 mg 10 tablet 0     No current facility-administered medications on file prior to visit.     Allergies   Allergen Reactions    Clonazepam Other (See Comments)     Excessive sedation  Vomiting    Methylphenidate Other (See Comments)     Hyperactivity\"    Latex Rash      Current Outpatient Medications on File Prior to Visit   Medication Sig Dispense Refill    busPIRone (BUSPAR) 5 mg tablet       ciclopirox (PENLAC) 8 % solution Apply topically daily at bedtime 6.6 mL 0    fluticasone (FLONASE) 50 mcg/act nasal spray 1 spray into each nostril daily 11.1 mL 0    ketoconazole (NIZORAL) 2 % cream Apply topically daily 60 g 2    meloxicam (MOBIC) 15 mg tablet Take 1 tablet by mouth once daily 28 tablet 1    sertraline (ZOLOFT) 100 mg tablet Take 100 mg by mouth daily      topiramate (TOPAMAX) 25 mg tablet Take 25 mg by mouth 2 (two) times a day      hydrOXYzine HCL (ATARAX) 10 mg tablet TAKE 1/2 (ONE-HALF) TABLET BY MOUTH THREE TIMES DAILY AS NEEDED      ketoconazole (NIZORAL) 2 % cream APPLY  CREAM TOPICALLY ONCE DAILY 60 g 0    LORazepam (ATIVAN) 0.5 mg tablet Take 1-2 about 30 minutes prior to the MRI (Patient not taking: Reported on 7/11/2024) 2 tablet 0    ondansetron (ZOFRAN) 4 mg tablet Take 1 tablet (4 mg total) by mouth " "every 8 (eight) hours as needed for nausea or vomiting 10 tablet 0    oxyCODONE (Roxicodone) 5 immediate release tablet Take 1 tablet (5 mg total) by mouth every 4 (four) hours as needed for severe pain Max Daily Amount: 30 mg 10 tablet 0     No current facility-administered medications on file prior to visit.      Social History     Tobacco Use    Smoking status: Some Days     Current packs/day: 0.50     Average packs/day: 0.5 packs/day for 28.0 years (14.0 ttl pk-yrs)     Types: Cigarettes    Smokeless tobacco: Never   Vaping Use    Vaping status: Never Used   Substance and Sexual Activity    Alcohol use: Never    Drug use: Never    Sexual activity: Not Currently     Objective     /82 (BP Location: Left arm, Patient Position: Sitting, Cuff Size: Large)   Pulse 83   Temp 98.3 °F (36.8 °C)   Ht 5' 3\" (1.6 m)   Wt 83.5 kg (184 lb)   LMP 12/25/2020   SpO2 97%   BMI 32.59 kg/m²     Physical Exam  Vitals and nursing note reviewed.   Constitutional:       General: She is not in acute distress.     Appearance: She is well-developed, well-groomed and overweight.   HENT:      Head: Normocephalic and atraumatic.      Comments: Poor dentition  Eyes:      General:         Right eye: No discharge.         Left eye: No discharge.      Conjunctiva/sclera: Conjunctivae normal.      Pupils: Pupils are equal, round, and reactive to light.   Cardiovascular:      Rate and Rhythm: Normal rate and regular rhythm.      Heart sounds: Normal heart sounds. No murmur heard.     No friction rub. No gallop.   Pulmonary:      Effort: No respiratory distress.      Breath sounds: No wheezing or rales.   Abdominal:      General: Bowel sounds are normal. There is no distension.      Tenderness: There is no abdominal tenderness.   Lymphadenopathy:      Cervical: No cervical adenopathy.   Skin:     General: Skin is warm and dry.      Comments: Excoriated areas upper arm right greater than left with scabbed areas over hair follicles; " similar area left back   Neurological:      Mental Status: She is alert and oriented to person, place, and time.   Psychiatric:         Mood and Affect: Mood is anxious.         Speech: Speech normal.         Behavior: Behavior normal. Behavior is cooperative.         Cognition and Memory: Cognition and memory normal.       Administrative Statements

## 2024-07-12 NOTE — PROGRESS NOTES
PT Re-Evaluation     Today's date: 2024  Patient name: Nina Joseph  : 1980  MRN: [id number]  Referring provider: Demond Cradenas MD  Dx:   Encounter Diagnosis     ICD-10-CM    1. S/P right rotator cuff repair  Z98.890               Start Time: 0800  Stop Time: 0850  Total time in clinic (min): 50 minutes    Assessment  Impairments: abnormal or restricted ROM, abnormal movement, activity intolerance, impaired physical strength, lacks appropriate home exercise program, pain with function, weight-bearing intolerance and poor posture   Symptom irritability: low    Assessment details: The patient is a 43 y.o. female presenting to Physical Therapy today s/p arthroscopy of the R shoulder with RTC repair performed on 24 with Dr. Cardenas. Patient has made steady progress in R shoulder AROM, R shoulder strength, and symptomatology. Upon completion of the PT re-evaluation the patient is demonstrating with limitations in R shoulder AROM, R shoulder strength, scapulothoracic control, R elbow mobility, R elbow strength, and pain. Patient is currently having difficulty with the performance of functional activities such as lifting, carrying, pushing, and pulling due to symptomatology. Patient would continue to benefit from skilled Physical Therapy services to address functional limitations to return to prior level of function.  Understanding of Dx/Px/POC: good     Prognosis: good    Goals  STG: (6 weeks)  1.) Patient will initiate HEP Independently MET  2.) Patient will have a 50% reduction in overall symptoms  PROGRESSING  3.) Patient will demonstrate improved strength evidenced by a 1-2 MMT grade increase PROGRESSING  4.) Patient will demonstrate improved R shoulder PROM evidenced by: R shoulder Flexion >/= 160 deg, R ER >/= 40 deg in the scapular plane MET   5.) Patient will initiate R shoulder AAROM/AROM MET  6.) Patient will discontinue utilization of sling MET    LTG: (12 weeks)   1.) Patient will be d/c to  an HEP independently PROGRESSING  2.) Patient will improve functional abilities evidenced by FOTO scores PROGRESSING  3.) Eliminate pain with functional activity  PROGRESSING  4.) Patient will demonstrate improved ability to lift, push, pull, and carry safely w/o symptoms  PROGRESSING  5.) Return to PLOF  PROGRESSING  6.) Patient will demonstrate improved ability to perform overhead activity >/= 2 minutes  PROGRESSING  7.) Patient will demonstrate improved R shoulder AROM evidenced by: R Shoulder Flexion/Abduction >/= 160 deg, R ER at 90 deg WNL, R IR at 90 deg WNL PROGRESSING    Plan  Patient would benefit from: skilled physical therapy  Referral necessary: No  Planned modality interventions: cryotherapy, thermotherapy: hydrocollator packs and TENS    Planned therapy interventions: functional ROM exercises, graded activity, graded exercise, graded motor, home exercise program, flexibility, joint mobilization, manual therapy, neuromuscular re-education, patient education, postural training, self care, strengthening, stretching, therapeutic activities, therapeutic exercise and therapeutic training    Frequency: 2x week  Duration in weeks: 6  Plan of Care beginning date: 7/12/2024  Plan of Care expiration date: 8/23/2024  Treatment plan discussed with: patient  Plan details: Plan of care was reviewed and discussed thoroughly with the patient on their current condition. Patient was instructed on a HEP with written instructions. Patient is in agreement with PT recommendations and will attend Physical Therapy 2x/week for the next 6 weeks to address current deficits.        Subjective Evaluation    History of Present Illness  Date of surgery: 4/30/2024  Mechanism of injury: surgery  Mechanism of injury: The patient reports today s/p R shoulder RTC arthroscopy performed on 4-30-24 by Dr. Cardenas. Patient states that her pain levels have been manageable. Patient utilizes ice for swelling and Tylenol PRN for symptoms in the  R shoulder. Patient has been compliant with wearing her sling. She followed up with Dr. Cardenas on 5/15/24 and had incision sites with Nylons replaced with Steri-Strips. She is now referred to OPPT.    Update 24: The patient reports today 6 weeks s/p R shoulder RTC arthroscopy. She notes experiencing pain in her R upper arm and in the R elbow. She states pain levels have been manageable. She notes being compliant with wearing her sling. She reports following up with Dr. Cardenas on .     Update 24: The patient reports today with a 70-75% improvement in function since start of Physical Therapy treatment. Patient notes activities such as brushing her hair, overhead activity, lifting, pushing, and pulling are still giving her difficulty. She is pleased with her progress to this point           Recurrent probem    Quality of life: good    Patient Goals  Patient goals for therapy: decreased edema, decreased pain, increased motion, increased strength, independence with ADLs/IADLs and return to sport/leisure activities    Pain  Current pain ratin  At best pain ratin  At worst pain ratin  Location: R Shoulder, R elbow  Quality: dull ache and burning  Relieving factors: medications and ice  Aggravating factors: overhead activity and lifting (carrying, pushing, pulling, ADL's.)    Social Support    Employment status: not working  Hand dominance: right    Treatments  Previous treatment: physical therapy  Current treatment: physical therapy      Objective     Postural Observations  Seated posture: poor  Standing posture: poor      Observations     Right Shoulder  Positive for incision. Negative for drainage.     Additional Observation Details  Incisions are healing well. No redness. No warmth. No drainage.    Palpation     Right   Tenderness of the biceps and supraspinatus.     Tenderness     Right Shoulder  No tenderness in the biceps tendon (proximal), bicipital groove, scapular spine and  supraspinatus tendon.     Neurological Testing     Sensation     Shoulder   Left Shoulder   Intact: light touch    Right Shoulder   Intact: Light touch    Additional Neurological Details  Patient denies any numbness or tingling into BUE's.    Active Range of Motion   Cervical/Thoracic Spine       Cervical    Flexion:  Restriction level: minimal  Extension:  Restriction level: minimal  Left lateral flexion:  Restriction level: moderate  Right lateral flexion:  Restriction level moderate  Left rotation:  Restriction level: moderate  Right rotation:  Restriction level: moderate  Left Shoulder   Normal active range of motion    Right Shoulder   Flexion: 175 degrees   Abduction: 180 degrees   External rotation 0°: WFL  External rotation 90°: 60 degrees  Internal rotation 0°: WFL  Internal rotation 90°: 70 degrees     Left Elbow   Normal active range of motion    Right Elbow   Flexion: WFL  Extension: -10 degrees     Passive Range of Motion     Right Shoulder   Flexion: WFL  Abduction: WFL  External rotation 90°: WFL  Internal rotation 90°: WFL    Scapular Mobility     Right Shoulder   Scapular Mobility with Shoulder to 90° FF   Upward rotation: inadequate  Downward rotation: inadequate    Scapular Mobility beyond 90° FF   Upward rotation: inadequate  Downward rotation: inadequate    Strength/Myotome Testing     Left Shoulder     Planes of Motion   Flexion: WFL   Abduction: WFL   External rotation at 0°: WFL   Internal rotation at 0°: WFL     Right Shoulder     Planes of Motion   Flexion: 3+   Abduction: 3+   External rotation at 0°: 3+   Internal rotation at 0°: 4-     Left Elbow   Normal strength    Right Elbow   Flexion: 3+  Extension: 3+    Additional Strength Details  Shoulder:   L IR 15 lbs  R IR 16 lbs   L ER 19 lbs  R ER 11 lbs   R Flx 5 lbs   L Flx 15 lbs    L ABD 13 lbs  R ABD 6 lbs     Tests     Additional Tests Details  No special tests were performed.             Precautions: CPS, s/p R RTC repair (4/30)          7/1 7/3 7/8 7/12 6/24 6/27   Manuals         R Shoulder PROM  BM  BM  BM  BM BM BM    R Elbow PROM  BM  BM     BM    Scapular Patterns all planes                   Neuro Re-Ed         MTP  L2 2x10  L3 2x10  L4 2x10  L4 2x10      LTP L2 2x10  L3 2x10  L4 2x10  L4 2x10      Scapular Stabilization w/ PB    X5 CW/CCW x5 X5 CW/CCW x 5     Serratus Anterior Activation w/ Foam Roll         Standing Scaption    1# 2x10  1# 2x10      PNF D2 Flx/Ext                   Ther Ex         AAROM Wand Flex 2x10  2x10 2x10 NV 2x10  2x10   AAROM Wand IR/ER 2x10 2x10  2x10  NV  X10  2x10   AAROM Wand ABD 2x10 2x10  2x10  2x10  x10 2x10    AAROM Pulleys Scaption 2x10 2x10 2x10 2x10  2x10  3x10   AAROM Pulleys Abduction  2x10 2x10 2x10 2x10     AAROM Pulleys Flexion  2x10  2x10 2x10  2x10     AAROM Wall Slides 2x10  2x10  2x10  2x10  X10  2X10    Front Shoulder Raise w/ TB  L1 2x10  L1 2x10  L1 2x10      Lateral Shoulder Raise w/ TB     L1 2x10      Serratus Anterior Raises   1lb 2x10  1lb 2x10  2 lb 2x10      TB IR L2 2x10  L3 2x10  L3 2x10  NV     TB ER L2 2x10  L3 2x10  L3 2x10  NV      UBE for muscular endurance 5' Forward 5' Backward L1.2, L1.3 5' Forward 5' Backward L1.3 5' Forward  5' Backward L1.3 5' Forward  5' Backward L1 5' Forward 5' Backward L1  5' Forward  5' Backward L2   Ther Activity                           Gait Training                           Modalities         Ice

## 2024-07-15 ENCOUNTER — OFFICE VISIT (OUTPATIENT)
Dept: PHYSICAL THERAPY | Facility: CLINIC | Age: 44
End: 2024-07-15
Payer: COMMERCIAL

## 2024-07-15 DIAGNOSIS — Z98.890 S/P RIGHT ROTATOR CUFF REPAIR: Primary | ICD-10-CM

## 2024-07-15 PROCEDURE — 97110 THERAPEUTIC EXERCISES: CPT

## 2024-07-15 PROCEDURE — 97140 MANUAL THERAPY 1/> REGIONS: CPT

## 2024-07-15 PROCEDURE — 97112 NEUROMUSCULAR REEDUCATION: CPT

## 2024-07-15 NOTE — PROGRESS NOTES
Daily Note     Today's date: 7/15/2024  Patient name: Nina Joseph  : 1980  MRN: [id number]  Referring provider: Demond Cardenas MD  Dx:   Encounter Diagnosis     ICD-10-CM    1. S/P right rotator cuff repair  Z98.890           Start Time: 1355  Stop Time: 1438  Total time in clinic (min): 43 minutes    Subjective: Patient reports no new complaints or issues with the R shoulder today.       Objective: See treatment diary below      Assessment: Tolerated treatment well. Patient is continuing to respond well to Physical Therapy treatment and is making appropriate progressions with the current directed therapeutic exercise program. We will continue to progress patient within tolerance. Patient demonstrated fatigue post treatment, exhibited good technique with therapeutic exercises, and would benefit from continued PT to address R shoulder AROM, R shoulder strength, scapulothoracic control, and symptomatology to meet functional goals.       Plan: Continue per plan of care.      Precautions: CPS, s/p R RTC repair ()         7/1 7/3 7/8 7/12 7/15 6/27   Manuals         R Shoulder PROM  BM  BM  BM  BM BM BM    R Elbow PROM  BM  BM     BM    Scapular Patterns all planes                   Neuro Re-Ed         MTP  L2 2x10  L3 2x10  L4 2x10  L4 2x10  L4 2x10     LTP L2 2x10  L3 2x10  L4 2x10  L4 2x10  L4 2x10     Scapular Stabilization w/ PB    X5 CW/CCW x5 X5 CW/CCW x 5 X5 CW/CCW x5     Serratus Anterior Activation w/ Foam Roll         Standing Scaption    1# 2x10  1# 2x10  1# 2x10     PNF D2 Flx/Ext                   Ther Ex         AAROM Wand Flex 2x10  2x10 2x10 NV 2x10  2x10   AAROM Wand IR/ER 2x10 2x10  2x10  NV  X10  2x10   AAROM Wand ABD 2x10 2x10  2x10  2x10  x10 2x10    AAROM Pulleys Scaption 2x10 2x10 2x10 2x10  2x10  3x10   AAROM Pulleys Abduction  2x10 2x10 2x10 2x10 2x10 2x10    AAROM Pulleys Flexion  2x10  2x10 2x10  2x10 2x10     AAROM Wall Slides 2x10  2x10  2x10  2x10  2x10  2X10    Front Shoulder  Raise w/ TB  L1 2x10  L1 2x10  L1 2x10  L1 2x10     Lateral Shoulder Raise w/ TB     L1 2x10  L1 2x10     Serratus Anterior Raises   1lb 2x10  1lb 2x10  2 lb 2x10  1lb 2x10     TB IR L2 2x10  L3 2x10  L3 2x10  NV L4 2x10     TB ER L2 2x10  L3 2x10  L3 2x10  NV  L3 2x10     UBE for muscular endurance 5' Forward 5' Backward L1.2, L1.3 5' Forward 5' Backward L1.3 5' Forward  5' Backward L1.3 5' Forward  5' Backward L1  5' Backward L1  5' Forward  5' Backward L2   Ther Activity                           Gait Training                           Modalities         Ice

## 2024-07-18 ENCOUNTER — OFFICE VISIT (OUTPATIENT)
Dept: PHYSICAL THERAPY | Facility: CLINIC | Age: 44
End: 2024-07-18
Payer: COMMERCIAL

## 2024-07-18 ENCOUNTER — CONSULT (OUTPATIENT)
Dept: OTOLARYNGOLOGY | Facility: CLINIC | Age: 44
End: 2024-07-18
Payer: COMMERCIAL

## 2024-07-18 ENCOUNTER — OFFICE VISIT (OUTPATIENT)
Dept: AUDIOLOGY | Facility: CLINIC | Age: 44
End: 2024-07-18
Payer: COMMERCIAL

## 2024-07-18 VITALS
SYSTOLIC BLOOD PRESSURE: 110 MMHG | DIASTOLIC BLOOD PRESSURE: 80 MMHG | HEART RATE: 61 BPM | BODY MASS INDEX: 32.28 KG/M2 | WEIGHT: 182.2 LBS | OXYGEN SATURATION: 96 % | RESPIRATION RATE: 16 BRPM | TEMPERATURE: 97.8 F | HEIGHT: 63 IN

## 2024-07-18 DIAGNOSIS — H81.11 BENIGN PAROXYSMAL POSITIONAL VERTIGO OF RIGHT EAR: ICD-10-CM

## 2024-07-18 DIAGNOSIS — R42 DIZZINESS: Primary | ICD-10-CM

## 2024-07-18 DIAGNOSIS — Z87.898 HISTORY OF VERTIGO: ICD-10-CM

## 2024-07-18 DIAGNOSIS — H90.3 SENSORY HEARING LOSS, BILATERAL: Primary | ICD-10-CM

## 2024-07-18 DIAGNOSIS — Z01.10 NORMAL HEARING EXAM: ICD-10-CM

## 2024-07-18 DIAGNOSIS — Z98.890 S/P RIGHT ROTATOR CUFF REPAIR: Primary | ICD-10-CM

## 2024-07-18 PROCEDURE — 97110 THERAPEUTIC EXERCISES: CPT

## 2024-07-18 PROCEDURE — 99203 OFFICE O/P NEW LOW 30 MIN: CPT | Performed by: PHYSICIAN ASSISTANT

## 2024-07-18 PROCEDURE — 92567 TYMPANOMETRY: CPT | Performed by: AUDIOLOGIST-HEARING AID FITTER

## 2024-07-18 PROCEDURE — 97112 NEUROMUSCULAR REEDUCATION: CPT

## 2024-07-18 PROCEDURE — 92557 COMPREHENSIVE HEARING TEST: CPT | Performed by: AUDIOLOGIST-HEARING AID FITTER

## 2024-07-18 NOTE — PROGRESS NOTES
[facility] Otolaryngology New Patient  visit      Nina Joseph is a 43 y.o. female who presents with a chief complaint of dizziness    Independent historian: none      Pertinent elements of the history:  Tripped and hit her head on New Year[place]  No LOC    Went to the ED  CT max/face performed, clear   Following this she started to experience brief vertigo   Described as a spinning sensation, associated with turning in bed     Was evaluated and treated at vestibular therapy   Right Epley performed   No longer having vertigo    No hearing changes or tinnitus with the episodes      Lately, she's been doing well   Doesn't feel as bad as before, but experiences lightheadedness when getting up from laying down  No vertigo    Started supplementing for:  B12-- injections started 07/11  B6   Folate     Mild cervical degenerative changes    No HTN  No BP meds      Review of any relevant imaging: images from any scan reviewed personally  Scans: MRI brain IAC 04/11/24: IMPRESSION: Normal appearance of the brain parenchyma and internal auditory canals.     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 No imaging findings to explain the patient's dizziness.    Labs:   Folate 5.3  B6: 2.2  B12: 144    Notes: PT notes Right Epley x2 performed     Review of Systems:  As above    PMHx:  Past Medical History:   Diagnosis Date    Anxiety     Bipolar depression (HCC)     Chronic pelvic pain in female 03/08/2021    Endometrial hyperplasia without atypia 02/25/2021    Hypertension     Kidney stone     Menometrorrhagia 03/08/2021    Vertigo 03/27/2024        FAMHx:  Family History   Problem Relation Age of Onset    Heart disease Father     No Known Problems Sister     No Known Problems Daughter     No Known Problems Maternal Grandmother     No Known Problems  "Maternal Grandfather     No Known Problems Paternal Grandmother     No Known Problems Paternal Grandfather     No Known Problems Son     No Known Problems Son     No Known Problems Son     No Known Problems Maternal Aunt     No Known Problems Maternal Aunt     No Known Problems Paternal Aunt     No Known Problems Paternal Aunt        SOCHx:  Social History     Socioeconomic History    Marital status: /Civil Union     Spouse name: None    Number of children: None    Years of education: None    Highest education level: None   Occupational History    None   Tobacco Use    Smoking status: Some Days     Current packs/day: 0.50     Average packs/day: 0.5 packs/day for 28.0 years (14.0 ttl pk-yrs)     Types: Cigarettes    Smokeless tobacco: Never   Vaping Use    Vaping status: Never Used   Substance and Sexual Activity    Alcohol use: Never    Drug use: Never    Sexual activity: Not Currently   Other Topics Concern    None   Social History Narrative    None     Social Determinants of Health     Financial Resource Strain: Not on file   Food Insecurity: Not on file   Transportation Needs: Not on file   Physical Activity: Not on file   Stress: Not on file   Social Connections: Unknown (6/18/2024)    Received from [email or web address]     How often do you feel lonely or isolated from those around you? (Adult - for ages 18 years and over): Not on file   Intimate Partner Violence: Not on file   Housing Stability: Not on file       Allergies:  Allergies   Allergen Reactions    Clonazepam Other (See Comments)     Excessive sedation  Vomiting    Methylphenidate Other (See Comments)     Hyperactivity\"    Latex Rash        MEDS:  Reviewed      Physical exam: (abnormal findings appear in bold and supercede any conflicting normal findings listed below)    /80   Pulse 61   Temp 97.8 °F (36.6 °C) (Temporal)   Resp 16   Ht 5' 3\" (1.6 m)   Wt 82.6 kg (182 lb 3.2 oz)   LMP 12/25/2020   SpO2 96%   BMI 32.28 " kg/m²     Constitutional:  Well developed, well nourished and groomed, in no acute distress.     Eyes:  Extra-ocular movements intact, pupils equally round and reactive to light and accommodation, the lids and conjunctivae are normal in appearance.    Head: Atraumatic, normocephalic, no visible scalp lesions, bony palpation unremarkable without stepoffs, parotid and submandibular salivary glands non-tender to palpation and without masses bilaterally.     Ears:  Auricles normal in appearance bilaterally, mastoid prominence non-tender, external auditory canals clear bilaterally, tympanic membranes intact bilaterally without evidence of middle ear effusion or masses, normal appearing ossicles.     Nose/Sinuses:  External appearance unremarkable, no maxillary or frontal sinus tenderness to palpation bilaterally. Anterior rhinoscopy demonstrates  pink mucosa. No polyps or other masses identified. Turbinates are non-edematous. No evidence of purulent drainage.    Oral Cavity:  Moist mucus membranes, gums and dentition unremarkable, no oral mucosal masses or lesions, floor of mouth soft, tongue mobile without masses or lesions.     Oropharynx:  Base of tongue soft and without masses, tonsils bilaterally unremarkable, soft palate mucosa unremarkable.     Neck:  No visible or palpable cervical lesions or lymphadenopathy, thyroid gland is normal in size and symmetry and without masses, normal laryngeal elevation with swallowing.     Cardiovascular:  Normal rate and rhythm, no palpable thrills, no jugulovenous distension observed.  Respiratory:  Normal respiratory effort without evidence of retractions or use of accessory muscles.  Integument:  Normal appearing without observed masses or lesions.  Neurologic:  Cranial nerves II-XII intact bilaterally.  Psychiatric:  Alert and oriented to time, place and person, normal affect.      Procedure:    Audiometry:  Audiogram ordered and reviewed with patient and audiologist. Normal  "hearing bilaterally. Word recognition 100% at 60 dB bilaterally. Tympanograms type A bilaterally.      Assessment:  1. Dizziness  Ambulatory Referral to Otolaryngology      2. History of vertigo        3. Benign paroxysmal positional vertigo of right ear        4. Normal hearing exam            Plan:  1. Nina Joseph is a 43 y.o. female with acute and chronic problems as above who presents for evaluation of vertigo and lightheadedness. BPPV treated in vestibular therapy with good control of symptoms. No longer having spinning, but she experiences positional lightheadedness.    Ears clear bilaterally.   Hearing within normal range. Excellent word rec with type A tymps.    Discussed pathophysiology of BPPV and treatment options. Patient may have recurrent episodes despite treatment. If patient continues to have BPPV despite at home exercises, would recommend returning to vestibular therapy. Patient is agreeable.     Reviewed MRI brain IAC 04/11/24 which did not demonstrate any CPA mass or explanation for the patient's dizziness. It did show congenital variants of the vertebral arteries with normal flow. Findings aren't suspected to be related to her dizziness.    I did encourage patient to have her PCP review the MRI. Likely doesn't need CTA head/neck at this point, but it's a consideration if the lightheadedness persists. We discussed lightheadedness which could be due to vitamin deficiency, c-spine process, orthostatic hypotension, vascular process.    Continue vit B12, B6 and folate supplementation.    Follow up PRN.        ** Please Note: Portions of the record may have been created with voice recognition software. Occasional wrong word or \"sound a like\" substitutions may have occurred due to the inherent limitations of voice recognition software. There may also be notations and random deletions of words or characters from malfunctioning software. Read the chart carefully and recognize, using context, where " substitutions/deletions have occurred.**

## 2024-07-18 NOTE — PROGRESS NOTES
ENT HEARING EVALUATION    Name:  Nina Joseph  :  1980  Age:  43 y.o.   MRN:  [id number]  Date of Evaluation: 24     HISTORY:    Reason for visit: Dizziness    Nina Joseph is being seen today for an evaluation of hearing as part of their ENT visit.   EVALUATION:    Otoscopy was completed during ENT visit.    Tympanometry:   Right Ear: Type A; normal middle ear pressure and static compliance    Left Ear: Type A; normal middle ear pressure and static compliance     Speech Audiometry:  Speech Reception (SRT)   Right Ear: 20 dB HL   Left Ear: 20 dB HL    Word Recognition Scores (WRS):  Right Ear: excellent (100 % correct)     Left Ear: excellent (100 % correct)   Stimuli: [id number]    Pure Tone Audiometry:  Conventional pure tone audiometry from 250 - 8000 Hz  was obtained with good reliability and revealed the following:     Right Ear: Normal hearing sensitivity   Left Ear: Normal hearing sensitivity      *see attached audiogram    IMPRESSIONS:   Normal hearing     RECOMMENDATIONS:  Consult ENT      Fausto Smith, CCC-A  Clinical Audiologist

## 2024-07-18 NOTE — PROGRESS NOTES
Review of Systems   Constitutional: Negative.    HENT:  Positive for ear discharge (ear itching).    Eyes: Negative.    Respiratory: Negative.     Cardiovascular: Negative.    Gastrointestinal: Negative.    Endocrine: Negative.    Musculoskeletal: Negative.    Skin: Negative.    Allergic/Immunologic: Negative.    Neurological:  Positive for dizziness.   Hematological: Negative.    Psychiatric/Behavioral: Negative.

## 2024-07-18 NOTE — PROGRESS NOTES
Daily Note     Today's date: 2024  Patient name: Nina Joseph  : 1980  MRN: [id number]  Referring provider: Demond Cardenas MD  Dx:   Encounter Diagnosis     ICD-10-CM    1. S/P right rotator cuff repair  Z98.890           Start Time: 1400  Stop Time: 1445  Total time in clinic (min): 45 minutes    Subjective: Patient reports a slight increase in soreness in the R shoulder since last treatment session.       Objective: See treatment diary below      Assessment: Tolerated treatment well. We progressed the current program with the addition of PNF D2 flexion and extension as well as R shoulder frontal plane AROM. Patient demonstrated good tolerance to progressions with a minimal increase in symptomatology. Patient demonstrated fatigue post treatment, exhibited good technique with therapeutic exercises, and would benefit from continued PT at this time.       Plan: Continue per plan of care.      Precautions: CPS, s/p R RTC repair ()          7/3 7/8 7/12 7/15 7/18   Manuals         R Shoulder PROM  BM  BM  BM  BM BM BM    R Elbow PROM  BM  BM        Scapular Patterns all planes                   Neuro Re-Ed         MTP  L2 2x10  L3 2x10  L4 2x10  L4 2x10  L4 2x10  L5 2x15   LTP L2 2x10  L3 2x10  L4 2x10  L4 2x10  L4 2x10  L5 2x10    Scapular Stabilization w/ PB    X5 CW/CCW x5 X5 CW/CCW x 5 X5 CW/CCW x5  NV    Serratus Anterior Activation w/ Foam Roll         Standing Scaption    1# 2x10  1# 2x10  1# 2x10  1# 2x10    PNF D2 Flx/Ext       2x10             Ther Ex         AAROM Wand Flex 2x10  2x10 2x10 NV 2x10  HEP    AAROM Wand IR/ER 2x10 2x10  2x10  NV  X10  HEP   AAROM Wand ABD 2x10 2x10  2x10  2x10  x10 HEP     AAROM Pulleys Scaption 2x10 2x10 2x10 2x10  2x10  HEP    AAROM Pulleys Abduction  2x10 2x10 2x10 2x10 2x10 HEP   AAROM Pulleys Flexion  2x10  2x10 2x10  2x10 2x10  HEP    AROM R Shoulder ABD      2x10    AAROM Wall Slides 2x10  2x10  2x10  2x10  2x10  2X10 w/ PB    Front Shoulder Raise w/ TB   L1 2x10  L1 2x10  L1 2x10  L1 2x10  L1 2x10    Lateral Shoulder Raise w/ TB     L1 2x10  L1 2x10  L1 2x10    Serratus Anterior Raises   1lb 2x10  1lb 2x10  2 lb 2x10  1lb 2x10  2lb 2x10    TB IR L2 2x10  L3 2x10  L3 2x10  NV L4 2x10  L5 2x10    TB ER L2 2x10  L3 2x10  L3 2x10  NV  L3 2x10  L4 2x10    UBE for muscular endurance 5' Forward 5' Backward L1.2, L1.3 5' Forward 5' Backward L1.3 5' Forward  5' Backward L1.3 5' Forward  5' Backward L1  5' Backward L1  5' Forward  5' Backward L2   Ther Activity                           Gait Training                           Modalities         Ice

## 2024-07-19 ENCOUNTER — TELEPHONE (OUTPATIENT)
Age: 44
End: 2024-07-19

## 2024-07-19 NOTE — TELEPHONE ENCOUNTER
"Patient needs \"after visit summary's\" for her visits from March- now. That, or a letter with the dates she has visited since March of 2024. She needs to submit it to social Security.      Fax: [phone]      Nina Joseph: [phone]  "

## 2024-07-19 NOTE — TELEPHONE ENCOUNTER
Pt called to confirm when she was seen by our office. She is applying for SSI and needs her records. Advised pt she will need to request medical records and I would warm transfer her to that department. Pt started to say something, said she would have to call me back and hung up.

## 2024-07-19 NOTE — TELEPHONE ENCOUNTER
Caller: patient    Doctor: WENDY    Reason for call: patient would like her OV summary faxed over    From 3/29/24 and 7/1/2024  FAX#   [phone]  Call back#:

## 2024-07-22 ENCOUNTER — APPOINTMENT (OUTPATIENT)
Dept: LAB | Facility: MEDICAL CENTER | Age: 44
End: 2024-07-22
Payer: COMMERCIAL

## 2024-07-22 DIAGNOSIS — L73.9 FOLLICULITIS: ICD-10-CM

## 2024-07-22 DIAGNOSIS — E53.8 FOLATE DEFICIENCY: ICD-10-CM

## 2024-07-22 DIAGNOSIS — Z12.31 ENCOUNTER FOR SCREENING MAMMOGRAM FOR BREAST CANCER: ICD-10-CM

## 2024-07-22 DIAGNOSIS — E78.2 MIXED HYPERLIPIDEMIA: ICD-10-CM

## 2024-07-22 DIAGNOSIS — E53.8 VITAMIN B12 DEFICIENCY: ICD-10-CM

## 2024-07-22 DIAGNOSIS — F34.1 PERSISTENT DEPRESSIVE DISORDER: ICD-10-CM

## 2024-07-22 DIAGNOSIS — Z79.899 ENCOUNTER FOR LONG-TERM (CURRENT) USE OF OTHER MEDICATIONS: Primary | ICD-10-CM

## 2024-07-22 DIAGNOSIS — E53.1 VITAMIN B6 DEFICIENCY: ICD-10-CM

## 2024-07-22 DIAGNOSIS — F41.1 GENERALIZED ANXIETY DISORDER: ICD-10-CM

## 2024-07-22 DIAGNOSIS — F43.10 POSTTRAUMATIC STRESS DISORDER: ICD-10-CM

## 2024-07-22 DIAGNOSIS — E55.9 VITAMIN D DEFICIENCY: ICD-10-CM

## 2024-07-22 LAB
25(OH)D3 SERPL-MCNC: 15.7 NG/ML (ref 30–100)
ALBUMIN SERPL BCG-MCNC: 4.1 G/DL (ref 3.5–5)
ALP SERPL-CCNC: 112 U/L (ref 34–104)
ALT SERPL W P-5'-P-CCNC: 84 U/L (ref 7–52)
ANION GAP SERPL CALCULATED.3IONS-SCNC: 7 MMOL/L (ref 4–13)
AST SERPL W P-5'-P-CCNC: 62 U/L (ref 13–39)
BASOPHILS # BLD AUTO: 0.09 THOUSANDS/ÂΜL (ref 0–0.1)
BASOPHILS NFR BLD AUTO: 1 % (ref 0–1)
BILIRUB SERPL-MCNC: 0.38 MG/DL (ref 0.2–1)
BUN SERPL-MCNC: 7 MG/DL (ref 5–25)
CALCIUM SERPL-MCNC: 9.3 MG/DL (ref 8.4–10.2)
CHLORIDE SERPL-SCNC: 94 MMOL/L (ref 96–108)
CHOLEST SERPL-MCNC: 295 MG/DL
CO2 SERPL-SCNC: 23 MMOL/L (ref 21–32)
CREAT SERPL-MCNC: 0.57 MG/DL (ref 0.6–1.3)
EOSINOPHIL # BLD AUTO: 0.29 THOUSAND/ÂΜL (ref 0–0.61)
EOSINOPHIL NFR BLD AUTO: 3 % (ref 0–6)
ERYTHROCYTE [DISTWIDTH] IN BLOOD BY AUTOMATED COUNT: 12.8 % (ref 11.6–15.1)
EST. AVERAGE GLUCOSE BLD GHB EST-MCNC: 108 MG/DL
FOLATE SERPL-MCNC: 7.1 NG/ML
GFR SERPL CREATININE-BSD FRML MDRD: 113 ML/MIN/1.73SQ M
GLUCOSE P FAST SERPL-MCNC: 105 MG/DL (ref 65–99)
HBA1C MFR BLD: 5.4 %
HCT VFR BLD AUTO: 47 % (ref 34.8–46.1)
HDLC SERPL-MCNC: 41 MG/DL
HGB BLD-MCNC: 15.8 G/DL (ref 11.5–15.4)
IMM GRANULOCYTES # BLD AUTO: 0.05 THOUSAND/UL (ref 0–0.2)
IMM GRANULOCYTES NFR BLD AUTO: 1 % (ref 0–2)
LDLC SERPL CALC-MCNC: 182 MG/DL (ref 0–100)
LYMPHOCYTES # BLD AUTO: 2.47 THOUSANDS/ÂΜL (ref 0.6–4.47)
LYMPHOCYTES NFR BLD AUTO: 28 % (ref 14–44)
MCH RBC QN AUTO: 33.3 PG (ref 26.8–34.3)
MCHC RBC AUTO-ENTMCNC: 33.6 G/DL (ref 31.4–37.4)
MCV RBC AUTO: 99 FL (ref 82–98)
MONOCYTES # BLD AUTO: 0.59 THOUSAND/ÂΜL (ref 0.17–1.22)
MONOCYTES NFR BLD AUTO: 7 % (ref 4–12)
NEUTROPHILS # BLD AUTO: 5.47 THOUSANDS/ÂΜL (ref 1.85–7.62)
NEUTS SEG NFR BLD AUTO: 60 % (ref 43–75)
NONHDLC SERPL-MCNC: 254 MG/DL
NRBC BLD AUTO-RTO: 0 /100 WBCS
PLATELET # BLD AUTO: 275 THOUSANDS/UL (ref 149–390)
PMV BLD AUTO: 10.7 FL (ref 8.9–12.7)
POTASSIUM SERPL-SCNC: 3.7 MMOL/L (ref 3.5–5.3)
PROT SERPL-MCNC: 6.9 G/DL (ref 6.4–8.4)
RBC # BLD AUTO: 4.74 MILLION/UL (ref 3.81–5.12)
SODIUM SERPL-SCNC: 124 MMOL/L (ref 135–147)
T4 FREE SERPL-MCNC: 0.7 NG/DL (ref 0.61–1.12)
TRIGL SERPL-MCNC: 361 MG/DL
TSH SERPL DL<=0.05 MIU/L-ACNC: 1.66 UIU/ML (ref 0.45–4.5)
VIT B12 SERPL-MCNC: 272 PG/ML (ref 180–914)
WBC # BLD AUTO: 8.96 THOUSAND/UL (ref 4.31–10.16)

## 2024-07-22 PROCEDURE — 36415 COLL VENOUS BLD VENIPUNCTURE: CPT

## 2024-07-22 PROCEDURE — 82607 VITAMIN B-12: CPT

## 2024-07-22 PROCEDURE — 82746 ASSAY OF FOLIC ACID SERUM: CPT

## 2024-07-22 PROCEDURE — 83036 HEMOGLOBIN GLYCOSYLATED A1C: CPT

## 2024-07-22 PROCEDURE — 84439 ASSAY OF FREE THYROXINE: CPT

## 2024-07-22 PROCEDURE — 80053 COMPREHEN METABOLIC PANEL: CPT

## 2024-07-22 PROCEDURE — 85025 COMPLETE CBC W/AUTO DIFF WBC: CPT

## 2024-07-22 PROCEDURE — 84207 ASSAY OF VITAMIN B-6: CPT

## 2024-07-22 PROCEDURE — 82306 VITAMIN D 25 HYDROXY: CPT

## 2024-07-22 PROCEDURE — 84443 ASSAY THYROID STIM HORMONE: CPT

## 2024-07-22 PROCEDURE — 80061 LIPID PANEL: CPT

## 2024-07-23 ENCOUNTER — OFFICE VISIT (OUTPATIENT)
Dept: PHYSICAL THERAPY | Facility: CLINIC | Age: 44
End: 2024-07-23
Payer: COMMERCIAL

## 2024-07-23 DIAGNOSIS — Z98.890 S/P RIGHT ROTATOR CUFF REPAIR: Primary | ICD-10-CM

## 2024-07-23 PROCEDURE — 97112 NEUROMUSCULAR REEDUCATION: CPT

## 2024-07-23 PROCEDURE — 97110 THERAPEUTIC EXERCISES: CPT

## 2024-07-23 NOTE — PROGRESS NOTES
Daily Note     Today's date: 2024  Patient name: Nina Joseph  : 1980  MRN: [id number]  Referring provider: Demond Cardenas MD  Dx:   Encounter Diagnosis     ICD-10-CM    1. S/P right rotator cuff repair  Z98.890           Start Time: 1525  Stop Time: 1607  Total time in clinic (min): 42 minutes    Subjective: Patient reports no new complaints or issues with the R shoulder.       Objective: See treatment diary below      Assessment: Tolerated treatment well. Patient demonstrated fatigue post treatment, exhibited good technique with therapeutic exercises, and would benefit from continued PT. We progressed the current program with the addition of scapulothoracic strengthening with good tolerance from patient. We will continue to progress patient within tolerance to return to functional independence.       Plan: Continue per plan of care.      Precautions: CPS, s/p R RTC repair ()         7/23 7/3 7/8 7/12 7/15 7/18   Manuals         R Shoulder PROM  BM  BM  BM  BM BM BM    R Elbow PROM  BM  BM        Scapular Patterns all planes                   Neuro Re-Ed         MTP  L2 2x10  L3 2x10  L4 2x10  L4 2x10  L4 2x10  L5 2x15   LTP L2 2x10  L3 2x10  L4 2x10  L4 2x10  L4 2x10  L5 2x10    Scapular Stabilization w/ PB  X5 CW/CCW x5   X5 CW/CCW x5 X5 CW/CCW x 5 X5 CW/CCW x5  NV    Serratus Anterior Activation w/ Foam Roll X10 L2         Standing Scaption  2# 2x10   1# 2x10  1# 2x10  1# 2x10  1# 2x10    PNF D2 Flx/Ext  1# 2x10      2x10             Ther Ex         AROM R Shoulder ABD 2x10      2x10    AAROM Wall Slides 2x10 w/ PB   2x10  2x10  2x10  2x10  2X10 w/ PB    Front Shoulder Raise w/ TB L1 2x10  L1 2x10  L1 2x10  L1 2x10  L1 2x10  L1 2x10    Lateral Shoulder Raise w/ TB  L1 2x10    L1 2x10  L1 2x10  L1 2x10    Serratus Anterior Raises  3lb 2x10  1lb 2x10  1lb 2x10  2 lb 2x10  1lb 2x10  2lb 2x10    TB IR L5 2x10  L3 2x10  L3 2x10  NV L4 2x10  L5 2x10    TB ER L4 2x10  L3 2x10  L3 2x10  NV  L3 2x10  L4  2x10    UBE for muscular endurance 5' Forward 5' Backward L1.5  5' Forward 5' Backward L1.3 5' Forward  5' Backward L1.3 5' Forward  5' Backward L1  5' Backward L1  5' Forward  5' Backward L2   Ther Activity                           Gait Training                           Modalities         Ice

## 2024-07-25 ENCOUNTER — OFFICE VISIT (OUTPATIENT)
Dept: OBGYN CLINIC | Facility: CLINIC | Age: 44
End: 2024-07-25

## 2024-07-25 ENCOUNTER — CLINICAL SUPPORT (OUTPATIENT)
Dept: INTERNAL MEDICINE CLINIC | Facility: CLINIC | Age: 44
End: 2024-07-25
Payer: COMMERCIAL

## 2024-07-25 VITALS
SYSTOLIC BLOOD PRESSURE: 120 MMHG | DIASTOLIC BLOOD PRESSURE: 85 MMHG | HEART RATE: 79 BPM | BODY MASS INDEX: 32.28 KG/M2 | HEIGHT: 63 IN

## 2024-07-25 DIAGNOSIS — E53.8 B12 DEFICIENCY: Primary | ICD-10-CM

## 2024-07-25 DIAGNOSIS — Z98.890 S/P RIGHT ROTATOR CUFF REPAIR: Primary | ICD-10-CM

## 2024-07-25 LAB — VIT B6 SERPL-MCNC: 26.9 UG/L (ref 3.4–65.2)

## 2024-07-25 PROCEDURE — 99024 POSTOP FOLLOW-UP VISIT: CPT | Performed by: STUDENT IN AN ORGANIZED HEALTH CARE EDUCATION/TRAINING PROGRAM

## 2024-07-25 PROCEDURE — 96372 THER/PROPH/DIAG INJ SC/IM: CPT

## 2024-07-25 RX ADMIN — CYANOCOBALAMIN 1000 MCG: 1000 INJECTION, SOLUTION INTRAMUSCULAR; SUBCUTANEOUS at 09:41

## 2024-07-25 NOTE — PROGRESS NOTES
Shoulder Post Operative Visit     Assesment:   43 y.o. female s/p surgical arthroscopy of the right shoulder with rotator cuff repair, DOS: 4/30/2024    Plan:  Patient is doing well 3 months status post above procedure.  She states she is having no pain.  She is not taking pain medications.  She is to work with physical therapy.  She is now completely out of the sling.  She is making continued improvement in terms of shoulder range of motion and strength.  She does have good forward flexion and internal rotation, but her external rotation is still limited.  She does have good strength overall considering how far out she is from surgery.  She is overall happy with her progress to date.  I encouraged her to continue with physical therapy focusing on improving her external rotation range of motion and working on continue to build up her strength.  I will see her back in 2 months for repeat evaluation. The patient demonstrated understanding of the discussion and was in agreement with the plan.  All of the questions were answered.  Patient can reach out to clinic with any questions or concerns at any time.      Post-Operative treatment:  Ice to shoulder 1-2 times daily, for 20 minutes at a time.  Continue with physical therapy - rotator cuff repair protocol.   Over-the-counter pain medications as needed    Imaging:  All imaging from today was reviewed by myself and explained to the patient.     Sling: Discontinued at this point    DVT Prophylaxis:  Ambulation    Follow up: 8 weeks    Patient was advised that if they have any fevers, chills, chest pain, shortness of breath, redness or drainage from the incision, please let our office know immediately.        Chief Complaint   Patient presents with    Right Shoulder - Follow-up       History of Present Illness:    The patient is a 43 y.o. female who is being evaluated post operatively 3 months status post rotator repair of her right shoulder.     Patient states that she is  "doing well.  She denies any pain in the shoulder, 0 out of 10 at worst.  She is not taking any pain medications.  She is now out of the sling.  She has been working with physical therapy.  She feels that she is making continued progress in terms of range of motion.  She is starting to work on strengthening.  She denies any numbness or tingling in the right upper extremity.     The patient denies any fevers, chills, calf pain, chest pain/shortness of breath, redness or drainage from the incision.     I have reviewed the past medical, surgical, social and family history, medications and allergies as documented in the EMR.      Review of systems: ROS is negative other than that noted in the HPI.  Constitutional: Negative for fatigue and fever.       Physical Exam:    Blood pressure 120/85, pulse 79, height 5' 3\" (1.6 m), last menstrual period 12/25/2020.    General/Constitutional: NAD, well developed, well nourished  HENT: Normocephalic, atraumatic  CV: Intact distal pulses, regular rate  Resp: No respiratory distress or labored breathing  GI: Soft and non-tender   Lymphatic: No lymphadenopathy palpated  Neuro: Alert and Oriented x 3, no focal deficits  Psych: Normal mood, normal affect, normal judgement, normal behavior  Skin: Warm, dry, no rashes, no erythema    right Shoulder focused exam:    Incisions well healed no erythema, no drainage, no dehiscence.    No tenderness to palpation   Passive range of motion: 170 degrees of forward flexion, 40 degrees ER, IR to 90 degrees with 90 degrees abduction  Rotator cuff strength: 4+/5 empty can, resisted ER and IR       UE NV Exam:   +2 Radial pulses   Sensation intact to light touch C5-T1 bilaterally  SILT median/ulnar/radial/axillary distributions  Radial/median/ulnar/PIN/AIN/axillary nerve motor intact    "

## 2024-07-29 ENCOUNTER — OFFICE VISIT (OUTPATIENT)
Dept: PHYSICAL THERAPY | Facility: CLINIC | Age: 44
End: 2024-07-29
Payer: COMMERCIAL

## 2024-07-29 ENCOUNTER — TELEPHONE (OUTPATIENT)
Age: 44
End: 2024-07-29

## 2024-07-29 DIAGNOSIS — Z98.890 S/P RIGHT ROTATOR CUFF REPAIR: Primary | ICD-10-CM

## 2024-07-29 PROCEDURE — 97112 NEUROMUSCULAR REEDUCATION: CPT

## 2024-07-29 PROCEDURE — 97110 THERAPEUTIC EXERCISES: CPT

## 2024-07-29 NOTE — PROGRESS NOTES
"Daily Note     Today's date: 2024  Patient name: Nina Joseph  : 1980  MRN: [id number]  Referring provider: Demond Cardenas MD  Dx:   Encounter Diagnosis     ICD-10-CM    1. S/P right rotator cuff repair  Z98.890           Start Time: 1012  Stop Time: 1100  Total time in clinic (min): 48 minutes    Subjective: Patient reports following up with Dr. Cardenas on . She notes her R shoulder feels stiff at times.      Objective: See treatment diary below      Assessment: Tolerated treatment well. We progressed the current program with the addition of prone R shoulder retractions, ER static stretching, and prone R shoulder extension with good tolerance from patient. We will continue to progress patient within tolerance to address functional deficits.  Patient demonstrated fatigue post treatment, exhibited good technique with therapeutic exercises, and would benefit from continued PT.      Plan: Continue per plan of care.      Precautions: CPS, s/p R RTC repair ()          7/8 7/12 7/15 7/18   Manuals         R Shoulder PROM  BM   BM  BM BM BM    R Elbow PROM  BM         Scapular Patterns all planes                   Neuro Re-Ed         MTP  L2 2x10  L5 2x10  L4 2x10  L4 2x10  L4 2x10  L5 2x15   LTP L2 2x10  L5 2x10  L4 2x10  L4 2x10  L4 2x10  L5 2x10    Scapular Stabilization w/ PB  X5 CW/CCW x5  X5 CW/CCW X5 CW/CCW x5 X5 CW/CCW x 5 X5 CW/CCW x5  NV    Serratus Anterior Activation w/ Foam Roll X10 L2  X10 L2        Standing Scaption  2# 2x10  2# 2x10  1# 2x10  1# 2x10  1# 2x10  1# 2x10    PNF D2 Flx/Ext  1# 2x10  1# 2x10     2x10             Ther Ex         AROM R Shoulder ABD 2x10  HEP     2x10    Seated ER Stretch w/ Pillow   10\" Hold x10        AAROM Wall Slides 2x10 w/ PB   2x10 w/ PB 2x10  2x10  2x10  2X10 w/ PB    Front Shoulder Raise w/ TB L1 2x10  L2 2x10  L1 2x10  L1 2x10  L1 2x10  L1 2x10    Lateral Shoulder Raise w/ TB  L1 2x10  L2 2x10   L1 2x10  L1 2x10  L1 2x10    Serratus Anterior " Raises  3lb 2x10  4lb 2x10  1lb 2x10  2 lb 2x10  1lb 2x10  2lb 2x10    TB IR L5 2x10  L5 2x10  L3 2x10  NV L4 2x10  L5 2x10    TB ER L4 2x10  L5 2x10  L3 2x10  NV  L3 2x10  L4 2x10    Prone Retractions   2x10        Prone R Shoulder Ext  2x10        UBE for muscular endurance 5' Forward 5' Backward L1.5  5' Forward 5' Backward L1.5 5' Forward  5' Backward L1.3 5' Forward  5' Backward L1  5' Backward L1  5' Forward  5' Backward L2   Ther Activity                           Gait Training                           Modalities         Ice

## 2024-07-30 ENCOUNTER — APPOINTMENT (OUTPATIENT)
Dept: PHYSICAL THERAPY | Facility: CLINIC | Age: 44
End: 2024-07-30
Payer: COMMERCIAL

## 2024-08-01 ENCOUNTER — OFFICE VISIT (OUTPATIENT)
Dept: PHYSICAL THERAPY | Facility: CLINIC | Age: 44
End: 2024-08-01
Payer: COMMERCIAL

## 2024-08-01 DIAGNOSIS — Z98.890 S/P RIGHT ROTATOR CUFF REPAIR: Primary | ICD-10-CM

## 2024-08-01 PROCEDURE — 97112 NEUROMUSCULAR REEDUCATION: CPT

## 2024-08-01 PROCEDURE — 97110 THERAPEUTIC EXERCISES: CPT

## 2024-08-01 NOTE — PROGRESS NOTES
"Daily Note     Today's date: 2024  Patient name: Nina Joseph  : 1980  MRN: [id number]  Referring provider: Demond Cardenas MD  Dx:   Encounter Diagnosis     ICD-10-CM    1. S/P right rotator cuff repair  Z98.890           Start Time: 1300  Stop Time: 1345  Total time in clinic (min): 45 minutes    Subjective: Patient reports R shoulder and R elbow soreness this afternoon.       Objective: See treatment diary below      Assessment: Tolerated treatment well. We progressed the current program with the addition of increased resistance. Patient demonstrated good tolerance to progressions with a minimal increase in symptomatology. Patient demonstrated fatigue post treatment, exhibited good technique with therapeutic exercises, and would benefit from continued PT at this time.       Plan: Continue per plan of care.      Precautions: CPS, s/p R RTC repair ()         7/23 7/29 8/1 7/12 7/15 7/18   Manuals         R Shoulder PROM  BM   BM  BM BM BM    R Elbow PROM  BM         Scapular Patterns all planes                   Neuro Re-Ed         MTP  L2 2x10  L5 2x10  L5 2x10  L4 2x10  L4 2x10  L5 2x15   LTP L2 2x10  L5 2x10  L5 2x10  L4 2x10  L4 2x10  L5 2x10    Scapular Stabilization w/ PB  X5 CW/CCW x5  X5 CW/CCW X5 CW/CCW x5 X5 CW/CCW x 5 X5 CW/CCW x5  NV    Serratus Anterior Activation w/ Foam Roll X10 L2  X10 L2  X10 L3       Standing Scaption  2# 2x10  2# 2x10  2# 2x10  1# 2x10  1# 2x10  1# 2x10    PNF D2 Flx/Ext  1# 2x10  1# 2x10  1# 2x10    2x10             Ther Ex         Seated ER Stretch w/ Pillow   10\" Hold x10  10\" Hold x10       AAROM Wall Slides 2x10 w/ PB   2x10 w/ PB 2x10 w/ PB  2x10  2x10  2X10 w/ PB    Front Shoulder Raise w/ TB L1 2x10  L2 2x10  L1 2x10  L1 2x10  L1 2x10  L1 2x10    Lateral Shoulder Raise w/ TB  L1 2x10  L2 2x10  L2 2x10  L1 2x10  L1 2x10  L1 2x10    Serratus Anterior Raises  3lb 2x10  4lb 2x10  4lb 2x10  2 lb 2x10  1lb 2x10  2lb 2x10    TB IR L5 2x10  L5 2x10  L5 2x10  NV L4 " 2x10  L5 2x10    TB ER L4 2x10  L5 2x10  L5 2x10  NV  L3 2x10  L4 2x10    Prone Retractions   2x10  2x10      Prone R Shoulder Ext  2x10  2x10      UBE for muscular endurance 5' Forward 5' Backward L1.5  5' Forward 5' Backward L1.5 5' Forward  5' Backward L1.3 5' Forward  5' Backward L1  5' Backward L1  5' Forward  5' Backward L2   Ther Activity                           Gait Training                           Modalities         Ice

## 2024-08-05 ENCOUNTER — OFFICE VISIT (OUTPATIENT)
Dept: PHYSICAL THERAPY | Facility: CLINIC | Age: 44
End: 2024-08-05
Payer: COMMERCIAL

## 2024-08-05 DIAGNOSIS — Z98.890 S/P RIGHT ROTATOR CUFF REPAIR: Primary | ICD-10-CM

## 2024-08-05 PROCEDURE — 97110 THERAPEUTIC EXERCISES: CPT

## 2024-08-05 PROCEDURE — 97112 NEUROMUSCULAR REEDUCATION: CPT

## 2024-08-05 NOTE — PROGRESS NOTES
"Daily Note     Today's date: 2024  Patient name: Nina Joseph  : 1980  MRN: [id number]  Referring provider: Demond Cardenas MD  Dx:   Encounter Diagnosis     ICD-10-CM    1. S/P right rotator cuff repair  Z98.890           Start Time: 1300  Stop Time: 1345  Total time in clinic (min): 45 minutes    Subjective: Patient reports continued improvement in R shoulder function.       Objective: See treatment diary below      Assessment: Tolerated treatment well. Patient continues to respond well to Physical Therapy treatment and is making appropriate progressions with the current program. We will continue to progress patient within tolerance to address functional limitations. Patient exhibited good technique with therapeutic exercises and would benefit from continued PT.      Plan: Continue per plan of care.      Precautions: CPS, s/p R RTC repair ()         7/23 7/29 8/1 8/5 7/15 7/18   Manuals         R Shoulder PROM  BM   BM  BM BM BM    R Elbow PROM  BM         Scapular Patterns all planes                   Neuro Re-Ed         MTP  L2 2x10  L5 2x10  L5 2x10  HEP  L4 2x10  L5 2x15   LTP L2 2x10  L5 2x10  L5 2x10  L5 2x10  L4 2x10  L5 2x10    Scapular Stabilization w/ PB  X5 CW/CCW x5  X5 CW/CCW X5 CW/CCW x5 X5 CW/CCW x5 X5 CW/CCW x5  NV    Serratus Anterior Activation w/ Foam Roll X10 L2  X10 L2  X10 L3  X10 L3     Standing Scaption  2# 2x10  2# 2x10  2# 2x10  2# 2x10  1# 2x10  1# 2x10    PNF D2 Flx/Ext  1# 2x10  1# 2x10  1# 2x10  1# 2x10   2x10             Ther Ex         Seated ER Stretch w/ Pillow   10\" Hold x10  10\" Hold x10  NV      AAROM Wall Slides 2x10 w/ PB   2x10 w/ PB 2x10 w/ PB  2x10  2x10  2X10 w/ PB    Front Shoulder Raise w/ TB L1 2x10  L2 2x10  L1 2x10  L2 2x10  L1 2x10  L1 2x10    Lateral Shoulder Raise w/ TB  L1 2x10  L2 2x10  L2 2x10  L2 2x10  L1 2x10  L1 2x10    Serratus Anterior Raises  3lb 2x10  4lb 2x10  4lb 2x10  4lb 2x10  1lb 2x10  2lb 2x10    TB IR L5 2x10  L5 2x10  L5 2x10  L5 " 2x10  L4 2x10  L5 2x10    TB ER L4 2x10  L5 2x10  L5 2x10   L5 2x10  L3 2x10  L4 2x10    Prone Retractions   2x10  2x10 2x10     Prone R Shoulder Ext  2x10  2x10 2x10     UBE for muscular endurance 5' Forward 5' Backward L1.5  5' Forward 5' Backward L1.5 5' Forward  5' Backward L1.3 5' Forward  5' Backward L1  5' Backward L1  5' Forward  5' Backward L2   Ther Activity                           Gait Training                           Modalities         Ice

## 2024-08-08 ENCOUNTER — OFFICE VISIT (OUTPATIENT)
Dept: PHYSICAL THERAPY | Facility: CLINIC | Age: 44
End: 2024-08-08
Payer: COMMERCIAL

## 2024-08-08 ENCOUNTER — CLINICAL SUPPORT (OUTPATIENT)
Dept: INTERNAL MEDICINE CLINIC | Facility: CLINIC | Age: 44
End: 2024-08-08
Payer: COMMERCIAL

## 2024-08-08 DIAGNOSIS — E53.8 B12 DEFICIENCY: Primary | ICD-10-CM

## 2024-08-08 DIAGNOSIS — Z98.890 S/P RIGHT ROTATOR CUFF REPAIR: Primary | ICD-10-CM

## 2024-08-08 PROCEDURE — 97110 THERAPEUTIC EXERCISES: CPT

## 2024-08-08 PROCEDURE — 97112 NEUROMUSCULAR REEDUCATION: CPT

## 2024-08-08 PROCEDURE — 96372 THER/PROPH/DIAG INJ SC/IM: CPT

## 2024-08-08 RX ADMIN — CYANOCOBALAMIN 1000 MCG: 1000 INJECTION, SOLUTION INTRAMUSCULAR; SUBCUTANEOUS at 11:42

## 2024-08-08 NOTE — PROGRESS NOTES
"Daily Note     Today's date: 2024  Patient name: Nina Joseph  : 1980  MRN: [id number]  Referring provider: Demond Cardenas MD  Dx:   Encounter Diagnosis     ICD-10-CM    1. S/P right rotator cuff repair  Z98.890           Start Time: 1000  Stop Time: 1050  Total time in clinic (min): 50 minutes    Subjective: Patient reports her R shoulder is continuing to improve.      Objective: See treatment diary below      Assessment: Tolerated treatment well. We will look to transition patient to an independent home exercise program next visit to continue managing her condition. Patient demonstrated fatigue post treatment, exhibited good technique with therapeutic exercises, and would benefit from continued PT.      Plan: Continue per plan of care.      Precautions: CPS, s/p R RTC repair ()            Manuals         R Shoulder PROM  BM   BM  BM BM BM    R Elbow PROM  BM         Scapular Patterns all planes                   Neuro Re-Ed         LTP L2 2x10  L5 2x10  L5 2x10  L5 2x10  L5 2x10  L5 2x10    Scapular Stabilization w/ PB  X5 CW/CCW x5  X5 CW/CCW X5 CW/CCW x5 X5 CW/CCW x5 X5 CW/CCW x5  NV    Serratus Anterior Activation w/ Foam Roll X10 L2  X10 L2  X10 L3  X10 L3 X10 L3     Standing Scaption  2# 2x10  2# 2x10  2# 2x10  2# 2x10  2# 2x10  1# 2x10    PNF D2 Flx/Ext  1# 2x10  1# 2x10  1# 2x10  1# 2x10  2# 2x10  2x10             Ther Ex         Seated ER Stretch w/ Pillow   10\" Hold x10  10\" Hold x10  NV  10\" Hold x10     AAROM Wall Slides 2x10 w/ PB   2x10 w/ PB 2x10 w/ PB  2x10  2x10 w/ PB  2X10 w/ PB    Front Shoulder Raise w/ TB L1 2x10  L2 2x10  L1 2x10  L2 2x10  L2 2x10  L1 2x10    Lateral Shoulder Raise w/ TB  L1 2x10  L2 2x10  L2 2x10  L2 2x10  L2 2x10  L1 2x10    Serratus Anterior Raises  3lb 2x10  4lb 2x10  4lb 2x10  4lb 2x10  4lb 2x10  2lb 2x10    TB IR L5 2x10  L5 2x10  L5 2x10  L5 2x10  L5 2x10  L5 2x10    TB ER L4 2x10  L5 2x10  L5 2x10   L5 2x10  L5 2x10  L4 2x10  "   Prone Retractions   2x10  2x10 2x10 2x10    Prone R Shoulder Ext  2x10  2x10 2x10 2x10    UBE for muscular endurance 5' Forward 5' Backward L1.5  5' Forward 5' Backward L1.5 5' Forward  5' Backward L1.3 5' Forward  5' Backward L1  5' Backward L1.6  5' Forward 5' L1.6  5' Forward  5' Backward L2   Ther Activity                           Gait Training                           Modalities         Ice

## 2024-08-09 ENCOUNTER — HOSPITAL ENCOUNTER (OUTPATIENT)
Dept: NEUROLOGY | Facility: CLINIC | Age: 44
End: 2024-08-09
Payer: COMMERCIAL

## 2024-08-09 ENCOUNTER — TELEPHONE (OUTPATIENT)
Dept: PAIN MEDICINE | Facility: CLINIC | Age: 44
End: 2024-08-09

## 2024-08-09 DIAGNOSIS — G89.4 CHRONIC PAIN SYNDROME: ICD-10-CM

## 2024-08-09 DIAGNOSIS — M54.12 CERVICAL RADICULOPATHY: ICD-10-CM

## 2024-08-09 PROCEDURE — 95886 MUSC TEST DONE W/N TEST COMP: CPT | Performed by: PSYCHIATRY & NEUROLOGY

## 2024-08-09 PROCEDURE — 95909 NRV CNDJ TST 5-6 STUDIES: CPT | Performed by: PSYCHIATRY & NEUROLOGY

## 2024-08-09 NOTE — TELEPHONE ENCOUNTER
Caller: pedrito Wood    Doctor: Kocher    Reason for call: pt was read the below message verbatim.  Pt has no other questions    Call back#: [phone]     Message from Barbara Kocher, CRNP sent at 8/9/2024  2:03 PM EDT -----  Please call patient and let her know that recent EMG of the right upper extremity was normal.

## 2024-08-09 NOTE — TELEPHONE ENCOUNTER
----- Message from Barbara Kocher, CRNP sent at 8/9/2024  2:03 PM EDT -----  Please call patient and let her know that recent EMG of the right upper extremity was normal.

## 2024-08-15 ENCOUNTER — OFFICE VISIT (OUTPATIENT)
Dept: PHYSICAL THERAPY | Facility: CLINIC | Age: 44
End: 2024-08-15
Payer: COMMERCIAL

## 2024-08-15 DIAGNOSIS — Z98.890 S/P RIGHT ROTATOR CUFF REPAIR: Primary | ICD-10-CM

## 2024-08-15 DIAGNOSIS — Z12.31 ENCOUNTER FOR SCREENING MAMMOGRAM FOR BREAST CANCER: ICD-10-CM

## 2024-08-15 PROCEDURE — 97110 THERAPEUTIC EXERCISES: CPT

## 2024-08-15 PROCEDURE — 97112 NEUROMUSCULAR REEDUCATION: CPT

## 2024-08-15 NOTE — PROGRESS NOTES
PT Discharge    Today's date: 8/15/2024  Patient name: Nina Joseph  : 1980  MRN: [id number]  Referring provider: Demond Cardenas MD  Dx:   Encounter Diagnosis     ICD-10-CM    1. S/P right rotator cuff repair  Z98.890               Start Time: 1400  Stop Time: 1450  Total time in clinic (min): 50 minutes    Assessment    Assessment details: The patient presents to Physical Therapy today with a notable improvement in symptomatology and function in the R shoulder. Patient is happy with her progress to this point and feels that her condition is much more manageable. We will d/c patient today from Physical Therapy services and transition to an independent HEP to continue managing her condition. Patient was encouraged to call the office if she has any questions or concerns.   Understanding of Dx/Px/POC: good     Prognosis: good    Goals  STG: (6 weeks)  1.) Patient will initiate HEP Independently MET  2.) Patient will have a 50% reduction in overall symptoms  MET  3.) Patient will demonstrate improved strength evidenced by a 1-2 MMT grade increase MET  4.) Patient will demonstrate improved R shoulder PROM evidenced by: R shoulder Flexion >/= 160 deg, R ER >/= 40 deg in the scapular plane MET   5.) Patient will initiate R shoulder AAROM/AROM MET  6.) Patient will discontinue utilization of sling MET    LTG: (12 weeks)   1.) Patient will be d/c to an HEP independently MET  2.) Patient will improve functional abilities evidenced by FOTO scores MET  3.) Eliminate pain with functional activity  MET  4.) Patient will demonstrate improved ability to lift, push, pull, and carry safely w/o symptoms  MET  5.) Return to PLOF  MET  6.) Patient will demonstrate improved ability to perform overhead activity >/= 2 minutes  MET  7.) Patient will demonstrate improved R shoulder AROM evidenced by: R Shoulder Flexion/Abduction >/= 160 deg, R ER at 90 deg WNL, R IR at 90 deg WNL MET    Plan    Frequency: 2x week  Duration in weeks:  6  Plan of Care beginning date: 7/12/2024  Plan of Care expiration date: 8/23/2024  Treatment plan discussed with: patient  Plan details: Plan of care was reviewed and discussed thoroughly with the patient on their current condition. Patient was instructed on a HEP with written instructions. We will d/c patient today from Physical Therapy services and transition to an independent HEP to continue managing her condition. Patient was encouraged to call the office if she has any questions or concerns.       Subjective Evaluation    History of Present Illness  Date of surgery: 4/30/2024  Mechanism of injury: surgery  Mechanism of injury: The patient reports today s/p R shoulder RTC arthroscopy performed on 4-30-24 by Dr. Cardenas. Patient states that her pain levels have been manageable. Patient utilizes ice for swelling and Tylenol PRN for symptoms in the R shoulder. Patient has been compliant with wearing her sling. She followed up with Dr. Cardenas on 5/15/24 and had incision sites with Nylons replaced with Steri-Strips. She is now referred to OPPT.    Update 6-12-24: The patient reports today 6 weeks s/p R shoulder RTC arthroscopy. She notes experiencing pain in her R upper arm and in the R elbow. She states pain levels have been manageable. She notes being compliant with wearing her sling. She reports following up with Dr. Cardenas on 6/13.     Update 7-12-24: The patient reports today with a 70-75% improvement in function since start of Physical Therapy treatment. Patient notes activities such as brushing her hair, overhead activity, lifting, pushing, and pulling are still giving her difficulty. She is pleased with her progress to this point.    Update 8-15-24: The patient reports today with an overall improvement in function since the start of Physical Therapy treatment. She feels that her condition is much more manageable and would like to transition to an independent home exercise program. She is pleased with her  progress to this point.          Recurrent probem    Quality of life: good    Patient Goals  Patient goals for therapy: decreased edema, decreased pain, increased motion, increased strength, independence with ADLs/IADLs and return to sport/leisure activities  Patient goal: Goals have been met.  Pain  Current pain ratin  At best pain ratin  At worst pain ratin    Social Support    Employment status: not working  Hand dominance: right    Treatments  Previous treatment: physical therapy  Current treatment: physical therapy      Objective     Postural Observations  Seated posture: good  Standing posture: good      Observations     Right Shoulder  Positive for incision. Negative for drainage.     Tenderness     Right Shoulder  No tenderness in the biceps tendon (proximal), bicipital groove, scapular spine and supraspinatus tendon.     Neurological Testing     Sensation     Shoulder   Left Shoulder   Intact: light touch    Right Shoulder   Intact: Light touch    Additional Neurological Details  Patient denies any numbness or tingling into BUE's.    Active Range of Motion   Cervical/Thoracic Spine       Cervical    Flexion:  WFL  Extension:  WFL  Left lateral flexion:  WFL  Right lateral flexion:  WFL  Left rotation:  WFL  Right rotation:  WFL  Left Shoulder   Normal active range of motion    Right Shoulder   Flexion: WFL  Abduction: WFL  External rotation 0°: WFL  External rotation 90°: WFL  Internal rotation 0°: WFL  Internal rotation 90°: WFL    Left Elbow   Normal active range of motion    Right Elbow   Flexion: WFL  Extension: -10 degrees     Passive Range of Motion     Right Shoulder   Flexion: WFL  Abduction: WFL  External rotation 90°: WFL  Internal rotation 90°: WFL    Strength/Myotome Testing     Left Shoulder     Planes of Motion   Flexion: WFL   Abduction: WFL   External rotation at 0°: WFL   Internal rotation at 0°: WFL     Right Shoulder     Planes of Motion   Flexion: WFL   Abduction: WFL  "  External rotation at 0°: WFL   Internal rotation at 0°: WFL     Left Elbow   Normal strength    Right Elbow   Flexion: WFL  Extension: WFL    Tests     Additional Tests Details  No special tests were performed.             Precautions: CPS, s/p R RTC repair (4/30)       7/23 7/29 8/1 8/5 8/8 8/15   Manuals         R Shoulder PROM  BM   BM  BM BM BM    R Elbow PROM  BM         Scapular Patterns all planes                   Neuro Re-Ed         LTP L2 2x10  L5 2x10  L5 2x10  L5 2x10  L5 2x10  L5 2x10    Scapular Stabilization w/ PB  X5 CW/CCW x5  X5 CW/CCW X5 CW/CCW x5 X5 CW/CCW x5 X5 CW/CCW x5  X5 CW/CCW x5    Serratus Anterior Activation w/ Foam Roll X10 L2  X10 L2  X10 L3  X10 L3 X10 L3  X10 L3    Standing Scaption  2# 2x10  2# 2x10  2# 2x10  2# 2x10  2# 2x10  2# 2x10    PNF D2 Flx/Ext  1# 2x10  1# 2x10  1# 2x10  1# 2x10  2# 2x10  2# 2x10             Ther Ex         Seated ER Stretch w/ Pillow   10\" Hold x10  10\" Hold x10  NV  10\" Hold x10     AAROM Wall Slides 2x10 w/ PB   2x10 w/ PB 2x10 w/ PB  2x10  2x10 w/ PB  2X10 w/ PB    Front Shoulder Raise w/ TB L1 2x10  L2 2x10  L1 2x10  L2 2x10  L2 2x10  L1 2x10    Lateral Shoulder Raise w/ TB  L1 2x10  L2 2x10  L2 2x10  L2 2x10  L2 2x10  L1 2x10    Serratus Anterior Raises  3lb 2x10  4lb 2x10  4lb 2x10  4lb 2x10  4lb 2x10  2lb 2x10    TB IR L5 2x10  L5 2x10  L5 2x10  L5 2x10  L5 2x10  L5 2x10    TB ER L4 2x10  L5 2x10  L5 2x10   L5 2x10  L5 2x10  L4 2x10    Prone Retractions   2x10  2x10 2x10 2x10 2x10    Prone R Shoulder Ext  2x10  2x10 2x10 2x10 2x10    UBE for muscular endurance 5' Forward 5' Backward L1.5  5' Forward 5' Backward L1.5 5' Forward  5' Backward L1.3 5' Forward  5' Backward L1  5' Backward L1.6  5' Forward 5' L1.6  5' Forward  5' Backward L2   Ther Activity                           Gait Training                           Modalities         Ice                         "

## 2024-08-15 NOTE — PROGRESS NOTES
PT Re-Evaluation     Today's date: 8/15/2024  Patient name: Nina Joseph  : 1980  MRN: [id number]  Referring provider: Demond Cardenas MD  Dx:   Encounter Diagnosis     ICD-10-CM    1. S/P right rotator cuff repair  Z98.890       2. Encounter for screening mammogram for breast cancer  Z12.31 Mammo screening bilateral w 3d & cad                         Assessment  Impairments: abnormal or restricted ROM, abnormal movement, activity intolerance, impaired physical strength, lacks appropriate home exercise program, pain with function, weight-bearing intolerance and poor posture   Symptom irritability: low    Assessment details: The patient is a 43 y.o. female presenting to Physical Therapy today s/p arthroscopy of the R shoulder with RTC repair performed on 24 with Dr. Cardenas. Patient has made steady progress in R shoulder AROM, R shoulder strength, and symptomatology. Upon completion of the PT re-evaluation the patient is demonstrating with limitations in R shoulder AROM, R shoulder strength, scapulothoracic control, R elbow mobility, R elbow strength, and pain. Patient is currently having difficulty with the performance of functional activities such as lifting, carrying, pushing, and pulling due to symptomatology. Patient would continue to benefit from skilled Physical Therapy services to address functional limitations to return to prior level of function.  Understanding of Dx/Px/POC: good     Prognosis: good    Goals  STG: (6 weeks)  1.) Patient will initiate HEP Independently MET  2.) Patient will have a 50% reduction in overall symptoms  PROGRESSING  3.) Patient will demonstrate improved strength evidenced by a 1-2 MMT grade increase PROGRESSING  4.) Patient will demonstrate improved R shoulder PROM evidenced by: R shoulder Flexion >/= 160 deg, R ER >/= 40 deg in the scapular plane MET   5.) Patient will initiate R shoulder AAROM/AROM MET  6.) Patient will discontinue utilization of sling MET    LTG:  (12 weeks)   1.) Patient will be d/c to an HEP independently PROGRESSING  2.) Patient will improve functional abilities evidenced by FOTO scores PROGRESSING  3.) Eliminate pain with functional activity  PROGRESSING  4.) Patient will demonstrate improved ability to lift, push, pull, and carry safely w/o symptoms  PROGRESSING  5.) Return to PLOF  PROGRESSING  6.) Patient will demonstrate improved ability to perform overhead activity >/= 2 minutes  PROGRESSING  7.) Patient will demonstrate improved R shoulder AROM evidenced by: R Shoulder Flexion/Abduction >/= 160 deg, R ER at 90 deg WNL, R IR at 90 deg WNL PROGRESSING    Plan  Patient would benefit from: skilled physical therapy  Referral necessary: No  Planned modality interventions: cryotherapy, thermotherapy: hydrocollator packs and TENS    Planned therapy interventions: functional ROM exercises, graded activity, graded exercise, graded motor, home exercise program, flexibility, joint mobilization, manual therapy, neuromuscular re-education, patient education, postural training, self care, strengthening, stretching, therapeutic activities, therapeutic exercise and therapeutic training    Frequency: 2x week  Duration in weeks: 6  Plan of Care beginning date: 7/12/2024  Plan of Care expiration date: 8/23/2024  Treatment plan discussed with: patient  Plan details: Plan of care was reviewed and discussed thoroughly with the patient on their current condition. Patient was instructed on a HEP with written instructions. Patient is in agreement with PT recommendations and will attend Physical Therapy 2x/week for the next 6 weeks to address current deficits.          Subjective Evaluation    History of Present Illness  Date of surgery: 4/30/2024  Mechanism of injury: surgery  Mechanism of injury: The patient reports today s/p R shoulder RTC arthroscopy performed on 4-30-24 by Dr. Cardenas. Patient states that her pain levels have been manageable. Patient utilizes ice for  swelling and Tylenol PRN for symptoms in the R shoulder. Patient has been compliant with wearing her sling. She followed up with Dr. Cardenas on 5/15/24 and had incision sites with Nylons replaced with Steri-Strips. She is now referred to OPPT.    Update 24: The patient reports today 6 weeks s/p R shoulder RTC arthroscopy. She notes experiencing pain in her R upper arm and in the R elbow. She states pain levels have been manageable. She notes being compliant with wearing her sling. She reports following up with Dr. Cardenas on .     Update 24: The patient reports today with a 70-75% improvement in function since start of Physical Therapy treatment. Patient notes activities such as brushing her hair, overhead activity, lifting, pushing, and pulling are still giving her difficulty. She is pleased with her progress to this point           Recurrent probem    Quality of life: good    Patient Goals  Patient goals for therapy: decreased edema, decreased pain, increased motion, increased strength, independence with ADLs/IADLs and return to sport/leisure activities    Pain  Current pain ratin  At best pain ratin  At worst pain ratin  Location: R Shoulder, R elbow  Quality: dull ache and burning  Relieving factors: medications and ice  Aggravating factors: overhead activity and lifting (carrying, pushing, pulling, ADL's.)    Social Support    Employment status: not working  Hand dominance: right    Treatments  Previous treatment: physical therapy  Current treatment: physical therapy        Objective     Postural Observations  Seated posture: poor  Standing posture: poor      Observations     Right Shoulder  Positive for incision. Negative for drainage.     Additional Observation Details  Incisions are healing well. No redness. No warmth. No drainage.    Palpation     Right   Tenderness of the biceps and supraspinatus.     Tenderness     Right Shoulder  No tenderness in the biceps tendon (proximal),  bicipital groove, scapular spine and supraspinatus tendon.     Neurological Testing     Sensation     Shoulder   Left Shoulder   Intact: light touch    Right Shoulder   Intact: Light touch    Additional Neurological Details  Patient denies any numbness or tingling into BUE's.    Active Range of Motion   Cervical/Thoracic Spine       Cervical    Flexion:  Restriction level: minimal  Extension:  Restriction level: minimal  Left lateral flexion:  Restriction level: moderate  Right lateral flexion:  Restriction level moderate  Left rotation:  Restriction level: moderate  Right rotation:  Restriction level: moderate  Left Shoulder   Normal active range of motion    Right Shoulder   Flexion: 175 degrees   Abduction: 180 degrees   External rotation 0°: WFL  External rotation 90°: 60 degrees  Internal rotation 0°: WFL  Internal rotation 90°: 70 degrees     Left Elbow   Normal active range of motion    Right Elbow   Flexion: WFL  Extension: -10 degrees     Passive Range of Motion     Right Shoulder   Flexion: WFL  Abduction: WFL  External rotation 90°: WFL  Internal rotation 90°: WFL    Scapular Mobility     Right Shoulder   Scapular Mobility with Shoulder to 90° FF   Upward rotation: inadequate  Downward rotation: inadequate    Scapular Mobility beyond 90° FF   Upward rotation: inadequate  Downward rotation: inadequate    Strength/Myotome Testing     Left Shoulder     Planes of Motion   Flexion: WFL   Abduction: WFL   External rotation at 0°: WFL   Internal rotation at 0°: WFL     Right Shoulder     Planes of Motion   Flexion: 3+   Abduction: 3+   External rotation at 0°: 3+   Internal rotation at 0°: 4-     Left Elbow   Normal strength    Right Elbow   Flexion: 3+  Extension: 3+    Additional Strength Details  Shoulder:   L IR 15 lbs  R IR 16 lbs   L ER 19 lbs  R ER 11 lbs   R Flx 5 lbs   L Flx 15 lbs    L ABD 13 lbs  R ABD 6 lbs     Tests     Additional Tests Details  No special tests were performed.            "  Precautions: CPS, s/p R RTC repair (4/30)       7/23 7/29 8/1 8/5 8/8 8/15   Manuals         R Shoulder PROM  BM   BM  BM BM BM    R Elbow PROM  BM         Scapular Patterns all planes                   Neuro Re-Ed         LTP L2 2x10  L5 2x10  L5 2x10  L5 2x10  L5 2x10  L5 2x10    Scapular Stabilization w/ PB  X5 CW/CCW x5  X5 CW/CCW X5 CW/CCW x5 X5 CW/CCW x5 X5 CW/CCW x5  NV    Serratus Anterior Activation w/ Foam Roll X10 L2  X10 L2  X10 L3  X10 L3 X10 L3     Standing Scaption  2# 2x10  2# 2x10  2# 2x10  2# 2x10  2# 2x10  1# 2x10    PNF D2 Flx/Ext  1# 2x10  1# 2x10  1# 2x10  1# 2x10  2# 2x10  2x10             Ther Ex         Seated ER Stretch w/ Pillow   10\" Hold x10  10\" Hold x10  NV  10\" Hold x10     AAROM Wall Slides 2x10 w/ PB   2x10 w/ PB 2x10 w/ PB  2x10  2x10 w/ PB  2X10 w/ PB    Front Shoulder Raise w/ TB L1 2x10  L2 2x10  L1 2x10  L2 2x10  L2 2x10  L1 2x10    Lateral Shoulder Raise w/ TB  L1 2x10  L2 2x10  L2 2x10  L2 2x10  L2 2x10  L1 2x10    Serratus Anterior Raises  3lb 2x10  4lb 2x10  4lb 2x10  4lb 2x10  4lb 2x10  2lb 2x10    TB IR L5 2x10  L5 2x10  L5 2x10  L5 2x10  L5 2x10  L5 2x10    TB ER L4 2x10  L5 2x10  L5 2x10   L5 2x10  L5 2x10  L4 2x10    Prone Retractions   2x10  2x10 2x10 2x10    Prone R Shoulder Ext  2x10  2x10 2x10 2x10    UBE for muscular endurance 5' Forward 5' Backward L1.5  5' Forward 5' Backward L1.5 5' Forward  5' Backward L1.3 5' Forward  5' Backward L1  5' Backward L1.6  5' Forward 5' L1.6  5' Forward  5' Backward L2   Ther Activity                           Gait Training                           Modalities         Ice                             "

## 2024-08-22 ENCOUNTER — OFFICE VISIT (OUTPATIENT)
Dept: INTERNAL MEDICINE CLINIC | Facility: CLINIC | Age: 44
End: 2024-08-22
Payer: COMMERCIAL

## 2024-08-22 ENCOUNTER — APPOINTMENT (OUTPATIENT)
Dept: LAB | Facility: MEDICAL CENTER | Age: 44
End: 2024-08-22
Payer: COMMERCIAL

## 2024-08-22 ENCOUNTER — APPOINTMENT (OUTPATIENT)
Dept: PHYSICAL THERAPY | Facility: CLINIC | Age: 44
End: 2024-08-22
Payer: COMMERCIAL

## 2024-08-22 DIAGNOSIS — E53.8 FOLATE DEFICIENCY: ICD-10-CM

## 2024-08-22 DIAGNOSIS — Z01.419 ENCOUNTER FOR GYNECOLOGICAL EXAMINATION: ICD-10-CM

## 2024-08-22 DIAGNOSIS — E87.1 HYPONATREMIA: Primary | ICD-10-CM

## 2024-08-22 DIAGNOSIS — E87.1 HYPONATREMIA: ICD-10-CM

## 2024-08-22 DIAGNOSIS — E53.1 VITAMIN B6 DEFICIENCY: ICD-10-CM

## 2024-08-22 DIAGNOSIS — E53.8 VITAMIN B12 DEFICIENCY: ICD-10-CM

## 2024-08-22 LAB
ANION GAP SERPL CALCULATED.3IONS-SCNC: 9 MMOL/L (ref 4–13)
BUN SERPL-MCNC: 8 MG/DL (ref 5–25)
CALCIUM SERPL-MCNC: 9.3 MG/DL (ref 8.4–10.2)
CHLORIDE SERPL-SCNC: 104 MMOL/L (ref 96–108)
CO2 SERPL-SCNC: 24 MMOL/L (ref 21–32)
CREAT SERPL-MCNC: 0.6 MG/DL (ref 0.6–1.3)
GFR SERPL CREATININE-BSD FRML MDRD: 111 ML/MIN/1.73SQ M
GLUCOSE P FAST SERPL-MCNC: 80 MG/DL (ref 65–99)
POTASSIUM SERPL-SCNC: 4.2 MMOL/L (ref 3.5–5.3)
SODIUM SERPL-SCNC: 137 MMOL/L (ref 135–147)

## 2024-08-22 PROCEDURE — 80048 BASIC METABOLIC PNL TOTAL CA: CPT

## 2024-08-22 PROCEDURE — 36415 COLL VENOUS BLD VENIPUNCTURE: CPT

## 2024-08-22 PROCEDURE — 96372 THER/PROPH/DIAG INJ SC/IM: CPT | Performed by: FAMILY MEDICINE

## 2024-08-22 PROCEDURE — 99213 OFFICE O/P EST LOW 20 MIN: CPT | Performed by: FAMILY MEDICINE

## 2024-08-22 RX ADMIN — CYANOCOBALAMIN 1000 MCG: 1000 INJECTION, SOLUTION INTRAMUSCULAR; SUBCUTANEOUS at 14:54

## 2024-08-23 ENCOUNTER — TELEPHONE (OUTPATIENT)
Dept: INTERNAL MEDICINE CLINIC | Facility: CLINIC | Age: 44
End: 2024-08-23

## 2024-08-23 DIAGNOSIS — E87.1 HYPONATREMIA: Primary | ICD-10-CM

## 2024-08-23 NOTE — PROGRESS NOTES
Ambulatory Visit  Name: Nina Joseph      : 1980      MRN: [id number]  Encounter Provider: Lo Miles MD  Encounter Date: 2024   Encounter department: [facility]    Assessment & Plan   1. Hyponatremia  -     Basic metabolic panel; Future  2. Encounter for gynecological examination  -     Ambulatory Referral to Obstetrics / Gynecology; Future  3. Vitamin B6 deficiency  4. Vitamin B12 deficiency  5. Folate deficiency    Orders recommendations as noted above.  Previous laboratory testing reviewed with her.  Sodium was significantly low.  Discussed with her that this was completely different than her previous laboratory testing.  Discussed if there has been any medication changes.  She denies this and states that she is actually not taking basically any of her medications.  Watch fluid intake.  Discussed with her not drinking excessive amounts of fluid.  Will recheck a BMP for further investigation.  Discussed potential causes for low sodium.  Cholesterol significantly elevated.  She is hesitant to go on medications for this.  Watch diet more closely.  Try to remain as active as possible.  Financial constraints discussed.  Will discuss potential further investigation if sodium remains low.     History of Present Illness     She presents to review previous laboratory testing.  She had laboratory testing done last month which did show a significantly low sodium which is unusual for her.  She was advised by her other specialist to discuss further investigation for this.  She has been getting some of her medications refilled but states that she basically has not been taking any of them.  Denies taking the Topamax.  Denies any excessive fluid intake.  Admits to frequent urination.  Still with overall joint and muscle aches.  Denies any chest pain or palpitations.        Review of Systems   Constitutional:  Positive for activity change, appetite change and fatigue. Negative for  chills and fever.   Respiratory:  Negative for shortness of breath.    Cardiovascular:  Negative for chest pain and palpitations.   Musculoskeletal:  Positive for arthralgias and myalgias.   Psychiatric/Behavioral:  Positive for decreased concentration and dysphoric mood.      Medical History Reviewed by provider this encounter:       Past Medical History   Past Medical History:   Diagnosis Date    Anxiety     Bipolar depression (HCC)     Chronic pelvic pain in female 2021    Endometrial hyperplasia without atypia 2021    Hypertension     Kidney stone     Menometrorrhagia 2021    Vertigo 2024     Past Surgical History:   Procedure Laterality Date     SECTION      EPIDURAL BLOCK INJECTION Right 10/19/2023    Procedure: Right C7-T1 ILESI;  Surgeon: Elsi Saunders MD;  Location: [facility];  Service: Pain Management     FL INJECTION RIGHT SHOULDER (ARTHROGRAM)  2024    HYSTERECTOMY  2021    [place] SURGICAL ARTHROSCOPY SHOULDER W/ROTATOR CUFF RPR Right 2024    Procedure: Right shoulder arthroscopy, rotator cuff repair;  Surgeon: Demond Cardenas MD;  Location: [facility];  Service: Orthopedics     Family History   Problem Relation Age of Onset    Heart disease Father     No Known Problems Sister     No Known Problems Daughter     No Known Problems Maternal Grandmother     No Known Problems Maternal Grandfather     No Known Problems Paternal Grandmother     No Known Problems Paternal Grandfather     No Known Problems Son     No Known Problems Son     No Known Problems Son     No Known Problems Maternal Aunt     No Known Problems Maternal Aunt     No Known Problems Paternal Aunt     No Known Problems Paternal Aunt      Current Outpatient Medications on File Prior to Visit   Medication Sig Dispense Refill    busPIRone (BUSPAR) 5 mg tablet       ciclopirox (PENLAC) 8 % solution Apply topically daily at bedtime 6.6 mL 0    fluticasone (FLONASE) 50 mcg/act nasal spray 1 spray  "into each nostril daily 11.1 mL 0    folic acid (FOLVITE) 1 mg tablet Take 1 tablet (1 mg total) by mouth daily 30 tablet 5    hydrOXYzine HCL (ATARAX) 10 mg tablet TAKE 1/2 (ONE-HALF) TABLET BY MOUTH THREE TIMES DAILY AS NEEDED      ketoconazole (NIZORAL) 2 % cream APPLY  CREAM TOPICALLY ONCE DAILY 60 g 0    ketoconazole (NIZORAL) 2 % cream Apply topically daily 60 g 2    LORazepam (ATIVAN) 0.5 mg tablet Take 1-2 about 30 minutes prior to the MRI (Patient not taking: Reported on 7/11/2024) 2 tablet 0    meloxicam (MOBIC) 15 mg tablet Take 1 tablet by mouth once daily (Patient not taking: Reported on 7/18/2024) 28 tablet 1    ondansetron (ZOFRAN) 4 mg tablet Take 1 tablet (4 mg total) by mouth every 8 (eight) hours as needed for nausea or vomiting 10 tablet 0    oxyCODONE (Roxicodone) 5 immediate release tablet Take 1 tablet (5 mg total) by mouth every 4 (four) hours as needed for severe pain Max Daily Amount: 30 mg (Patient not taking: Reported on 7/18/2024) 10 tablet 0    pyridoxine (VITAMIN B6) 100 mg tablet Take 1 tablet (100 mg total) by mouth daily 30 tablet 5    sertraline (ZOLOFT) 100 mg tablet Take 100 mg by mouth daily      topiramate (TOPAMAX) 25 mg tablet Take 25 mg by mouth 2 (two) times a day      triamcinolone (KENALOG) 0.1 % cream Apply topically 2 (two) times a day 30 g 1     Current Facility-Administered Medications on File Prior to Visit   Medication Dose Route Frequency Provider Last Rate Last Admin    cyanocobalamin injection 1,000 mcg  1,000 mcg Intramuscular Q30 Days    1,000 mcg at 08/22/24 1454     Allergies   Allergen Reactions    Clonazepam Other (See Comments)     Excessive sedation  Vomiting    Methylphenidate Other (See Comments)     Hyperactivity\"    Latex Rash      Current Outpatient Medications on File Prior to Visit   Medication Sig Dispense Refill    busPIRone (BUSPAR) 5 mg tablet       ciclopirox (PENLAC) 8 % solution Apply topically daily at bedtime 6.6 mL 0    fluticasone " (FLONASE) 50 mcg/act nasal spray 1 spray into each nostril daily 11.1 mL 0    folic acid (FOLVITE) 1 mg tablet Take 1 tablet (1 mg total) by mouth daily 30 tablet 5    hydrOXYzine HCL (ATARAX) 10 mg tablet TAKE 1/2 (ONE-HALF) TABLET BY MOUTH THREE TIMES DAILY AS NEEDED      ketoconazole (NIZORAL) 2 % cream APPLY  CREAM TOPICALLY ONCE DAILY 60 g 0    ketoconazole (NIZORAL) 2 % cream Apply topically daily 60 g 2    LORazepam (ATIVAN) 0.5 mg tablet Take 1-2 about 30 minutes prior to the MRI (Patient not taking: Reported on 7/11/2024) 2 tablet 0    meloxicam (MOBIC) 15 mg tablet Take 1 tablet by mouth once daily (Patient not taking: Reported on 7/18/2024) 28 tablet 1    ondansetron (ZOFRAN) 4 mg tablet Take 1 tablet (4 mg total) by mouth every 8 (eight) hours as needed for nausea or vomiting 10 tablet 0    oxyCODONE (Roxicodone) 5 immediate release tablet Take 1 tablet (5 mg total) by mouth every 4 (four) hours as needed for severe pain Max Daily Amount: 30 mg (Patient not taking: Reported on 7/18/2024) 10 tablet 0    pyridoxine (VITAMIN B6) 100 mg tablet Take 1 tablet (100 mg total) by mouth daily 30 tablet 5    sertraline (ZOLOFT) 100 mg tablet Take 100 mg by mouth daily      topiramate (TOPAMAX) 25 mg tablet Take 25 mg by mouth 2 (two) times a day      triamcinolone (KENALOG) 0.1 % cream Apply topically 2 (two) times a day 30 g 1     Current Facility-Administered Medications on File Prior to Visit   Medication Dose Route Frequency Provider Last Rate Last Admin    cyanocobalamin injection 1,000 mcg  1,000 mcg Intramuscular Q30 Days    1,000 mcg at 08/22/24 6154      Social History     Tobacco Use    Smoking status: Some Days     Current packs/day: 0.50     Average packs/day: 0.5 packs/day for 28.0 years (14.0 ttl pk-yrs)     Types: Cigarettes    Smokeless tobacco: Never   Vaping Use    Vaping status: Never Used   Substance and Sexual Activity    Alcohol use: Never    Drug use: Never    Sexual activity: Not Currently      Objective     LMP 12/25/2020     Physical Exam  Vitals and nursing note reviewed.   Constitutional:       Appearance: She is well-developed and well-groomed.   Cardiovascular:      Rate and Rhythm: Normal rate and regular rhythm.   Neurological:      Mental Status: She is alert.   Psychiatric:         Mood and Affect: Mood is anxious.         Behavior: Behavior is cooperative.       Administrative Statements

## 2024-08-23 NOTE — TELEPHONE ENCOUNTER
Spoke with Dr. Miles about patients bloodwork. She said bloodwork is completely normal. To be safe, she'd like to recheck a BMP in 2 weeks.    BMP placed in chart. Phone call made to patient, she did not answer and I had to leave a message for her to call back for the results.

## 2024-08-26 ENCOUNTER — TELEPHONE (OUTPATIENT)
Dept: GYNECOLOGY | Facility: CLINIC | Age: 44
End: 2024-08-26

## 2024-08-29 ENCOUNTER — APPOINTMENT (OUTPATIENT)
Dept: PHYSICAL THERAPY | Facility: CLINIC | Age: 44
End: 2024-08-29
Payer: COMMERCIAL

## 2024-09-03 ENCOUNTER — OFFICE VISIT (OUTPATIENT)
Dept: PAIN MEDICINE | Facility: CLINIC | Age: 44
End: 2024-09-03
Payer: COMMERCIAL

## 2024-09-03 VITALS
HEIGHT: 63 IN | HEART RATE: 83 BPM | TEMPERATURE: 98.3 F | OXYGEN SATURATION: 98 % | SYSTOLIC BLOOD PRESSURE: 109 MMHG | DIASTOLIC BLOOD PRESSURE: 79 MMHG | RESPIRATION RATE: 16 BRPM | BODY MASS INDEX: 32.78 KG/M2 | WEIGHT: 185 LBS

## 2024-09-03 DIAGNOSIS — M54.2 NECK PAIN: ICD-10-CM

## 2024-09-03 DIAGNOSIS — G89.4 CHRONIC PAIN SYNDROME: Primary | ICD-10-CM

## 2024-09-03 DIAGNOSIS — M79.18 MYOFASCIAL PAIN SYNDROME: ICD-10-CM

## 2024-09-03 DIAGNOSIS — M47.812 CERVICAL SPONDYLOSIS: ICD-10-CM

## 2024-09-03 PROCEDURE — 99213 OFFICE O/P EST LOW 20 MIN: CPT | Performed by: NURSE PRACTITIONER

## 2024-09-03 RX ORDER — TOPIRAMATE SPINKLE 25 MG/1
25 CAPSULE ORAL DAILY
COMMUNITY
Start: 2024-09-01

## 2024-09-03 NOTE — PROGRESS NOTES
Assessment:  1. Chronic pain syndrome    2. Neck pain    3. Cervical spondylosis    4. Myofascial pain syndrome        Plan:  While the patient was in the office today, I did have a thorough conversation regarding their chronic pain syndrome, medication management, and treatment plan options.  Patient is being seen for a follow-up visit.  She was last seen here on 7/1/2024 at which time an EMG of her upper extremities was ordered.  EMG was normal.  EMG results were reviewed with patient during today's visit.  Patient previously underwent a C7-T1 interlaminar epidural steroid injection performed in October 2023 without relief.  MRI of the cervical spine reveals mild degenerative changes throughout the cervical spine.  No large disc herniation.  No critical stenosis.  MRI results were again reviewed with patient during today's visit.    Patient previously attended physical therapy for her cervical spine in 2021 and 2022.  She states that she continues to do exercises at home.  Today, we discussed possibility of restarting physical therapy.  She declined due to continuing with home exercise program since previous PT.    We briefly discussed possibility of cervical facet medial branch blocks to determine if she is a candidate for radiofrequency ablation.  I provided her with brochures regarding both procedures.  She will call the office if this is something that she is interested in pursuing.      History of Present Illness:  The patient is a 44 y.o. female who presents for a follow up office visit in regards to Back Pain and Neck Pain.   The patient’s current symptoms include complaints of neck pain.  Complaints of low back pain.  Current pain level is a 5/10.  Quality of pain is described as pressure-like.    Current pain medications includes: Currently none.  Patient states that she has stopped many medications because she is having memory issues.  She states that they are trying to figure out if medications are  related to memory issues.    I have personally reviewed and/or updated the patient's past medical history, past surgical history, family history, social history, current medications, allergies, and vital signs today.         Review of Systems  Review of Systems   Musculoskeletal:  Positive for back pain and neck pain.        Decreased ROM   All other systems reviewed and are negative.          Past Medical History:   Diagnosis Date    Anxiety     Bipolar depression (HCC)     Chronic pelvic pain in female 2021    Endometrial hyperplasia without atypia 2021    Hypertension     Kidney stone     Menometrorrhagia 2021    Vertigo 2024       Past Surgical History:   Procedure Laterality Date     SECTION      EPIDURAL BLOCK INJECTION Right 10/19/2023    Procedure: Right C7-T1 ILESI;  Surgeon: Elsi Saunders MD;  Location: [facility];  Service: Pain Management     [place] INJECTION RIGHT SHOULDER (ARTHROGRAM)  2024    HYSTERECTOMY  2021    [place] SURGICAL ARTHROSCOPY SHOULDER W/ROTATOR CUFF RPR Right 2024    Procedure: Right shoulder arthroscopy, rotator cuff repair;  Surgeon: Demond Cardenas MD;  Location: [facility];  Service: Orthopedics       Family History   Problem Relation Age of Onset    Heart disease Father     No Known Problems Sister     No Known Problems Daughter     No Known Problems Maternal Grandmother     No Known Problems Maternal Grandfather     No Known Problems Paternal Grandmother     No Known Problems Paternal Grandfather     No Known Problems Son     No Known Problems Son     No Known Problems Son     No Known Problems Maternal Aunt     No Known Problems Maternal Aunt     No Known Problems Paternal Aunt     No Known Problems Paternal Aunt        Social History     Occupational History    Not on file   Tobacco Use    Smoking status: Some Days     Current packs/day: 0.50     Average packs/day: 0.5 packs/day for 28.0 years (14.0 ttl pk-yrs)     Types: Cigarettes     Smokeless tobacco: Never   Vaping Use    Vaping status: Never Used   Substance and Sexual Activity    Alcohol use: Never    Drug use: Never    Sexual activity: Not Currently         Current Outpatient Medications:     busPIRone (BUSPAR) 5 mg tablet, , Disp: , Rfl:     ciclopirox (PENLAC) 8 % solution, Apply topically daily at bedtime, Disp: 6.6 mL, Rfl: 0    fluticasone (FLONASE) 50 mcg/act nasal spray, 1 spray into each nostril daily, Disp: 11.1 mL, Rfl: 0    folic acid (FOLVITE) 1 mg tablet, Take 1 tablet (1 mg total) by mouth daily, Disp: 30 tablet, Rfl: 5    hydrOXYzine HCL (ATARAX) 10 mg tablet, TAKE 1/2 (ONE-HALF) TABLET BY MOUTH THREE TIMES DAILY AS NEEDED, Disp: , Rfl:     ketoconazole (NIZORAL) 2 % cream, APPLY  CREAM TOPICALLY ONCE DAILY, Disp: 60 g, Rfl: 0    ketoconazole (NIZORAL) 2 % cream, Apply topically daily, Disp: 60 g, Rfl: 2    ondansetron (ZOFRAN) 4 mg tablet, Take 1 tablet (4 mg total) by mouth every 8 (eight) hours as needed for nausea or vomiting, Disp: 10 tablet, Rfl: 0    pyridoxine (VITAMIN B6) 100 mg tablet, Take 1 tablet (100 mg total) by mouth daily, Disp: 30 tablet, Rfl: 5    sertraline (ZOLOFT) 100 mg tablet, Take 100 mg by mouth daily, Disp: , Rfl:     topiramate (TOPAMAX) 25 mg sprinkle capsule, Take 25 mg by mouth daily, Disp: , Rfl:     topiramate (TOPAMAX) 25 mg tablet, Take 25 mg by mouth 2 (two) times a day, Disp: , Rfl:     triamcinolone (KENALOG) 0.1 % cream, Apply topically 2 (two) times a day, Disp: 30 g, Rfl: 1    LORazepam (ATIVAN) 0.5 mg tablet, Take 1-2 about 30 minutes prior to the MRI (Patient not taking: Reported on 7/11/2024), Disp: 2 tablet, Rfl: 0    meloxicam (MOBIC) 15 mg tablet, Take 1 tablet by mouth once daily (Patient not taking: Reported on 7/18/2024), Disp: 28 tablet, Rfl: 1    oxyCODONE (Roxicodone) 5 immediate release tablet, Take 1 tablet (5 mg total) by mouth every 4 (four) hours as needed for severe pain Max Daily Amount: 30 mg (Patient not taking:  "Reported on 7/18/2024), Disp: 10 tablet, Rfl: 0    Current Facility-Administered Medications:     cyanocobalamin injection 1,000 mcg, 1,000 mcg, Intramuscular, Q30 Days, , 1,000 mcg at 08/22/24 1454    Allergies   Allergen Reactions    Clonazepam Other (See Comments)     Excessive sedation  Vomiting    Methylphenidate Other (See Comments)     Hyperactivity\"    Latex Rash       Physical Exam:    /79   Pulse 83   Temp 98.3 °F (36.8 °C)   Resp 16   Ht 5' 3\" (1.6 m)   Wt 83.9 kg (185 lb)   LMP 12/25/2020   SpO2 98%   BMI 32.77 kg/m²     Constitutional:normal, well developed, well nourished, alert, in no distress and non-toxic and no overt pain behavior.  Eyes:anicteric  HEENT:grossly intact  Neck:supple, symmetric, trachea midline and no masses   Pulmonary:even and unlabored  Cardiovascular:No edema or pitting edema present  Skin:Normal without rashes or lesions and well hydrated  Psychiatric:Mood and affect appropriate  Neurologic:Cranial Nerves II-XII grossly intact  Musculoskeletal: Range of motion of cervical spine is limited in all planes.  There is tenderness bilaterally C2-C6.  There are cervical paraspinal muscle spasms present.    Imaging    Study Result    Narrative & Impression   MRI CERVICAL SPINE WITHOUT CONTRAST     INDICATION: G89.4: Chronic pain syndrome  M54.2: Cervicalgia  M54.12: Radiculopathy, cervical region  M47.812: Spondylosis without myelopathy or radiculopathy, cervical region.     COMPARISON: CT 8/2/2023.     TECHNIQUE:  Multiplanar, multisequence imaging of the cervical spine was performed. .        IMAGE QUALITY:  Diagnostic     FINDINGS:     ALIGNMENT: Mild reversal of cervical lordosis may be positional.  No compression fracture.  No subluxation.  No scoliosis.     MARROW SIGNAL:  Normal marrow signal is identified within the visualized bony structures.  No discrete marrow lesion.     CERVICAL AND VISUALIZED THORACIC CORD:  Normal signal within the visualized cord.   "   PREVERTEBRAL AND PARASPINAL SOFT TISSUES:  Normal.     VISUALIZED POSTERIOR FOSSA:  The visualized posterior fossa demonstrates no abnormal signal.     CERVICAL DISC SPACES:     C2-C3:  Normal.     C3-C4:  Normal.     C4-C5: Disc desiccation with loss of disc height. Modic type II endplate changes noted disc osteophyte complex with left greater than right uncovertebral hypertrophy and facet arthrosis. Mild left foraminal narrowing. Right neuroforamen remains patent.     C5-C6: Disc desiccation with loss of disc height. Disc osteophyte complex with left greater than right uncovertebral hypertrophy and bilateral facet arthrosis. Mild left foraminal narrowing. Right neural foramen is patent.     C6-C7: Disc desiccation with mild loss of disc height. Disc osteophyte complex with facet hypertrophy. No significant central canal or foraminal encroachment.     C7-T1: Right greater than left uncovertebral hypertrophy and bilateral facet arthrosis resulting in mild bilateral foraminal stenosis. Central canal remains patent.     UPPER THORACIC DISC SPACES:  Normal.     OTHER FINDINGS:  None.     IMPRESSION:  Mild spondylotic changes of the cervical spine as detailed above. No critical stenosis.        Workstation performed: [id number]           No orders to display       No orders of the defined types were placed in this encounter.

## 2024-09-11 ENCOUNTER — OFFICE VISIT (OUTPATIENT)
Dept: OBGYN CLINIC | Facility: CLINIC | Age: 44
End: 2024-09-11
Payer: COMMERCIAL

## 2024-09-11 VITALS
BODY MASS INDEX: 32.78 KG/M2 | HEIGHT: 63 IN | WEIGHT: 185 LBS | DIASTOLIC BLOOD PRESSURE: 76 MMHG | HEART RATE: 85 BPM | SYSTOLIC BLOOD PRESSURE: 111 MMHG

## 2024-09-11 DIAGNOSIS — Z98.890 S/P RIGHT ROTATOR CUFF REPAIR: Primary | ICD-10-CM

## 2024-09-11 PROCEDURE — 99213 OFFICE O/P EST LOW 20 MIN: CPT | Performed by: STUDENT IN AN ORGANIZED HEALTH CARE EDUCATION/TRAINING PROGRAM

## 2024-09-11 NOTE — PROGRESS NOTES
Shoulder Post Operative Visit     Assesment:   44 y.o. female s/p surgical arthroscopy of the right shoulder with rotator cuff repair, DOS: 4/30/2024    Plan:  Patient is doing well 4.5 months status post above procedure.  She states she is having no pain.  She is not taking pain medications. She has been discharged from physical therapy and compliant with doing her HEP. She will focus on strengthening in her rehab process, her ROM is full.  She does have good strength overall considering how far out she is from surgery.  She is overall happy with her progress to date. Patient will follow up PRN, if pain or complications occur she will call and schedule an appointment.     Post-Operative treatment:  Ice to shoulder 1-2 times daily, for 20 minutes at a time.  Continue with home exercise program provided by PT  Over-the-counter pain medications as needed    Imaging:  All imaging from today was reviewed by myself and explained to the patient.     Sling: Discontinued at this point    DVT Prophylaxis:  Ambulation    Follow up: PRN    Patient was advised that if they have any fevers, chills, chest pain, shortness of breath, redness or drainage from the incision, please let our office know immediately.        Chief Complaint   Patient presents with    Right Shoulder - Follow-up       History of Present Illness:    The patient is a 44 y.o. female who is being evaluated post operatively 4.5 months status post rotator repair of her right shoulder.     Patient states that she is doing well.  She denies any pain in the shoulder, 0 out of 10 at worst.  She is not taking any pain medications.  She has been working with physical therapy and recently discharged.  She feels that she is making continued progress in terms of range of motion.  She is starting to work on strengthening.  She denies any numbness or tingling in the right upper extremity.     The patient denies any fevers, chills, calf pain, chest pain/shortness of breath,  "redness or drainage from the incision.     I have reviewed the past medical, surgical, social and family history, medications and allergies as documented in the EMR.      Review of systems: ROS is negative other than that noted in the HPI.  Constitutional: Negative for fatigue and fever.       Physical Exam:    Blood pressure 111/76, pulse 85, height 5' 3\" (1.6 m), weight 83.9 kg (185 lb), last menstrual period 12/25/2020.    General/Constitutional: NAD, well developed, well nourished  HENT: Normocephalic, atraumatic  CV: Intact distal pulses, regular rate  Resp: No respiratory distress or labored breathing  GI: Soft and non-tender   Lymphatic: No lymphadenopathy palpated  Neuro: Alert and Oriented x 3, no focal deficits  Psych: Normal mood, normal affect, normal judgement, normal behavior  Skin: Warm, dry, no rashes, no erythema    right Shoulder focused exam:    Incisions well healed no erythema, no drainage, no dehiscence.    No tenderness to palpation   Passive range of motion: 170 degrees of forward flexion, 60 degrees ER, IR to 80 degrees with 90 degrees abduction  Rotator cuff strength: 4+/5 empty can, 5/5 resisted ER and IR     UE NV Exam:   +2 Radial pulses   Sensation intact to light touch C5-T1 bilaterally  SILT median/ulnar/radial/axillary distributions  Radial/median/ulnar/PIN/AIN/axillary nerve motor intact    Scribe Attestation      I,:   am acting as a scribe while in the presence of the attending physician.:       I,:   personally performed the services described in this documentation    as scribed in my presence.:              "

## 2024-09-26 ENCOUNTER — CLINICAL SUPPORT (OUTPATIENT)
Dept: INTERNAL MEDICINE CLINIC | Facility: CLINIC | Age: 44
End: 2024-09-26
Payer: COMMERCIAL

## 2024-09-26 DIAGNOSIS — E53.8 VITAMIN B12 DEFICIENCY: Primary | ICD-10-CM

## 2024-09-26 PROCEDURE — 96372 THER/PROPH/DIAG INJ SC/IM: CPT

## 2024-09-26 RX ADMIN — CYANOCOBALAMIN 1000 MCG: 1000 INJECTION, SOLUTION INTRAMUSCULAR; SUBCUTANEOUS at 11:45

## 2024-10-23 ENCOUNTER — OFFICE VISIT (OUTPATIENT)
Dept: URGENT CARE | Facility: MEDICAL CENTER | Age: 44
End: 2024-10-23
Payer: COMMERCIAL

## 2024-10-23 VITALS — HEART RATE: 84 BPM | OXYGEN SATURATION: 97 % | RESPIRATION RATE: 18 BRPM | TEMPERATURE: 98.3 F

## 2024-10-23 DIAGNOSIS — M54.50 ACUTE RIGHT-SIDED LOW BACK PAIN, UNSPECIFIED WHETHER SCIATICA PRESENT: Primary | ICD-10-CM

## 2024-10-23 LAB
SL AMB  POCT GLUCOSE, UA: NORMAL
SL AMB LEUKOCYTE ESTERASE,UA: NORMAL
SL AMB POCT BILIRUBIN,UA: NORMAL
SL AMB POCT BLOOD,UA: NORMAL
SL AMB POCT CLARITY,UA: CLEAR
SL AMB POCT COLOR,UA: YELLOW
SL AMB POCT KETONES,UA: NORMAL
SL AMB POCT NITRITE,UA: NORMAL
SL AMB POCT PH,UA: 6
SL AMB POCT SPECIFIC GRAVITY,UA: 1020
SL AMB POCT URINE PROTEIN: NORMAL
SL AMB POCT UROBILINOGEN: 0.2

## 2024-10-23 PROCEDURE — 99213 OFFICE O/P EST LOW 20 MIN: CPT

## 2024-10-23 PROCEDURE — 81002 URINALYSIS NONAUTO W/O SCOPE: CPT

## 2024-10-23 NOTE — PROGRESS NOTES
[facility]        NAME: Nina Joseph is a 44 y.o. female  : 1980    MRN: [id number]  DATE: 2024  TIME: 8:34 AM    Assessment and Plan   Acute right-sided low back pain, unspecified whether sciatica present [M54.50]  1. Acute right-sided low back pain, unspecified whether sciatica present  POCT urine dip        Discussed problem with patient.  Urine dip revealed no acute abnormalities and insufficient sample for culture.  Low suspicion for infection.  Suspicious of muscular back pain and advised continue conservative management with previously prescribed medications.  Low back exercises included in discharge paperwork.  Ice and heat as well.  Discussed tricked red flag symptoms to report to the ER if experiencing    Patient Instructions       Follow up with PCP in 3-5 days.  Proceed to  ER if symptoms worsen.    If tests are performed, our office will contact you with results only if changes need to made to the care plan discussed with you at the visit. You can review your full results on [facility] Mychart.    Chief Complaint     Chief Complaint   Patient presents with    Back Pain     Right lower back pain          History of Present Illness       44-year-old female with a past medical history of Chronic bilateral lower back pain, chronic pain syndrome, back spasm, lumbar spondylosis. Has been going on the last 2 days, worst last night. 5/10 pain currently.  Denies any urinary frequency, urgency, dysuria, nausea.  Has been using previously prescribed pain medications with mild relief.  Eating and drinking normally.  Has had a history of kidney stones has not had one in many years.        Review of Systems   Review of Systems   Constitutional:  Negative for appetite change, chills, fatigue and fever.   Respiratory:  Negative for cough, shortness of breath, wheezing and stridor.    Cardiovascular:  Negative for chest pain and palpitations.   Gastrointestinal:  Negative for nausea.    Genitourinary:  Negative for dysuria, flank pain, frequency, pelvic pain and urgency.   Musculoskeletal:  Positive for back pain.         Current Medications       Current Outpatient Medications:     busPIRone (BUSPAR) 5 mg tablet, , Disp: , Rfl:     ciclopirox (PENLAC) 8 % solution, Apply topically daily at bedtime, Disp: 6.6 mL, Rfl: 0    fluticasone (FLONASE) 50 mcg/act nasal spray, 1 spray into each nostril daily, Disp: 11.1 mL, Rfl: 0    folic acid (FOLVITE) 1 mg tablet, Take 1 tablet (1 mg total) by mouth daily, Disp: 30 tablet, Rfl: 5    hydrOXYzine HCL (ATARAX) 10 mg tablet, TAKE 1/2 (ONE-HALF) TABLET BY MOUTH THREE TIMES DAILY AS NEEDED, Disp: , Rfl:     ketoconazole (NIZORAL) 2 % cream, APPLY  CREAM TOPICALLY ONCE DAILY, Disp: 60 g, Rfl: 0    ketoconazole (NIZORAL) 2 % cream, Apply topically daily, Disp: 60 g, Rfl: 2    LORazepam (ATIVAN) 0.5 mg tablet, Take 1-2 about 30 minutes prior to the MRI (Patient not taking: Reported on 7/11/2024), Disp: 2 tablet, Rfl: 0    meloxicam (MOBIC) 15 mg tablet, Take 1 tablet by mouth once daily (Patient not taking: Reported on 7/18/2024), Disp: 28 tablet, Rfl: 1    ondansetron (ZOFRAN) 4 mg tablet, Take 1 tablet (4 mg total) by mouth every 8 (eight) hours as needed for nausea or vomiting, Disp: 10 tablet, Rfl: 0    oxyCODONE (Roxicodone) 5 immediate release tablet, Take 1 tablet (5 mg total) by mouth every 4 (four) hours as needed for severe pain Max Daily Amount: 30 mg (Patient not taking: Reported on 7/18/2024), Disp: 10 tablet, Rfl: 0    pyridoxine (VITAMIN B6) 100 mg tablet, Take 1 tablet (100 mg total) by mouth daily, Disp: 30 tablet, Rfl: 5    sertraline (ZOLOFT) 100 mg tablet, Take 100 mg by mouth daily, Disp: , Rfl:     topiramate (TOPAMAX) 25 mg sprinkle capsule, Take 25 mg by mouth daily, Disp: , Rfl:     topiramate (TOPAMAX) 25 mg tablet, Take 25 mg by mouth 2 (two) times a day, Disp: , Rfl:     triamcinolone (KENALOG) 0.1 % cream, Apply topically 2 (two)  times a day, Disp: 30 g, Rfl: 1    Current Facility-Administered Medications:     cyanocobalamin injection 1,000 mcg, 1,000 mcg, Intramuscular, Q30 Days, , 1,000 mcg at 24 1145    Current Allergies     Allergies as of 10/23/2024 - Reviewed 10/23/2024   Allergen Reaction Noted    Clonazepam Other (See Comments) 2019    Methylphenidate Other (See Comments) 2020    Latex Rash 2019            The following portions of the patient's history were reviewed and updated as appropriate: allergies, current medications, past family history, past medical history, past social history, past surgical history and problem list.     Past Medical History:   Diagnosis Date    Anxiety     Bipolar depression (HCC)     Chronic pelvic pain in female 2021    Endometrial hyperplasia without atypia 2021    Hypertension     Kidney stone     Menometrorrhagia 2021    Vertigo 2024       Past Surgical History:   Procedure Laterality Date     SECTION      EPIDURAL BLOCK INJECTION Right 10/19/2023    Procedure: Right C7-T1 ILESI;  Surgeon: Elsi Saunders MD;  Location: [facility];  Service: Pain Management     FL INJECTION RIGHT SHOULDER (ARTHROGRAM)  2024    HYSTERECTOMY  2021    HI SURGICAL ARTHROSCOPY SHOULDER W/ROTATOR CUFF RPR Right 2024    Procedure: Right shoulder arthroscopy, rotator cuff repair;  Surgeon: Demond Cardenas MD;  Location: [facility];  Service: Orthopedics       Family History   Problem Relation Age of Onset    Heart disease Father     No Known Problems Sister     No Known Problems Daughter     No Known Problems Maternal Grandmother     No Known Problems Maternal Grandfather     No Known Problems Paternal Grandmother     No Known Problems Paternal Grandfather     No Known Problems Son     No Known Problems Son     No Known Problems Son     No Known Problems Maternal Aunt     No Known Problems Maternal Aunt     No Known Problems Paternal Aunt     No Known  Problems Paternal Aunt          Medications have been verified.        Objective   Pulse 84   Temp 98.3 °F (36.8 °C)   Resp 18   LMP 12/25/2020   SpO2 97%        Physical Exam     Physical Exam  Vitals and nursing note reviewed.   Constitutional:       General: She is not in acute distress.     Appearance: Normal appearance. She is normal weight. She is not ill-appearing, toxic-appearing or diaphoretic.      Comments: Well-appearing, no signs of acute distress   Cardiovascular:      Rate and Rhythm: Normal rate and regular rhythm.      Pulses: Normal pulses.      Heart sounds: Normal heart sounds. No murmur heard.     No friction rub. No gallop.   Pulmonary:      Effort: Pulmonary effort is normal. No respiratory distress.      Breath sounds: Normal breath sounds. No stridor. No wheezing, rhonchi or rales.   Chest:      Chest wall: No tenderness.   Abdominal:      General: Abdomen is flat. Bowel sounds are normal. There is no distension.      Palpations: Abdomen is soft. There is no mass.      Tenderness: There is no abdominal tenderness. There is right CVA tenderness. There is no left CVA tenderness, guarding or rebound.      Hernia: No hernia is present.   Musculoskeletal:      Comments: Generalized right-sided lower back pain to palpation.  Patient is tender to CVA tenderness but is tender to her back in general.  No overlying skin changes or deformities.  No midline lifts or step-offs   Neurological:      Mental Status: She is alert.

## 2024-10-24 ENCOUNTER — CLINICAL SUPPORT (OUTPATIENT)
Dept: INTERNAL MEDICINE CLINIC | Facility: CLINIC | Age: 44
End: 2024-10-24
Payer: COMMERCIAL

## 2024-10-24 DIAGNOSIS — E53.8 B12 DEFICIENCY: Primary | ICD-10-CM

## 2024-10-24 PROCEDURE — 96372 THER/PROPH/DIAG INJ SC/IM: CPT | Performed by: FAMILY MEDICINE

## 2024-10-24 RX ADMIN — CYANOCOBALAMIN 1000 MCG: 1000 INJECTION, SOLUTION INTRAMUSCULAR; SUBCUTANEOUS at 12:17

## 2024-11-12 ENCOUNTER — OFFICE VISIT (OUTPATIENT)
Dept: INTERNAL MEDICINE CLINIC | Facility: CLINIC | Age: 44
End: 2024-11-12
Payer: COMMERCIAL

## 2024-11-12 VITALS
HEART RATE: 84 BPM | SYSTOLIC BLOOD PRESSURE: 110 MMHG | BODY MASS INDEX: 33.56 KG/M2 | TEMPERATURE: 98.6 F | HEIGHT: 63 IN | WEIGHT: 189.4 LBS | OXYGEN SATURATION: 96 % | DIASTOLIC BLOOD PRESSURE: 74 MMHG

## 2024-11-12 DIAGNOSIS — Z23 ENCOUNTER FOR IMMUNIZATION: ICD-10-CM

## 2024-11-12 DIAGNOSIS — E53.8 FOLATE DEFICIENCY: ICD-10-CM

## 2024-11-12 DIAGNOSIS — E53.1 VITAMIN B6 DEFICIENCY: ICD-10-CM

## 2024-11-12 DIAGNOSIS — E53.8 VITAMIN B12 DEFICIENCY: Primary | ICD-10-CM

## 2024-11-12 DIAGNOSIS — F31.81 BIPOLAR 2 DISORDER (HCC): ICD-10-CM

## 2024-11-12 DIAGNOSIS — D50.0 IRON DEFICIENCY ANEMIA DUE TO CHRONIC BLOOD LOSS: ICD-10-CM

## 2024-11-12 DIAGNOSIS — E78.2 MIXED HYPERLIPIDEMIA: ICD-10-CM

## 2024-11-12 PROBLEM — B02.9 HERPES ZOSTER WITHOUT COMPLICATION: Status: RESOLVED | Noted: 2023-02-21 | Resolved: 2024-11-12

## 2024-11-12 PROBLEM — Z01.818 PREOPERATIVE CLEARANCE: Status: RESOLVED | Noted: 2024-04-10 | Resolved: 2024-11-12

## 2024-11-12 PROCEDURE — 90471 IMMUNIZATION ADMIN: CPT | Performed by: FAMILY MEDICINE

## 2024-11-12 PROCEDURE — 96372 THER/PROPH/DIAG INJ SC/IM: CPT | Performed by: FAMILY MEDICINE

## 2024-11-12 PROCEDURE — 99213 OFFICE O/P EST LOW 20 MIN: CPT | Performed by: FAMILY MEDICINE

## 2024-11-12 PROCEDURE — 90656 IIV3 VACC NO PRSV 0.5 ML IM: CPT | Performed by: FAMILY MEDICINE

## 2024-11-12 RX ORDER — ARIPIPRAZOLE 5 MG/1
TABLET ORAL
COMMUNITY
Start: 2024-11-11

## 2024-11-12 RX ADMIN — CYANOCOBALAMIN 1000 MCG: 1000 INJECTION, SOLUTION INTRAMUSCULAR; SUBCUTANEOUS at 13:21

## 2024-11-13 NOTE — PROGRESS NOTES
Ambulatory Visit  Name: Nina Joseph      : 1980      MRN: [id number]  Encounter Provider: Lo Miles MD  Encounter Date: 2024   Encounter department: [facility]    Assessment & Plan  Vitamin B12 deficiency    Orders:    CBC and differential; Future    Vitamin B12; Future    Vitamin B6 deficiency    Orders:    CBC and differential; Future    Vitamin B6; Future    Mixed hyperlipidemia    Orders:    CBC and differential; Future    Comprehensive metabolic panel; Future    Lipid panel; Future    TSH, 3rd generation; Future    Folate deficiency    Orders:    CBC and differential; Future    Folate; Future    Bipolar 2 disorder (HCC)    Orders:    CBC and differential; Future    Iron deficiency anemia due to chronic blood loss    Orders:    CBC and differential; Future    Iron Panel (Includes Ferritin, Iron Sat%, Iron, and TIBC); Future    Encounter for immunization    Orders:    influenza vaccine preservative-free 0.5 mL IM (Fluzone, Afluria, Fluarix, Flulaval)    Orders and recommendations as noted above.  Discussed her continuing to follow-up with psychiatry.  Discussed medication changes.  Hopefully these will assist her overall.  Continue with the vitamin B12 injections.  Discussed with her the importance of the other vitamin supplementation but she cannot even afford the co-pay at this point.  Slip given for repeat laboratory testing.  Will have her follow-up in about 3 to 5 months or sooner if needed.     History of Present Illness     She presents for follow-up and vitamin B12 injection.  Has been following up with psychiatry.  They have adjusted her medications significantly.  She is not sure if she has noticed a difference yet.  May be somewhat less tired but still feels very fatigued.  Still regaining range of motion of her shoulder after the surgery.  Still with significant financial issues.  Has not been taking the vitamins as prescribed because she cannot  afford the co-pay.  Has been continuing with the vitamin B12 injections.        History obtained from : patient  Review of Systems   Constitutional:  Positive for activity change and fatigue. Negative for appetite change.   Respiratory:  Negative for cough and shortness of breath.    Musculoskeletal:  Positive for arthralgias, back pain, neck pain and neck stiffness.   Neurological:  Negative for headaches.   Psychiatric/Behavioral:  Positive for decreased concentration and dysphoric mood. The patient is nervous/anxious.      Medical History Reviewed by provider this encounter:       Past Medical History   Past Medical History:   Diagnosis Date    Anxiety     Bipolar depression (HCC)     Chronic pelvic pain in female 2021    Endometrial hyperplasia without atypia 2021    Hypertension     Kidney stone     Menometrorrhagia 2021    Vertigo 2024     Past Surgical History:   Procedure Laterality Date     SECTION      EPIDURAL BLOCK INJECTION Right 10/19/2023    Procedure: Right C7-T1 ILESI;  Surgeon: Elsi Saunders MD;  Location: [facility];  Service: Pain Management     FL INJECTION RIGHT SHOULDER (ARTHROGRAM)  2024    HYSTERECTOMY  2021    CT SURGICAL ARTHROSCOPY SHOULDER W/ROTATOR CUFF RPR Right 2024    Procedure: Right shoulder arthroscopy, rotator cuff repair;  Surgeon: Demond Cardenas MD;  Location: [facility];  Service: Orthopedics     Family History   Problem Relation Age of Onset    Heart disease Father     No Known Problems Sister     No Known Problems Daughter     No Known Problems Maternal Grandmother     No Known Problems Maternal Grandfather     No Known Problems Paternal Grandmother     No Known Problems Paternal Grandfather     No Known Problems Son     No Known Problems Son     No Known Problems Son     No Known Problems Maternal Aunt     No Known Problems Maternal Aunt     No Known Problems Paternal Aunt     No Known Problems Paternal Aunt      Current  "Outpatient Medications on File Prior to Visit   Medication Sig Dispense Refill    ARIPiprazole (ABILIFY) 5 mg tablet       fluticasone (FLONASE) 50 mcg/act nasal spray 1 spray into each nostril daily 11.1 mL 0    triamcinolone (KENALOG) 0.1 % cream Apply topically 2 (two) times a day 30 g 1    folic acid (FOLVITE) 1 mg tablet Take 1 tablet (1 mg total) by mouth daily (Patient not taking: Reported on 11/12/2024) 30 tablet 5    LORazepam (ATIVAN) 0.5 mg tablet Take 1-2 about 30 minutes prior to the MRI (Patient not taking: Reported on 7/11/2024) 2 tablet 0    pyridoxine (VITAMIN B6) 100 mg tablet Take 1 tablet (100 mg total) by mouth daily (Patient not taking: Reported on 11/12/2024) 30 tablet 5     Current Facility-Administered Medications on File Prior to Visit   Medication Dose Route Frequency Provider Last Rate Last Admin    cyanocobalamin injection 1,000 mcg  1,000 mcg Intramuscular Q30 Days    1,000 mcg at 11/12/24 1321     Allergies   Allergen Reactions    Clonazepam Other (See Comments)     Excessive sedation  Vomiting    Methylphenidate Other (See Comments)     Hyperactivity\"    Latex Rash      Current Outpatient Medications on File Prior to Visit   Medication Sig Dispense Refill    ARIPiprazole (ABILIFY) 5 mg tablet       fluticasone (FLONASE) 50 mcg/act nasal spray 1 spray into each nostril daily 11.1 mL 0    triamcinolone (KENALOG) 0.1 % cream Apply topically 2 (two) times a day 30 g 1    folic acid (FOLVITE) 1 mg tablet Take 1 tablet (1 mg total) by mouth daily (Patient not taking: Reported on 11/12/2024) 30 tablet 5    LORazepam (ATIVAN) 0.5 mg tablet Take 1-2 about 30 minutes prior to the MRI (Patient not taking: Reported on 7/11/2024) 2 tablet 0    pyridoxine (VITAMIN B6) 100 mg tablet Take 1 tablet (100 mg total) by mouth daily (Patient not taking: Reported on 11/12/2024) 30 tablet 5     Current Facility-Administered Medications on File Prior to Visit   Medication Dose Route Frequency Provider Last " "Rate Last Admin    cyanocobalamin injection 1,000 mcg  1,000 mcg Intramuscular Q30 Days    1,000 mcg at 11/12/24 1321      Social History     Tobacco Use    Smoking status: Some Days     Current packs/day: 0.50     Average packs/day: 0.5 packs/day for 28.0 years (14.0 ttl pk-yrs)     Types: Cigarettes    Smokeless tobacco: Never   Vaping Use    Vaping status: Never Used   Substance and Sexual Activity    Alcohol use: Never    Drug use: Never    Sexual activity: Not Currently         Objective     /74 (BP Location: Left arm, Patient Position: Sitting, Cuff Size: Large)   Pulse 84   Temp 98.6 °F (37 °C)   Ht 5' 3\" (1.6 m)   Wt 85.9 kg (189 lb 6.4 oz)   LMP 12/25/2020   SpO2 96%   BMI 33.55 kg/m²     Physical Exam  Vitals and nursing note reviewed.   Constitutional:       General: She is not in acute distress.  HENT:      Head: Normocephalic and atraumatic.   Eyes:      General:         Right eye: No discharge.         Left eye: No discharge.      Conjunctiva/sclera: Conjunctivae normal.      Pupils: Pupils are equal, round, and reactive to light.   Cardiovascular:      Rate and Rhythm: Normal rate and regular rhythm.      Heart sounds: Normal heart sounds. No murmur heard.     No friction rub. No gallop.   Pulmonary:      Effort: No respiratory distress.      Breath sounds: No wheezing or rales.   Abdominal:      General: Bowel sounds are normal. There is no distension.      Tenderness: There is no abdominal tenderness.   Lymphadenopathy:      Cervical: No cervical adenopathy.   Skin:     General: Skin is warm and dry.   Neurological:      Mental Status: She is oriented to person, place, and time.   Psychiatric:         Mood and Affect: Affect is labile.         Speech: Speech normal.         Behavior: Behavior normal.         "

## 2024-11-15 ENCOUNTER — APPOINTMENT (OUTPATIENT)
Dept: LAB | Facility: MEDICAL CENTER | Age: 44
End: 2024-11-15
Payer: COMMERCIAL

## 2024-11-15 DIAGNOSIS — E87.1 HYPONATREMIA: ICD-10-CM

## 2024-11-15 DIAGNOSIS — F31.81 BIPOLAR 2 DISORDER (HCC): ICD-10-CM

## 2024-11-15 DIAGNOSIS — E53.8 FOLATE DEFICIENCY: ICD-10-CM

## 2024-11-15 DIAGNOSIS — E78.2 MIXED HYPERLIPIDEMIA: ICD-10-CM

## 2024-11-15 DIAGNOSIS — E53.8 VITAMIN B12 DEFICIENCY: ICD-10-CM

## 2024-11-15 DIAGNOSIS — D50.0 IRON DEFICIENCY ANEMIA DUE TO CHRONIC BLOOD LOSS: ICD-10-CM

## 2024-11-15 DIAGNOSIS — E53.1 VITAMIN B6 DEFICIENCY: ICD-10-CM

## 2024-11-15 LAB
ALBUMIN SERPL BCG-MCNC: 4.1 G/DL (ref 3.5–5)
ALP SERPL-CCNC: 118 U/L (ref 34–104)
ALT SERPL W P-5'-P-CCNC: 69 U/L (ref 7–52)
ANION GAP SERPL CALCULATED.3IONS-SCNC: 10 MMOL/L (ref 4–13)
AST SERPL W P-5'-P-CCNC: 55 U/L (ref 13–39)
BASOPHILS # BLD AUTO: 0.07 THOUSANDS/ÂΜL (ref 0–0.1)
BASOPHILS NFR BLD AUTO: 1 % (ref 0–1)
BILIRUB SERPL-MCNC: 0.7 MG/DL (ref 0.2–1)
BUN SERPL-MCNC: 7 MG/DL (ref 5–25)
CALCIUM SERPL-MCNC: 8.8 MG/DL (ref 8.4–10.2)
CHLORIDE SERPL-SCNC: 108 MMOL/L (ref 96–108)
CHOLEST SERPL-MCNC: 310 MG/DL (ref ?–200)
CO2 SERPL-SCNC: 23 MMOL/L (ref 21–32)
CREAT SERPL-MCNC: 0.54 MG/DL (ref 0.6–1.3)
EOSINOPHIL # BLD AUTO: 0.32 THOUSAND/ÂΜL (ref 0–0.61)
EOSINOPHIL NFR BLD AUTO: 4 % (ref 0–6)
ERYTHROCYTE [DISTWIDTH] IN BLOOD BY AUTOMATED COUNT: 13.1 % (ref 11.6–15.1)
FERRITIN SERPL-MCNC: 59 NG/ML (ref 11–307)
FOLATE SERPL-MCNC: 6.6 NG/ML
GFR SERPL CREATININE-BSD FRML MDRD: 115 ML/MIN/1.73SQ M
GLUCOSE P FAST SERPL-MCNC: 115 MG/DL (ref 65–99)
HCT VFR BLD AUTO: 46 % (ref 34.8–46.1)
HDLC SERPL-MCNC: 33 MG/DL
HGB BLD-MCNC: 15.5 G/DL (ref 11.5–15.4)
IMM GRANULOCYTES # BLD AUTO: 0.04 THOUSAND/UL (ref 0–0.2)
IMM GRANULOCYTES NFR BLD AUTO: 1 % (ref 0–2)
IRON SATN MFR SERPL: 36 % (ref 15–50)
IRON SERPL-MCNC: 124 UG/DL (ref 50–212)
LDLC SERPL CALC-MCNC: 201 MG/DL (ref 0–100)
LYMPHOCYTES # BLD AUTO: 2.41 THOUSANDS/ÂΜL (ref 0.6–4.47)
LYMPHOCYTES NFR BLD AUTO: 31 % (ref 14–44)
MCH RBC QN AUTO: 33.3 PG (ref 26.8–34.3)
MCHC RBC AUTO-ENTMCNC: 33.7 G/DL (ref 31.4–37.4)
MCV RBC AUTO: 99 FL (ref 82–98)
MONOCYTES # BLD AUTO: 0.58 THOUSAND/ÂΜL (ref 0.17–1.22)
MONOCYTES NFR BLD AUTO: 8 % (ref 4–12)
NEUTROPHILS # BLD AUTO: 4.36 THOUSANDS/ÂΜL (ref 1.85–7.62)
NEUTS SEG NFR BLD AUTO: 55 % (ref 43–75)
NONHDLC SERPL-MCNC: 277 MG/DL
NRBC BLD AUTO-RTO: 0 /100 WBCS
PLATELET # BLD AUTO: 247 THOUSANDS/UL (ref 149–390)
PMV BLD AUTO: 10.8 FL (ref 8.9–12.7)
POTASSIUM SERPL-SCNC: 4.3 MMOL/L (ref 3.5–5.3)
PROT SERPL-MCNC: 6.6 G/DL (ref 6.4–8.4)
RBC # BLD AUTO: 4.66 MILLION/UL (ref 3.81–5.12)
SODIUM SERPL-SCNC: 141 MMOL/L (ref 135–147)
TIBC SERPL-MCNC: 349 UG/DL (ref 250–450)
TRIGL SERPL-MCNC: 378 MG/DL (ref ?–150)
TSH SERPL DL<=0.05 MIU/L-ACNC: 2.59 UIU/ML (ref 0.45–4.5)
UIBC SERPL-MCNC: 225 UG/DL (ref 155–355)
VIT B12 SERPL-MCNC: 669 PG/ML (ref 180–914)
WBC # BLD AUTO: 7.78 THOUSAND/UL (ref 4.31–10.16)

## 2024-11-15 PROCEDURE — 82728 ASSAY OF FERRITIN: CPT

## 2024-11-15 PROCEDURE — 84443 ASSAY THYROID STIM HORMONE: CPT

## 2024-11-15 PROCEDURE — 85025 COMPLETE CBC W/AUTO DIFF WBC: CPT

## 2024-11-15 PROCEDURE — 80053 COMPREHEN METABOLIC PANEL: CPT

## 2024-11-15 PROCEDURE — 36415 COLL VENOUS BLD VENIPUNCTURE: CPT

## 2024-11-15 PROCEDURE — 84207 ASSAY OF VITAMIN B-6: CPT

## 2024-11-15 PROCEDURE — 82746 ASSAY OF FOLIC ACID SERUM: CPT

## 2024-11-15 PROCEDURE — 83550 IRON BINDING TEST: CPT

## 2024-11-15 PROCEDURE — 80061 LIPID PANEL: CPT

## 2024-11-15 PROCEDURE — 83540 ASSAY OF IRON: CPT

## 2024-11-15 PROCEDURE — 82607 VITAMIN B-12: CPT

## 2024-11-19 LAB — VIT B6 SERPL-MCNC: 3.2 UG/L (ref 3.4–65.2)

## 2024-12-10 ENCOUNTER — CLINICAL SUPPORT (OUTPATIENT)
Dept: INTERNAL MEDICINE CLINIC | Facility: CLINIC | Age: 44
End: 2024-12-10
Payer: COMMERCIAL

## 2024-12-10 DIAGNOSIS — E53.8 B12 DEFICIENCY: Primary | ICD-10-CM

## 2024-12-10 PROCEDURE — 96372 THER/PROPH/DIAG INJ SC/IM: CPT

## 2024-12-10 RX ADMIN — CYANOCOBALAMIN 1000 MCG: 1000 INJECTION, SOLUTION INTRAMUSCULAR; SUBCUTANEOUS at 08:23

## 2025-01-09 ENCOUNTER — CLINICAL SUPPORT (OUTPATIENT)
Dept: INTERNAL MEDICINE CLINIC | Facility: CLINIC | Age: 45
End: 2025-01-09
Payer: COMMERCIAL

## 2025-01-09 DIAGNOSIS — E53.8 B12 DEFICIENCY: Primary | ICD-10-CM

## 2025-01-09 PROCEDURE — 96372 THER/PROPH/DIAG INJ SC/IM: CPT

## 2025-01-09 RX ADMIN — CYANOCOBALAMIN 1000 MCG: 1000 INJECTION, SOLUTION INTRAMUSCULAR; SUBCUTANEOUS at 09:36

## 2025-01-17 NOTE — PROGRESS NOTES
Assessment/Plan:    MAGED reviewed. Will refer to urogyn clinic.   Advised monthly SBE, annual CBE and the importance of continued annual mammography reviewed. Script printed for pt. Pap done given timing of previous pap. STI testing ordered.  Diet/activity recommendations - Calcium 1200 mg daily and Vit D 600-100 IU daily.  RTO in one year or sooner PRN.      Diagnoses and all orders for this visit:    Encounter for gynecological examination (general) (routine) with abnormal findings    Encounter for gynecological examination  -     Ambulatory Referral to Obstetrics / Gynecology    Routine screening for STI (sexually transmitted infection)  -     Hepatitis B E Antigen; Future  -     HIV 1/2 AG/AB w Reflex [facility] for 2 yr old and above; Future  -     RPR-Syphilis Screening (Total Syphilis IGG/IGM); Future  -     Chlamydia/GC amplified DNA by PCR    MAGED (stress urinary incontinence, female)        Subjective:      Patient ID: Nina Joseph is a 44 y.o. female.    This patient presents for routine annual gyn exam.   History of hysterectomy at age 40 for menorrhagia. Last pap many years ago. Also has hx of C/S.  Pt reports leakage with every cough, sneeze, laugh. Denies urgency or UUI.   She denies pelvic pain, dyspareunia, breast concerns, abnormal discharge, bowel dysfunction, depression/anxiety.   Last mammo 2022, next ordered and printed out for pt today.   Sexually active, for 1 month. Requests STD testing.                  The following portions of the patient's history were reviewed and updated as appropriate: allergies, current medications, past family history, past medical history, past social history, past surgical history and problem list.    Review of Systems   Constitutional: Negative.    HENT: Negative.     Respiratory: Negative.     Cardiovascular: Negative.    Gastrointestinal: Negative.    Endocrine: Negative.    Genitourinary:  Negative for difficulty urinating, dyspareunia, dysuria, frequency, menstrual  "problem, pelvic pain, urgency, vaginal bleeding, vaginal discharge and vaginal pain.   Musculoskeletal: Negative.    Skin: Negative.    Neurological: Negative.    Psychiatric/Behavioral: Negative.           Objective:      /72 (BP Location: Right arm, Patient Position: Sitting, Cuff Size: Large)   Pulse 82   Ht 5' 3\" (1.6 m)   Wt 80.3 kg (177 lb)   LMP 12/25/2020   BMI 31.35 kg/m²          Physical Exam  Vitals and nursing note reviewed. Exam conducted with a chaperone present.   Constitutional:       Appearance: Normal appearance. She is well-developed.   HENT:      Head: Normocephalic and atraumatic.   Neck:      Thyroid: No thyroid mass or thyromegaly.   Cardiovascular:      Rate and Rhythm: Normal rate and regular rhythm.      Heart sounds: Normal heart sounds.   Pulmonary:      Effort: Pulmonary effort is normal.      Breath sounds: Normal breath sounds.   Chest:   Breasts:     Breasts are symmetrical.      Right: No inverted nipple, mass, nipple discharge, skin change or tenderness.      Left: No inverted nipple, mass, nipple discharge, skin change or tenderness.   Abdominal:      General: Bowel sounds are normal.      Palpations: Abdomen is soft.      Tenderness: There is no abdominal tenderness.      Hernia: There is no hernia in the left inguinal area or right inguinal area.   Genitourinary:     General: Normal vulva.      Exam position: Supine.      Pubic Area: No rash.       Labia:         Right: No rash, tenderness, lesion or injury.         Left: No rash, tenderness, lesion or injury.       Urethra: No prolapse, urethral pain, urethral swelling or urethral lesion.      Vagina: Normal. No signs of injury and foreign body. No vaginal discharge, erythema, tenderness, bleeding, lesions or prolapsed vaginal walls.      Adnexa:         Right: No mass, tenderness or fullness.          Left: No mass, tenderness or fullness.        Rectum: No external hemorrhoid.      Comments: Urethra normal without " lesions  No bladder tenderness  Cervix, uterus absent, no masses or tenderness on BME  Musculoskeletal:         General: Normal range of motion.      Cervical back: Normal range of motion and neck supple.   Lymphadenopathy:      Lower Body: No right inguinal adenopathy. No left inguinal adenopathy.   Skin:     General: Skin is warm and dry.   Neurological:      Mental Status: She is alert and oriented to person, place, and time.   Psychiatric:         Speech: Speech normal.         Behavior: Behavior normal. Behavior is cooperative.

## 2025-01-21 ENCOUNTER — OFFICE VISIT (OUTPATIENT)
Dept: GYNECOLOGY | Facility: CLINIC | Age: 45
End: 2025-01-21
Payer: COMMERCIAL

## 2025-01-21 ENCOUNTER — APPOINTMENT (OUTPATIENT)
Dept: LAB | Facility: MEDICAL CENTER | Age: 45
End: 2025-01-21
Payer: COMMERCIAL

## 2025-01-21 VITALS
HEIGHT: 63 IN | BODY MASS INDEX: 31.36 KG/M2 | DIASTOLIC BLOOD PRESSURE: 72 MMHG | HEART RATE: 82 BPM | SYSTOLIC BLOOD PRESSURE: 110 MMHG | WEIGHT: 177 LBS

## 2025-01-21 DIAGNOSIS — Z11.3 ROUTINE SCREENING FOR STI (SEXUALLY TRANSMITTED INFECTION): ICD-10-CM

## 2025-01-21 DIAGNOSIS — N39.3 SUI (STRESS URINARY INCONTINENCE, FEMALE): ICD-10-CM

## 2025-01-21 DIAGNOSIS — Z01.411 ENCOUNTER FOR GYNECOLOGICAL EXAMINATION (GENERAL) (ROUTINE) WITH ABNORMAL FINDINGS: Primary | ICD-10-CM

## 2025-01-21 DIAGNOSIS — Z01.419 ENCOUNTER FOR GYNECOLOGICAL EXAMINATION: ICD-10-CM

## 2025-01-21 PROCEDURE — 87389 HIV-1 AG W/HIV-1&-2 AB AG IA: CPT

## 2025-01-21 PROCEDURE — G0476 HPV COMBO ASSAY CA SCREEN: HCPCS | Performed by: OBSTETRICS & GYNECOLOGY

## 2025-01-21 PROCEDURE — 87491 CHLMYD TRACH DNA AMP PROBE: CPT | Performed by: OBSTETRICS & GYNECOLOGY

## 2025-01-21 PROCEDURE — 86780 TREPONEMA PALLIDUM: CPT

## 2025-01-21 PROCEDURE — 36415 COLL VENOUS BLD VENIPUNCTURE: CPT

## 2025-01-21 PROCEDURE — G0143 SCR C/V CYTO,THINLAYER,RESCR: HCPCS | Performed by: OBSTETRICS & GYNECOLOGY

## 2025-01-21 PROCEDURE — 99386 PREV VISIT NEW AGE 40-64: CPT | Performed by: OBSTETRICS & GYNECOLOGY

## 2025-01-21 PROCEDURE — 87591 N.GONORRHOEAE DNA AMP PROB: CPT | Performed by: OBSTETRICS & GYNECOLOGY

## 2025-01-21 PROCEDURE — 87350 HEPATITIS BE AG IA: CPT

## 2025-01-22 ENCOUNTER — RESULTS FOLLOW-UP (OUTPATIENT)
Dept: GYNECOLOGY | Facility: CLINIC | Age: 45
End: 2025-01-22

## 2025-01-22 LAB
C TRACH DNA SPEC QL NAA+PROBE: NEGATIVE
HBV E AG SERPL QL IA: NORMAL
HIV 1+2 AB+HIV1 P24 AG SERPL QL IA: NORMAL
HIV 2 AB SERPL QL IA: NORMAL
HIV1 AB SERPL QL IA: NORMAL
HIV1 P24 AG SERPL QL IA: NORMAL
HPV HR 12 DNA CVX QL NAA+PROBE: NEGATIVE
HPV16 DNA CVX QL NAA+PROBE: NEGATIVE
HPV18 DNA CVX QL NAA+PROBE: NEGATIVE
N GONORRHOEA DNA SPEC QL NAA+PROBE: NEGATIVE
TREPONEMA PALLIDUM IGG+IGM AB [PRESENCE] IN SERUM OR PLASMA BY IMMUNOASSAY: NORMAL

## 2025-01-27 LAB
LAB AP GYN PRIMARY INTERPRETATION: NORMAL
Lab: NORMAL

## 2025-01-29 ENCOUNTER — DOCTOR'S OFFICE (OUTPATIENT)
Dept: URBAN - NONMETROPOLITAN AREA CLINIC 1 | Facility: CLINIC | Age: 45
Setting detail: OPHTHALMOLOGY
End: 2025-01-29
Payer: COMMERCIAL

## 2025-01-29 ENCOUNTER — RX ONLY (RX ONLY)
Age: 45
End: 2025-01-29

## 2025-01-29 DIAGNOSIS — H52.4: ICD-10-CM

## 2025-01-29 DIAGNOSIS — H52.223: ICD-10-CM

## 2025-01-29 PROBLEM — D23.122 OTHER BENIGN NEOPLASM OF SKIN OF LEFT LOWER EYELID, INCLUDING CANTHUS: Status: ACTIVE | Noted: 2025-01-29

## 2025-01-29 PROCEDURE — 92014 COMPRE OPH EXAM EST PT 1/>: CPT | Performed by: OPTOMETRIST

## 2025-01-29 ASSESSMENT — REFRACTION_AUTOREFRACTION
OS_AXIS: 078
OD_AXIS: 087
OS_CYLINDER: -2.00
OD_SPHERE: -1.50
OD_CYLINDER: -3.00
OS_SPHERE: -2.50

## 2025-01-29 ASSESSMENT — REFRACTION_MANIFEST
OS_SPHERE: -2.50
OD_AXIS: 095
OD_SPHERE: -1.50
OS_VA1: 20/25
OD_CYLINDER: -2.75
OD_VA1: 20/25-2
OD_VA2: 20/25-2
OS_VA2: 20/25
OS_CYLINDER: -2.25
OS_ADD: +1.50
OS_AXIS: 080
OD_ADD: +1.50

## 2025-01-29 ASSESSMENT — REFRACTION_CURRENTRX
OS_VPRISM_DIRECTION: BF
OD_ADD: +1.00
OD_OVR_VA: 20/
OS_ADD: +1.00
OD_VPRISM_DIRECTION: BF
OD_SPHERE: -1.25
OD_AXIS: 094
OS_SPHERE: -2.00
OS_CYLINDER: -2.25
OS_OVR_VA: 20/
OS_AXIS: 078
OD_CYLINDER: -2.75

## 2025-01-29 ASSESSMENT — VISUAL ACUITY
OD_BCVA: 20/25-2
OS_BCVA: 20/40

## 2025-01-29 ASSESSMENT — KERATOMETRY
OS_K1POWER_DIOPTERS: 44.75
OD_K1POWER_DIOPTERS: 45.00
OD_K2POWER_DIOPTERS: 47.00
OD_AXISANGLE_DEGREES: 176
OS_K2POWER_DIOPTERS: 46.50
OS_AXISANGLE_DEGREES: 170

## 2025-01-29 ASSESSMENT — TONOMETRY
OD_IOP_MMHG: 15
OS_IOP_MMHG: 15

## 2025-01-29 ASSESSMENT — CONFRONTATIONAL VISUAL FIELD TEST (CVF)
OS_FINDINGS: FULL
OD_FINDINGS: FULL

## 2025-02-04 ENCOUNTER — OPTICAL OFFICE (OUTPATIENT)
Dept: URBAN - NONMETROPOLITAN AREA CLINIC 4 | Facility: CLINIC | Age: 45
Setting detail: OPHTHALMOLOGY
End: 2025-02-04

## 2025-02-04 DIAGNOSIS — H52.223: ICD-10-CM

## 2025-02-04 PROCEDURE — V2020 VISION SVCS FRAMES PURCHASES: HCPCS | Performed by: OPTOMETRIST

## 2025-02-04 PROCEDURE — V2204 LENS SPHCY BIFOCAL 4.00D/2.1: HCPCS | Performed by: OPTOMETRIST

## 2025-02-04 PROCEDURE — V2204 LENS SPHCY BIFOCAL 4.00D/2.1: HCPCS | Mod: LT | Performed by: OPTOMETRIST

## 2025-02-10 ENCOUNTER — CLINICAL SUPPORT (OUTPATIENT)
Dept: INTERNAL MEDICINE CLINIC | Facility: CLINIC | Age: 45
End: 2025-02-10
Payer: COMMERCIAL

## 2025-02-10 DIAGNOSIS — E53.8 B12 DEFICIENCY: Primary | ICD-10-CM

## 2025-02-10 PROCEDURE — 96372 THER/PROPH/DIAG INJ SC/IM: CPT

## 2025-02-10 RX ADMIN — CYANOCOBALAMIN 1000 MCG: 1000 INJECTION, SOLUTION INTRAMUSCULAR; SUBCUTANEOUS at 09:29

## 2025-02-14 ENCOUNTER — DOCTOR'S OFFICE (OUTPATIENT)
Dept: URBAN - NONMETROPOLITAN AREA CLINIC 1 | Facility: CLINIC | Age: 45
Setting detail: OPHTHALMOLOGY
End: 2025-02-14
Payer: COMMERCIAL

## 2025-02-14 DIAGNOSIS — H16.223: ICD-10-CM

## 2025-02-14 DIAGNOSIS — H02.835: ICD-10-CM

## 2025-02-14 DIAGNOSIS — D23.122: ICD-10-CM

## 2025-02-14 DIAGNOSIS — H02.832: ICD-10-CM

## 2025-02-14 PROCEDURE — 99214 OFFICE O/P EST MOD 30 MIN: CPT | Performed by: OPHTHALMOLOGY

## 2025-02-14 PROCEDURE — 92285 EXTERNAL OCULAR PHOTOGRAPHY: CPT | Performed by: OPHTHALMOLOGY

## 2025-02-14 ASSESSMENT — REFRACTION_CURRENTRX
OD_OVR_VA: 20/
OS_ADD: +1.00
OD_VPRISM_DIRECTION: BF
OS_VPRISM_DIRECTION: BF
OD_AXIS: 094
OS_AXIS: 078
OD_SPHERE: -1.25
OS_CYLINDER: -2.25
OS_SPHERE: -2.00
OD_ADD: +1.00
OS_OVR_VA: 20/
OD_CYLINDER: -2.75

## 2025-02-14 ASSESSMENT — TEAR BREAK UP TIME (TBUT)
OS_TBUT: 1+
OD_TBUT: 1+

## 2025-02-14 ASSESSMENT — VISUAL ACUITY
OS_BCVA: 20/40-1
OD_BCVA: 20/30

## 2025-02-14 ASSESSMENT — REFRACTION_MANIFEST
OD_SPHERE: -1.50
OD_VA2: 20/25-2
OS_ADD: +1.50
OS_VA2: 20/25
OD_ADD: +1.50
OS_VA1: 20/25
OD_CYLINDER: -2.75
OS_AXIS: 080
OD_VA1: 20/25-2
OS_CYLINDER: -2.25
OD_AXIS: 095
OS_SPHERE: -2.50

## 2025-02-14 ASSESSMENT — LID POSITION - DERMATOCHALASIS
OD_DERMATOCHALASIS: RLL 1+
OS_DERMATOCHALASIS: LLL 2+

## 2025-02-14 ASSESSMENT — KERATOMETRY
OS_K1POWER_DIOPTERS: 44.75
OS_AXISANGLE_DEGREES: 170
OD_K2POWER_DIOPTERS: 47.00
OS_K2POWER_DIOPTERS: 46.50
OD_AXISANGLE_DEGREES: 176
OD_K1POWER_DIOPTERS: 45.00

## 2025-02-14 ASSESSMENT — REFRACTION_AUTOREFRACTION
OD_SPHERE: -1.50
OS_SPHERE: -2.75
OS_AXIS: 075
OD_AXIS: 083
OD_CYLINDER: -3.00
OS_CYLINDER: -1.75

## 2025-02-14 ASSESSMENT — CONFRONTATIONAL VISUAL FIELD TEST (CVF)
OD_FINDINGS: FULL
OS_FINDINGS: FULL

## 2025-02-27 ENCOUNTER — DOCTOR'S OFFICE (OUTPATIENT)
Dept: URBAN - NONMETROPOLITAN AREA CLINIC 1 | Facility: CLINIC | Age: 45
Setting detail: OPHTHALMOLOGY
End: 2025-02-27
Payer: COMMERCIAL

## 2025-02-27 DIAGNOSIS — D21.0: ICD-10-CM

## 2025-02-27 PROBLEM — D23.122 OTHER BENIGN NEOPLASM OF SKIN OF LEFT LOWER EYELID, INCLUDING CANTHUS: Status: ACTIVE | Noted: 2025-02-14

## 2025-02-27 PROBLEM — H02.832 DERMATOCHALASIS; RIGHT LOWER LID, LEFT LOWER LID: Status: ACTIVE | Noted: 2025-02-14

## 2025-02-27 PROBLEM — H02.835 DERMATOCHALASIS; RIGHT LOWER LID, LEFT LOWER LID: Status: ACTIVE | Noted: 2025-02-14

## 2025-02-27 PROBLEM — H16.223 DRY EYE SYNDROME K SICCA; BOTH EYES: Status: ACTIVE | Noted: 2025-02-14

## 2025-02-27 PROCEDURE — 67840 REMOVE EYELID LESION: CPT | Mod: E2 | Performed by: OPHTHALMOLOGY

## 2025-02-27 ASSESSMENT — REFRACTION_AUTOREFRACTION
OS_SPHERE: -2.75
OS_CYLINDER: -1.75
OD_SPHERE: -1.50
OS_AXIS: 075
OD_AXIS: 083
OD_CYLINDER: -3.00

## 2025-02-27 ASSESSMENT — REFRACTION_MANIFEST
OD_VA2: 20/25-2
OD_CYLINDER: -2.75
OS_AXIS: 080
OS_ADD: +1.50
OD_ADD: +1.50
OS_SPHERE: -2.50
OS_VA1: 20/25
OD_VA1: 20/25-2
OS_CYLINDER: -2.25
OS_VA2: 20/25
OD_AXIS: 095
OD_SPHERE: -1.50

## 2025-02-27 ASSESSMENT — REFRACTION_CURRENTRX
OS_VPRISM_DIRECTION: BF
OD_CYLINDER: -2.75
OS_AXIS: 078
OD_AXIS: 094
OS_CYLINDER: -2.25
OS_ADD: +1.00
OS_OVR_VA: 20/
OS_SPHERE: -2.00
OD_OVR_VA: 20/
OD_SPHERE: -1.25
OD_VPRISM_DIRECTION: BF
OD_ADD: +1.00

## 2025-02-27 ASSESSMENT — KERATOMETRY
OD_AXISANGLE_DEGREES: 176
OD_K2POWER_DIOPTERS: 47.00
OD_K1POWER_DIOPTERS: 45.00
OS_K1POWER_DIOPTERS: 44.75
OS_K2POWER_DIOPTERS: 46.50
OS_AXISANGLE_DEGREES: 170

## 2025-02-27 ASSESSMENT — VISUAL ACUITY
OS_BCVA: 20/40
OD_BCVA: 20/30+1

## 2025-02-27 ASSESSMENT — CONFRONTATIONAL VISUAL FIELD TEST (CVF)
OD_FINDINGS: FULL
OS_FINDINGS: FULL

## 2025-03-13 ENCOUNTER — OFFICE VISIT (OUTPATIENT)
Dept: INTERNAL MEDICINE CLINIC | Facility: CLINIC | Age: 45
End: 2025-03-13
Payer: COMMERCIAL

## 2025-03-13 VITALS
OXYGEN SATURATION: 98 % | TEMPERATURE: 97.8 F | DIASTOLIC BLOOD PRESSURE: 76 MMHG | WEIGHT: 179.7 LBS | BODY MASS INDEX: 31.84 KG/M2 | HEIGHT: 63 IN | SYSTOLIC BLOOD PRESSURE: 112 MMHG

## 2025-03-13 DIAGNOSIS — M47.816 LUMBAR SPONDYLOSIS: ICD-10-CM

## 2025-03-13 DIAGNOSIS — E53.1 VITAMIN B6 DEFICIENCY: ICD-10-CM

## 2025-03-13 DIAGNOSIS — Z00.00 ANNUAL PHYSICAL EXAM: Primary | ICD-10-CM

## 2025-03-13 DIAGNOSIS — K76.0 FATTY LIVER: ICD-10-CM

## 2025-03-13 DIAGNOSIS — E53.8 VITAMIN B12 DEFICIENCY: ICD-10-CM

## 2025-03-13 DIAGNOSIS — E78.2 MIXED HYPERLIPIDEMIA: ICD-10-CM

## 2025-03-13 DIAGNOSIS — Z12.31 VISIT FOR SCREENING MAMMOGRAM: ICD-10-CM

## 2025-03-13 DIAGNOSIS — E53.8 FOLATE DEFICIENCY: ICD-10-CM

## 2025-03-13 DIAGNOSIS — F31.81 BIPOLAR 2 DISORDER (HCC): ICD-10-CM

## 2025-03-13 DIAGNOSIS — D50.0 IRON DEFICIENCY ANEMIA DUE TO CHRONIC BLOOD LOSS: ICD-10-CM

## 2025-03-13 DIAGNOSIS — R73.02 IMPAIRED GLUCOSE TOLERANCE: ICD-10-CM

## 2025-03-13 PROCEDURE — 99214 OFFICE O/P EST MOD 30 MIN: CPT | Performed by: FAMILY MEDICINE

## 2025-03-13 PROCEDURE — 99396 PREV VISIT EST AGE 40-64: CPT | Performed by: FAMILY MEDICINE

## 2025-03-13 RX ORDER — ERYTHROMYCIN 5 MG/G
OINTMENT OPHTHALMIC
COMMUNITY
Start: 2025-02-27

## 2025-03-13 RX ORDER — TRAZODONE HYDROCHLORIDE 50 MG/1
50 TABLET ORAL
COMMUNITY
Start: 2025-02-03

## 2025-03-13 RX ORDER — CHLORHEXIDINE GLUCONATE ORAL RINSE 1.2 MG/ML
SOLUTION DENTAL
COMMUNITY
Start: 2025-01-30

## 2025-03-13 RX ORDER — SODIUM FLUORIDE 1.1 G/100G
CREAM ORAL
COMMUNITY
Start: 2025-01-30

## 2025-03-13 NOTE — PROGRESS NOTES
Adult Annual Physical  Name: Nina Joseph      : 1980      MRN: [id number]  Encounter Provider: Lo Miles MD  Encounter Date: 3/13/2025   Encounter department: [facility]    Assessment & Plan  Annual physical exam  Slip given for mammogram.  Continue to follow-up with psychiatry regularly.  Financial issues discussed.       Fatty liver  Watch diet more closely.  Dietary changes discussed.  Financial issues regarding food discussed.  Orders:  •  CBC and differential; Future    Lumbar spondylosis  Chronic back issues discussed.  Try to remain as active as possible.  Orders:  •  CBC and differential; Future    Bipolar 2 disorder (HCC)  Continue to follow-up with psychiatry.  Discussed with her the importance of taking her medications regularly.  Orders:  •  CBC and differential; Future    Iron deficiency anemia due to chronic blood loss  Slip given for repeat laboratory testing.  Will recheck CBC with upcoming laboratory testing.  Orders:  •  CBC and differential; Future    Folate deficiency  Discussed the importance of trying to take the folic acid on a regular basis.  Difficulty affording the co-pay for medications discussed.  Discussed options to help financially.  Orders:  •  CBC and differential; Future  •  Folate; Future    Vitamin B12 deficiency  Continue with vitamin B12 injections.  Will continue to follow vitamin B12 level with routine laboratory testing and adjust dosing if needed.  Advised her to have her laboratory testing done in about 2 to 3 weeks since she did receive a vitamin B12 injection today.  Orders:  •  CBC and differential; Future  •  Vitamin B12; Future    Vitamin B6 deficiency  Discussed the importance of taking the vitamin B6 on a regular basis.  Unfortunately she has difficulty affording the vitamin supplementation.  Discussed financially at options with her.  Orders:  •  CBC and differential; Future  •  Vitamin B6; Future    Mixed  hyperlipidemia  Cholesterol has been very significantly elevated.  Likely related to her very poor diet as well as possibly a familial component.  Discussed statin medications but she is hesitant to proceed.  Will recheck laboratory testing in a few weeks.  Orders:  •  CBC and differential; Future  •  Comprehensive metabolic panel; Future  •  Lipid panel; Future  •  TSH, 3rd generation; Future    Impaired glucose tolerance  Blood sugar has been mildly elevated in the past.  Will continue to follow this with routine laboratory testing.  Healthy eating habits discussed.  Orders:  •  CBC and differential; Future  •  Hemoglobin A1C; Future    Visit for screening mammogram    Orders:  •  Mammo screening bilateral w 3d and cad; Future    Preventive Screenings:  - Diabetes Screening: screening up-to-date  - Cholesterol Screening: screening not indicated and has hyperlipidemia   - Hepatitis C screening: screening up-to-date   - HIV screening: screening up-to-date   - Cervical cancer screening: screening up-to-date   - Breast cancer screening: risks/benefits discussed and orders placed   - Colon cancer screening: screening not indicated   - Lung cancer screening: screening not indicated   - Prostate cancer screening: screening not indicated     Immunizations:  - Immunizations due: Prevnar 20    Counseling/Anticipatory Guidance:    - Tobacco use: discussed harms of tobacco use and management options for quitting.   - Dental health: discussed importance of regular tooth brushing, flossing, and dental visits.   - Sexual health: discussed sexually transmitted diseases, partner selection, use of condoms, avoidance of unintended pregnancy, and contraceptive alternatives.   - Diet: discussed recommendations for a healthy/well-balanced diet.   - Exercise: the importance of regular exercise/physical activity was discussed. Recommend exercise 3-5 times per week for at least 30 minutes.   - Injury prevention: discussed safety/seat  belts, safety helmets, smoke detectors, carbon monoxide detectors, and smoking near bedding or upholstery.          History of Present Illness     Adult Annual Physical:  Patient presents for annual physical. She presents for routine follow-up as well as annual wellness visit.  Has generally been doing about the same.  She continues to follow-up with psychiatry on a regular basis.  Admits that she does not always remember to take her Abilify.  She continues to have difficulty financially.  She cannot always afford the co-pays for her medications.  This does limit her vitamin supplementation for the vitamin deficiencies.  Has been hesitant to start taking a statin medication.  Admits that her diet is very poor.  States that she eats chips and string cheese a lot.  Does not eat a lot of fruits or vegetables.  Does not exercise regularly.  Has had some on and off back pain.  Did have some worsening a few days ago but has since improved somewhat..     Diet and Physical Activity:  - Diet/Nutrition: poor diet, frequent junk food and high fat diet.  - Exercise: no formal exercise.    General Health:  - Sleep: sleeps well.  - Hearing: normal hearing bilateral ears.  - Vision: most recent eye exam < 1 year ago.  - Dental:. Significant dental issues and needs multiple extractions    /GYN Health:  - Follows with GYN: yes.     Review of Systems   Constitutional:  Negative for activity change, appetite change, chills and fever.   HENT:  Positive for dental problem. Negative for congestion and rhinorrhea.    Eyes:  Negative for visual disturbance.   Respiratory:  Negative for chest tightness and shortness of breath.    Cardiovascular:  Negative for chest pain and palpitations.   Gastrointestinal:  Negative for abdominal pain, blood in stool, diarrhea, nausea and vomiting.   Endocrine: Negative for polydipsia, polyphagia and polyuria.   Genitourinary:  Negative for dysuria, frequency and urgency.   Musculoskeletal:  Positive for  "arthralgias and back pain. Negative for gait problem.   Skin:  Negative for color change.   Neurological:  Negative for dizziness and headaches.   Hematological:  Does not bruise/bleed easily.   Psychiatric/Behavioral:  Negative for confusion and sleep disturbance. The patient is not nervous/anxious.          Objective   /76 (BP Location: Left arm, Patient Position: Sitting)   Temp 97.8 °F (36.6 °C) (Temporal)   Ht 5' 3\" (1.6 m)   Wt 81.5 kg (179 lb 11.2 oz)   LMP 12/25/2020   SpO2 98%   BMI 31.83 kg/m²     Physical Exam  Vitals and nursing note reviewed.   Constitutional:       General: She is not in acute distress.     Appearance: She is well-developed and well-groomed.   HENT:      Head: Normocephalic and atraumatic.      Comments: Poor dentition  Eyes:      General:         Right eye: No discharge.         Left eye: No discharge.      Conjunctiva/sclera: Conjunctivae normal.      Pupils: Pupils are equal, round, and reactive to light.   Cardiovascular:      Rate and Rhythm: Normal rate and regular rhythm.      Heart sounds: Normal heart sounds. No murmur heard.     No friction rub. No gallop.   Pulmonary:      Effort: No respiratory distress.      Breath sounds: No wheezing or rales.   Abdominal:      General: Bowel sounds are normal. There is no distension.      Tenderness: There is no abdominal tenderness.   Lymphadenopathy:      Cervical: No cervical adenopathy.   Skin:     General: Skin is warm and dry.   Neurological:      Mental Status: She is alert and oriented to person, place, and time.   Psychiatric:         Mood and Affect: Mood normal. Affect is labile.         Speech: Speech normal.         Behavior: Behavior normal. Behavior is cooperative.         Cognition and Memory: Cognition and memory normal.         "

## 2025-03-13 NOTE — ASSESSMENT & PLAN NOTE
Watch diet more closely.  Dietary changes discussed.  Financial issues regarding food discussed.  Orders:  •  CBC and differential; Future

## 2025-03-13 NOTE — ASSESSMENT & PLAN NOTE
Discussed the importance of trying to take the folic acid on a regular basis.  Difficulty affording the co-pay for medications discussed.  Discussed options to help financially.  Orders:  •  CBC and differential; Future  •  Folate; Future

## 2025-03-13 NOTE — ASSESSMENT & PLAN NOTE
Discussed the importance of taking the vitamin B6 on a regular basis.  Unfortunately she has difficulty affording the vitamin supplementation.  Discussed financially at options with her.  Orders:  •  CBC and differential; Future  •  Vitamin B6; Future

## 2025-03-13 NOTE — ASSESSMENT & PLAN NOTE
Continue to follow-up with psychiatry.  Discussed with her the importance of taking her medications regularly.  Orders:  •  CBC and differential; Future

## 2025-03-13 NOTE — ASSESSMENT & PLAN NOTE
Continue with vitamin B12 injections.  Will continue to follow vitamin B12 level with routine laboratory testing and adjust dosing if needed.  Advised her to have her laboratory testing done in about 2 to 3 weeks since she did receive a vitamin B12 injection today.  Orders:  •  CBC and differential; Future  •  Vitamin B12; Future

## 2025-03-13 NOTE — ASSESSMENT & PLAN NOTE
Slip given for repeat laboratory testing.  Will recheck CBC with upcoming laboratory testing.  Orders:  •  CBC and differential; Future

## 2025-03-13 NOTE — ASSESSMENT & PLAN NOTE
Chronic back issues discussed.  Try to remain as active as possible.  Orders:  •  CBC and differential; Future

## 2025-03-13 NOTE — ASSESSMENT & PLAN NOTE
Blood sugar has been mildly elevated in the past.  Will continue to follow this with routine laboratory testing.  Healthy eating habits discussed.  Orders:  •  CBC and differential; Future  •  Hemoglobin A1C; Future

## 2025-03-13 NOTE — PATIENT INSTRUCTIONS
"Patient Education     Routine physical for adults   The Basics   Written by the doctors and editors at [facility]   What is a physical? -- A physical is a routine visit, or \"check-up,\" with your doctor. You might also hear it called a \"wellness visit\" or \"preventive visit.\"  During each visit, the doctor will:   Ask about your physical and mental health   Ask about your habits, behaviors, and lifestyle   Do an exam   Give you vaccines if needed   Talk to you about any medicines you take   Give advice about your health   Answer your questions  Getting regular check-ups is an important part of taking care of your health. It can help your doctor find and treat any problems you have. But it's also important for preventing health problems.  A routine physical is different from a \"sick visit.\" A sick visit is when you see a doctor because of a health concern or problem. Since physicals are scheduled ahead of time, you can think about what you want to ask the doctor.  How often should I get a physical? -- It depends on your age and health. In general, for people age 21 years and older:   If you are younger than 50 years, you might be able to get a physical every 3 years.   If you are 50 years or older, your doctor might recommend a physical every year.  If you have an ongoing health condition, like diabetes or high blood pressure, your doctor will probably want to see you more often.  What happens during a physical? -- In general, each visit will include:   Physical exam - The doctor or nurse will check your height, weight, heart rate, and blood pressure. They will also look at your eyes and ears. They will ask about how you are feeling and whether you have any symptoms that bother you.   Medicines - It's a good idea to bring a list of all the medicines you take to each doctor visit. Your doctor will talk to you about your medicines and answer any questions. Tell them if you are having any side effects that bother you. You " "should also tell them if you are having trouble paying for any of your medicines.   Habits and behaviors - This includes:   Your diet   Your exercise habits   Whether you smoke, drink alcohol, or use drugs   Whether you are sexually active   Whether you feel safe at home  Your doctor will talk to you about things you can do to improve your health and lower your risk of health problems. They will also offer help and support. For example, if you want to quit smoking, they can give you advice and might prescribe medicines. If you want to improve your diet or get more physical activity, they can help you with this, too.   Lab tests, if needed - The tests you get will depend on your age and situation. For example, your doctor might want to check your:   Cholesterol   Blood sugar   Iron level   Vaccines - The recommended vaccines will depend on your age, health, and what vaccines you already had. Vaccines are very important because they can prevent certain serious or deadly infections.   Discussion of screening - \"Screening\" means checking for diseases or other health problems before they cause symptoms. Your doctor can recommend screening based on your age, risk, and preferences. This might include tests to check for:   Cancer, such as breast, prostate, cervical, ovarian, colorectal, prostate, lung, or skin cancer   Sexually transmitted infections, such as chlamydia and gonorrhea   Mental health conditions like depression and anxiety  Your doctor will talk to you about the different types of screening tests. They can help you decide which screenings to have. They can also explain what the results might mean.   Answering questions - The physical is a good time to ask the doctor or nurse questions about your health. If needed, they can refer you to other doctors or specialists, too.  Adults older than 65 years often need other care, too. As you get older, your doctor will talk to you about:   How to prevent falling at " home   Hearing or vision tests   Memory testing   How to take your medicines safely   Making sure that you have the help and support you need at home  All topics are updated as new evidence becomes available and our peer review process is complete.  This topic retrieved from [employer] on: May 02, 2024.  Topic 766984 Version 1.0  Release: 32.4.3 - C32.122  © 2024 [employer] and/or its affiliates. All rights reserved.  Consumer Information Use and Disclaimer   Disclaimer: This generalized information is a limited summary of diagnosis, treatment, and/or medication information. It is not meant to be comprehensive and should be used as a tool to help the user understand and/or assess potential diagnostic and treatment options. It does NOT include all information about conditions, treatments, medications, side effects, or risks that may apply to a specific patient. It is not intended to be medical advice or a substitute for the medical advice, diagnosis, or treatment of a health care provider based on the health care provider's examination and assessment of a patient's specific and unique circumstances. Patients must speak with a health care provider for complete information about their health, medical questions, and treatment options, including any risks or benefits regarding use of medications. This information does not endorse any treatments or medications as safe, effective, or approved for treating a specific patient. [employer] Inc[employer] and its affiliates disclaim any warranty or liability relating to this information or the use thereof.The use of this information is governed by the Terms of Use, available at https://www.woltersDiviteluwer.com/en/know/clinical-effectiveness-terms. 2024© Up[employer]Date, Inc[employer] and its affiliates and/or licensors. All rights reserved.  Copyright   © 2024 [employer] and/or its affiliates. All rights reserved.

## 2025-03-13 NOTE — ASSESSMENT & PLAN NOTE
Cholesterol has been very significantly elevated.  Likely related to her very poor diet as well as possibly a familial component.  Discussed statin medications but she is hesitant to proceed.  Will recheck laboratory testing in a few weeks.  Orders:  •  CBC and differential; Future  •  Comprehensive metabolic panel; Future  •  Lipid panel; Future  •  TSH, 3rd generation; Future

## 2025-03-25 ENCOUNTER — APPOINTMENT (OUTPATIENT)
Dept: LAB | Facility: MEDICAL CENTER | Age: 45
End: 2025-03-25
Payer: COMMERCIAL

## 2025-03-25 DIAGNOSIS — E78.2 MIXED HYPERLIPIDEMIA: ICD-10-CM

## 2025-03-25 DIAGNOSIS — K76.0 FATTY LIVER: ICD-10-CM

## 2025-03-25 DIAGNOSIS — E53.8 FOLATE DEFICIENCY: ICD-10-CM

## 2025-03-25 DIAGNOSIS — M47.816 LUMBAR SPONDYLOSIS: ICD-10-CM

## 2025-03-25 DIAGNOSIS — R73.02 IMPAIRED GLUCOSE TOLERANCE: ICD-10-CM

## 2025-03-25 DIAGNOSIS — E53.8 VITAMIN B12 DEFICIENCY: ICD-10-CM

## 2025-03-25 DIAGNOSIS — E53.1 VITAMIN B6 DEFICIENCY: ICD-10-CM

## 2025-03-25 DIAGNOSIS — F31.81 BIPOLAR 2 DISORDER (HCC): ICD-10-CM

## 2025-03-25 DIAGNOSIS — D50.0 IRON DEFICIENCY ANEMIA DUE TO CHRONIC BLOOD LOSS: ICD-10-CM

## 2025-03-25 LAB
ALBUMIN SERPL BCG-MCNC: 4.1 G/DL (ref 3.5–5)
ALP SERPL-CCNC: 93 U/L (ref 34–104)
ALT SERPL W P-5'-P-CCNC: 32 U/L (ref 7–52)
ANION GAP SERPL CALCULATED.3IONS-SCNC: 13 MMOL/L (ref 4–13)
AST SERPL W P-5'-P-CCNC: 26 U/L (ref 13–39)
BASOPHILS # BLD AUTO: 0.08 THOUSANDS/ÂΜL (ref 0–0.1)
BASOPHILS NFR BLD AUTO: 1 % (ref 0–1)
BILIRUB SERPL-MCNC: 0.63 MG/DL (ref 0.2–1)
BUN SERPL-MCNC: 10 MG/DL (ref 5–25)
CALCIUM SERPL-MCNC: 8.7 MG/DL (ref 8.4–10.2)
CHLORIDE SERPL-SCNC: 106 MMOL/L (ref 96–108)
CHOLEST SERPL-MCNC: 260 MG/DL (ref ?–200)
CO2 SERPL-SCNC: 21 MMOL/L (ref 21–32)
CREAT SERPL-MCNC: 0.54 MG/DL (ref 0.6–1.3)
EOSINOPHIL # BLD AUTO: 0.29 THOUSAND/ÂΜL (ref 0–0.61)
EOSINOPHIL NFR BLD AUTO: 3 % (ref 0–6)
ERYTHROCYTE [DISTWIDTH] IN BLOOD BY AUTOMATED COUNT: 13.2 % (ref 11.6–15.1)
EST. AVERAGE GLUCOSE BLD GHB EST-MCNC: 117 MG/DL
FOLATE SERPL-MCNC: 3.8 NG/ML
GFR SERPL CREATININE-BSD FRML MDRD: 115 ML/MIN/1.73SQ M
GLUCOSE P FAST SERPL-MCNC: 83 MG/DL (ref 65–99)
HBA1C MFR BLD: 5.7 %
HCT VFR BLD AUTO: 44.1 % (ref 34.8–46.1)
HDLC SERPL-MCNC: 38 MG/DL
HGB BLD-MCNC: 15 G/DL (ref 11.5–15.4)
IMM GRANULOCYTES # BLD AUTO: 0.07 THOUSAND/UL (ref 0–0.2)
IMM GRANULOCYTES NFR BLD AUTO: 1 % (ref 0–2)
LDLC SERPL CALC-MCNC: 163 MG/DL (ref 0–100)
LYMPHOCYTES # BLD AUTO: 2.78 THOUSANDS/ÂΜL (ref 0.6–4.47)
LYMPHOCYTES NFR BLD AUTO: 32 % (ref 14–44)
MCH RBC QN AUTO: 34.2 PG (ref 26.8–34.3)
MCHC RBC AUTO-ENTMCNC: 34 G/DL (ref 31.4–37.4)
MCV RBC AUTO: 101 FL (ref 82–98)
MONOCYTES # BLD AUTO: 0.63 THOUSAND/ÂΜL (ref 0.17–1.22)
MONOCYTES NFR BLD AUTO: 7 % (ref 4–12)
NEUTROPHILS # BLD AUTO: 4.73 THOUSANDS/ÂΜL (ref 1.85–7.62)
NEUTS SEG NFR BLD AUTO: 56 % (ref 43–75)
NONHDLC SERPL-MCNC: 222 MG/DL
NRBC BLD AUTO-RTO: 0 /100 WBCS
PLATELET # BLD AUTO: 238 THOUSANDS/UL (ref 149–390)
PMV BLD AUTO: 11.1 FL (ref 8.9–12.7)
POTASSIUM SERPL-SCNC: 3.9 MMOL/L (ref 3.5–5.3)
PROT SERPL-MCNC: 6.3 G/DL (ref 6.4–8.4)
RBC # BLD AUTO: 4.38 MILLION/UL (ref 3.81–5.12)
SODIUM SERPL-SCNC: 140 MMOL/L (ref 135–147)
TRIGL SERPL-MCNC: 294 MG/DL (ref ?–150)
TSH SERPL DL<=0.05 MIU/L-ACNC: 2.7 UIU/ML (ref 0.45–4.5)
VIT B12 SERPL-MCNC: 341 PG/ML (ref 180–914)
WBC # BLD AUTO: 8.58 THOUSAND/UL (ref 4.31–10.16)

## 2025-03-25 PROCEDURE — 84207 ASSAY OF VITAMIN B-6: CPT

## 2025-03-25 PROCEDURE — 80061 LIPID PANEL: CPT

## 2025-03-25 PROCEDURE — 36415 COLL VENOUS BLD VENIPUNCTURE: CPT

## 2025-03-25 PROCEDURE — 85025 COMPLETE CBC W/AUTO DIFF WBC: CPT

## 2025-03-25 PROCEDURE — 80053 COMPREHEN METABOLIC PANEL: CPT

## 2025-03-25 PROCEDURE — 82746 ASSAY OF FOLIC ACID SERUM: CPT

## 2025-03-25 PROCEDURE — 83036 HEMOGLOBIN GLYCOSYLATED A1C: CPT

## 2025-03-25 PROCEDURE — 84443 ASSAY THYROID STIM HORMONE: CPT

## 2025-03-25 PROCEDURE — 82607 VITAMIN B-12: CPT

## 2025-03-29 LAB — VIT B6 SERPL-MCNC: 5.3 UG/L (ref 3.4–65.2)

## 2025-04-10 ENCOUNTER — CLINICAL SUPPORT (OUTPATIENT)
Dept: INTERNAL MEDICINE CLINIC | Facility: CLINIC | Age: 45
End: 2025-04-10
Payer: COMMERCIAL

## 2025-04-10 DIAGNOSIS — E53.8 VITAMIN B12 DEFICIENCY: Primary | ICD-10-CM

## 2025-04-10 PROCEDURE — 96372 THER/PROPH/DIAG INJ SC/IM: CPT

## 2025-04-10 RX ADMIN — CYANOCOBALAMIN 1000 MCG: 1000 INJECTION, SOLUTION INTRAMUSCULAR; SUBCUTANEOUS at 09:59

## 2025-04-14 ENCOUNTER — HOSPITAL ENCOUNTER (EMERGENCY)
Facility: HOSPITAL | Age: 45
Discharge: HOME | End: 2025-04-14
Attending: EMERGENCY MEDICINE
Payer: COMMERCIAL

## 2025-04-14 ENCOUNTER — APPOINTMENT (EMERGENCY)
Dept: CT IMAGING | Facility: HOSPITAL | Age: 45
End: 2025-04-14
Payer: COMMERCIAL

## 2025-04-14 ENCOUNTER — APPOINTMENT (EMERGENCY)
Dept: RADIOLOGY | Facility: HOSPITAL | Age: 45
End: 2025-04-14
Payer: COMMERCIAL

## 2025-04-14 VITALS
TEMPERATURE: 98.1 F | DIASTOLIC BLOOD PRESSURE: 79 MMHG | HEART RATE: 84 BPM | RESPIRATION RATE: 18 BRPM | SYSTOLIC BLOOD PRESSURE: 119 MMHG | OXYGEN SATURATION: 96 %

## 2025-04-14 DIAGNOSIS — M54.12 CERVICAL RADICULOPATHY: Primary | ICD-10-CM

## 2025-04-14 DIAGNOSIS — E87.6 HYPOKALEMIA: ICD-10-CM

## 2025-04-14 LAB
ANION GAP SERPL CALCULATED.3IONS-SCNC: 10 MMOL/L (ref 4–13)
BASOPHILS # BLD AUTO: 0.09 THOUSANDS/ÂΜL (ref 0–0.1)
BASOPHILS NFR BLD AUTO: 1 % (ref 0–1)
BUN SERPL-MCNC: 11 MG/DL (ref 5–25)
CALCIUM SERPL-MCNC: 9.1 MG/DL (ref 8.4–10.2)
CHLORIDE SERPL-SCNC: 103 MMOL/L (ref 96–108)
CO2 SERPL-SCNC: 25 MMOL/L (ref 21–32)
CREAT SERPL-MCNC: 0.61 MG/DL (ref 0.6–1.3)
EOSINOPHIL # BLD AUTO: 0.3 THOUSAND/ÂΜL (ref 0–0.61)
EOSINOPHIL NFR BLD AUTO: 3 % (ref 0–6)
ERYTHROCYTE [DISTWIDTH] IN BLOOD BY AUTOMATED COUNT: 12.8 % (ref 11.6–15.1)
GFR SERPL CREATININE-BSD FRML MDRD: 110 ML/MIN/1.73SQ M
GLUCOSE SERPL-MCNC: 104 MG/DL (ref 65–140)
HCT VFR BLD AUTO: 44.7 % (ref 34.8–46.1)
HGB BLD-MCNC: 15.1 G/DL (ref 11.5–15.4)
IMM GRANULOCYTES # BLD AUTO: 0.03 THOUSAND/UL (ref 0–0.2)
IMM GRANULOCYTES NFR BLD AUTO: 0 % (ref 0–2)
LYMPHOCYTES # BLD AUTO: 2.81 THOUSANDS/ÂΜL (ref 0.6–4.47)
LYMPHOCYTES NFR BLD AUTO: 30 % (ref 14–44)
MCH RBC QN AUTO: 33.9 PG (ref 26.8–34.3)
MCHC RBC AUTO-ENTMCNC: 33.8 G/DL (ref 31.4–37.4)
MCV RBC AUTO: 100 FL (ref 82–98)
MONOCYTES # BLD AUTO: 0.81 THOUSAND/ÂΜL (ref 0.17–1.22)
MONOCYTES NFR BLD AUTO: 9 % (ref 4–12)
NEUTROPHILS # BLD AUTO: 5.46 THOUSANDS/ÂΜL (ref 1.85–7.62)
NEUTS SEG NFR BLD AUTO: 57 % (ref 43–75)
NRBC BLD AUTO-RTO: 0 /100 WBCS
PLATELET # BLD AUTO: 299 THOUSANDS/UL (ref 149–390)
PMV BLD AUTO: 10.6 FL (ref 8.9–12.7)
POTASSIUM SERPL-SCNC: 3.3 MMOL/L (ref 3.5–5.3)
RBC # BLD AUTO: 4.45 MILLION/UL (ref 3.81–5.12)
SODIUM SERPL-SCNC: 138 MMOL/L (ref 135–147)
WBC # BLD AUTO: 9.5 THOUSAND/UL (ref 4.31–10.16)

## 2025-04-14 PROCEDURE — 80048 BASIC METABOLIC PNL TOTAL CA: CPT

## 2025-04-14 PROCEDURE — 85025 COMPLETE CBC W/AUTO DIFF WBC: CPT

## 2025-04-14 PROCEDURE — 93005 ELECTROCARDIOGRAM TRACING: CPT

## 2025-04-14 PROCEDURE — 73030 X-RAY EXAM OF SHOULDER: CPT

## 2025-04-14 PROCEDURE — 99284 EMERGENCY DEPT VISIT MOD MDM: CPT

## 2025-04-14 PROCEDURE — 96374 THER/PROPH/DIAG INJ IV PUSH: CPT

## 2025-04-14 PROCEDURE — 72125 CT NECK SPINE W/O DYE: CPT

## 2025-04-14 PROCEDURE — 99285 EMERGENCY DEPT VISIT HI MDM: CPT | Performed by: EMERGENCY MEDICINE

## 2025-04-14 PROCEDURE — 36415 COLL VENOUS BLD VENIPUNCTURE: CPT

## 2025-04-14 RX ORDER — KETOROLAC TROMETHAMINE 30 MG/ML
15 INJECTION, SOLUTION INTRAMUSCULAR; INTRAVENOUS ONCE
Status: COMPLETED | OUTPATIENT
Start: 2025-04-14 | End: 2025-04-14

## 2025-04-14 RX ORDER — METHOCARBAMOL 500 MG/1
500 TABLET, FILM COATED ORAL
Qty: 20 TABLET | Refills: 0 | Status: SHIPPED | OUTPATIENT
Start: 2025-04-14

## 2025-04-14 RX ORDER — LIDOCAINE 50 MG/G
1 PATCH TOPICAL ONCE
Status: DISCONTINUED | OUTPATIENT
Start: 2025-04-14 | End: 2025-04-14 | Stop reason: HOSPADM

## 2025-04-14 RX ORDER — POTASSIUM CHLORIDE 1500 MG/1
40 TABLET, EXTENDED RELEASE ORAL ONCE
Status: COMPLETED | OUTPATIENT
Start: 2025-04-14 | End: 2025-04-14

## 2025-04-14 RX ORDER — ACETAMINOPHEN 325 MG/1
975 TABLET ORAL ONCE
Status: COMPLETED | OUTPATIENT
Start: 2025-04-14 | End: 2025-04-14

## 2025-04-14 RX ADMIN — ACETAMINOPHEN 975 MG: 325 TABLET, FILM COATED ORAL at 16:20

## 2025-04-14 RX ADMIN — KETOROLAC TROMETHAMINE 15 MG: 30 INJECTION, SOLUTION INTRAMUSCULAR at 16:20

## 2025-04-14 RX ADMIN — LIDOCAINE 1 PATCH: 50 PATCH CUTANEOUS at 16:20

## 2025-04-14 RX ADMIN — POTASSIUM CHLORIDE 40 MEQ: 1500 TABLET, EXTENDED RELEASE ORAL at 17:44

## 2025-04-14 NOTE — ED PROVIDER NOTES
ED Disposition       None          Assessment & Plan   {Hyperlinks  Risk Stratification - NIHSS - HEART SCORE - Fill out sepsis note and make sure you call [phone] if severe or septic shock:[id number]}    Medical Decision Making  Amount and/or Complexity of Data Reviewed  Labs: ordered.  Radiology: ordered and independent interpretation performed.    Risk  OTC drugs.  Prescription drug management.             Medications - No data to display    ED Risk Strat Scores                    No data recorded        SBIRT 20yo+      Flowsheet Row Most Recent Value   Initial Alcohol Screen: US AUDIT-C     1. How often do you have a drink containing alcohol? 0 Filed at: 2025 1510   2. How many drinks containing alcohol do you have on a typical day you are drinking?  0 Filed at: 2025 1510   3a. Male UNDER 65: How often do you have five or more drinks on one occasion? 0 Filed at: 2025 1510   3b. FEMALE Any Age, or MALE 65+: How often do you have 4 or more drinks on one occassion? 0 Filed at: 2025 1510   Audit-C Score 0 Filed at: 2025 1510   LE: How many times in the past year have you...    Used an illegal drug or used a prescription medication for non-medical reasons? Never Filed at: 2025 1510                            History of Present Illness   {Hyperlinks  History (Med, Surg, Fam, Social) - Current Medications - Allergies  :[id number]}    Chief Complaint   Patient presents with    Shoulder Pain     Patient reports for 4 days having R should pain that radiates into her shoulder blade.        Past Medical History:   Diagnosis Date    Anxiety     Bipolar depression (HCC)     Chronic pelvic pain in female 2021    Endometrial hyperplasia without atypia 2021    Hypertension     Kidney stone     Menometrorrhagia 2021    Vertigo 2024      Past Surgical History:   Procedure Laterality Date     SECTION      EPIDURAL BLOCK INJECTION Right 10/19/2023    Procedure:  Right C7-T1 ILESI;  Surgeon: Elsi Saunders MD;  Location: [facility];  Service: Pain Management     FL INJECTION RIGHT SHOULDER (ARTHROGRAM)  1/29/2024    HYSTERECTOMY  04/09/2021    IL SURGICAL ARTHROSCOPY SHOULDER W/ROTATOR CUFF RPR Right 4/30/2024    Procedure: Right shoulder arthroscopy, rotator cuff repair;  Surgeon: Demond Cardenas MD;  Location: [facility];  Service: Orthopedics      Family History   Problem Relation Age of Onset    Heart disease Father     No Known Problems Sister     No Known Problems Daughter     No Known Problems Maternal Grandmother     No Known Problems Maternal Grandfather     No Known Problems Paternal Grandmother     No Known Problems Paternal Grandfather     No Known Problems Son     No Known Problems Son     No Known Problems Son     No Known Problems Maternal Aunt     No Known Problems Maternal Aunt     No Known Problems Paternal Aunt     No Known Problems Paternal Aunt       Social History     Tobacco Use    Smoking status: Every Day     Current packs/day: 0.50     Average packs/day: 0.5 packs/day for 28.0 years (14.0 ttl pk-yrs)     Types: Cigarettes    Smokeless tobacco: Never   Vaping Use    Vaping status: Never Used   Substance Use Topics    Alcohol use: Never    Drug use: Never      E-Cigarette/Vaping    E-Cigarette Use Never User     Cartridges/Day 0.5 ppd       E-Cigarette/Vaping Substances    Nicotine No     THC No     CBD No     Flavoring No     Other No     Unknown No       I have reviewed and agree with the history as documented.     HPI    Review of Systems   Constitutional:  Negative for chills and fever.   HENT:  Negative for ear pain and sore throat.    Eyes:  Negative for visual disturbance.   Respiratory:  Negative for cough and shortness of breath.    Cardiovascular:  Negative for chest pain and leg swelling.   Gastrointestinal:  Negative for abdominal pain, blood in stool, constipation, diarrhea, nausea and vomiting.   Genitourinary:  Negative for dysuria  and hematuria.   Musculoskeletal:  Negative for neck pain and neck stiffness.        Right-sided neck pain that radiates down into her shoulder and down her arm   Neurological:  Negative for dizziness, syncope, weakness and light-headedness.   All other systems reviewed and are negative.          Objective   {Hyperlinks  Historical Vitals - Historical Labs - Chart Review/Microbiology - Last Echo - Code Status  :[id number]}    ED Triage Vitals [04/14/25 1512]   Temperature Pulse Blood Pressure Respirations SpO2 Patient Position - Orthostatic VS   98.1 °F (36.7 °C) 92 141/80 18 97 % Lying      Temp Source Heart Rate Source BP Location FiO2 (%) Pain Score    Temporal Monitor Left arm -- 10 - Worst Possible Pain      Vitals      Date and Time Temp Pulse SpO2 Resp BP Pain Score FACES Pain Rating User   04/14/25 1512 98.1 °F (36.7 °C) 92 97 % 18 141/80 10 - Worst Possible Pain -- SK            Physical Exam  Vitals and nursing note reviewed.   Constitutional:       General: She is not in acute distress.     Appearance: She is well-developed. She is not ill-appearing, toxic-appearing or diaphoretic.   HENT:      Head: Normocephalic and atraumatic.   Eyes:      Extraocular Movements: Extraocular movements intact.      Conjunctiva/sclera: Conjunctivae normal.   Cardiovascular:      Rate and Rhythm: Normal rate and regular rhythm.      Heart sounds: No murmur heard.  Pulmonary:      Effort: Pulmonary effort is normal. No respiratory distress.      Breath sounds: Normal breath sounds. No wheezing or rales.   Abdominal:      Palpations: Abdomen is soft.      Tenderness: There is no abdominal tenderness. There is no right CVA tenderness, left CVA tenderness or guarding.   Musculoskeletal:         General: No swelling.      Cervical back: Normal range of motion and neck supple. No rigidity, torticollis, tenderness (no midline c spine tenderness) or crepitus. Pain with movement and muscular tenderness present. No spinous process  tenderness.      Comments: Strong distal radial pulse of the right upper extremity   Skin:     General: Skin is warm and dry.      Capillary Refill: Capillary refill takes less than 2 seconds.      Findings: No rash.   Neurological:      General: No focal deficit present.      Mental Status: She is alert and oriented to person, place, and time.      GCS: GCS eye subscore is 4. GCS verbal subscore is 5. GCS motor subscore is 6.      Cranial Nerves: No cranial nerve deficit, dysarthria or facial asymmetry.      Sensory: Sensation is intact. No sensory deficit.      Motor: No weakness, tremor, atrophy, abnormal muscle tone, seizure activity or pronator drift.      Coordination: Coordination normal. Finger-Nose-Finger Test normal.      Comments: Movement and sensation intact to the right upper extremity   Psychiatric:         Mood and Affect: Mood normal.         Results Reviewed       None            No orders to display       ECG 12 Lead Documentation Only    Date/Time: 4/14/2025 3:58 PM    Performed by: Teresa Hartley MD  Authorized by: Teresa Hartley MD    Indications / Diagnosis:  Right shoulder and neck pain radiating to the shoulder blade  ECG reviewed by me, the ED Provider: yes    Patient location:  ED  Rate:     ECG rate:  91    ECG rate assessment: normal    Rhythm:     Rhythm: sinus rhythm    Ectopy:     Ectopy: none    QRS:     QRS axis:  Normal    QRS intervals:  Normal  Conduction:     Conduction: normal    ST segments:     ST segments:  Normal  T waves:     T waves: normal    Comments:      QTc 462      ED Medication and Procedure Management   Prior to Admission Medications   Prescriptions Last Dose Informant Patient Reported? Taking?   ARIPiprazole (ABILIFY) 10 mg tablet 4/13/2025 Self Yes Yes   Sig: 10 mg   Denta 5000 Plus 1.1 % CREA 4/13/2025 Self Yes Yes   Sig: USE A PEA SIZED AMOUNT WHEN BRUSHING TWICE DAILY   LORazepam (ATIVAN) 0.5 mg tablet  Self No No   Sig: Take 1-2 about 30 minutes  prior to the MRI   Patient not taking: Reported on 2024   chlorhexidine (PERIDEX) 0.12 % solution 2025 Self Yes Yes   Sig: USE 1 CAPFUL TWICE DAILY, RINSE AND SWISH FOR 30 SECONDS, THEN SPIT   erythromycin (ILOTYCIN) ophthalmic ointment  Self Yes No   Sig: APPLY OINTMENT TO LEFT LOWER LID TWICE DAILY AND AT BEDTIME FOR 10 DAYS AND THEN DISCONTINUE   Patient not taking: Reported on 3/13/2025   fluticasone (FLONASE) 50 mcg/act nasal spray 2025 Self No Yes   Si spray into each nostril daily   folic acid (FOLVITE) 1 mg tablet  Self No No   Sig: Take 1 tablet (1 mg total) by mouth daily   Patient not taking: Reported on 2024   pyridoxine (VITAMIN B6) 100 mg tablet Not Taking Self No No   Sig: Take 1 tablet (100 mg total) by mouth daily   Patient not taking: Reported on 2025   traZODone (DESYREL) 50 mg tablet Not Taking Self Yes No   Sig: Take 50 mg by mouth daily at bedtime as needed   Patient not taking: Reported on 2025      Facility-Administered Medications Last Administration Doses Remaining   cyanocobalamin injection 1,000 mcg 4/10/2025  9:59 AM         Patient's Medications   Discharge Prescriptions    No medications on file     No discharge procedures on file.  ED SEPSIS DOCUMENTATION          Specimen: Blood from Arm, Right Updated: 04/14/25 1719     Sodium 138 mmol/L      Potassium 3.3 mmol/L      Chloride 103 mmol/L      CO2 25 mmol/L      ANION GAP 10 mmol/L      BUN 11 mg/dL      Creatinine 0.61 mg/dL      Glucose 104 mg/dL      Calcium 9.1 mg/dL      eGFR 110 ml/min/1.73sq m     Narrative:      [employer] guidelines for Chronic Kidney Disease (CKD):     Stage 1 with normal or high GFR (GFR > 90 mL/min/1.73 square meters)    Stage 2 Mild CKD (GFR = 60-89 mL/min/1.73 square meters)    Stage 3A Moderate CKD (GFR = 45-59 mL/min/1.73 square meters)    Stage 3B Moderate CKD (GFR = 30-44 mL/min/1.73 square meters)    Stage 4 Severe CKD (GFR = 15-29 mL/min/1.73 square meters)    Stage 5 End Stage CKD (GFR <15 mL/min/1.73 square meters)  Note: GFR calculation is accurate only with a steady state creatinine    CBC and differential [[id number]]  (Abnormal) Collected: 04/14/25 1617    Lab Status: Final result Specimen: Blood from Arm, Right Updated: 04/14/25 1659     WBC 9.50 Thousand/uL      RBC 4.45 Million/uL      Hemoglobin 15.1 g/dL      Hematocrit 44.7 %       fL      MCH 33.9 pg      MCHC 33.8 g/dL      RDW 12.8 %      MPV 10.6 fL      Platelets 299 Thousands/uL      nRBC 0 /100 WBCs      Segmented % 57 %      Immature Grans % 0 %      Lymphocytes % 30 %      Monocytes % 9 %      Eosinophils Relative 3 %      Basophils Relative 1 %      Absolute Neutrophils 5.46 Thousands/µL      Absolute Immature Grans 0.03 Thousand/uL      Absolute Lymphocytes 2.81 Thousands/µL      Absolute Monocytes 0.81 Thousand/µL      Eosinophils Absolute 0.30 Thousand/µL      Basophils Absolute 0.09 Thousands/µL             CT spine cervical without contrast   Final Interpretation by Chaitanya Romeo DO (04/14 1614)      No cervical spine fracture or traumatic malalignment.      Minor mid cervical degenerative change. No canal stenosis. Mild left foraminal narrowing at C4-5.             Workstation performed: [id number]         XR shoulder 2+ views RIGHT   ED Interpretation by Teresa Hartley MD (04/14 2174)   Do not appreciate any evidence of acute fracture or dislocation.  Interpreted by me.      Final Interpretation by Demond Gonzalez MD (04/15 6249)      No acute osseous abnormality.      Degenerative changes as described.         Computerized Assisted Algorithm (CAA) may have been used to analyze all applicable images.         Resident: Emily Braswell I, the attending radiologist, have reviewed the images and agree with the final report above.      Workstation performed: [id number]             ECG 12 Lead Documentation Only    Date/Time: 4/14/2025 3:58 PM    Performed by: Teresa Hartley MD  Authorized by: Teresa Hartley MD    Indications / Diagnosis:  Right shoulder and neck pain radiating to the shoulder blade  ECG reviewed by me, the ED Provider: yes    Patient location:  ED  Rate:     ECG rate:  91    ECG rate assessment: normal    Rhythm:     Rhythm: sinus rhythm    Ectopy:     Ectopy: none    QRS:     QRS axis:  Normal    QRS intervals:  Normal  Conduction:     Conduction: normal    ST segments:     ST segments:  Normal  T waves:     T waves: normal    Comments:      QTc 462      ED Medication and Procedure Management   Prior to Admission Medications   Prescriptions Last Dose Informant Patient Reported? Taking?   ARIPiprazole (ABILIFY) 10 mg tablet 4/13/2025 Self Yes Yes   Sig: 10 mg   Denta 5000 Plus 1.1 % CREA 4/13/2025 Self Yes Yes   Sig: USE A PEA SIZED AMOUNT WHEN BRUSHING TWICE DAILY   LORazepam (ATIVAN) 0.5 mg tablet  Self No No   Sig: Take 1-2 about 30 minutes prior to the MRI   Patient not taking: Reported on 7/11/2024   chlorhexidine (PERIDEX) 0.12 % solution 4/13/2025 Self Yes Yes   Sig: USE 1 CAPFUL TWICE DAILY, RINSE AND SWISH FOR 30 SECONDS, THEN SPIT   erythromycin (ILOTYCIN) ophthalmic ointment  Self Yes No   Sig: APPLY OINTMENT TO LEFT LOWER LID  TWICE DAILY AND AT BEDTIME FOR 10 DAYS AND THEN DISCONTINUE   Patient not taking: Reported on 3/13/2025   fluticasone (FLONASE) 50 mcg/act nasal spray 2025 Self No Yes   Si spray into each nostril daily   folic acid (FOLVITE) 1 mg tablet  Self No No   Sig: Take 1 tablet (1 mg total) by mouth daily   Patient not taking: Reported on 2024   pyridoxine (VITAMIN B6) 100 mg tablet Not Taking Self No No   Sig: Take 1 tablet (100 mg total) by mouth daily   Patient not taking: Reported on 2025   traZODone (DESYREL) 50 mg tablet Not Taking Self Yes No   Sig: Take 50 mg by mouth daily at bedtime as needed   Patient not taking: Reported on 2025      Facility-Administered Medications Last Administration Doses Remaining   cyanocobalamin injection 1,000 mcg 4/10/2025  9:59 AM         Discharge Medication List as of 2025  5:27 PM        START taking these medications    Details   methocarbamol (ROBAXIN) 500 mg tablet Take 1 tablet (500 mg total) by mouth daily at bedtime as needed for muscle spasms, Starting Mon 2025, Normal           CONTINUE these medications which have NOT CHANGED    Details   ARIPiprazole (ABILIFY) 10 mg tablet 10 mg, Starting Mon 2024, Historical Med      chlorhexidine (PERIDEX) 0.12 % solution USE 1 CAPFUL TWICE DAILY, RINSE AND SWISH FOR 30 SECONDS, THEN SPIT, Historical Med      Denta 5000 Plus 1.1 % CREA USE A PEA SIZED AMOUNT WHEN BRUSHING TWICE DAILY, Historical Med      fluticasone (FLONASE) 50 mcg/act nasal spray 1 spray into each nostril daily, Starting Mon 4/15/2024, Normal      erythromycin (ILOTYCIN) ophthalmic ointment APPLY OINTMENT TO LEFT LOWER LID TWICE DAILY AND AT BEDTIME FOR 10 DAYS AND THEN DISCONTINUE, Historical Med      folic acid (FOLVITE) 1 mg tablet Take 1 tablet (1 mg total) by mouth daily, Starting Thu 2024, Normal      LORazepam (ATIVAN) 0.5 mg tablet Take 1-2 about 30 minutes prior to the MRI, Normal      pyridoxine (VITAMIN B6) 100  mg tablet Take 1 tablet (100 mg total) by mouth daily, Starting Thu 7/11/2024, Normal      traZODone (DESYREL) 50 mg tablet Take 50 mg by mouth daily at bedtime as needed, Starting Mon 2/3/2025, Historical Med             ED SEPSIS DOCUMENTATION   Time reflects when diagnosis was documented in both MDM as applicable and the Disposition within this note       Time User Action Codes Description Comment    4/14/2025  5:04 PM Teresa Hartley [M54.12] Cervical radiculopathy     4/14/2025  5:26 PM Teresa Hartley [E87.6] Hypokalemia                  Teresa Hartley MD  04/21/25 9592

## 2025-04-14 NOTE — DISCHARGE INSTRUCTIONS
You can take 1000 mg of Tylenol every 8 hours as needed for pain.  You can also take 400 mg of ibuprofen every 8 hours as needed for pain, but please take this medication with food.  Sending home with a prescription for Robaxin which is a muscle relaxer, please only take this medication before you go to bed.  Do not take before driving or going to work as it can make you drowsy.    Please immediately come back to the emergency department if you develop weakness in your hands or arm, difficulty speaking or swallowing, slurring of words, loss of vision    IMPRESSION:     No cervical spine fracture or traumatic malalignment.     Minor mid cervical degenerative change. No canal stenosis. Mild left foraminal narrowing at C4-5.

## 2025-04-14 NOTE — ED ATTENDING ATTESTATION
4/14/2025  I, Ruby Casillas MD, saw and evaluated the patient. I have discussed the patient with the resident/non-physician practitioner and agree with the resident's/non-physician practitioner's findings, Plan of Care, and MDM as documented in the resident's/non-physician practitioner's note, except where noted. All available labs and Radiology studies were reviewed.  I was present for key portions of any procedure(s) performed by the resident/non-physician practitioner and I was immediately available to provide assistance.       At this point I agree with the current assessment done in the Emergency Department.  I have conducted an independent evaluation of this patient a history and physical is as follows:    44-year-old female with history of myofascial pain syndrome, cervical arthritis, hypertension presenting for evaluation of right-sided neck and shoulder pain.  Patient states pain has been going on for the past 4 days.  Pain is worse with movement of the neck.  She states 4 days ago, she felt some tingling in the tips of all the fingers in the right hand but this is resolved since then.  She denies any numbness or tingling in the arms or legs.  Denies facial numbness, blurred or double vision, dysphagia, dysarthria, or vertigo.  She states she has a little bit of weakness with moving the right arm secondary to pain.  She did try taking Motrin yesterday and Tylenol this morning without much relief in symptoms.  She states she does have known arthritis in her neck.  She has been trying to do neck exercises at home.  She otherwise denies chest pain or shortness of breath.  Denies fevers.  Denies headaches.    Physical exam:  Vital signs reviewed, within normal limits.  Patient is awake and alert, no acute distress, head normocephalic, atraumatic, mucous membranes moist, neck supple, tenderness to the right sided cervical paraspinal muscles, mildly reduce range of motion of the neck secondary to pain, heart  regular rate and rhythm, no murmurs/rubs/gallops, lungs clear to auscultation bilaterally, abdomen soft, non tender, non distended, no rebound or guarding, no peripheral edema, no skin rashes, strength 5/5 in bilateral lower extremities and left upper extremity, slightly decreased strength with handgrip on the right side secondary to pain but otherwise 5/5 in right upper extremity with wrist extension and flexion, finger abduction, elbow flexion extension shoulder abduction.  No sensory deficits, cranial nerves II through XII intact, normal coordination.    Assessment/Plan:  44-year-old female with history of myofascial pain syndrome, cervical arthritis, hypertension presenting for evaluation of right-sided neck and shoulder pain.   Likely related to arthritis/cervical radiculopathy.  Will treat symptomatically.  Will obtain CT scan to evaluate for osseous abnormality.  Will obtain EKG to evaluate for acs although very low suspicion for ACS.      ED Course     Reviewed and interpreted EKG, shows normal sinus rhythm with no acute ischemic changes.  Reviewed labs, no marked abnormalities.  Reviewed CT scan, shows arthritic changes but no acute fracture or osseous abnormality.  Will have patient follow-up with [facility] for physical therapy.  Strict return precautions discussed.  Patient expressed understanding was agreeable for discharge.    Critical Care Time  Procedures

## 2025-04-15 ENCOUNTER — TELEPHONE (OUTPATIENT)
Dept: PHYSICAL THERAPY | Facility: OTHER | Age: 45
End: 2025-04-15

## 2025-04-15 LAB
ATRIAL RATE: 91 BPM
P AXIS: 62 DEGREES
PR INTERVAL: 164 MS
QRS AXIS: 34 DEGREES
QRSD INTERVAL: 80 MS
QT INTERVAL: 376 MS
QTC INTERVAL: 462 MS
T WAVE AXIS: -2 DEGREES
VENTRICULAR RATE: 91 BPM

## 2025-04-15 PROCEDURE — 93010 ELECTROCARDIOGRAM REPORT: CPT | Performed by: INTERNAL MEDICINE

## 2025-04-15 NOTE — TELEPHONE ENCOUNTER
Call placed to the patient per [facility] referral.    V/M left for patient to call back. Phone number and hours of business provided.    This is the 1st attempt to reach the patient. Will defer referral per protocol.    Right sided neck pain radiating to right shoulder and shoulder blade.

## 2025-04-16 ENCOUNTER — NURSE TRIAGE (OUTPATIENT)
Dept: PHYSICAL THERAPY | Facility: OTHER | Age: 45
End: 2025-04-16

## 2025-04-16 DIAGNOSIS — M54.2 ACUTE NECK PAIN: Primary | ICD-10-CM

## 2025-04-16 NOTE — TELEPHONE ENCOUNTER
Additional Information   Negative: Has the patient had unexplained weight loss?   Negative: Does the patient have a fever?   Negative: Is the patient experiencing blood in sputum?   Negative: Is the patient experiencing urine retention?   Negative: Is the patient experiencing acute drop foot or paralysis?   Negative: Has the patient experienced major trauma? (fall from height, high speed collision, direct blow to spine) and is also experiencing nausea, light-headedness, or loss of consciousness?   Affirmative: Is this a chronic condition?    Protocols used: [facility] Protocol    [facility] was reviewed in detail and what we can provide for their neck pain.  Patient is agreeable to being triaged and would like to proceed with Physical Therapy.    Referral was placed for Physical Therapy at the [facility] site. Patients information was sent to the  to make evaluation appointment. Patient made aware that the PT office  will be calling to schedule the appointment.  Patient was provided with the phone number to the PT office.    No further questions and/or concerns were voiced by the patient at this time. Patient states understanding of the referral that was placed.    Referral Closed.

## 2025-04-16 NOTE — TELEPHONE ENCOUNTER
"Additional Information   Negative: Is this related to a work injury?   Negative: Is this related to an MVA?   Negative: Are you currently recieving homecare services?    Background - Initial Assessment  Clinical complaint: Pain is right side neck also right shoulder down right arm to the elbow. On and off tingling in right fingers. NKI Started 4/10/25. No prior neck or back SX. Does have a HX of right shoulder/rotator cuff pain with PT. Saw Ortho for her right shoulder. Also a HX of neck pain. Not currently following any doctors per Pt. Per chart she is established with PM Dr Saunders last seen 2024. Did try PT for her neck in [id number]. Pain is constant and wakes her up at night. Seen in ED 4/14/25. Is following up with her PCP 4/17/25  Date of onset: 4/10/25  Frequency of pain: constant  Quality of pain: \"all of them really\"    Protocols used: [facility] Protocol    "

## 2025-04-17 ENCOUNTER — OFFICE VISIT (OUTPATIENT)
Dept: INTERNAL MEDICINE CLINIC | Facility: CLINIC | Age: 45
End: 2025-04-17
Payer: COMMERCIAL

## 2025-04-17 VITALS
WEIGHT: 181.9 LBS | BODY MASS INDEX: 32.23 KG/M2 | TEMPERATURE: 98.3 F | OXYGEN SATURATION: 98 % | SYSTOLIC BLOOD PRESSURE: 130 MMHG | HEIGHT: 63 IN | HEART RATE: 78 BPM | DIASTOLIC BLOOD PRESSURE: 90 MMHG

## 2025-04-17 DIAGNOSIS — M54.9 UPPER BACK PAIN: ICD-10-CM

## 2025-04-17 DIAGNOSIS — M62.838 MUSCLE SPASM: Primary | ICD-10-CM

## 2025-04-17 PROCEDURE — 96372 THER/PROPH/DIAG INJ SC/IM: CPT | Performed by: NURSE PRACTITIONER

## 2025-04-17 PROCEDURE — 99213 OFFICE O/P EST LOW 20 MIN: CPT | Performed by: NURSE PRACTITIONER

## 2025-04-17 RX ORDER — DEXAMETHASONE SODIUM PHOSPHATE 4 MG/ML
4 INJECTION, SOLUTION INTRA-ARTICULAR; INTRALESIONAL; INTRAMUSCULAR; INTRAVENOUS; SOFT TISSUE ONCE
Status: COMPLETED | OUTPATIENT
Start: 2025-04-17 | End: 2025-04-17

## 2025-04-17 RX ORDER — PREDNISONE 10 MG/1
TABLET ORAL
Qty: 18 TABLET | Refills: 0 | Status: SHIPPED | OUTPATIENT
Start: 2025-04-17

## 2025-04-17 RX ORDER — CYCLOBENZAPRINE HCL 10 MG
10 TABLET ORAL
Qty: 30 TABLET | Refills: 0 | Status: SHIPPED | OUTPATIENT
Start: 2025-04-17

## 2025-04-17 RX ADMIN — DEXAMETHASONE SODIUM PHOSPHATE 4 MG: 4 INJECTION, SOLUTION INTRA-ARTICULAR; INTRALESIONAL; INTRAMUSCULAR; INTRAVENOUS; SOFT TISSUE at 10:11

## 2025-04-17 NOTE — PROGRESS NOTES
"Name: Nina Joseph      : 1980      MRN: [id number]  Encounter Provider: MARTHA Arce  Encounter Date: 2025   Encounter department: [facility] [place]  :  Assessment & Plan  Muscle spasm    Orders:    cyclobenzaprine (FLEXERIL) 10 mg tablet; Take 1 tablet (10 mg total) by mouth daily at bedtime    predniSONE 10 mg tablet; 30 mg by mouth daily for 3 days, then 20 mg by mouth daily for 3 days, then 10 mg by mouth daily for 3 days, then stop    dexamethasone (DECADRON) injection 4 mg    Upper back pain    Orders:    cyclobenzaprine (FLEXERIL) 10 mg tablet; Take 1 tablet (10 mg total) by mouth daily at bedtime    predniSONE 10 mg tablet; 30 mg by mouth daily for 3 days, then 20 mg by mouth daily for 3 days, then 10 mg by mouth daily for 3 days, then stop    dexamethasone (DECADRON) injection 4 mg    Will start on Flexeril take as directed. Can stop the Robaxin. Will start on Prednisone tapered dose. Follow up with PT on Monday. Will follow up as needed.       History of Present Illness   Nina is for an ER follow up. She was seen in the ED for upper back neck pain. She was given robaxin and newsome start PT on Monday but still having a lot of pain. She feels it is in her upper shoulder blade. She is using ice but nothing is helping.      Review of Systems   Musculoskeletal:  Positive for neck pain.   All other systems reviewed and are negative.      Objective   /90 (BP Location: Left arm, Patient Position: Sitting, Cuff Size: Adult)   Pulse 78   Temp 98.3 °F (36.8 °C) (Temporal)   Ht 5' 3\" (1.6 m)   Wt 82.5 kg (181 lb 14.4 oz)   LMP 2020   SpO2 98%   BMI 32.22 kg/m²      Physical Exam  Vitals reviewed.   Constitutional:       Appearance: Normal appearance. She is normal weight.   Musculoskeletal:         General: Tenderness present. Normal range of motion.   Skin:     General: Skin is warm and dry.      Capillary Refill: Capillary refill takes less than 2 " seconds.   Neurological:      General: No focal deficit present.      Mental Status: She is alert and oriented to person, place, and time. Mental status is at baseline.   Psychiatric:         Mood and Affect: Mood normal.         Behavior: Behavior normal.         Thought Content: Thought content normal.         Judgment: Judgment normal.

## 2025-04-17 NOTE — ASSESSMENT & PLAN NOTE
Orders:    cyclobenzaprine (FLEXERIL) 10 mg tablet; Take 1 tablet (10 mg total) by mouth daily at bedtime    predniSONE 10 mg tablet; 30 mg by mouth daily for 3 days, then 20 mg by mouth daily for 3 days, then 10 mg by mouth daily for 3 days, then stop    dexamethasone (DECADRON) injection 4 mg

## 2025-04-21 ENCOUNTER — EVALUATION (OUTPATIENT)
Dept: PHYSICAL THERAPY | Facility: CLINIC | Age: 45
End: 2025-04-21
Attending: PHYSICAL THERAPIST
Payer: COMMERCIAL

## 2025-04-21 VITALS — TEMPERATURE: 97.3 F | HEART RATE: 74 BPM | SYSTOLIC BLOOD PRESSURE: 130 MMHG | DIASTOLIC BLOOD PRESSURE: 90 MMHG

## 2025-04-21 DIAGNOSIS — M54.2 ACUTE NECK PAIN: ICD-10-CM

## 2025-04-21 PROCEDURE — 97535 SELF CARE MNGMENT TRAINING: CPT | Performed by: PHYSICAL THERAPIST

## 2025-04-21 PROCEDURE — 97162 PT EVAL MOD COMPLEX 30 MIN: CPT | Performed by: PHYSICAL THERAPIST

## 2025-04-21 PROCEDURE — 97110 THERAPEUTIC EXERCISES: CPT | Performed by: PHYSICAL THERAPIST

## 2025-04-21 PROCEDURE — 97140 MANUAL THERAPY 1/> REGIONS: CPT | Performed by: PHYSICAL THERAPIST

## 2025-04-21 NOTE — HOME EXERCISE EDUCATION
Program_ID:[id number]   Access Code: [email or web address]  URL: [email or web address]  Date: 04-  Prepared By: Maria E Hernandez    Program Notes      Exercises      - Seated Cervical Retraction - 1 x daily -  x weekly - 1 sets - 10 reps - 3 second hold      - Sidelying Open Book Thoracic Lumbar Rotation and Extension - 1 x daily -  x weekly - 1 sets - 5 reps - 10 second hold      - Seated Assisted Cervical Rotation with Towel - 1 x daily -  x weekly - 1 sets - 10 reps - 5 second hold

## 2025-04-21 NOTE — PROGRESS NOTES
PT Evaluation     Today's date: 2025  Patient name: Nina Joseph  : 1980  MRN: [id number]  Referring provider: Maria E Hernandez PT  Dx:   Encounter Diagnosis     ICD-10-CM    1. Acute neck pain  M54.2 Ambulatory referral to PT spine          Start Time: 0800  Stop Time: 0855  Total time in clinic (min): 55 minutes    Assessment  Impairments: abnormal or restricted ROM, impaired physical strength, lacks appropriate home exercise program and pain with function    Assessment details: The patient is a 45 yo female who presents to physical therapy with signs and symptoms consistent with degenerative changes in the cervical spine. She woke up on 4-10-25 with pain in her R scapular region that extended in to her neck. The pain persisted over the next few days. She notes occasional sharp pain into her R shoulder and upper arm. She was seen in the emergency room and then followed up with her PCP. She is currently taking prednisone and a muscle relaxer. Her symptoms have improved but she continues to have difficulty reaching overhead, turning her head and looking up. Examination reveals cervical and thoracic spinal hypomobility and pain. End range tightness present with cervical side flexion and rotation. B UE strength is fair. Neuro exam is normal. She would benefit from a course of skilled physical therapy to address deficits in ROM, strength, stability and functional status.      Understanding of Dx/Px/POC: good     Prognosis: good    Goals  STG(4 weeks):             1. Independent with HEP            2. Decrease pain to 3/10 at worst with functional activities            3. Increase AROM C/S to WFL            4. Increase strength by 1/2 MMT grade     LTG(8 weeks):             1. Eliminate pain with cervical mobility and UE reaching/lifitng             2. Increase B shoulder strength to 4+ to 5/5             3.  Return to PLOF                   Plan  Patient would benefit from: skilled physical  therapy  Planned modality interventions: cryotherapy and thermotherapy: hydrocollator packs    Planned therapy interventions: manual therapy, joint mobilization, neuromuscular re-education, strengthening, therapeutic activities, therapeutic exercise and home exercise program    Frequency: 1-2x week  Duration in weeks: 4  Plan of Care beginning date: 2025  Plan of Care expiration date: 2025  Treatment plan discussed with: patient        Subjective Evaluation    History of Present Illness  Mechanism of injury: The patient states that on 4-10-25, she awoke with pain in the R shoulder blade that extended up into the neck. She had difficulty lifting her R arm. She was using ice and trying to move her neck and shoulder. The pain persisted and she went to the ER after 4 days. A CT revealed degenerative changes. She was prescribed a ms relaxer. She followed up with her PCP 2 days later. She was prescribed a different ms relaxer and a 10 course of steroids. She does note a significant improvement in her pain. She has some pain in the R cervical region with occasional sharp pains into the R upper arm.   Quality of life: good    Patient Goals  Patient goal: To not have pain  Pain  Current pain ratin  At best pain ratin  At worst pain ratin  Location: R cervical,scap  Quality: dull ache and burning  Relieving factors: ice and medications  Exacerbated by: looking up, turninghead, reaching with R arm.    Social Support  Steps to enter house: no  Stairs in house: no   Lives in: apartment  Lives with: alone    Employment status: not working  Hand dominance: right          Objective     Concurrent Complaints  Positive for disturbed sleep. Negative for dizziness, faints, headaches, nausea/motion sickness, tinnitus and trouble swallowing    Static Posture     Head  Forward.    Shoulders  Rounded.    Neurological Testing     Sensation   Cervical/Thoracic   Left   Intact: light touch    Right   Intact: light  "touch    Reflexes   Left   Biceps (C5/C6): normal (2+)    Right   Biceps (C5/C6): normal (2+)    Active Range of Motion   Cervical/Thoracic Spine       Cervical    Flexion:  WFL  Extension:  WFL  Left lateral flexion:  Restriction level: minimal  Right lateral flexion:  Restriction level minimal  Left rotation:  Restriction level: minimal  Right rotation:  Restriction level: minimal    Right Shoulder   Flexion: WFL  Abduction: WFL  External rotation BTH: WFL  Internal rotation BTB: WFL    Joint Play     Hypomobile: C2, C3, T7, T8 and T9     Pain: C2, C3, T7, T8 and T9     Strength/Myotome Testing     Left Shoulder     Planes of Motion   Flexion: 4   Abduction: 4   External rotation at 0°: 4   Internal rotation at 0°: 4     Right Shoulder     Planes of Motion   Flexion: 4   Abduction: 4-   External rotation at 0°: 4-   Internal rotation at 0°: 4     Left Elbow   Flexion: 4  Extension: 4-    Right Elbow   Flexion: 4  Extension: 4-  Neuro Exam:     Headaches   Patient reports headaches: No.              Precautions: R RTC repair 04/2024  HEP issued. Diagnosis, prognosis and PT POC discussed with patient       4/21            Manuals             Jt mobs C2,C3 R ULPA, mid thoracic gr II PA                                      Neuro Re-Ed             Chin tuck 3\"x10            Cerv rot w. towel 5\"x 10 ea            TB MTP/LTP                          Prone T,Y             Prone rows                          Ther Ex             Open book stretch 10\"x5             Thoracic stretch w. 1/2 roll                          UT stretch             Lev stretch                          TB neck ext                                       UBE retro for scapular mobility and ms endurance             Ther Activity                                                    Modalities             MH                               "

## 2025-04-23 ENCOUNTER — OFFICE VISIT (OUTPATIENT)
Dept: PHYSICAL THERAPY | Facility: CLINIC | Age: 45
End: 2025-04-23
Attending: PHYSICAL THERAPIST
Payer: COMMERCIAL

## 2025-04-23 DIAGNOSIS — M54.2 ACUTE NECK PAIN: Primary | ICD-10-CM

## 2025-04-23 PROCEDURE — 97110 THERAPEUTIC EXERCISES: CPT | Performed by: PHYSICAL THERAPIST

## 2025-04-23 PROCEDURE — 97140 MANUAL THERAPY 1/> REGIONS: CPT | Performed by: PHYSICAL THERAPIST

## 2025-04-23 NOTE — PROGRESS NOTES
"Daily Note     Today's date: 2025  Patient name: Nina Joseph  : 1980  MRN: [id number]  Referring provider: Armaan Contreras, PT  Dx:   Encounter Diagnosis     ICD-10-CM    1. Acute neck pain  M54.2           Start Time: 0815  Stop Time: 0900  Total time in clinic (min): 45 minutes    Subjective: The patient reports stiffness in her R neck and UT      Objective: See treatment diary below      Assessment: Reviewed home exercises. Thoracic tightness and tenderness noted with manual therapy. Tolerated treatment well. Patient would benefit from continued PT      Plan: Continue per plan of care.      Precautions: R RTC repair 2024  HEP issued. Diagnosis, prognosis and PT POC discussed with patient                  Manuals             Jt mobs C2,C3 R ULPA, mid thoracic gr II PA- in prone C2,C3 R/L ULPA, mid thoracic gr II PA- in prone                                     Neuro Re-Ed             Chin tuck 3\"x10 3\"x10           Cerv rot w. towel 5\"x 10 ea 5\"x10 ea           TB MTP/LTP                          Prone T,Y             Prone rows                          Ther Ex             Open book stretch 10\"x5  10\"x5           Thoracic stretch w. 1/2 roll  10\"x10                        UT stretch  30\"x2 ea           Lev stretch  30\"x2 ea                        TB neck ext                                       UBE retro for scapular mobility and ms endurance  L1 5'           Ther Activity                                                    Modalities             MH  10' R UT in sitting pre -exer                             "

## 2025-04-28 ENCOUNTER — HOSPITAL ENCOUNTER (OUTPATIENT)
Dept: MAMMOGRAPHY | Facility: HOSPITAL | Age: 45
Discharge: HOME | End: 2025-04-28
Attending: FAMILY MEDICINE
Payer: COMMERCIAL

## 2025-04-28 VITALS — HEIGHT: 63 IN | WEIGHT: 181 LBS | BODY MASS INDEX: 32.07 KG/M2

## 2025-04-28 DIAGNOSIS — Z12.31 VISIT FOR SCREENING MAMMOGRAM: ICD-10-CM

## 2025-04-28 PROCEDURE — 77063 BREAST TOMOSYNTHESIS BI: CPT

## 2025-04-28 PROCEDURE — 77067 SCR MAMMO BI INCL CAD: CPT

## 2025-04-30 ENCOUNTER — OFFICE VISIT (OUTPATIENT)
Dept: PHYSICAL THERAPY | Facility: CLINIC | Age: 45
End: 2025-04-30
Attending: PHYSICAL THERAPIST
Payer: COMMERCIAL

## 2025-04-30 DIAGNOSIS — M54.2 ACUTE NECK PAIN: Primary | ICD-10-CM

## 2025-04-30 PROCEDURE — 97112 NEUROMUSCULAR REEDUCATION: CPT | Performed by: PHYSICAL THERAPIST

## 2025-04-30 PROCEDURE — 97110 THERAPEUTIC EXERCISES: CPT | Performed by: PHYSICAL THERAPIST

## 2025-04-30 NOTE — PROGRESS NOTES
"Daily Note     Today's date: 2025  Patient name: Nina Joseph  : 1980  MRN: [id number]  Referring provider: Armaan Contreras, PT  Dx:   Encounter Diagnosis     ICD-10-CM    1. Acute neck pain  M54.2           Start Time: 0815  Stop Time: 0900  Total time in clinic (min): 45 minutes    Subjective: The patient states that her R neck and UT remain tight.       Objective: See treatment diary below      Assessment: Moderate soft tissue tightness remain present in B suboccipital region and R UT. Upper cervical remains tender and hypomobile. Reviewed technique for cervical stretching. Tolerated treatment well. Patient would benefit from continued PT      Plan: Continue per plan of care.      Precautions: R RTC repair 2024  HEP issued. Diagnosis, prognosis and PT POC discussed with patient                 Manuals             Jt mobs C2,C3 R ULPA, mid thoracic gr II PA- in prone C2,C3 R/L ULPA, mid thoracic gr II PA- in prone C2,C3 R/L ULPA mid thoracic gr II  PA-in prone           STM   R UT, B suboccipital in prone-JM                       Neuro Re-Ed             Chin tuck 3\"x10 3\"x10 3\"x10          Cerv rot w. towel 5\"x 10 ea 5\"x10 ea 5\"x10 ea          TB MTP/LTP   L2 20x ea                       Prone T,Y             Prone rows                          Ther Ex             Open book stretch 10\"x5  10\"x5 10\"x5          Thoracic stretch w. 1/2 roll  10\"x10 10\"x10                       UT stretch  30\"x2 ea 30\"x3 ea          Lev stretch  30\"x2 ea 30\"x3 ea                       TB neck ext                                       UBE retro for scapular mobility and ms endurance  L1 5' L1 7'          Ther Activity                                                    Modalities             MH  10' R UT in sitting pre -exer 10' R UT in sitting pre exer                              "

## 2025-05-02 ENCOUNTER — OFFICE VISIT (OUTPATIENT)
Dept: PHYSICAL THERAPY | Facility: CLINIC | Age: 45
End: 2025-05-02
Attending: PHYSICAL THERAPIST
Payer: COMMERCIAL

## 2025-05-02 DIAGNOSIS — M54.2 ACUTE NECK PAIN: Primary | ICD-10-CM

## 2025-05-02 PROCEDURE — 97112 NEUROMUSCULAR REEDUCATION: CPT | Performed by: PHYSICAL THERAPIST

## 2025-05-02 PROCEDURE — 97140 MANUAL THERAPY 1/> REGIONS: CPT | Performed by: PHYSICAL THERAPIST

## 2025-05-02 PROCEDURE — 97110 THERAPEUTIC EXERCISES: CPT | Performed by: PHYSICAL THERAPIST

## 2025-05-02 NOTE — PROGRESS NOTES
"Daily Note     Today's date: 2025  Patient name: Nina Joseph  : 1980  MRN: [id number]  Referring provider: Armaan Contreras, PT  Dx:   Encounter Diagnosis     ICD-10-CM    1. Acute neck pain  M54.2           Start Time: 0850  Stop Time: 0945  Total time in clinic (min): 55 minutes    Subjective: The patient reports that pain woke her up last night. She describes tightness in her R UT and cervical region.      Objective: See treatment diary below      Assessment: Moderate soft tissue tightness present in R UT and cervical paraspinals. Good response to STM. Resisted cervical extension added today to improve cervical stability. Tolerated treatment well. Patient would benefit from continued PT      Plan: Continue per plan of care.      Precautions: R RTC repair 2024  HEP issued. Diagnosis, prognosis and PT POC discussed with patient                Manuals             Jt mobs C2,C3 R ULPA, mid thoracic gr II PA- in prone C2,C3 R/L ULPA, mid thoracic gr II PA- in prone C2,C3 R/L ULPA mid thoracic gr II  PA-in prone           STM   R UT, B suboccipital in prone-JM R UT, R cerv paraspinals in sitting-JM                      Neuro Re-Ed             Chin tuck 3\"x10 3\"x10 3\"x10 3\"x10         Cerv rot w. towel 5\"x 10 ea 5\"x10 ea 5\"x10 ea 5\"x10 ea         TB MTP/LTP   L2 20x ea L2 20x ea                      Prone T,Y             Prone rows                          Ther Ex             Open book stretch 10\"x5  10\"x5 10\"x5 10\"x5         Thoracic stretch w. 1/2 roll  10\"x10 10\"x10 10\"x10                      UT stretch  30\"x2 ea 30\"x3 ea 30\"x3 ea         Lev stretch  30\"x2 ea 30\"x3 ea 30\"x3 ea                      TB neck ext    L1 5\"x10                                   UBE retro for scapular mobility and ms endurance  L1 5' L1 7' L1 7'         Ther Activity                                                    Modalities             MH  10' R UT in sitting pre -exer 10' R UT in sitting pre exer 10' " R UT in sitting pre-exer

## 2025-05-05 ENCOUNTER — OFFICE VISIT (OUTPATIENT)
Dept: PHYSICAL THERAPY | Facility: CLINIC | Age: 45
End: 2025-05-05
Attending: PHYSICAL THERAPIST
Payer: COMMERCIAL

## 2025-05-05 DIAGNOSIS — M54.2 ACUTE NECK PAIN: Primary | ICD-10-CM

## 2025-05-05 PROCEDURE — 97112 NEUROMUSCULAR REEDUCATION: CPT | Performed by: PHYSICAL THERAPIST

## 2025-05-05 PROCEDURE — 97140 MANUAL THERAPY 1/> REGIONS: CPT | Performed by: PHYSICAL THERAPIST

## 2025-05-05 PROCEDURE — 97110 THERAPEUTIC EXERCISES: CPT | Performed by: PHYSICAL THERAPIST

## 2025-05-05 NOTE — PROGRESS NOTES
"Daily Note     Today's date: 2025  Patient name: Nina Joseph  : 1980  MRN: [id number]  Referring provider: Armaan Contreras, PT  Dx:   Encounter Diagnosis     ICD-10-CM    1. Acute neck pain  M54.2           Start Time: 915  Stop Time: 1000  Total time in clinic (min): 45 minutes    Subjective: The patient reports ongoing stiffness in her R neck.      Objective: See treatment diary below      Assessment: Mild muscle spasms present in R UT and cervical musculature. Good response to STM. Patient continues to progress well with scapular stabilization exercises. UBE not performed due to equipment unavailable. Tolerated treatment well. Patient would benefit from continued PT      Plan: Continue per plan of care.      Precautions: R RTC repair 2024  HEP issued. Diagnosis, prognosis and PT POC discussed with patient               Manuals             Jt mobs C2,C3 R ULPA, mid thoracic gr II PA- in prone C2,C3 R/L ULPA, mid thoracic gr II PA- in prone C2,C3 R/L ULPA mid thoracic gr II  PA-in prone           STM   R UT, B suboccipital in prone-JM R UT, R cerv paraspinals in sitting-JM R UT,R cerv paraspinals in sitting-JM                     Neuro Re-Ed             Chin tuck 3\"x10 3\"x10 3\"x10 3\"x10 3\"x10        Cerv rot w. towel 5\"x 10 ea 5\"x10 ea 5\"x10 ea 5\"x10 ea 5\"x10 ea        TB MTP/LTP   L2 20x ea L2 20x ea L3 20x ea                     Prone T,Y     10x ea        Prone rows                          Ther Ex             Open book stretch 10\"x5  10\"x5 10\"x5 10\"x5 10\"x5         Thoracic stretch w. / roll  10\"x10 10\"x10 10\"x10 10\"x10                     UT stretch  30\"x2 ea 30\"x3 ea 30\"x3 ea 30\"x3 ea        Lev stretch  30\"x2 ea 30\"x3 ea 30\"x3 ea 30\"x3 ea                     TB neck ext    L1 5\"x10 L1 5:x10                                  UBE retro for scapular mobility and ms endurance  L1 5' L1 7' L1 7' NP        Ther Activity                                                  "   Modalities             MH  10' R UT in sitting pre -exer 10' R UT in sitting pre exer 10' R UT in sitting pre-exer 10' R UT in sitting pre exer

## 2025-05-07 ENCOUNTER — OFFICE VISIT (OUTPATIENT)
Dept: PHYSICAL THERAPY | Facility: CLINIC | Age: 45
End: 2025-05-07
Attending: PHYSICAL THERAPIST
Payer: COMMERCIAL

## 2025-05-07 DIAGNOSIS — M54.2 ACUTE NECK PAIN: Primary | ICD-10-CM

## 2025-05-07 PROCEDURE — 97110 THERAPEUTIC EXERCISES: CPT | Performed by: PHYSICAL THERAPIST

## 2025-05-07 PROCEDURE — 97140 MANUAL THERAPY 1/> REGIONS: CPT | Performed by: PHYSICAL THERAPIST

## 2025-05-07 PROCEDURE — 97112 NEUROMUSCULAR REEDUCATION: CPT | Performed by: PHYSICAL THERAPIST

## 2025-05-07 NOTE — PROGRESS NOTES
"Daily Note     Today's date: 2025  Patient name: Nina Joseph  : 1980  MRN: [id number]  Referring provider: Armaan Contreras, PT  Dx:   Encounter Diagnosis     ICD-10-CM    1. Acute neck pain  M54.2           Start Time: 0900  Stop Time: 0945  Total time in clinic (min): 45 minutes    Subjective: The patient reports some tightness in her R neck but it seems a little better      Objective: See treatment diary below      Assessment: Minimal muscle tightness palpated in the R UT region today. Scapular stabilization exercises progressed today due to ongoing strength deficits. Tolerated treatment well. Patient would benefit from continued PT      Plan: Continue per plan of care.      Precautions: R RTC repair 2024  HEP issued. Diagnosis, prognosis and PT POC discussed with patient              Manuals             Jt mobs C2,C3 R ULPA, mid thoracic gr II PA- in prone C2,C3 R/L ULPA, mid thoracic gr II PA- in prone C2,C3 R/L ULPA mid thoracic gr II  PA-in prone           STM   R UT, B suboccipital in prone- R UT, R cerv paraspinals in sitting- R UT,R cerv paraspinals in sitting- R UT,R cerv paraspinals in sitting-                    Neuro Re-Ed             Chin tuck 3\"x10 3\"x10 3\"x10 3\"x10 3\"x10 3\"x10       Cerv rot w. towel 5\"x 10 ea 5\"x10 ea 5\"x10 ea 5\"x10 ea 5\"x10 ea 5\"x10 ea       TB MTP/LTP   L2 20x ea L2 20x ea L3 20x ea L3 20x ea                    Prone T,Y     10x ea 2x10       Prone rows      3# 2x10 ea                    Ther Ex             Open book stretch 10\"x5  10\"x5 10\"x5 10\"x5 10\"x5  10\"x5       Thoracic stretch w. 1/ roll  10\"x10 10\"x10 10\"x10 10\"x10 10\"x10                    UT stretch  30\"x2 ea 30\"x3 ea 30\"x3 ea 30\"x3 ea 30\"x3 ea       Lev stretch  30\"x2 ea 30\"x3 ea 30\"x3 ea 30\"x3 ea 30\"x3 ea                    TB neck ext    L1 5\"x10 L1 5\"x10 L1 5\"x10                                 UBE retro for scapular mobility and ms endurance  L1 5' L1 7' L1 7' NP "  L1 7'       Ther Activity                                                    Modalities             MH  10' R UT in sitting pre -exer 10' R UT in sitting pre exer 10' R UT in sitting pre-exer 10' R UT in sitting pre exer 10' R UT in sitting pre exer

## 2025-05-08 ENCOUNTER — CLINICAL SUPPORT (OUTPATIENT)
Dept: INTERNAL MEDICINE CLINIC | Facility: CLINIC | Age: 45
End: 2025-05-08
Payer: COMMERCIAL

## 2025-05-08 DIAGNOSIS — E53.8 B12 DEFICIENCY: Primary | ICD-10-CM

## 2025-05-08 PROCEDURE — 96372 THER/PROPH/DIAG INJ SC/IM: CPT

## 2025-05-08 RX ADMIN — CYANOCOBALAMIN 1000 MCG: 1000 INJECTION, SOLUTION INTRAMUSCULAR; SUBCUTANEOUS at 10:10

## 2025-05-12 ENCOUNTER — OFFICE VISIT (OUTPATIENT)
Dept: PHYSICAL THERAPY | Facility: CLINIC | Age: 45
End: 2025-05-12
Attending: PHYSICAL THERAPIST
Payer: COMMERCIAL

## 2025-05-12 DIAGNOSIS — M54.2 ACUTE NECK PAIN: Primary | ICD-10-CM

## 2025-05-12 PROCEDURE — 97110 THERAPEUTIC EXERCISES: CPT | Performed by: PHYSICAL THERAPIST

## 2025-05-12 NOTE — PROGRESS NOTES
"Daily Note     Today's date: 2025  Patient name: Nina Joseph  : 1980  MRN: [id number]  Referring provider: Armaan Contreras, PT  Dx:   Encounter Diagnosis     ICD-10-CM    1. Acute neck pain  M54.2           Start Time: 1030  Stop Time: 1115  Total time in clinic (min): 45 minutes    Subjective: The patient reports ongoing tightness in her R neck.      Objective: See treatment diary below      Assessment: Mild ms tightness remains in proximal R UT. Good response to manual therapy. Patient progressing well with scapular stabilization. Tolerated treatment well. Patient would benefit from continued PT      Plan: Continue per plan of care. Reassess next session.     Precautions: R RTC repair 2024  HEP issued. Diagnosis, prognosis and PT POC discussed with patient             Manuals             Jt mobs C2,C3 R ULPA, mid thoracic gr II PA- in prone C2,C3 R/L ULPA, mid thoracic gr II PA- in prone C2,C3 R/L ULPA mid thoracic gr II  PA-in prone           STM   R UT, B suboccipital in prone- R UT, R cerv paraspinals in sitting- R UT,R cerv paraspinals in sitting- R UT,R cerv paraspinals in sitting- R UT,R cerv paraspinals-                   Neuro Re-Ed             Chin tuck 3\"x10 3\"x10 3\"x10 3\"x10 3\"x10 3\"x10 3\"x10      Cerv rot w. towel 5\"x 10 ea 5\"x10 ea 5\"x10 ea 5\"x10 ea 5\"x10 ea 5\"x10 ea 5\"x10 ea      TB MTP/LTP   L2 20x ea L2 20x ea L3 20x ea L3 20x ea L3 20x ea                   Prone T,Y     10x ea 2x10 2x10      Prone rows      3# 2x10 ea 3# 2x10 ea                   Ther Ex             Open book stretch 10\"x5  10\"x5 10\"x5 10\"x5 10\"x5  10\"x5 10\"x5      Thoracic stretch w. 1/ roll  10\"x10 10\"x10 10\"x10 10\"x10 10\"x10 10\"x10                   UT stretch  30\"x2 ea 30\"x3 ea 30\"x3 ea 30\"x3 ea 30\"x3 ea 30\"x3 ea      Lev stretch  30\"x2 ea 30\"x3 ea 30\"x3 ea 30\"x3 ea 30\"x3 ea 30\"x3ea                   TB neck ext    L1 5\"x10 L1 5\"x10 L1 5\"x10 L1 5\"x10                 "                UBE retro for scapular mobility and ms endurance  L1 5' L1 7' L1 7' NP  L1 7' L1 7'      Ther Activity                                                    Modalities             MH  10' R UT in sitting pre -exer 10' R UT in sitting pre exer 10' R UT in sitting pre-exer 10' R UT in sitting pre exer 10' R UT in sitting pre exer 10' R UT in sitting pre exer

## 2025-05-15 ENCOUNTER — OFFICE VISIT (OUTPATIENT)
Dept: URGENT CARE | Facility: MEDICAL CENTER | Age: 45
End: 2025-05-15
Payer: COMMERCIAL

## 2025-05-15 ENCOUNTER — APPOINTMENT (OUTPATIENT)
Dept: PHYSICAL THERAPY | Facility: CLINIC | Age: 45
End: 2025-05-15
Attending: PHYSICAL THERAPIST
Payer: COMMERCIAL

## 2025-05-15 VITALS
WEIGHT: 182.2 LBS | RESPIRATION RATE: 16 BRPM | DIASTOLIC BLOOD PRESSURE: 70 MMHG | HEART RATE: 86 BPM | SYSTOLIC BLOOD PRESSURE: 100 MMHG | OXYGEN SATURATION: 97 % | TEMPERATURE: 97.2 F | BODY MASS INDEX: 32.28 KG/M2

## 2025-05-15 DIAGNOSIS — R05.1 ACUTE COUGH: ICD-10-CM

## 2025-05-15 DIAGNOSIS — J20.9 ACUTE BRONCHITIS, UNSPECIFIED ORGANISM: Primary | ICD-10-CM

## 2025-05-15 PROCEDURE — 99213 OFFICE O/P EST LOW 20 MIN: CPT

## 2025-05-15 RX ORDER — BROMPHENIRAMINE MALEATE, PSEUDOEPHEDRINE HYDROCHLORIDE, AND DEXTROMETHORPHAN HYDROBROMIDE 2; 30; 10 MG/5ML; MG/5ML; MG/5ML
5 SYRUP ORAL 4 TIMES DAILY PRN
Qty: 120 ML | Refills: 0 | Status: SHIPPED | OUTPATIENT
Start: 2025-05-15

## 2025-05-15 RX ORDER — AZITHROMYCIN 250 MG/1
TABLET, FILM COATED ORAL
Qty: 6 TABLET | Refills: 0 | Status: SHIPPED | OUTPATIENT
Start: 2025-05-15 | End: 2025-05-19

## 2025-05-15 RX ORDER — FLUTICASONE PROPIONATE 50 MCG
1 SPRAY, SUSPENSION (ML) NASAL DAILY
Qty: 11.1 ML | Refills: 0 | Status: SHIPPED | OUTPATIENT
Start: 2025-05-15

## 2025-05-15 NOTE — PROGRESS NOTES
[facility]  Name: Nina Joseph      : 1980      MRN: [id number]  Encounter Provider: MARTHA Shepard  Encounter Date: 5/15/2025   Encounter department: [facility] [place]  :  Assessment & Plan  Acute cough         Acute bronchitis, unspecified organism    Orders:    fluticasone (FLONASE) 50 mcg/act nasal spray; 1 spray into each nostril daily    brompheniramine-pseudoephedrine-DM 30-2-10 MG/5ML syrup; Take 5 mL by mouth 4 (four) times a day as needed for allergies    azithromycin (ZITHROMAX) 250 mg tablet; Take 2 tablets today then 1 tablet daily x 4 days        Patient Instructions  Patient reports cough for 5 days sometimes productive with yellow mucus also reports a low-grade fever, hoarseness, instructed to tale antibiotic as prescribed and drink plenty of fluids to treat bronchitis  Eat yogurt with live and active cultures and/or take a probiotic at least 3 hours before or after antibiotic dose. Monitor stool for diarrhea and/or blood. If this occurs, contact primary care doctor ASAP.       Follow up with PCP in 3-5 days.  Proceed to  ER if symptoms worsen.    If tests are performed, our office will contact you with results only if changes need to made to the care plan discussed with you at the visit. You can review your full results on [facility].    Chief Complaint:   Chief Complaint   Patient presents with    Cough     For 4 days     History of Present Illness   Cough  This is a new problem. The current episode started in the past 7 days. The problem has been gradually worsening. The problem occurs every few minutes. The cough is Non-productive. Associated symptoms include a fever, headaches and nasal congestion. Pertinent negatives include no chest pain, chills, ear pain, rash, sore throat or shortness of breath. The symptoms are aggravated by lying down. She has tried OTC cough suppressant for the symptoms. The treatment provided no relief.   URI   This is a new  problem. The current episode started in the past 7 days. The problem has been gradually worsening. The maximum temperature recorded prior to her arrival was 100.4 - 100.9 F. Associated symptoms include congestion, coughing, headaches and nausea. Pertinent negatives include no abdominal pain, chest pain, dysuria, ear pain, rash, sore throat or vomiting.         Review of Systems   Constitutional:  Positive for fever. Negative for chills.   HENT:  Positive for congestion and voice change. Negative for ear pain and sore throat.    Eyes:  Negative for pain and visual disturbance.   Respiratory:  Positive for cough. Negative for shortness of breath.    Cardiovascular:  Negative for chest pain and palpitations.   Gastrointestinal:  Positive for nausea. Negative for abdominal pain and vomiting.   Genitourinary:  Negative for dysuria and hematuria.   Musculoskeletal:  Negative for arthralgias and back pain.   Skin:  Negative for color change and rash.   Neurological:  Positive for headaches. Negative for seizures and syncope.   All other systems reviewed and are negative.    Past Medical History   Past Medical History:   Diagnosis Date    Anxiety     Bipolar depression (HCC)     Chronic pelvic pain in female 2021    Endometrial hyperplasia without atypia 2021    Hypertension     Kidney stone     Menometrorrhagia 2021    Vertigo 2024     Past Surgical History:   Procedure Laterality Date     SECTION      EPIDURAL BLOCK INJECTION Right 10/19/2023    Procedure: Right C7-T1 ILESI;  Surgeon: Elsi Saunders MD;  Location: [facility];  Service: Pain Management     FL INJECTION RIGHT SHOULDER (ARTHROGRAM)  2024    HYSTERECTOMY  2021    ID SURGICAL ARTHROSCOPY SHOULDER W/ROTATOR CUFF RPR Right 2024    Procedure: Right shoulder arthroscopy, rotator cuff repair;  Surgeon: Demond Cardenas MD;  Location: [facility];  Service: Orthopedics     Family History   Problem Relation Age of  Onset    Heart disease Father     No Known Problems Sister     No Known Problems Daughter     No Known Problems Maternal Grandmother     No Known Problems Maternal Grandfather     No Known Problems Paternal Grandmother     No Known Problems Paternal Grandfather     No Known Problems Son     No Known Problems Son     No Known Problems Son     No Known Problems Maternal Aunt     No Known Problems Maternal Aunt     No Known Problems Paternal Aunt     No Known Problems Paternal Aunt      she reports that she has been smoking cigarettes. She has a 14 pack-year smoking history. She has never used smokeless tobacco. She reports that she does not drink alcohol and does not use drugs.  Current Outpatient Medications   Medication Instructions    ARIPiprazole (ABILIFY) 10 mg    azithromycin (ZITHROMAX) 250 mg tablet Take 2 tablets today then 1 tablet daily x 4 days    brompheniramine-pseudoephedrine-DM 30-2-10 MG/5ML syrup 5 mL, Oral, 4 times daily PRN    chlorhexidine (PERIDEX) 0.12 % solution USE 1 CAPFUL TWICE DAILY, RINSE AND SWISH FOR 30 SECONDS, THEN SPIT    cyclobenzaprine (FLEXERIL) 10 mg, Oral, Daily at bedtime    Denta 5000 Plus 1.1 % CREA USE A PEA SIZED AMOUNT WHEN BRUSHING TWICE DAILY    erythromycin (ILOTYCIN) ophthalmic ointment APPLY OINTMENT TO LEFT LOWER LID TWICE DAILY AND AT BEDTIME FOR 10 DAYS AND THEN DISCONTINUE    fluticasone (FLONASE) 50 mcg/act nasal spray 1 spray, Nasal, Daily    folic acid (FOLVITE) 1 mg, Oral, Daily    LORazepam (ATIVAN) 0.5 mg tablet Take 1-2 about 30 minutes prior to the MRI    methocarbamol (ROBAXIN) 500 mg, Oral, Daily at bedtime PRN    predniSONE 10 mg tablet 30 mg by mouth daily for 3 days, then 20 mg by mouth daily for 3 days, then 10 mg by mouth daily for 3 days, then stop    pyridoxine (VITAMIN B6) 100 mg, Oral, Daily    traZODone (DESYREL) 50 mg, Daily at bedtime PRN   Allergies[1]     Objective   /70   Pulse 86   Temp (!) 97.2 °F (36.2 °C) (Oral)   Resp 16   Wt  "82.6 kg (182 lb 3.2 oz)   LMP 12/25/2020   SpO2 97%   BMI 32.28 kg/m²      Physical Exam  Vitals and nursing note reviewed.   Constitutional:       General: She is not in acute distress.     Appearance: She is well-developed.   HENT:      Head: Normocephalic and atraumatic.     Eyes:      Conjunctiva/sclera: Conjunctivae normal.       Cardiovascular:      Rate and Rhythm: Normal rate and regular rhythm.      Heart sounds: No murmur heard.  Pulmonary:      Effort: Pulmonary effort is normal. No respiratory distress.      Breath sounds: Normal breath sounds.   Abdominal:      Palpations: Abdomen is soft.      Tenderness: There is no abdominal tenderness.     Musculoskeletal:         General: No swelling.      Cervical back: Neck supple.     Skin:     General: Skin is warm and dry.      Capillary Refill: Capillary refill takes less than 2 seconds.     Neurological:      Mental Status: She is alert.     Psychiatric:         Mood and Affect: Mood normal.         Portions of the record may have been created with voice recognition software.  Occasional wrong word or \"sound a like\" substitutions may have occurred due to the inherent limitations of voice recognition software.  Read the chart carefully and recognize, using context, where substitutions have occurred.       [1]   Allergies  Allergen Reactions    Clonazepam Other (See Comments)     Excessive sedation  Vomiting    Methylphenidate Other (See Comments)     Hyperactivity\"    Latex Rash     "

## 2025-05-15 NOTE — PATIENT INSTRUCTIONS
Drink plenty of water    Eat yogurt with live and active cultures and/or take a probiotic at least 3 hours before or after antibiotic dose. Monitor stool for diarrhea and/or blood. If this occurs, contact primary care doctor ASAP.

## 2025-05-19 ENCOUNTER — OFFICE VISIT (OUTPATIENT)
Dept: PHYSICAL THERAPY | Facility: CLINIC | Age: 45
End: 2025-05-19
Attending: PHYSICAL THERAPIST
Payer: COMMERCIAL

## 2025-05-19 DIAGNOSIS — M54.2 ACUTE NECK PAIN: Primary | ICD-10-CM

## 2025-05-19 PROCEDURE — 97140 MANUAL THERAPY 1/> REGIONS: CPT | Performed by: PHYSICAL THERAPIST

## 2025-05-19 PROCEDURE — 97110 THERAPEUTIC EXERCISES: CPT | Performed by: PHYSICAL THERAPIST

## 2025-05-19 NOTE — PROGRESS NOTES
PT Re-Evaluation  and PT Discharge    Today's date: 2025  Patient name: Nina Joseph  : 1980  MRN: [id number]  Referring provider: Armaan Contreras PT  Dx:   Encounter Diagnosis     ICD-10-CM    1. Acute neck pain  M54.2             Start Time: 1015  Stop Time: 1100  Total time in clinic (min): 45 minutes    Assessment    Assessment details: The patient has responded well to physical therapy treatment with gains noted in cervical mobility and UE strength. She reports continued neck stiffness but pain has much improved. She demonstrates normal R shoulder ROM and R UE strength. She is independent with her HEP and has been instructed to continue to exercise 3-4x/week in order to further improve scapular strength and stability. She verbalized understanding. Patient will be discharged from PT at this time.     Goals  STG(4 weeks):             1. Independent with HEP  MET            2. Decrease pain to 3/10 at worst with functional activities  MET            3. Increase AROM C/S to WFL  MET            4. Increase strength by 1/2 MMT grade  MET     LTG(8 weeks):             1. Eliminate pain with cervical mobility and UE reaching/lifting  PARTIALLY MET             2. Increase B shoulder strength to 4+ to 5/5  PARTIALLY MET             3.  Return to PLOF MET                  Plan    Treatment plan discussed with: patient  Plan details: D/C PT and transition to home program        Subjective Evaluation    History of Present Illness  Mechanism of injury: The patient states that on 4-10-25, she awoke with pain in the R shoulder blade that extended up into the neck. She had difficulty lifting her R arm. She was using ice and trying to move her neck and shoulder. The pain persisted and she went to the ER after 4 days. A CT revealed degenerative changes. She was prescribed a ms relaxer. She followed up with her PCP 2 days later. She was prescribed a different ms relaxer and a 10 course of steroids. She does note a  significant improvement in her pain. She has some pain in the R cervical region with occasional sharp pains into the R upper arm.     UPDATE 5/19/25:  The patient reports neck stiffness more than pain at this point. She is able to reach overhead without significant difficulty.   Patient Goals  Patient goal: To not have pain  Pain  Exacerbated by: looking up, turninghead, reaching with R arm.    Social Support  Steps to enter house: no  Stairs in house: no   Lives in: apartment  Lives with: alone    Employment status: not working  Hand dominance: right          Objective     Static Posture     Head  Forward.    Shoulders  Rounded.    Neurological Testing     Sensation   Cervical/Thoracic   Left   Intact: light touch    Right   Intact: light touch    Reflexes   Left   Biceps (C5/C6): normal (2+)    Right   Biceps (C5/C6): normal (2+)    Active Range of Motion   Cervical/Thoracic Spine       Cervical    Flexion:  WFL  Extension:  WFL  Left lateral flexion:  WFL  Right lateral flexion:  WFL  Left rotation:  WFL  Right rotation:  WFL    Right Shoulder   Flexion: WFL  Abduction: WFL  External rotation BTH: WFL  Internal rotation BTB: WFL    Strength/Myotome Testing     Left Shoulder     Planes of Motion   Flexion: 4+   Abduction: 4+   External rotation at 0°: 4   Internal rotation at 0°: 5     Right Shoulder     Planes of Motion   Flexion: 4+   Abduction: 4+   External rotation at 0°: 4   Internal rotation at 0°: 5     Left Elbow   Flexion: 4+  Extension: 4    Right Elbow   Flexion: 4+  Extension: 4             Precautions: R RTC repair 04/2024  HEP issued. Diagnosis, prognosis and PT POC discussed with patient       4/21 4/23 4/30 5/2 5/5 5/7 5/12 5/19     Manuals             Jt mobs C2,C3 R ULPA, mid thoracic gr II PA- in prone C2,C3 R/L ULPA, mid thoracic gr II PA- in prone C2,C3 R/L ULPA mid thoracic gr II  PA-in prone           STM   R UT, B suboccipital in prone-JM R UT, R cerv paraspinals in sitting-JM R UT,R cerv  "paraspinals in sitting- R UT,R cerv paraspinals in sitting- R UT,R cerv paraspinals- R UT,R cerv paraspinlas -                  Neuro Re-Ed             Chin tuck 3\"x10 3\"x10 3\"x10 3\"x10 3\"x10 3\"x10 3\"x10 3\"x10     Cerv rot w. towel 5\"x 10 ea 5\"x10 ea 5\"x10 ea 5\"x10 ea 5\"x10 ea 5\"x10 ea 5\"x10 ea 5\"x10 ea     TB MTP/LTP   L2 20x ea L2 20x ea L3 20x ea L3 20x ea L3 20x ea L3 20x ea                  Prone T,Y     10x ea 2x10 2x10 2x10     Prone rows      3# 2x10 ea 3# 2x10 ea 3# 2x10 ea                  Ther Ex             Open book stretch 10\"x5  10\"x5 10\"x5 10\"x5 10\"x5  10\"x5 10\"x5 10\"x5     Thoracic stretch w. 1/2 roll  10\"x10 10\"x10 10\"x10 10\"x10 10\"x10 10\"x10 10\"x10                  UT stretch  30\"x2 ea 30\"x3 ea 30\"x3 ea 30\"x3 ea 30\"x3 ea 30\"x3 ea 30\"x3 ea     Lev stretch  30\"x2 ea 30\"x3 ea 30\"x3 ea 30\"x3 ea 30\"x3 ea 30\"x3ea 30\"x3 ea                  TB neck ext    L1 5\"x10 L1 5\"x10 L1 5\"x10 L1 5\"x10 L1 5\"x10                               UBE retro for scapular mobility and ms endurance  L1 5' L1 7' L1 7' NP  L1 7' L1 7'      Ther Activity                                                    Modalities             MH  10' R UT in sitting pre -exer 10' R UT in sitting pre exer 10' R UT in sitting pre-exer 10' R UT in sitting pre exer 10' R UT in sitting pre exer 10' R UT in sitting pre exer 10' R UT in sitting pre exer                    "

## 2025-05-21 ENCOUNTER — APPOINTMENT (OUTPATIENT)
Dept: PHYSICAL THERAPY | Facility: CLINIC | Age: 45
End: 2025-05-21
Attending: PHYSICAL THERAPIST
Payer: COMMERCIAL

## 2025-06-05 ENCOUNTER — CLINICAL SUPPORT (OUTPATIENT)
Dept: INTERNAL MEDICINE CLINIC | Facility: CLINIC | Age: 45
End: 2025-06-05
Payer: COMMERCIAL

## 2025-06-05 DIAGNOSIS — E53.8 B12 DEFICIENCY: Primary | ICD-10-CM

## 2025-06-05 PROCEDURE — 96372 THER/PROPH/DIAG INJ SC/IM: CPT

## 2025-06-05 RX ADMIN — CYANOCOBALAMIN 1000 MCG: 1000 INJECTION, SOLUTION INTRAMUSCULAR; SUBCUTANEOUS at 09:42

## 2025-07-14 ENCOUNTER — OFFICE VISIT (OUTPATIENT)
Dept: INTERNAL MEDICINE CLINIC | Facility: CLINIC | Age: 45
End: 2025-07-14
Payer: COMMERCIAL

## 2025-07-14 VITALS
HEIGHT: 63 IN | HEART RATE: 73 BPM | OXYGEN SATURATION: 97 % | SYSTOLIC BLOOD PRESSURE: 116 MMHG | WEIGHT: 184.2 LBS | TEMPERATURE: 97.9 F | BODY MASS INDEX: 32.64 KG/M2 | DIASTOLIC BLOOD PRESSURE: 74 MMHG

## 2025-07-14 DIAGNOSIS — E53.1 VITAMIN B6 DEFICIENCY: ICD-10-CM

## 2025-07-14 DIAGNOSIS — E78.2 MIXED HYPERLIPIDEMIA: Primary | ICD-10-CM

## 2025-07-14 DIAGNOSIS — E53.8 VITAMIN B12 DEFICIENCY: ICD-10-CM

## 2025-07-14 DIAGNOSIS — R73.02 IMPAIRED GLUCOSE TOLERANCE: ICD-10-CM

## 2025-07-14 DIAGNOSIS — E53.8 FOLATE DEFICIENCY: ICD-10-CM

## 2025-07-14 PROCEDURE — 96372 THER/PROPH/DIAG INJ SC/IM: CPT | Performed by: FAMILY MEDICINE

## 2025-07-14 PROCEDURE — 99214 OFFICE O/P EST MOD 30 MIN: CPT | Performed by: FAMILY MEDICINE

## 2025-07-14 RX ADMIN — CYANOCOBALAMIN 1000 MCG: 1000 INJECTION, SOLUTION INTRAMUSCULAR; SUBCUTANEOUS at 11:00

## 2025-08-14 ENCOUNTER — CLINICAL SUPPORT (OUTPATIENT)
Dept: INTERNAL MEDICINE CLINIC | Facility: CLINIC | Age: 45
End: 2025-08-14
Payer: COMMERCIAL

## (undated) DEVICE — CANNULA 8.25 X 90MM LOPRFL SLFRET

## (undated) DEVICE — ALL PURPOSE SPONGES,NON-WOVEN, 4 PLY: Brand: CURITY

## (undated) DEVICE — FLEXIBLE ADHESIVE BANDAGE,X-LARGE: Brand: CURITY

## (undated) DEVICE — STOCKINETTE,IMPERVIOUS,12X48,STERILE: Brand: MEDLINE

## (undated) DEVICE — MEDI-VAC NON-CONDUCTIVE SUCTION TUBING 6MM X 1.8M (6FT.) L: Brand: CARDINAL HEALTH

## (undated) DEVICE — OCCLUSIVE GAUZE STRIP,3% BISMUTH TRIBROMOPHENATE IN PETROLATUM BLEND: Brand: XEROFORM

## (undated) DEVICE — PACK PBDS SHOULDER ARTHROSCOPY RF

## (undated) DEVICE — SUT ETHILON 4-0 PS-2 18 IN 1667H

## (undated) DEVICE — 4-PORT MANIFOLD: Brand: NEPTUNE 2

## (undated) DEVICE — AMBIENT MEGAVAC 90: Brand: COBLATION

## (undated) DEVICE — GAUZE SPONGES,16 PLY: Brand: CURITY

## (undated) DEVICE — ABDOMINAL PAD: Brand: DERMACEA

## (undated) DEVICE — PUDDLE VAC

## (undated) DEVICE — SCD SEQUENTIAL COMPRESSION COMFORT SLEEVE MEDIUM KNEE LENGTH: Brand: KENDALL SCD

## (undated) DEVICE — TUBING SUCTION 5MM X 12 FT

## (undated) DEVICE — CHLORAPREP HI-LITE 10.5ML ORANGE

## (undated) DEVICE — MAT FLOOR STEP DRI 24 X 36 IN N-STRL

## (undated) DEVICE — SYRINGE 5ML LL

## (undated) DEVICE — CHLORAPREP HI-LITE 26ML ORANGE

## (undated) DEVICE — SPONGE LAP 18 X 18 IN STRL RFD

## (undated) DEVICE — POSITIONER HEAD DISP STRL

## (undated) DEVICE — INTENDED FOR TISSUE SEPARATION, AND OTHER PROCEDURES THAT REQUIRE A SHARP SURGICAL BLADE TO PUNCTURE OR CUT.: Brand: BARD-PARKER ® CARBON RIB-BACK BLADES

## (undated) DEVICE — BLADE SHAVER EXCALIBUR 4MM 13CM COOLCUT

## (undated) DEVICE — HALF SHEET: Brand: CONVERTORS

## (undated) DEVICE — TRAY EPIDURAL SINGLE SHOT

## (undated) DEVICE — 3M™ IOBAN™ 2 ANTIMICROBIAL INCISE DRAPE 6650EZ: Brand: IOBAN™ 2

## (undated) DEVICE — SUT FIBERLINK #2 26IN AR-7235

## (undated) DEVICE — TUBING ARTHROSCOPIC WAVE  MAIN PUMP

## (undated) DEVICE — 3M™ MICROFOAM™ TAPE 1528-4: Brand: 3M™ MICROFOAM™

## (undated) DEVICE — GLOVE SRG BIOGEL 8

## (undated) DEVICE — SUTURETAPE 1.3MM W/CLOSED LOOP BLUE/WHITE AR-7535

## (undated) DEVICE — GLOVE SRG BIOGEL 7.5

## (undated) DEVICE — COBAN 6 IN STERILE

## (undated) DEVICE — FIBERTAPE 2MM X 7IN AR-7237-7

## (undated) DEVICE — POSITIONER TRIMANO LIMB BEACH CHAIR

## (undated) DEVICE — GLOVE INDICATOR PI UNDERGLOVE SZ 8 BLUE

## (undated) DEVICE — 3M™ STERI-DRAPE™ U-DRAPE 1015: Brand: STERI-DRAPE™

## (undated) DEVICE — FRAZIER SUCTION INSTRUMENT 18 FR W/OBTURATOR, NO CONTROL VENT: Brand: FRAZIER

## (undated) DEVICE — NEEDLE SUT SCORPION MEGALOADER

## (undated) DEVICE — CANNULA 7 X 70MM NO VALVE TWIST-IN TRIPLE-DAM